# Patient Record
Sex: FEMALE | Race: WHITE | NOT HISPANIC OR LATINO | Employment: OTHER | URBAN - METROPOLITAN AREA
[De-identification: names, ages, dates, MRNs, and addresses within clinical notes are randomized per-mention and may not be internally consistent; named-entity substitution may affect disease eponyms.]

---

## 2017-04-18 ENCOUNTER — CONVERSION ENCOUNTER (OUTPATIENT)
Dept: FAMILY MEDICINE CLINIC | Facility: CLINIC | Age: 67
End: 2017-04-18

## 2017-04-18 LAB
ALBUMIN SERPL-MCNC: 4 G/DL (ref 3.6–5.1)
ALBUMIN/GLOB SERPL: ABNORMAL {RATIO} (ref 1–2.5)
ALP SERPL-CCNC: 75 UNITS/L (ref 33–130)
ALT SERPL-CCNC: 14 UNITS/L (ref 6–29)
AST SERPL-CCNC: 14 UNITS/L (ref 10–35)
BASOPHILS # BLD AUTO: ABNORMAL 10*3/MM3 (ref 0–200)
BASOPHILS NFR BLD AUTO: 0.3 %
BILIRUB SERPL-MCNC: 0.4 MG/DL (ref 0.2–1.2)
BUN SERPL-MCNC: 11 MG/DL (ref 7–25)
BUN/CREAT SERPL: ABNORMAL (ref 6–22)
CALCIUM SERPL-MCNC: 9.3 MG/DL (ref 8.6–10.4)
CHLORIDE SERPL-SCNC: 101 MMOL/L (ref 98–110)
CHOLEST SERPL-MCNC: 301 MG/DL (ref 125–200)
CHOLEST/HDLC SERPL: ABNORMAL {RATIO}
CO2 CONTENT VENOUS: 27 MMOL/L (ref 20–31)
CONV % HGB A1C: ABNORMAL %
CONV NEUTROPHILS/100 LEUKOCYTES IN BODY FLUID BY MANUAL COUNT: 68.4 %
CONV TOTAL PROTEIN: 7.9 G/DL (ref 6.1–8.1)
CREAT UR-MCNC: 0.85 MG/DL (ref 0.5–0.99)
EOSINOPHIL # BLD AUTO: 2.5 %
EOSINOPHIL # BLD AUTO: ABNORMAL 10*3/MM3 (ref 15–500)
ERYTHROCYTE [DISTWIDTH] IN BLOOD BY AUTOMATED COUNT: 13.2 % (ref 11–15)
GLOBULIN UR ELPH-MCNC: ABNORMAL G/DL (ref 1.9–3.7)
GLUCOSE SERPL-MCNC: 222 MG/DL (ref 65–99)
HCT VFR BLD AUTO: 42.3 % (ref 35–45)
HDLC SERPL-MCNC: 44 MG/DL
HGB BLD-MCNC: 14.1 G/DL (ref 11.7–15.5)
LDLC SERPL CALC-MCNC: ABNORMAL MG/DL
LYMPHOCYTES # BLD AUTO: ABNORMAL 10*3/MM3 (ref 850–3900)
LYMPHOCYTES NFR BLD AUTO: 23.6 %
MCH RBC QN AUTO: 28.7 PG (ref 27–33)
MCHC RBC AUTO-ENTMCNC: ABNORMAL % (ref 32–36)
MCV RBC AUTO: 86.2 FL (ref 80–100)
MONOCYTES # BLD AUTO: ABNORMAL 10*3/MICROLITER (ref 200–950)
MONOCYTES NFR BLD AUTO: 5.2 %
NEUTROPHILS # BLD AUTO: ABNORMAL 10*3/MM3 (ref 1500–7800)
PLATELET # BLD AUTO: ABNORMAL 10*3/MM3 (ref 140–400)
PMV BLD AUTO: 10.1 FL (ref 7.5–12.5)
POTASSIUM SERPL-SCNC: 4.1 MMOL/L (ref 3.5–5.3)
RBC # BLD AUTO: ABNORMAL 10*6/MM3 (ref 3.8–5.1)
SODIUM SERPL-SCNC: 139 MMOL/L (ref 135–146)
TRIGL SERPL-MCNC: 456 MG/DL
TSH SERPL-ACNC: 1.12 MICROINTL UNITS/ML (ref 0.4–4.5)
WBC # BLD AUTO: ABNORMAL K/UL (ref 3.8–10.8)

## 2017-05-09 ENCOUNTER — HOSPITAL ENCOUNTER (OUTPATIENT)
Dept: MAMMOGRAPHY | Facility: HOSPITAL | Age: 67
Discharge: HOME | End: 2017-05-09
Attending: NURSE PRACTITIONER | Admitting: NURSE PRACTITIONER

## 2017-05-16 ENCOUNTER — HOSPITAL ENCOUNTER (OUTPATIENT)
Dept: MAMMOGRAPHY | Facility: HOSPITAL | Age: 67
Discharge: HOME | End: 2017-05-16
Attending: NURSE PRACTITIONER | Admitting: NURSE PRACTITIONER

## 2017-06-05 ENCOUNTER — HOSPITAL ENCOUNTER (OUTPATIENT)
Dept: MAMMOGRAPHY | Facility: HOSPITAL | Age: 67
Discharge: HOME | End: 2017-06-05
Attending: NURSE PRACTITIONER | Admitting: NURSE PRACTITIONER

## 2017-08-03 ENCOUNTER — HOSPITAL ENCOUNTER (OUTPATIENT)
Dept: FAMILY MEDICINE CLINIC | Facility: CLINIC | Age: 67
Setting detail: SPECIMEN
Discharge: HOME | End: 2017-08-03
Attending: NURSE PRACTITIONER | Admitting: NURSE PRACTITIONER

## 2017-08-03 LAB
ALBUMIN SERPL-MCNC: 3.4 G/DL (ref 3.5–4.8)
ALBUMIN/GLOB SERPL: 0.9 {RATIO} (ref 1–1.7)
ALP SERPL-CCNC: 57 IU/L (ref 32–91)
ALT SERPL-CCNC: 19 IU/L (ref 14–54)
ANION GAP SERPL CALC-SCNC: 15.6 MMOL/L (ref 10–20)
AST SERPL-CCNC: 20 IU/L (ref 15–41)
BASOPHILS # BLD AUTO: 0 10*3/UL (ref 0–0.2)
BASOPHILS NFR BLD AUTO: 0 % (ref 0–2)
BILIRUB SERPL-MCNC: 0.5 MG/DL (ref 0.3–1.2)
BUN SERPL-MCNC: 12 MG/DL (ref 8–20)
BUN/CREAT SERPL: 15 (ref 5.4–26.2)
CALCIUM SERPL-MCNC: 9.5 MG/DL (ref 8.9–10.3)
CHLORIDE SERPL-SCNC: 104 MMOL/L (ref 101–111)
CHOLEST SERPL-MCNC: 248 MG/DL
CHOLEST/HDLC SERPL: 6.6 {RATIO}
CONV CO2: 23 MMOL/L (ref 22–32)
CONV LDL CHOLESTEROL DIRECT: 153 MG/DL (ref 0–100)
CONV TOTAL PROTEIN: 7.3 G/DL (ref 6.1–7.9)
CREAT UR-MCNC: 0.8 MG/DL (ref 0.4–1)
DIFFERENTIAL METHOD BLD: (no result)
EOSINOPHIL # BLD AUTO: 0.3 10*3/UL (ref 0–0.3)
EOSINOPHIL # BLD AUTO: 4 % (ref 0–3)
ERYTHROCYTE [DISTWIDTH] IN BLOOD BY AUTOMATED COUNT: 12.6 % (ref 11.5–14.5)
GLOBULIN UR ELPH-MCNC: 3.9 G/DL (ref 2.5–3.8)
GLUCOSE SERPL-MCNC: 157 MG/DL (ref 65–99)
HCT VFR BLD AUTO: 39.5 % (ref 35–49)
HDLC SERPL-MCNC: 38 MG/DL
HGB BLD-MCNC: 13 G/DL (ref 12–15)
LDLC/HDLC SERPL: 4.1 {RATIO}
LIPID INTERPRETATION: ABNORMAL
LYMPHOCYTES # BLD AUTO: 2.1 10*3/UL (ref 0.8–4.8)
LYMPHOCYTES NFR BLD AUTO: 30 % (ref 18–42)
MCH RBC QN AUTO: 28.6 PG (ref 26–32)
MCHC RBC AUTO-ENTMCNC: 33 G/DL (ref 32–36)
MCV RBC AUTO: 86.8 FL (ref 80–94)
MONOCYTES # BLD AUTO: 0.5 10*3/UL (ref 0.1–1.3)
MONOCYTES NFR BLD AUTO: 6 % (ref 2–11)
NEUTROPHILS # BLD AUTO: 4.3 10*3/UL (ref 2.3–8.6)
NEUTROPHILS NFR BLD AUTO: 60 % (ref 50–75)
NRBC BLD AUTO-RTO: 0 /100{WBCS}
NRBC/RBC NFR BLD MANUAL: 0 10*3/UL
PLATELET # BLD AUTO: 215 10*3/UL (ref 150–450)
PMV BLD AUTO: 9.1 FL (ref 7.4–10.4)
POTASSIUM SERPL-SCNC: 4.6 MMOL/L (ref 3.6–5.1)
RBC # BLD AUTO: 4.55 10*6/UL (ref 4–5.4)
SODIUM SERPL-SCNC: 138 MMOL/L (ref 136–144)
TRIGL SERPL-MCNC: 345 MG/DL
URATE SERPL-MCNC: 7.6 MG/DL (ref 2.6–8)
VLDLC SERPL CALC-MCNC: 58 MG/DL
WBC # BLD AUTO: 7.2 10*3/UL (ref 4.5–11.5)

## 2017-08-10 ENCOUNTER — HOSPITAL ENCOUNTER (OUTPATIENT)
Dept: MRI IMAGING | Facility: HOSPITAL | Age: 67
Discharge: HOME | End: 2017-08-10
Attending: NURSE PRACTITIONER | Admitting: NURSE PRACTITIONER

## 2017-09-01 ENCOUNTER — HOSPITAL ENCOUNTER (OUTPATIENT)
Dept: PREADMISSION TESTING | Facility: HOSPITAL | Age: 67
Discharge: HOME | End: 2017-09-01
Attending: ORTHOPAEDIC SURGERY | Admitting: ORTHOPAEDIC SURGERY

## 2017-09-01 LAB
ALBUMIN SERPL-MCNC: 3.7 G/DL (ref 3.5–4.8)
ALBUMIN/GLOB SERPL: 0.9 {RATIO} (ref 1–1.7)
ALP SERPL-CCNC: 61 IU/L (ref 32–91)
ALT SERPL-CCNC: 18 IU/L (ref 14–54)
ANION GAP SERPL CALC-SCNC: 17 MMOL/L (ref 10–20)
AST SERPL-CCNC: 19 IU/L (ref 15–41)
BASOPHILS # BLD AUTO: 0.1 10*3/UL (ref 0–0.2)
BASOPHILS NFR BLD AUTO: 1 % (ref 0–2)
BILIRUB SERPL-MCNC: 0.6 MG/DL (ref 0.3–1.2)
BUN SERPL-MCNC: 15 MG/DL (ref 8–20)
BUN/CREAT SERPL: 15 (ref 5.4–26.2)
CALCIUM SERPL-MCNC: 9.3 MG/DL (ref 8.9–10.3)
CHLORIDE SERPL-SCNC: 100 MMOL/L (ref 101–111)
CONV CO2: 26 MMOL/L (ref 22–32)
CONV TOTAL PROTEIN: 7.6 G/DL (ref 6.1–7.9)
CREAT UR-MCNC: 1 MG/DL (ref 0.4–1)
DIFFERENTIAL METHOD BLD: (no result)
EOSINOPHIL # BLD AUTO: 0.3 10*3/UL (ref 0–0.3)
EOSINOPHIL # BLD AUTO: 3 % (ref 0–3)
ERYTHROCYTE [DISTWIDTH] IN BLOOD BY AUTOMATED COUNT: 13.2 % (ref 11.5–14.5)
GLOBULIN UR ELPH-MCNC: 3.9 G/DL (ref 2.5–3.8)
GLUCOSE SERPL-MCNC: 231 MG/DL (ref 65–99)
HCT VFR BLD AUTO: 40.3 % (ref 35–49)
HGB BLD-MCNC: 13.7 G/DL (ref 12–15)
LYMPHOCYTES # BLD AUTO: 2.1 10*3/UL (ref 0.8–4.8)
LYMPHOCYTES NFR BLD AUTO: 27 % (ref 18–42)
Lab: NORMAL
MCH RBC QN AUTO: 29 PG (ref 26–32)
MCHC RBC AUTO-ENTMCNC: 34 G/DL (ref 32–36)
MCV RBC AUTO: 85.4 FL (ref 80–94)
MICRO REPORT STATUS: NORMAL
MONOCYTES # BLD AUTO: 0.4 10*3/UL (ref 0.1–1.3)
MONOCYTES NFR BLD AUTO: 5 % (ref 2–11)
NEUTROPHILS # BLD AUTO: 5.1 10*3/UL (ref 2.3–8.6)
NEUTROPHILS NFR BLD AUTO: 64 % (ref 50–75)
NRBC BLD AUTO-RTO: 0 /100{WBCS}
NRBC/RBC NFR BLD MANUAL: 0 10*3/UL
PLATELET # BLD AUTO: 180 10*3/UL (ref 150–450)
PMV BLD AUTO: 8.8 FL (ref 7.4–10.4)
POTASSIUM SERPL-SCNC: 4 MMOL/L (ref 3.6–5.1)
RBC # BLD AUTO: 4.72 10*6/UL (ref 4–5.4)
SODIUM SERPL-SCNC: 139 MMOL/L (ref 136–144)
SPECIMEN SOURCE: NORMAL
WBC # BLD AUTO: 8 10*3/UL (ref 4.5–11.5)

## 2017-09-05 ENCOUNTER — HOSPITAL ENCOUNTER (OUTPATIENT)
Dept: PREOP | Facility: HOSPITAL | Age: 67
Setting detail: HOSPITAL OUTPATIENT SURGERY
Discharge: HOME | End: 2017-09-05
Attending: ORTHOPAEDIC SURGERY | Admitting: ORTHOPAEDIC SURGERY

## 2017-09-05 LAB
GLUCOSE BLD-MCNC: 172 MG/DL (ref 70–105)
GLUCOSE BLD-MCNC: 257 MG/DL (ref 70–105)
GLUCOSE BLD-MCNC: 265 MG/DL (ref 70–105)
GLUCOSE BLD-MCNC: 282 MG/DL (ref 70–105)

## 2018-09-01 ENCOUNTER — HOSPITAL ENCOUNTER (OUTPATIENT)
Dept: URGENT CARE | Facility: CLINIC | Age: 68
Discharge: HOME | End: 2018-09-01
Attending: EMERGENCY MEDICINE | Admitting: EMERGENCY MEDICINE

## 2018-09-12 ENCOUNTER — HOSPITAL ENCOUNTER (OUTPATIENT)
Dept: FAMILY MEDICINE CLINIC | Facility: CLINIC | Age: 68
Setting detail: SPECIMEN
Discharge: HOME | End: 2018-09-12
Attending: NURSE PRACTITIONER | Admitting: NURSE PRACTITIONER

## 2018-09-12 LAB
ALBUMIN SERPL-MCNC: 3.5 G/DL (ref 3.5–4.8)
ALBUMIN/GLOB SERPL: 0.9 {RATIO} (ref 1–1.7)
ALP SERPL-CCNC: 71 IU/L (ref 32–91)
ALT SERPL-CCNC: 21 IU/L (ref 14–54)
ANION GAP SERPL CALC-SCNC: 14.6 MMOL/L (ref 10–20)
AST SERPL-CCNC: 22 IU/L (ref 15–41)
BASOPHILS # BLD AUTO: 0 10*3/UL (ref 0–0.2)
BASOPHILS NFR BLD AUTO: 1 % (ref 0–2)
BILIRUB SERPL-MCNC: 0.5 MG/DL (ref 0.3–1.2)
BUN SERPL-MCNC: 14 MG/DL (ref 8–20)
BUN/CREAT SERPL: 14 (ref 5.4–26.2)
CALCIUM SERPL-MCNC: 8.9 MG/DL (ref 8.9–10.3)
CHLORIDE SERPL-SCNC: 103 MMOL/L (ref 101–111)
CHOLEST SERPL-MCNC: 256 MG/DL
CHOLEST/HDLC SERPL: 6.5 {RATIO}
CONV CO2: 24 MMOL/L (ref 22–32)
CONV LDL CHOLESTEROL DIRECT: 145 MG/DL (ref 0–100)
CONV TOTAL PROTEIN: 7.2 G/DL (ref 6.1–7.9)
CREAT UR-MCNC: 1 MG/DL (ref 0.4–1)
DIFFERENTIAL METHOD BLD: (no result)
EOSINOPHIL # BLD AUTO: 0.2 10*3/UL (ref 0–0.3)
EOSINOPHIL # BLD AUTO: 3 % (ref 0–3)
ERYTHROCYTE [DISTWIDTH] IN BLOOD BY AUTOMATED COUNT: 13.1 % (ref 11.5–14.5)
GLOBULIN UR ELPH-MCNC: 3.7 G/DL (ref 2.5–3.8)
GLUCOSE SERPL-MCNC: 197 MG/DL (ref 65–99)
HCT VFR BLD AUTO: 40.4 % (ref 35–49)
HDLC SERPL-MCNC: 40 MG/DL
HGB BLD-MCNC: 13.5 G/DL (ref 12–15)
LDLC/HDLC SERPL: 3.7 {RATIO}
LIPID INTERPRETATION: ABNORMAL
LYMPHOCYTES # BLD AUTO: 1.7 10*3/UL (ref 0.8–4.8)
LYMPHOCYTES NFR BLD AUTO: 24 % (ref 18–42)
MCH RBC QN AUTO: 28.5 PG (ref 26–32)
MCHC RBC AUTO-ENTMCNC: 33.3 G/DL (ref 32–36)
MCV RBC AUTO: 85.5 FL (ref 80–94)
MONOCYTES # BLD AUTO: 0.4 10*3/UL (ref 0.1–1.3)
MONOCYTES NFR BLD AUTO: 6 % (ref 2–11)
NEUTROPHILS # BLD AUTO: 4.6 10*3/UL (ref 2.3–8.6)
NEUTROPHILS NFR BLD AUTO: 66 % (ref 50–75)
NRBC BLD AUTO-RTO: 0 /100{WBCS}
NRBC/RBC NFR BLD MANUAL: 0 10*3/UL
PLATELET # BLD AUTO: 235 10*3/UL (ref 150–450)
PMV BLD AUTO: 9.6 FL (ref 7.4–10.4)
POTASSIUM SERPL-SCNC: 4.6 MMOL/L (ref 3.6–5.1)
RBC # BLD AUTO: 4.73 10*6/UL (ref 4–5.4)
SODIUM SERPL-SCNC: 137 MMOL/L (ref 136–144)
TRIGL SERPL-MCNC: 497 MG/DL
VLDLC SERPL CALC-MCNC: 71.7 MG/DL
WBC # BLD AUTO: 7 10*3/UL (ref 4.5–11.5)

## 2018-09-27 ENCOUNTER — HOSPITAL ENCOUNTER (OUTPATIENT)
Dept: GENERAL RADIOLOGY | Facility: HOSPITAL | Age: 68
Discharge: HOME | End: 2018-09-27
Attending: NURSE PRACTITIONER | Admitting: NURSE PRACTITIONER

## 2018-12-12 ENCOUNTER — HOSPITAL ENCOUNTER (OUTPATIENT)
Dept: FAMILY MEDICINE CLINIC | Facility: CLINIC | Age: 68
Setting detail: SPECIMEN
Discharge: HOME | End: 2018-12-12
Attending: NURSE PRACTITIONER | Admitting: NURSE PRACTITIONER

## 2018-12-12 LAB
ALBUMIN SERPL-MCNC: 3.6 G/DL (ref 3.5–4.8)
ALBUMIN/GLOB SERPL: 1 {RATIO} (ref 1–1.7)
ALP SERPL-CCNC: 71 IU/L (ref 32–91)
ALT SERPL-CCNC: 13 IU/L (ref 14–54)
ANION GAP SERPL CALC-SCNC: 14.4 MMOL/L (ref 10–20)
AST SERPL-CCNC: 17 IU/L (ref 15–41)
BILIRUB SERPL-MCNC: 0.6 MG/DL (ref 0.3–1.2)
BUN SERPL-MCNC: 16 MG/DL (ref 8–20)
BUN/CREAT SERPL: 16 (ref 5.4–26.2)
CALCIUM SERPL-MCNC: 8.6 MG/DL (ref 8.9–10.3)
CHLORIDE SERPL-SCNC: 101 MMOL/L (ref 101–111)
CHOLEST SERPL-MCNC: 150 MG/DL
CHOLEST/HDLC SERPL: 3.8 {RATIO}
CONV CO2: 26 MMOL/L (ref 22–32)
CONV LDL CHOLESTEROL DIRECT: 80 MG/DL (ref 0–100)
CONV TOTAL PROTEIN: 7.3 G/DL (ref 6.1–7.9)
CREAT UR-MCNC: 1 MG/DL (ref 0.4–1)
GLOBULIN UR ELPH-MCNC: 3.7 G/DL (ref 2.5–3.8)
GLUCOSE SERPL-MCNC: 260 MG/DL (ref 65–99)
HBA1C MFR BLD: 8.5 % (ref 0–5.6)
HDLC SERPL-MCNC: 40 MG/DL
LDLC/HDLC SERPL: 2 {RATIO}
LIPID INTERPRETATION: ABNORMAL
POTASSIUM SERPL-SCNC: 4.4 MMOL/L (ref 3.6–5.1)
SODIUM SERPL-SCNC: 137 MMOL/L (ref 136–144)
TRIGL SERPL-MCNC: 260 MG/DL
VLDLC SERPL CALC-MCNC: 30.7 MG/DL

## 2019-07-22 RX ORDER — GLIMEPIRIDE 2 MG/1
TABLET ORAL
Qty: 360 TABLET | Refills: 0 | Status: SHIPPED | OUTPATIENT
Start: 2019-07-22 | End: 2020-01-16

## 2019-12-06 RX ORDER — ESCITALOPRAM OXALATE 10 MG/1
10 TABLET ORAL DAILY
Qty: 30 TABLET | Refills: 3 | Status: SHIPPED | OUTPATIENT
Start: 2019-12-06 | End: 2019-12-09 | Stop reason: SDUPTHER

## 2019-12-09 RX ORDER — ESCITALOPRAM OXALATE 10 MG/1
TABLET ORAL
Qty: 30 TABLET | Refills: 4 | Status: SHIPPED | OUTPATIENT
Start: 2019-12-09 | End: 2020-08-27

## 2020-01-16 RX ORDER — GLIMEPIRIDE 2 MG/1
TABLET ORAL
Qty: 360 TABLET | Refills: 0 | Status: SHIPPED | OUTPATIENT
Start: 2020-01-16 | End: 2020-05-22

## 2020-03-04 RX ORDER — ATORVASTATIN CALCIUM 20 MG/1
TABLET, FILM COATED ORAL
Qty: 90 TABLET | Refills: 0 | Status: SHIPPED | OUTPATIENT
Start: 2020-03-04 | End: 2021-06-02 | Stop reason: SDUPTHER

## 2020-04-08 RX ORDER — BISOPROLOL FUMARATE 10 MG/1
TABLET, FILM COATED ORAL
Qty: 90 TABLET | Refills: 0 | Status: SHIPPED | OUTPATIENT
Start: 2020-04-08 | End: 2020-06-25 | Stop reason: SDUPTHER

## 2020-05-22 RX ORDER — GLIMEPIRIDE 2 MG/1
TABLET ORAL
Qty: 360 TABLET | Refills: 0 | Status: SHIPPED | OUTPATIENT
Start: 2020-05-22 | End: 2020-09-01

## 2020-05-22 NOTE — TELEPHONE ENCOUNTER
We have been filling patiens medications without being seen since 2018. She needs to make appt for refills. I did send a 90 day supply today on amaryl but needs appt please

## 2020-05-22 NOTE — TELEPHONE ENCOUNTER
Gave message to patient at 4:25pm and scheduled an appt with [facility] for 06/01/2020 at 9am.

## 2020-05-26 RX ORDER — AMLODIPINE BESYLATE 10 MG/1
TABLET ORAL
Qty: 90 TABLET | Refills: 0 | Status: SHIPPED | OUTPATIENT
Start: 2020-05-26 | End: 2020-08-24

## 2020-05-26 RX ORDER — HYDROCHLOROTHIAZIDE 50 MG/1
TABLET ORAL
Qty: 90 TABLET | Refills: 0 | Status: SHIPPED | OUTPATIENT
Start: 2020-05-26 | End: 2020-08-24

## 2020-06-02 ENCOUNTER — OFFICE VISIT (OUTPATIENT)
Dept: FAMILY MEDICINE CLINIC | Facility: CLINIC | Age: 70
End: 2020-06-02

## 2020-06-02 ENCOUNTER — LAB (OUTPATIENT)
Dept: FAMILY MEDICINE CLINIC | Facility: CLINIC | Age: 70
End: 2020-06-02

## 2020-06-02 VITALS
OXYGEN SATURATION: 97 % | HEIGHT: 64 IN | HEART RATE: 55 BPM | TEMPERATURE: 97.7 F | SYSTOLIC BLOOD PRESSURE: 120 MMHG | BODY MASS INDEX: 37.39 KG/M2 | RESPIRATION RATE: 8 BRPM | WEIGHT: 219 LBS | DIASTOLIC BLOOD PRESSURE: 74 MMHG

## 2020-06-02 DIAGNOSIS — E78.2 MIXED HYPERLIPIDEMIA: ICD-10-CM

## 2020-06-02 DIAGNOSIS — I10 ESSENTIAL HYPERTENSION: ICD-10-CM

## 2020-06-02 DIAGNOSIS — E11.65 TYPE 2 DIABETES MELLITUS WITH HYPERGLYCEMIA, WITHOUT LONG-TERM CURRENT USE OF INSULIN (HCC): ICD-10-CM

## 2020-06-02 DIAGNOSIS — M15.8 OTHER OSTEOARTHRITIS INVOLVING MULTIPLE JOINTS: ICD-10-CM

## 2020-06-02 DIAGNOSIS — E55.9 VITAMIN D DEFICIENCY: ICD-10-CM

## 2020-06-02 DIAGNOSIS — M85.89 OSTEOPENIA OF MULTIPLE SITES: ICD-10-CM

## 2020-06-02 DIAGNOSIS — E11.65 TYPE 2 DIABETES MELLITUS WITH HYPERGLYCEMIA, WITHOUT LONG-TERM CURRENT USE OF INSULIN (HCC): Primary | ICD-10-CM

## 2020-06-02 DIAGNOSIS — Z12.11 SCREENING FOR COLON CANCER: ICD-10-CM

## 2020-06-02 DIAGNOSIS — E11.42 DIABETIC PERIPHERAL NEUROPATHY (HCC): ICD-10-CM

## 2020-06-02 PROBLEM — E78.5 HYPERLIPIDEMIA: Status: ACTIVE | Noted: 2020-06-02

## 2020-06-02 PROBLEM — M85.80 OSTEOPENIA: Status: ACTIVE | Noted: 2017-04-17

## 2020-06-02 PROBLEM — K58.9 IRRITABLE BOWEL SYNDROME WITHOUT DIARRHEA: Status: ACTIVE | Noted: 2017-04-17

## 2020-06-02 PROBLEM — G62.9 PERIPHERAL NEUROPATHY: Status: ACTIVE | Noted: 2017-04-17

## 2020-06-02 PROBLEM — F43.21 ADJUSTMENT DISORDER WITH DEPRESSED MOOD: Status: ACTIVE | Noted: 2018-09-12

## 2020-06-02 PROBLEM — F32.A DEPRESSION: Status: ACTIVE | Noted: 2018-09-12

## 2020-06-02 LAB
25(OH)D3 SERPL-MCNC: 6.7 NG/ML (ref 30–100)
ALBUMIN SERPL-MCNC: 4.4 G/DL (ref 3.5–5.2)
ALBUMIN UR-MCNC: 2.5 MG/DL
ALBUMIN/GLOB SERPL: 1.2 G/DL
ALP SERPL-CCNC: 85 U/L (ref 39–117)
ALT SERPL W P-5'-P-CCNC: 14 U/L (ref 1–33)
ANION GAP SERPL CALCULATED.3IONS-SCNC: 12.7 MMOL/L (ref 5–15)
AST SERPL-CCNC: 11 U/L (ref 1–32)
BASOPHILS # BLD AUTO: 0.03 10*3/MM3 (ref 0–0.2)
BASOPHILS NFR BLD AUTO: 0.4 % (ref 0–1.5)
BILIRUB SERPL-MCNC: 0.2 MG/DL (ref 0.2–1.2)
BUN BLD-MCNC: 30 MG/DL (ref 8–23)
BUN/CREAT SERPL: 28.8 (ref 7–25)
CALCIUM SPEC-SCNC: 9.6 MG/DL (ref 8.6–10.5)
CHLORIDE SERPL-SCNC: 99 MMOL/L (ref 98–107)
CHOLEST SERPL-MCNC: 154 MG/DL (ref 0–200)
CO2 SERPL-SCNC: 23.3 MMOL/L (ref 22–29)
CREAT BLD-MCNC: 1.04 MG/DL (ref 0.57–1)
DEPRECATED RDW RBC AUTO: 40 FL (ref 37–54)
EOSINOPHIL # BLD AUTO: 0.26 10*3/MM3 (ref 0–0.4)
EOSINOPHIL NFR BLD AUTO: 3.5 % (ref 0.3–6.2)
ERYTHROCYTE [DISTWIDTH] IN BLOOD BY AUTOMATED COUNT: 12.8 % (ref 12.3–15.4)
GFR SERPL CREATININE-BSD FRML MDRD: 52 ML/MIN/1.73
GLOBULIN UR ELPH-MCNC: 3.7 GM/DL
GLUCOSE BLD-MCNC: 250 MG/DL (ref 65–99)
HBA1C MFR BLD: 10.3 % (ref 3.5–5.6)
HCT VFR BLD AUTO: 41.5 % (ref 34–46.6)
HDLC SERPL-MCNC: 38 MG/DL (ref 40–60)
HGB BLD-MCNC: 13.6 G/DL (ref 12–15.9)
IMM GRANULOCYTES # BLD AUTO: 0.02 10*3/MM3 (ref 0–0.05)
IMM GRANULOCYTES NFR BLD AUTO: 0.3 % (ref 0–0.5)
LDLC SERPL CALC-MCNC: 47 MG/DL (ref 0–100)
LDLC/HDLC SERPL: 1.25 {RATIO}
LYMPHOCYTES # BLD AUTO: 1.97 10*3/MM3 (ref 0.7–3.1)
LYMPHOCYTES NFR BLD AUTO: 26.9 % (ref 19.6–45.3)
MCH RBC QN AUTO: 28 PG (ref 26.6–33)
MCHC RBC AUTO-ENTMCNC: 32.8 G/DL (ref 31.5–35.7)
MCV RBC AUTO: 85.6 FL (ref 79–97)
MONOCYTES # BLD AUTO: 0.49 10*3/MM3 (ref 0.1–0.9)
MONOCYTES NFR BLD AUTO: 6.7 % (ref 5–12)
NEUTROPHILS # BLD AUTO: 4.56 10*3/MM3 (ref 1.7–7)
NEUTROPHILS NFR BLD AUTO: 62.2 % (ref 42.7–76)
NRBC BLD AUTO-RTO: 0.1 /100 WBC (ref 0–0.2)
PLATELET # BLD AUTO: 196 10*3/MM3 (ref 140–450)
PMV BLD AUTO: 11.5 FL (ref 6–12)
POTASSIUM BLD-SCNC: 5.5 MMOL/L (ref 3.5–5.2)
PROT SERPL-MCNC: 8.1 G/DL (ref 6–8.5)
RBC # BLD AUTO: 4.85 10*6/MM3 (ref 3.77–5.28)
SODIUM BLD-SCNC: 135 MMOL/L (ref 136–145)
TRIGL SERPL-MCNC: 343 MG/DL (ref 0–150)
TSH SERPL DL<=0.05 MIU/L-ACNC: 1.62 UIU/ML (ref 0.27–4.2)
VLDLC SERPL-MCNC: 68.6 MG/DL (ref 5–40)
WBC NRBC COR # BLD: 7.33 10*3/MM3 (ref 3.4–10.8)

## 2020-06-02 PROCEDURE — 80053 COMPREHEN METABOLIC PANEL: CPT | Performed by: NURSE PRACTITIONER

## 2020-06-02 PROCEDURE — 85025 COMPLETE CBC W/AUTO DIFF WBC: CPT | Performed by: NURSE PRACTITIONER

## 2020-06-02 PROCEDURE — 84443 ASSAY THYROID STIM HORMONE: CPT | Performed by: NURSE PRACTITIONER

## 2020-06-02 PROCEDURE — 82306 VITAMIN D 25 HYDROXY: CPT | Performed by: NURSE PRACTITIONER

## 2020-06-02 PROCEDURE — 99215 OFFICE O/P EST HI 40 MIN: CPT | Performed by: NURSE PRACTITIONER

## 2020-06-02 PROCEDURE — 80061 LIPID PANEL: CPT | Performed by: NURSE PRACTITIONER

## 2020-06-02 PROCEDURE — 82043 UR ALBUMIN QUANTITATIVE: CPT | Performed by: NURSE PRACTITIONER

## 2020-06-02 PROCEDURE — 83036 HEMOGLOBIN GLYCOSYLATED A1C: CPT | Performed by: NURSE PRACTITIONER

## 2020-06-02 RX ORDER — GABAPENTIN 600 MG/1
600 TABLET ORAL 3 TIMES DAILY
Qty: 90 TABLET | Refills: 3 | Status: SHIPPED | OUTPATIENT
Start: 2020-06-02 | End: 2020-08-31 | Stop reason: SDUPTHER

## 2020-06-02 RX ORDER — GABAPENTIN 300 MG/1
300 CAPSULE ORAL 3 TIMES DAILY
COMMUNITY
End: 2020-06-02

## 2020-06-02 RX ORDER — MELOXICAM 15 MG/1
15 TABLET ORAL DAILY
Qty: 30 TABLET | Refills: 3 | Status: SHIPPED | OUTPATIENT
Start: 2020-06-02 | End: 2020-08-31 | Stop reason: SDUPTHER

## 2020-06-02 RX ORDER — ASPIRIN 81 MG/1
TABLET ORAL
COMMUNITY
Start: 2013-09-04

## 2020-06-02 NOTE — PROGRESS NOTES
"Subjective   Dora Solorio is a 69 y.o. female.     Chief Complaint   Patient presents with   • Follow-up     medications       /74   Pulse 55   Temp 97.7 °F (36.5 °C) (Temporal)   Resp 8   Ht 162.6 cm (64\")   Wt 99.3 kg (219 lb)   SpO2 97%   BMI 37.59 kg/m²     BP Readings from Last 3 Encounters:   06/02/20 120/74   09/12/18 150/84   08/03/17 126/82       Wt Readings from Last 3 Encounters:   06/02/20 99.3 kg (219 lb)   09/12/18 100 kg (221 lb)   08/03/17 97.3 kg (214 lb 8 oz)       Pt comes in today for follow up on meds. Hasn't been seen in the office in over 1.5 years.  DM: not doing great with diet. BS has been running 110-200 different times throughout day. This morning was 181. Does have increased thirst. She contributes that to her HCTZ.   DPN: getting worse. Feels like \"i'm walking on rocks\". Has pain and burning in noel feet. Takes gabapentin 300mg tid.   Depression: when last seen she was started on lexapro for depression and grief of grandson. Doing well on it and tolerating well.   HTN: BP readings at home running 110-130s/70s.   No recent CP, SOA, palpitations, dizziness, or HA's.     Diabetes   She has type 2 diabetes mellitus. No MedicAlert identification noted. The initial diagnosis of diabetes was made 10 years ago. Pertinent negatives for hypoglycemia include no confusion, dizziness, headaches, hunger, mood changes, nervousness/anxiousness, seizures, sleepiness, speech difficulty, sweats or tremors. Associated symptoms include foot paresthesias. Pertinent negatives for diabetes include no blurred vision, no chest pain, no fatigue, no foot ulcerations, no polydipsia, no polyuria, no weakness and no weight loss. Pertinent negatives for hypoglycemia complications include no blackouts, no hospitalization and no required assistance. Symptoms are stable. Diabetic complications include peripheral neuropathy. Pertinent negatives for diabetic complications include no CVA, heart disease, " nephropathy or retinopathy. Risk factors for coronary artery disease include dyslipidemia, family history and hypertension. Current diabetic treatment includes oral agent (dual therapy). She is compliant with treatment most of the time. Her weight is stable. She is following a generally healthy diet. Meal planning includes avoidance of concentrated sweets. She has not had a previous visit with a dietitian. She participates in exercise intermittently. She monitors blood glucose at home 1-2 x per day. She monitors urine at home <1 x per month. Blood glucose monitoring compliance is fair. Her home blood glucose trend is fluctuating minimally. Her breakfast blood glucose is taken between 7-8 am. Her breakfast blood glucose range is generally 130-140 mg/dl. Her lunch blood glucose is taken between 12-1 pm. Her lunch blood glucose range is generally 140-180 mg/dl. Her dinner blood glucose is taken between 5-6 pm. Her dinner blood glucose range is generally 180-200 mg/dl. Her highest blood glucose is 180-200 mg/dl. Her overall blood glucose range is 140-180 mg/dl. She does not see a podiatrist.Eye exam is current.        The following portions of the patient's history were reviewed and updated as appropriate: allergies, current medications, past family history, past medical history, past social history, past surgical history and problem list.    Review of Systems   Constitutional: Negative for activity change, appetite change, fatigue, unexpected weight gain and unexpected weight loss.   Eyes: Negative for blurred vision.   Cardiovascular: Negative for chest pain and palpitations.   Gastrointestinal: Negative for abdominal pain, diarrhea and nausea.   Endocrine: Negative for polydipsia and polyuria.   Genitourinary: Negative for difficulty urinating.   Musculoskeletal: Positive for arthralgias, back pain and myalgias.   Skin: Positive for dry skin.   Neurological: Negative for dizziness, tremors, seizures, speech  difficulty, weakness and confusion.   Psychiatric/Behavioral: The patient is not nervous/anxious.        Objective   Physical Exam   Constitutional: She is oriented to person, place, and time. She appears well-developed and well-nourished.   HENT:   Head: Normocephalic.   Eyes: Pupils are equal, round, and reactive to light.   Neck: Normal range of motion.   Cardiovascular: Normal rate and regular rhythm.   Pulmonary/Chest: Effort normal and breath sounds normal.   Abdominal: Soft. Bowel sounds are normal. There is no tenderness.   Musculoskeletal:   Lumbar: degenerative changes    Neurological: She is alert and oriented to person, place, and time.   Skin: Skin is warm and dry.   Psychiatric: She has a normal mood and affect. Her behavior is normal.         Diagnoses and all orders for this visit:    1. Type 2 diabetes mellitus with hyperglycemia, without long-term current use of insulin (CMS/[facility]) (Primary)  -     CBC & Differential; Future  -     Comprehensive Metabolic Panel; Future  -     Hemoglobin A1c; Future  -     TSH; Future  -     MicroAlbumin, Urine, Random - Urine, Clean Catch; Future    2. Diabetic peripheral neuropathy (CMS/HCC)  -     gabapentin (NEURONTIN) 600 MG tablet; Take 1 tablet by mouth 3 (Three) Times a Day.  Dispense: 90 tablet; Refill: 3    3. Essential hypertension  -     TSH; Future    4. Mixed hyperlipidemia  -     Lipid Panel; Future    5. Other osteoarthritis involving multiple joints  -     meloxicam (Mobic) 15 MG tablet; Take 1 tablet by mouth Daily.  Dispense: 30 tablet; Refill: 3  -     Vitamin D 25 Hydroxy; Future    6. Vitamin D deficiency  -     Vitamin D 25 Hydroxy; Future    7. Osteopenia of multiple sites  -     DEXA Bone Density Axial    8. Screening for colon cancer  -     Cologuard - Stool, Per Rectum; Future    check labs  dexa scan  cologuard  Will check on PNA vaccine hx  Discussed meds  We are going to trial mobic for OA pain  Will increase her dany to 600mg tid for  SHERWIN, but really need to work on getting her DM under control.   Follow up next week for medicare wellness and review labs  35 min spent with pt discussing her medications, treatment plan, and DM education.   Discussed importance of regular exercise and recommended starting or continuing a regular exercise program for good health. The patient was also encouraged to lose weight for better health.   The importance of monitoring blood sugar regularly was reviewed.  The importance of monitoring the HgBA1C level regularly was reviewed.  The importance of monitoring urine microalbumin regularly to check for kidney damage was reviewed.   The importance of annual eye exams to prevent blindness was reviewed.  The importance of proper foot care and regularly checking feet to prevent sores and possibly loss of limbs was reviewed.   During this office visit, we discussed the pertinent aspects of the visit and treatment recommendations. Pt verbalizes understanding. Follow up was discussed. Patient was given the opportunity to ask questions and discuss other concerns.       Return in about 1 week (around 6/9/2020) for Medicare Wellness.  Answers for HPI/ROS submitted by the patient on 5/26/2020   Diabetes problem  What is the primary reason for your visit?: Diabetes

## 2020-06-12 ENCOUNTER — HOSPITAL ENCOUNTER (OUTPATIENT)
Dept: BONE DENSITY | Facility: HOSPITAL | Age: 70
Discharge: HOME | End: 2020-06-12
Admitting: NURSE PRACTITIONER

## 2020-06-12 PROCEDURE — 77080 DXA BONE DENSITY AXIAL: CPT

## 2020-06-15 ENCOUNTER — TELEPHONE (OUTPATIENT)
Dept: FAMILY MEDICINE CLINIC | Facility: CLINIC | Age: 70
End: 2020-06-15

## 2020-06-15 NOTE — PROGRESS NOTES
Let pt know that her dexa scan showed she is osteopenic. I want to make sure she is taking at least 1200mg calcium/day and 1000 iu Vit D/daily.

## 2020-06-15 NOTE — TELEPHONE ENCOUNTER
----- Message from BRANDON Levy sent at 6/15/2020  8:06 AM EDT -----  Let pt know that her dexa scan showed she is osteopenic. I want to make sure she is taking at least 1200mg calcium/day and 1000 iu Vit D/daily.

## 2020-06-25 ENCOUNTER — OFFICE VISIT (OUTPATIENT)
Dept: FAMILY MEDICINE CLINIC | Facility: CLINIC | Age: 70
End: 2020-06-25

## 2020-06-25 VITALS
RESPIRATION RATE: 16 BRPM | WEIGHT: 225 LBS | TEMPERATURE: 98.2 F | HEIGHT: 64 IN | SYSTOLIC BLOOD PRESSURE: 138 MMHG | OXYGEN SATURATION: 93 % | DIASTOLIC BLOOD PRESSURE: 70 MMHG | BODY MASS INDEX: 38.41 KG/M2 | HEART RATE: 58 BPM

## 2020-06-25 DIAGNOSIS — I10 ESSENTIAL HYPERTENSION: ICD-10-CM

## 2020-06-25 DIAGNOSIS — E55.9 VITAMIN D DEFICIENCY: ICD-10-CM

## 2020-06-25 DIAGNOSIS — Z00.00 MEDICARE ANNUAL WELLNESS VISIT, SUBSEQUENT: Primary | ICD-10-CM

## 2020-06-25 DIAGNOSIS — E11.65 TYPE 2 DIABETES MELLITUS WITH HYPERGLYCEMIA, WITHOUT LONG-TERM CURRENT USE OF INSULIN (HCC): ICD-10-CM

## 2020-06-25 DIAGNOSIS — L08.9 SOFT TISSUE INFECTION: ICD-10-CM

## 2020-06-25 DIAGNOSIS — E78.2 MIXED HYPERLIPIDEMIA: ICD-10-CM

## 2020-06-25 PROCEDURE — G0439 PPPS, SUBSEQ VISIT: HCPCS | Performed by: NURSE PRACTITIONER

## 2020-06-25 PROCEDURE — 99214 OFFICE O/P EST MOD 30 MIN: CPT | Performed by: NURSE PRACTITIONER

## 2020-06-25 RX ORDER — BISOPROLOL FUMARATE 10 MG/1
10 TABLET, FILM COATED ORAL DAILY
Qty: 90 TABLET | Refills: 1 | Status: SHIPPED | OUTPATIENT
Start: 2020-06-25 | End: 2020-12-30

## 2020-06-25 RX ORDER — LISINOPRIL 20 MG/1
20 TABLET ORAL DAILY
COMMUNITY
End: 2020-06-29

## 2020-06-25 RX ORDER — ERGOCALCIFEROL 1.25 MG/1
50000 CAPSULE ORAL WEEKLY
Qty: 12 CAPSULE | Refills: 1 | Status: SHIPPED | OUTPATIENT
Start: 2020-06-25 | End: 2020-12-07

## 2020-06-25 RX ORDER — CEPHALEXIN 500 MG/1
500 CAPSULE ORAL 3 TIMES DAILY
Qty: 30 CAPSULE | Refills: 0 | Status: SHIPPED | OUTPATIENT
Start: 2020-06-25 | End: 2021-10-27

## 2020-06-25 NOTE — PROGRESS NOTES
The ABCs of the Annual Wellness Visit  Subsequent Medicare Wellness Visit    Chief Complaint   Patient presents with   • Medicare Wellness-subsequent       Subjective   History of Present Illness:  Dora Solorio is a 69 y.o. female who presents for a Subsequent Medicare Wellness Visit and also to follow up on recent labs.   Uncontrolled DM: Not monitoring BS regularly at home, but when she does 200s at times. Knows she needs to make diet changes and get rid of soft drinks. She is currently on metformin BID and amaryl BID. We have trid bydureon in the past, but too expensive. We will try trulicity and see how she does (samples will be given for now). She will contact her insurance company to see what we can get covered.   Vit D: Vit d was LOW at 6.7. Will start drisdol. She does c/o fatigue and low energy.   HTN: needs refill on bisoprolol     Cologuard pending.       HEALTH RISK ASSESSMENT    Recent Hospitalizations:  No hospitalization(s) within the last year.    Current Medical Providers:  Patient Care Team:  Bhargavi Rodriguez APRN as PCP - General    Smoking Status:  Social History     Tobacco Use   Smoking Status Never Smoker   Smokeless Tobacco Never Used       Alcohol Consumption:  Social History     Substance and Sexual Activity   Alcohol Use Never   • Frequency: Never       Depression Screen:   PHQ-2/PHQ-9 Depression Screening 6/25/2020   Little interest or pleasure in doing things 0   Feeling down, depressed, or hopeless 0   Total Score 0       Fall Risk Screen:  STEADI Fall Risk Assessment has not been completed.    Health Habits and Functional and Cognitive Screening:  Functional & Cognitive Status 6/25/2020   Do you have difficulty preparing food and eating? No   Do you have difficulty bathing yourself, getting dressed or grooming yourself? No   Do you have difficulty using the toilet? No   Do you have difficulty moving around from place to place? No   Do you have trouble with steps or getting out of a  bed or a chair? Yes   Current Diet Well Balanced Diet   Do you need help using the phone?  No   Are you deaf or do you have serious difficulty hearing?  No   Do you need help with transportation? Yes   Do you need help shopping? No   Do you need help preparing meals?  No   Do you need help with housework?  No   Do you need help with laundry? No   Do you need help taking your medications? No   Do you need help managing money? No   Do you ever drive or ride in a car without wearing a seat belt? No   Have you felt unusual stress, anger or loneliness in the last month? No   Who do you live with? Spouse   If you need help, do you have trouble finding someone available to you? No   Have you been bothered in the last four weeks by sexual problems? No   Do you have difficulty concentrating, remembering or making decisions? No     Does the patient have evidence of cognitive impairment? No    Asprin use counseling:Taking ASA appropriately as indicated    Age-appropriate Screening Schedule:  Refer to the list below for future screening recommendations based on patient's age, sex and/or medical conditions. Orders for these recommended tests are listed in the plan section. The patient has been provided with a written plan.    Health Maintenance   Topic Date Due   • ZOSTER VACCINE (1 of 2) 06/11/2021 (Originally 7/23/1998)   • TDAP/TD VACCINES (1 - Tdap) 07/01/2021 (Originally 7/23/1961)   • INFLUENZA VACCINE  08/01/2020   • MAMMOGRAM  09/27/2020   • HEMOGLOBIN A1C  12/02/2020   • DIABETIC EYE EXAM  01/29/2021   • DIABETIC FOOT EXAM  06/02/2021   • LIPID PANEL  06/02/2021   • URINE MICROALBUMIN  06/02/2021   • DXA SCAN  06/12/2022          The following portions of the patient's history were reviewed and updated as appropriate: allergies, current medications, past family history, past medical history, past social history, past surgical history and problem list.    Outpatient Medications Prior to Visit   Medication Sig Dispense  Refill   • amLODIPine (NORVASC) 10 MG tablet Take 1 tablet by mouth once daily 90 tablet 0   • aspirin (ASPIR) 81 MG EC tablet ASPIRIN 81 81 MG TBEC     • atorvastatin (LIPITOR) 20 MG tablet TAKE 1 TABLET BY MOUTH ONCE DAILY 90 tablet 0   • escitalopram (LEXAPRO) 10 MG tablet TAKE 1 TABLET BY MOUTH ONCE DAILY 30 tablet 4   • gabapentin (NEURONTIN) 600 MG tablet Take 1 tablet by mouth 3 (Three) Times a Day. 90 tablet 3   • glimepiride (AMARYL) 2 MG tablet Take 2 tablets by mouth twice daily 360 tablet 0   • hydroCHLOROthiazide (HYDRODIURIL) 50 MG tablet Take 1 tablet by mouth once daily 90 tablet 0   • lisinopril (PRINIVIL,ZESTRIL) 20 MG tablet Take 20 mg by mouth Daily.     • meloxicam (Mobic) 15 MG tablet Take 1 tablet by mouth Daily. 30 tablet 3   • metFORMIN (GLUCOPHAGE) 1000 MG tablet Take 1,000 mg by mouth 2 (Two) Times a Day With Meals.     • bisoprolol (ZEBeta) 10 MG tablet Take 1 tablet by mouth once daily 90 tablet 0     No facility-administered medications prior to visit.        Patient Active Problem List   Diagnosis   • Adjustment disorder with depressed mood   • Depression   • Diabetes mellitus, type II (CMS/HCC)   • Diabetic peripheral neuropathy (CMS/HCC)   • Essential hypertension   • Gout   • Hyperlipidemia   • Irritable bowel syndrome without diarrhea   • Obesity   • Osteopenia   • Peripheral neuropathy       Advanced Care Planning:  ACP discussion was declined by the patient. Patient does not have an advance directive, declines further assistance.    Review of Systems   Constitutional: Negative for activity change and unexpected weight change.   Eyes: Negative for visual disturbance.   Cardiovascular: Negative for chest pain, palpitations and leg swelling.   Gastrointestinal: Negative for abdominal distention, constipation and diarrhea.   Endocrine: Negative for polydipsia and polyuria.   Neurological: Negative for headaches.   Psychiatric/Behavioral: Negative for confusion and sleep disturbance.  "The patient is not nervous/anxious.        Compared to one year ago, the patient feels her physical health is worse.  Compared to one year ago, the patient feels her mental health is the same.    Reviewed chart for potential of high risk medication in the elderly: yes  Reviewed chart for potential of harmful drug interactions in the elderly:yes    Objective         Vitals:    06/25/20 1031   BP: 138/70   BP Location: Left arm   Patient Position: Sitting   Cuff Size: Large Adult   Pulse: 58   Resp: 16   Temp: 98.2 °F (36.8 °C)   TempSrc: Temporal   SpO2: 93%   Weight: 102 kg (225 lb)   Height: 162.6 cm (64\")       Body mass index is 38.62 kg/m².  Discussed the patient's BMI with her. The BMI is above average; BMI management plan is completed.    Physical Exam   Constitutional: She is oriented to person, place, and time. She appears well-developed.   HENT:   Head: Normocephalic.   Eyes: Pupils are equal, round, and reactive to light. Conjunctivae are normal.   Neck: Neck supple. No thyromegaly present.   Cardiovascular: Normal rate and regular rhythm.   No murmur heard.  Pulmonary/Chest: Effort normal and breath sounds normal.   Abdominal: Soft. Bowel sounds are normal. She exhibits no mass. There is no tenderness.   Neurological: She is alert and oriented to person, place, and time.   Skin: Skin is warm and dry. No lesion noted.   Psychiatric: She has a normal mood and affect. Her behavior is normal.       Lab Results   Component Value Date    TRIG 343 (H) 06/02/2020    HDL 38 (L) 06/02/2020    LDL 47 06/02/2020    VLDL 68.6 (H) 06/02/2020    HGBA1C 10.3 (H) 06/02/2020        Assessment/Plan   Medicare Risks and Personalized Health Plan  CMS Preventative Services Quick Reference  Advance Directive Discussion  Breast Cancer/Mammogram Screening  Colon Cancer Screening  Depression/Dysphoria  Diabetic Lab Screening   Fall Risk  Hearing Problem  Obesity/Overweight   Osteoprorosis Risk    The above risks/problems have been " discussed with the patient.  Pertinent information has been shared with the patient in the After Visit Summary.  Follow up plans and orders are seen below in the Assessment/Plan Section.    Diagnoses and all orders for this visit:    1. Medicare annual wellness visit, subsequent (Primary)    2. Vitamin D deficiency  Comments:  Vit D was LOW at 6.7. Will start drisdol weekly   Orders:  -     vitamin D (ERGOCALCIFEROL) 1.25 MG (92465 UT) capsule capsule; Take 1 capsule by mouth 1 (One) Time Per Week.  Dispense: 12 capsule; Refill: 1    3. Essential hypertension  -     bisoprolol (ZEBeta) 10 MG tablet; Take 1 tablet by mouth Daily.  Dispense: 90 tablet; Refill: 1    4. Type 2 diabetes mellitus with hyperglycemia, without long-term current use of insulin (CMS/[facility])  Comments:  A1C 10.3. We are going to try and add trulicity. Discussed diet and weight management as well.   Orders:  -     Dulaglutide (Trulicity) 0.75 MG/0.5ML solution pen-injector; Inject 0.75 mg under the skin into the appropriate area as directed 1 (One) Time Per Week.  Dispense: 4 pen; Refill: 3    5. Mixed hyperlipidemia  Comments:  TRIGS were high, but should come down once BS comes down. Discussed diet changes.     6. Soft tissue infection  -     cephalexin (Keflex) 500 MG capsule; Take 1 capsule by mouth 3 (Three) Times a Day.  Dispense: 30 capsule; Refill: 0      Follow Up:  Return in about 3 months (around 9/25/2020) for Diabetes follow up.     An After Visit Summary and PPPS were given to the patient.     The importance of monitoring blood sugar regularly was reviewed.  The importance of monitoring the HgBA1C level regularly was reviewed.  The importance of monitoring urine microalbumin regularly to check for kidney damage was reviewed.   The importance of annual eye exams to prevent blindness was reviewed.  The importance of proper foot care and regularly checking feet to prevent sores and possibly loss of limbs was reviewed.   Discussed  importance of regular exercise and recommended starting or continuing a regular exercise program for good health. The patient was also encouraged to lose weight for better health.   During this visit for their annual exam, we reviewed their personal history, social history and family history. We went over their medications and all the recommended health maintenance items for their age group. They were given the opportunity to ask questions and discuss other concerns.

## 2020-06-29 RX ORDER — LISINOPRIL 20 MG/1
TABLET ORAL
Qty: 180 TABLET | Refills: 0 | Status: SHIPPED | OUTPATIENT
Start: 2020-06-29 | End: 2020-09-28

## 2020-08-24 RX ORDER — HYDROCHLOROTHIAZIDE 50 MG/1
TABLET ORAL
Qty: 90 TABLET | Refills: 0 | Status: SHIPPED | OUTPATIENT
Start: 2020-08-24 | End: 2020-11-23

## 2020-08-24 RX ORDER — AMLODIPINE BESYLATE 10 MG/1
TABLET ORAL
Qty: 90 TABLET | Refills: 0 | Status: SHIPPED | OUTPATIENT
Start: 2020-08-24 | End: 2020-11-23

## 2020-08-27 RX ORDER — ESCITALOPRAM OXALATE 10 MG/1
TABLET ORAL
Qty: 30 TABLET | Refills: 0 | Status: SHIPPED | OUTPATIENT
Start: 2020-08-27 | End: 2020-09-28

## 2020-08-31 DIAGNOSIS — M15.8 OTHER OSTEOARTHRITIS INVOLVING MULTIPLE JOINTS: ICD-10-CM

## 2020-08-31 DIAGNOSIS — E11.42 DIABETIC PERIPHERAL NEUROPATHY (HCC): ICD-10-CM

## 2020-08-31 RX ORDER — GABAPENTIN 600 MG/1
600 TABLET ORAL 3 TIMES DAILY
Qty: 90 TABLET | Refills: 3 | Status: SHIPPED | OUTPATIENT
Start: 2020-08-31 | End: 2021-04-05

## 2020-08-31 RX ORDER — MELOXICAM 15 MG/1
15 TABLET ORAL DAILY
Qty: 30 TABLET | Refills: 3 | Status: SHIPPED | OUTPATIENT
Start: 2020-08-31 | End: 2022-06-23

## 2020-09-01 RX ORDER — GLIMEPIRIDE 2 MG/1
TABLET ORAL
Qty: 360 TABLET | Refills: 0 | Status: SHIPPED | OUTPATIENT
Start: 2020-09-01 | End: 2021-04-05

## 2020-09-04 NOTE — TELEPHONE ENCOUNTER
PT IS REQUESTING A REFILL ON metFORMIN (GLUCOPHAGE) 1000 MG tablet    [address] - [facility] - [phone] [facility] [phone] FX    PATIENT IS COMPLETELY OUT OF THE MEDICATION.

## 2020-09-28 RX ORDER — LISINOPRIL 20 MG/1
TABLET ORAL
Qty: 180 TABLET | Refills: 0 | Status: SHIPPED | OUTPATIENT
Start: 2020-09-28 | End: 2020-12-30

## 2020-09-28 RX ORDER — ESCITALOPRAM OXALATE 10 MG/1
TABLET ORAL
Qty: 30 TABLET | Refills: 0 | Status: SHIPPED | OUTPATIENT
Start: 2020-09-28 | End: 2020-11-02

## 2020-11-02 RX ORDER — ESCITALOPRAM OXALATE 10 MG/1
TABLET ORAL
Qty: 30 TABLET | Refills: 0 | Status: SHIPPED | OUTPATIENT
Start: 2020-11-02 | End: 2020-11-30

## 2020-11-23 RX ORDER — HYDROCHLOROTHIAZIDE 50 MG/1
TABLET ORAL
Qty: 90 TABLET | Refills: 0 | Status: SHIPPED | OUTPATIENT
Start: 2020-11-23 | End: 2021-02-22

## 2020-11-23 RX ORDER — AMLODIPINE BESYLATE 10 MG/1
TABLET ORAL
Qty: 90 TABLET | Refills: 0 | Status: SHIPPED | OUTPATIENT
Start: 2020-11-23 | End: 2020-12-07

## 2020-11-30 RX ORDER — ESCITALOPRAM OXALATE 10 MG/1
TABLET ORAL
Qty: 30 TABLET | Refills: 0 | Status: SHIPPED | OUTPATIENT
Start: 2020-11-30 | End: 2020-12-30

## 2020-12-04 DIAGNOSIS — E55.9 VITAMIN D DEFICIENCY: ICD-10-CM

## 2020-12-07 RX ORDER — AMLODIPINE BESYLATE 10 MG/1
TABLET ORAL
Qty: 90 TABLET | Refills: 0 | Status: SHIPPED | OUTPATIENT
Start: 2020-12-07 | End: 2021-05-31

## 2020-12-07 RX ORDER — ERGOCALCIFEROL 1.25 MG/1
CAPSULE ORAL
Qty: 12 CAPSULE | Refills: 0 | Status: SHIPPED | OUTPATIENT
Start: 2020-12-07 | End: 2021-03-01

## 2020-12-30 DIAGNOSIS — I10 ESSENTIAL HYPERTENSION: ICD-10-CM

## 2020-12-30 RX ORDER — ESCITALOPRAM OXALATE 10 MG/1
TABLET ORAL
Qty: 30 TABLET | Refills: 0 | Status: SHIPPED | OUTPATIENT
Start: 2020-12-30 | End: 2021-02-01

## 2020-12-30 RX ORDER — BISOPROLOL FUMARATE 10 MG/1
TABLET, FILM COATED ORAL
Qty: 90 TABLET | Refills: 0 | Status: SHIPPED | OUTPATIENT
Start: 2020-12-30 | End: 2021-04-05

## 2020-12-30 RX ORDER — LISINOPRIL 20 MG/1
TABLET ORAL
Qty: 180 TABLET | Refills: 0 | Status: SHIPPED | OUTPATIENT
Start: 2020-12-30 | End: 2021-10-27 | Stop reason: SDUPTHER

## 2021-02-01 RX ORDER — ESCITALOPRAM OXALATE 10 MG/1
TABLET ORAL
Qty: 30 TABLET | Refills: 3 | Status: SHIPPED | OUTPATIENT
Start: 2021-02-01 | End: 2021-06-02

## 2021-02-22 RX ORDER — HYDROCHLOROTHIAZIDE 50 MG/1
TABLET ORAL
Qty: 90 TABLET | Refills: 0 | Status: SHIPPED | OUTPATIENT
Start: 2021-02-22 | End: 2021-05-23

## 2021-03-01 DIAGNOSIS — E55.9 VITAMIN D DEFICIENCY: ICD-10-CM

## 2021-03-01 RX ORDER — ERGOCALCIFEROL 1.25 MG/1
CAPSULE ORAL
Qty: 12 CAPSULE | Refills: 0 | Status: SHIPPED | OUTPATIENT
Start: 2021-03-01 | End: 2021-06-22

## 2021-04-05 DIAGNOSIS — E11.42 DIABETIC PERIPHERAL NEUROPATHY (HCC): ICD-10-CM

## 2021-04-05 DIAGNOSIS — I10 ESSENTIAL HYPERTENSION: ICD-10-CM

## 2021-04-05 RX ORDER — GLIMEPIRIDE 2 MG/1
TABLET ORAL
Qty: 360 TABLET | Refills: 0 | Status: SHIPPED | OUTPATIENT
Start: 2021-04-05 | End: 2021-11-05

## 2021-04-05 RX ORDER — BISOPROLOL FUMARATE 10 MG/1
TABLET, FILM COATED ORAL
Qty: 90 TABLET | Refills: 0 | Status: SHIPPED | OUTPATIENT
Start: 2021-04-05 | End: 2021-06-22

## 2021-04-05 RX ORDER — GABAPENTIN 600 MG/1
TABLET ORAL
Qty: 270 TABLET | Refills: 0 | Status: SHIPPED | OUTPATIENT
Start: 2021-04-05 | End: 2021-09-27

## 2021-05-23 RX ORDER — HYDROCHLOROTHIAZIDE 50 MG/1
TABLET ORAL
Qty: 90 TABLET | Refills: 0 | Status: SHIPPED | OUTPATIENT
Start: 2021-05-23 | End: 2021-08-23

## 2021-05-31 RX ORDER — AMLODIPINE BESYLATE 10 MG/1
TABLET ORAL
Qty: 90 TABLET | Refills: 0 | Status: SHIPPED | OUTPATIENT
Start: 2021-05-31 | End: 2021-08-23

## 2021-06-02 RX ORDER — ESCITALOPRAM OXALATE 10 MG/1
TABLET ORAL
Qty: 90 TABLET | Refills: 0 | Status: SHIPPED | OUTPATIENT
Start: 2021-06-02 | End: 2021-07-25

## 2021-06-02 RX ORDER — ATORVASTATIN CALCIUM 20 MG/1
20 TABLET, FILM COATED ORAL DAILY
Qty: 90 TABLET | Refills: 0 | Status: SHIPPED | OUTPATIENT
Start: 2021-06-02 | End: 2021-06-03

## 2021-06-03 RX ORDER — ATORVASTATIN CALCIUM 20 MG/1
TABLET, FILM COATED ORAL
Qty: 90 TABLET | Refills: 0 | Status: SHIPPED | OUTPATIENT
Start: 2021-06-03 | End: 2022-03-03

## 2021-06-21 DIAGNOSIS — E55.9 VITAMIN D DEFICIENCY: ICD-10-CM

## 2021-06-21 DIAGNOSIS — I10 ESSENTIAL HYPERTENSION: ICD-10-CM

## 2021-06-22 RX ORDER — BISOPROLOL FUMARATE 10 MG/1
TABLET, FILM COATED ORAL
Qty: 90 TABLET | Refills: 0 | Status: SHIPPED | OUTPATIENT
Start: 2021-06-22 | End: 2021-09-27

## 2021-06-22 RX ORDER — ERGOCALCIFEROL 1.25 MG/1
CAPSULE ORAL
Qty: 12 CAPSULE | Refills: 0 | Status: SHIPPED | OUTPATIENT
Start: 2021-06-22 | End: 2021-09-27

## 2021-07-25 RX ORDER — ESCITALOPRAM OXALATE 10 MG/1
TABLET ORAL
Qty: 90 TABLET | Refills: 0 | Status: SHIPPED | OUTPATIENT
Start: 2021-07-25 | End: 2021-11-23

## 2021-08-23 RX ORDER — AMLODIPINE BESYLATE 10 MG/1
TABLET ORAL
Qty: 90 TABLET | Refills: 0 | Status: SHIPPED | OUTPATIENT
Start: 2021-08-23 | End: 2021-11-22

## 2021-08-23 RX ORDER — HYDROCHLOROTHIAZIDE 50 MG/1
TABLET ORAL
Qty: 90 TABLET | Refills: 0 | Status: SHIPPED | OUTPATIENT
Start: 2021-08-23 | End: 2021-11-22

## 2021-09-26 DIAGNOSIS — E55.9 VITAMIN D DEFICIENCY: ICD-10-CM

## 2021-09-26 DIAGNOSIS — I10 ESSENTIAL HYPERTENSION: ICD-10-CM

## 2021-09-26 DIAGNOSIS — E11.42 DIABETIC PERIPHERAL NEUROPATHY (HCC): ICD-10-CM

## 2021-09-27 RX ORDER — GABAPENTIN 600 MG/1
TABLET ORAL
Qty: 270 TABLET | Refills: 0 | Status: SHIPPED | OUTPATIENT
Start: 2021-09-27 | End: 2022-03-03

## 2021-09-27 RX ORDER — BISOPROLOL FUMARATE 10 MG/1
TABLET, FILM COATED ORAL
Qty: 90 TABLET | Refills: 0 | Status: SHIPPED | OUTPATIENT
Start: 2021-09-27 | End: 2022-01-24

## 2021-09-27 RX ORDER — ERGOCALCIFEROL 1.25 MG/1
CAPSULE ORAL
Qty: 12 CAPSULE | Refills: 0 | Status: SHIPPED | OUTPATIENT
Start: 2021-09-27 | End: 2022-06-23

## 2021-10-07 RX ORDER — LISINOPRIL 20 MG/1
TABLET ORAL
Qty: 180 TABLET | Refills: 0 | OUTPATIENT
Start: 2021-10-07

## 2021-10-07 NOTE — TELEPHONE ENCOUNTER
Left detailed message on patient's voice mail at 3:24pm to call the office to schedule a med check with [facility].

## 2021-10-27 ENCOUNTER — LAB (OUTPATIENT)
Dept: FAMILY MEDICINE CLINIC | Facility: CLINIC | Age: 71
End: 2021-10-27

## 2021-10-27 ENCOUNTER — OFFICE VISIT (OUTPATIENT)
Dept: FAMILY MEDICINE CLINIC | Facility: CLINIC | Age: 71
End: 2021-10-27

## 2021-10-27 VITALS
HEIGHT: 64 IN | BODY MASS INDEX: 35.85 KG/M2 | OXYGEN SATURATION: 99 % | TEMPERATURE: 98 F | WEIGHT: 210 LBS | HEART RATE: 54 BPM | DIASTOLIC BLOOD PRESSURE: 80 MMHG | SYSTOLIC BLOOD PRESSURE: 170 MMHG

## 2021-10-27 DIAGNOSIS — I10 ESSENTIAL HYPERTENSION: ICD-10-CM

## 2021-10-27 DIAGNOSIS — I10 ESSENTIAL HYPERTENSION: Primary | ICD-10-CM

## 2021-10-27 DIAGNOSIS — E78.2 MIXED HYPERLIPIDEMIA: ICD-10-CM

## 2021-10-27 DIAGNOSIS — E11.65 TYPE 2 DIABETES MELLITUS WITH HYPERGLYCEMIA, WITHOUT LONG-TERM CURRENT USE OF INSULIN (HCC): ICD-10-CM

## 2021-10-27 LAB
ALBUMIN SERPL-MCNC: 4.3 G/DL (ref 3.5–5.2)
ALBUMIN UR-MCNC: 15.1 MG/DL
ALBUMIN/GLOB SERPL: 1.2 G/DL
ALP SERPL-CCNC: 69 U/L (ref 39–117)
ALT SERPL W P-5'-P-CCNC: 10 U/L (ref 1–33)
ANION GAP SERPL CALCULATED.3IONS-SCNC: 11.4 MMOL/L (ref 5–15)
AST SERPL-CCNC: 14 U/L (ref 1–32)
BACTERIA UR QL AUTO: ABNORMAL /HPF
BASOPHILS # BLD AUTO: 0.04 10*3/MM3 (ref 0–0.2)
BASOPHILS NFR BLD AUTO: 0.5 % (ref 0–1.5)
BILIRUB SERPL-MCNC: 0.3 MG/DL (ref 0–1.2)
BILIRUB UR QL STRIP: NEGATIVE
BUN SERPL-MCNC: 11 MG/DL (ref 8–23)
BUN/CREAT SERPL: 10.8 (ref 7–25)
CALCIUM SPEC-SCNC: 9.6 MG/DL (ref 8.6–10.5)
CHLORIDE SERPL-SCNC: 100 MMOL/L (ref 98–107)
CHOLEST SERPL-MCNC: 168 MG/DL (ref 0–200)
CLARITY UR: CLEAR
CO2 SERPL-SCNC: 25.6 MMOL/L (ref 22–29)
COLOR UR: YELLOW
CREAT SERPL-MCNC: 1.02 MG/DL (ref 0.57–1)
DEPRECATED RDW RBC AUTO: 41 FL (ref 37–54)
EOSINOPHIL # BLD AUTO: 0.24 10*3/MM3 (ref 0–0.4)
EOSINOPHIL NFR BLD AUTO: 3.1 % (ref 0.3–6.2)
ERYTHROCYTE [DISTWIDTH] IN BLOOD BY AUTOMATED COUNT: 12.5 % (ref 12.3–15.4)
GFR SERPL CREATININE-BSD FRML MDRD: 53 ML/MIN/1.73
GLOBULIN UR ELPH-MCNC: 3.7 GM/DL
GLUCOSE SERPL-MCNC: 263 MG/DL (ref 65–99)
GLUCOSE UR STRIP-MCNC: NEGATIVE MG/DL
HBA1C MFR BLD: 9.8 % (ref 3.5–5.6)
HCT VFR BLD AUTO: 43 % (ref 34–46.6)
HDLC SERPL-MCNC: 42 MG/DL (ref 40–60)
HGB BLD-MCNC: 13.6 G/DL (ref 12–15.9)
HGB UR QL STRIP.AUTO: ABNORMAL
HYALINE CASTS UR QL AUTO: ABNORMAL /LPF
IMM GRANULOCYTES # BLD AUTO: 0.03 10*3/MM3 (ref 0–0.05)
IMM GRANULOCYTES NFR BLD AUTO: 0.4 % (ref 0–0.5)
KETONES UR QL STRIP: NEGATIVE
LDLC SERPL CALC-MCNC: 81 MG/DL (ref 0–100)
LDLC/HDLC SERPL: 1.7 {RATIO}
LEUKOCYTE ESTERASE UR QL STRIP.AUTO: ABNORMAL
LYMPHOCYTES # BLD AUTO: 1.92 10*3/MM3 (ref 0.7–3.1)
LYMPHOCYTES NFR BLD AUTO: 25.1 % (ref 19.6–45.3)
MCH RBC QN AUTO: 28.2 PG (ref 26.6–33)
MCHC RBC AUTO-ENTMCNC: 31.6 G/DL (ref 31.5–35.7)
MCV RBC AUTO: 89.2 FL (ref 79–97)
MONOCYTES # BLD AUTO: 0.59 10*3/MM3 (ref 0.1–0.9)
MONOCYTES NFR BLD AUTO: 7.7 % (ref 5–12)
NEUTROPHILS NFR BLD AUTO: 4.84 10*3/MM3 (ref 1.7–7)
NEUTROPHILS NFR BLD AUTO: 63.2 % (ref 42.7–76)
NITRITE UR QL STRIP: NEGATIVE
NRBC BLD AUTO-RTO: 0 /100 WBC (ref 0–0.2)
PH UR STRIP.AUTO: 6.5 [PH] (ref 5–8)
PLATELET # BLD AUTO: 209 10*3/MM3 (ref 140–450)
PMV BLD AUTO: 11.3 FL (ref 6–12)
POTASSIUM SERPL-SCNC: 5.2 MMOL/L (ref 3.5–5.2)
PROT SERPL-MCNC: 8 G/DL (ref 6–8.5)
PROT UR QL STRIP: ABNORMAL
RBC # BLD AUTO: 4.82 10*6/MM3 (ref 3.77–5.28)
RBC # UR: ABNORMAL /HPF
REF LAB TEST METHOD: ABNORMAL
SODIUM SERPL-SCNC: 137 MMOL/L (ref 136–145)
SP GR UR STRIP: 1.02 (ref 1–1.03)
SQUAMOUS #/AREA URNS HPF: ABNORMAL /HPF
TRIGL SERPL-MCNC: 272 MG/DL (ref 0–150)
TSH SERPL DL<=0.05 MIU/L-ACNC: 2.61 UIU/ML (ref 0.27–4.2)
UROBILINOGEN UR QL STRIP: ABNORMAL
VLDLC SERPL-MCNC: 45 MG/DL (ref 5–40)
WBC # BLD AUTO: 7.66 10*3/MM3 (ref 3.4–10.8)
WBC UR QL AUTO: ABNORMAL /HPF

## 2021-10-27 PROCEDURE — 87086 URINE CULTURE/COLONY COUNT: CPT | Performed by: NURSE PRACTITIONER

## 2021-10-27 PROCEDURE — 80053 COMPREHEN METABOLIC PANEL: CPT | Performed by: NURSE PRACTITIONER

## 2021-10-27 PROCEDURE — 81001 URINALYSIS AUTO W/SCOPE: CPT | Performed by: NURSE PRACTITIONER

## 2021-10-27 PROCEDURE — 84443 ASSAY THYROID STIM HORMONE: CPT | Performed by: NURSE PRACTITIONER

## 2021-10-27 PROCEDURE — 82043 UR ALBUMIN QUANTITATIVE: CPT | Performed by: NURSE PRACTITIONER

## 2021-10-27 PROCEDURE — 36415 COLL VENOUS BLD VENIPUNCTURE: CPT

## 2021-10-27 PROCEDURE — 83036 HEMOGLOBIN GLYCOSYLATED A1C: CPT | Performed by: NURSE PRACTITIONER

## 2021-10-27 PROCEDURE — 85025 COMPLETE CBC W/AUTO DIFF WBC: CPT | Performed by: NURSE PRACTITIONER

## 2021-10-27 PROCEDURE — 99214 OFFICE O/P EST MOD 30 MIN: CPT | Performed by: NURSE PRACTITIONER

## 2021-10-27 PROCEDURE — 80061 LIPID PANEL: CPT | Performed by: NURSE PRACTITIONER

## 2021-10-27 RX ORDER — LISINOPRIL 20 MG/1
20 TABLET ORAL 2 TIMES DAILY
Qty: 180 TABLET | Refills: 0 | Status: SHIPPED | OUTPATIENT
Start: 2021-10-27 | End: 2022-01-27

## 2021-10-27 NOTE — ASSESSMENT & PLAN NOTE
Pt has been non compliant with her DM. Will check labs today, and then f/u in 2 weeks for MWV and discuss treatment options. She may need referral to endo.

## 2021-10-27 NOTE — PROGRESS NOTES
"Chief Complaint  med check  Subjective        Dora Solorio presents to [facility]  Pt comes in today for follow up on HTN and DM  HTN: BP is high today, but states she has been out of lisinopril for 2 weeks. She is also on amlodipine, HCTZ, and bisoprolol. She is also suppose to be taking lisinopril 40mg daily.     DM: last time she was seen was 6/2020. At that time she told me her BS was running in the 200s and I had started her on trulicity. It was working well for her, but it caused some nausea and diarrhea. Took it over 1 month.   Currently, BS running 200s in the mornings fasting. Still taking metformin and amaryl.   Does c/o polyuria.   Due for eye exam   Trying to watch diet, but does admit to drinking regular sodas 1/day or less.   Last A1C in June 2020 was 10.3          Objective     Vital Signs:   /80 (BP Location: Right arm, Patient Position: Sitting, Cuff Size: Adult)   Pulse 54   Temp 98 °F (36.7 °C) (Skin)   Ht 162.6 cm (64\")   Wt 95.3 kg (210 lb)   SpO2 99%   BMI 36.05 kg/m²       BP Readings from Last 3 Encounters:   10/27/21 170/80   06/25/20 138/70   06/02/20 120/74       Wt Readings from Last 3 Encounters:   10/27/21 95.3 kg (210 lb)   06/25/20 102 kg (225 lb)   06/02/20 99.3 kg (219 lb)     Physical Exam  Constitutional:       Appearance: She is well-developed. She is obese.   Eyes:      Pupils: Pupils are equal, round, and reactive to light.   Cardiovascular:      Rate and Rhythm: Normal rate and regular rhythm.   Pulmonary:      Effort: Pulmonary effort is normal.      Breath sounds: Normal breath sounds.   Neurological:      Mental Status: She is alert and oriented to person, place, and time.        Result Review :   The following data was reviewed by: BRANDON Garcias on 10/27/2021:                    Assessment and Plan    Diagnoses and all orders for this visit:    1. Essential hypertension (Primary)  Assessment & Plan:  Hypertension is " worsening.  Continue current treatment regimen.  Dietary sodium restriction.  Weight loss.  Regular aerobic exercise.  Medication changes per orders.  Blood pressure will be reassessed in 4 weeks.  Will restart lisinopril and monitor BP.     Orders:  -     lisinopril (PRINIVIL,ZESTRIL) 20 MG tablet; Take 1 tablet by mouth 2 (Two) Times a Day.  Dispense: 180 tablet; Refill: 0  -     TSH; Future    2. Type 2 diabetes mellitus with hyperglycemia, without long-term current use of insulin (HCC)  Assessment & Plan:  Pt has been non compliant with her DM. Will check labs today, and then f/u in 2 weeks for MWV and discuss treatment options. She may need referral to endo.     Orders:  -     CBC & Differential; Future  -     Comprehensive Metabolic Panel; Future  -     Hemoglobin A1c; Future  -     Urinalysis With Culture If Indicated - Urine, Clean Catch; Future  -     MicroAlbumin, Urine, Random - Urine, Clean Catch; Future    3. Mixed hyperlipidemia  -     Lipid Panel; Future  will check labs today  Refill lisinopril  Will f/u in 2 weeks for MWV, review labs, and discuss treatment options for DM.   During this office visit, we discussed the pertinent aspects of the visit and treatment recommendations. Pt verbalizes understanding. Follow up was discussed. Patient was given the opportunity to ask questions and discuss other concerns.         Follow Up   Return in about 2 weeks (around 11/10/2021) for Medicare Wellness.  Patient was given instructions and counseling regarding her condition or for health maintenance advice. Please see specific information pulled into the AVS if appropriate.

## 2021-10-27 NOTE — ASSESSMENT & PLAN NOTE
Hypertension is worsening.  Continue current treatment regimen.  Dietary sodium restriction.  Weight loss.  Regular aerobic exercise.  Medication changes per orders.  Blood pressure will be reassessed in 4 weeks.  Will restart lisinopril and monitor BP.

## 2021-10-28 LAB — BACTERIA SPEC AEROBE CULT: NO GROWTH

## 2021-11-01 RX ORDER — SEMAGLUTIDE 1.34 MG/ML
0.25 INJECTION, SOLUTION SUBCUTANEOUS WEEKLY
Qty: 1.5 ML | Refills: 1 | Status: SHIPPED | OUTPATIENT
Start: 2021-11-01 | End: 2021-11-11 | Stop reason: SDUPTHER

## 2021-11-05 RX ORDER — GLIMEPIRIDE 2 MG/1
TABLET ORAL
Qty: 360 TABLET | Refills: 0 | Status: SHIPPED | OUTPATIENT
Start: 2021-11-05

## 2021-11-11 ENCOUNTER — OFFICE VISIT (OUTPATIENT)
Dept: FAMILY MEDICINE CLINIC | Facility: CLINIC | Age: 71
End: 2021-11-11

## 2021-11-11 VITALS
SYSTOLIC BLOOD PRESSURE: 150 MMHG | OXYGEN SATURATION: 98 % | WEIGHT: 211 LBS | BODY MASS INDEX: 36.02 KG/M2 | DIASTOLIC BLOOD PRESSURE: 78 MMHG | TEMPERATURE: 97.5 F | HEART RATE: 51 BPM | HEIGHT: 64 IN

## 2021-11-11 DIAGNOSIS — Z00.00 MEDICARE ANNUAL WELLNESS VISIT, SUBSEQUENT: Primary | ICD-10-CM

## 2021-11-11 DIAGNOSIS — E11.65 TYPE 2 DIABETES MELLITUS WITH HYPERGLYCEMIA, WITHOUT LONG-TERM CURRENT USE OF INSULIN (HCC): ICD-10-CM

## 2021-11-11 DIAGNOSIS — Z12.31 ENCOUNTER FOR SCREENING MAMMOGRAM FOR MALIGNANT NEOPLASM OF BREAST: ICD-10-CM

## 2021-11-11 PROCEDURE — 1170F FXNL STATUS ASSESSED: CPT | Performed by: NURSE PRACTITIONER

## 2021-11-11 PROCEDURE — 1160F RVW MEDS BY RX/DR IN RCRD: CPT | Performed by: NURSE PRACTITIONER

## 2021-11-11 PROCEDURE — 99213 OFFICE O/P EST LOW 20 MIN: CPT | Performed by: NURSE PRACTITIONER

## 2021-11-11 PROCEDURE — G0439 PPPS, SUBSEQ VISIT: HCPCS | Performed by: NURSE PRACTITIONER

## 2021-11-11 RX ORDER — SEMAGLUTIDE 1.34 MG/ML
0.25 INJECTION, SOLUTION SUBCUTANEOUS WEEKLY
Qty: 1.5 ML | Refills: 1 | Status: SHIPPED | OUTPATIENT
Start: 2021-11-11 | End: 2022-04-07

## 2021-11-11 NOTE — ASSESSMENT & PLAN NOTE
Diabetes is unchanged.   will try to rx ozempic again. not sure what happened to last rx. pt hasn't tolerated trulicity in the past, but is willing to try ozempic. cont to work on diet and monitor BS and keep diary.   Diabetes will be reassessed in 6 months.

## 2021-11-11 NOTE — PROGRESS NOTES
"The ABCs of the Annual Wellness Visit  Subsequent Medicare Wellness Visit    Chief Complaint   Patient presents with   • Medicare Wellness-subsequent      Subjective    History of Present Illness:  Dora Solorio is a 71 y.o. female who presents for a Subsequent Medicare Wellness Visit.    HTN - Pt currently taking amlodipine, HCTZ, lisinopril, and bisoprolol. Pt monitors her BP every couple days at home. They average 110/50's. Pt reports that her BP \"are always increased here.\" Denies CP, SOB, or HA. Does report some dizziness on occasion.    Diabetes - at last visit, pt was stated on ozempic and referred to endo. Pt was unable to get ozempic and has not heard from endo yet. She is currently taking metformin 1000mg BID and amaryl. Pt reports that she is checking her BS twice a day. She reports that they average in the 100-160.     Covid - completed  Flu - completed  PNA - completed  Colonoscopy - has done cologuard and was positive, but did not follow up with colonoscopy. Does not want to do a colonoscopy. Refuses. Pt was made aware of the possible causes of a positive cologuard.   Mammogram - due. Wants to wait until the first of year.   Dental - UTD  Vision - due.     The following portions of the patient's history were reviewed and   updated as appropriate: allergies, current medications, past family history, past medical history, past social history, past surgical history and problem list.    Compared to one year ago, the patient feels her physical   health is worse.    Compared to one year ago, the patient feels her mental   health is the same.    Recent Hospitalizations:  She was not admitted to the hospital during the last year.       Current Medical Providers:  Patient Care Team:  Bhargavi Rodriguez APRN as PCP - General    Outpatient Medications Prior to Visit   Medication Sig Dispense Refill   • amLODIPine (NORVASC) 10 MG tablet Take 1 tablet by mouth once daily 90 tablet 0   • aspirin (ASPIR) 81 MG EC " tablet ASPIRIN 81 81 MG TBEC     • atorvastatin (LIPITOR) 20 MG tablet Take 1 tablet by mouth once daily 90 tablet 0   • bisoprolol (ZEBeta) 10 MG tablet Take 1 tablet by mouth once daily 90 tablet 0   • escitalopram (LEXAPRO) 10 MG tablet Take 1 tablet by mouth once daily 90 tablet 0   • gabapentin (NEURONTIN) 600 MG tablet TAKE 1 TABLET BY MOUTH THREE TIMES DAILY 270 tablet 0   • glimepiride (AMARYL) 2 MG tablet Take 2 tablets by mouth twice daily 360 tablet 0   • hydroCHLOROthiazide (HYDRODIURIL) 50 MG tablet Take 1 tablet by mouth once daily 90 tablet 0   • lisinopril (PRINIVIL,ZESTRIL) 20 MG tablet Take 1 tablet by mouth 2 (Two) Times a Day. 180 tablet 0   • meloxicam (Mobic) 15 MG tablet Take 1 tablet by mouth Daily. 30 tablet 3   • metFORMIN (GLUCOPHAGE) 1000 MG tablet TAKE 1 TABLET BY MOUTH TWICE DAILY WITH MEALS 180 tablet 0   • vitamin D (ERGOCALCIFEROL) 1.25 MG (15939 UT) capsule capsule Take 1 capsule by mouth once a week 12 capsule 0   • Semaglutide,0.25 or 0.5MG/DOS, (Ozempic, 0.25 or 0.5 MG/DOSE,) 2 MG/1.5ML solution pen-injector Inject 0.25 mg under the skin into the appropriate area as directed 1 (One) Time Per Week. 0.25mg weekly x 1 month, then 0.5mg weekly. 1.5 mL 1     No facility-administered medications prior to visit.       No opioid medication identified on active medication list. I have reviewed chart for other potential  high risk medication/s and harmful drug interactions in the elderly.          Aspirin is on active medication list. Aspirin use is not indicated based on review of current medical condition/s. Risk of harm outweighs potential benefits. Patient instructed to discontinue this medication.  .      Patient Active Problem List   Diagnosis   • Adjustment disorder with depressed mood   • Depression   • Diabetes mellitus, type II (HCC)   • Diabetic peripheral neuropathy (HCC)   • Essential hypertension   • Gout   • Hyperlipidemia   • Irritable bowel syndrome without diarrhea   •  "Obesity   • Osteopenia   • Peripheral neuropathy     Advance Care Planning  Advance Directive is not on file.  ACP discussion was held with the patient during this visit. Patient does not have an advance directive, information provided.          Objective    Vitals:    11/11/21 0926   BP: 150/78   BP Location: Left arm   Patient Position: Sitting   Cuff Size: Adult   Pulse: 51   Temp: 97.5 °F (36.4 °C)   TempSrc: Skin   SpO2: 98%   Weight: 95.7 kg (211 lb)   Height: 162.6 cm (64\")     BMI Readings from Last 1 Encounters:   11/11/21 36.22 kg/m²   BMI is above normal parameters. Recommendations include: nutrition counseling and pharmacological intervention    Does the patient have evidence of cognitive impairment? No    Physical Exam  Constitutional:       Appearance: She is obese. She is not ill-appearing.   HENT:      Head: Normocephalic and atraumatic.      Right Ear: Tympanic membrane normal.      Left Ear: Tympanic membrane normal.   Eyes:      Extraocular Movements: Extraocular movements intact.      Pupils: Pupils are equal, round, and reactive to light.   Cardiovascular:      Rate and Rhythm: Normal rate and regular rhythm.      Pulses: Normal pulses.      Heart sounds: Normal heart sounds.   Pulmonary:      Effort: Pulmonary effort is normal.   Abdominal:      General: Bowel sounds are normal.      Palpations: Abdomen is soft.   Musculoskeletal:         General: Normal range of motion.      Cervical back: Normal range of motion and neck supple.   Neurological:      Mental Status: She is alert and oriented to person, place, and time.   Psychiatric:         Behavior: Behavior normal.       Lab Results   Component Value Date    TRIG 272 (H) 10/27/2021    HDL 42 10/27/2021    LDL 81 10/27/2021    VLDL 45 (H) 10/27/2021    HGBA1C 9.8 (H) 10/27/2021            HEALTH RISK ASSESSMENT    Smoking Status:  Social History     Tobacco Use   Smoking Status Never Smoker   Smokeless Tobacco Never Used     Alcohol " Consumption:  Social History     Substance and Sexual Activity   Alcohol Use Never     Fall Risk Screen:    STEADI Fall Risk Assessment was completed, and patient is at LOW risk for falls.Assessment completed on:11/11/2021    Depression Screening:  PHQ-2/PHQ-9 Depression Screening 11/11/2021   Little interest or pleasure in doing things 0   Feeling down, depressed, or hopeless 0   Total Score 0       Health Habits and Functional and Cognitive Screening:  Functional & Cognitive Status 11/11/2021   Do you have difficulty preparing food and eating? Yes   Do you have difficulty bathing yourself, getting dressed or grooming yourself? No   Do you have difficulty using the toilet? No   Do you have difficulty moving around from place to place? No   Do you have trouble with steps or getting out of a bed or a chair? Yes   Current Diet Diabetic Diet   Dental Exam Up to date   Eye Exam Not up to date   Exercise (times per week) 0 times per week   Current Exercises Include No Regular Exercise;House Cleaning   Do you need help using the phone?  No   Are you deaf or do you have serious difficulty hearing?  No   Do you need help with transportation? No   Do you need help shopping? No   Do you need help preparing meals?  No   Do you need help with housework?  No   Do you need help with laundry? No   Do you need help taking your medications? No   Do you need help managing money? No   Do you ever drive or ride in a car without wearing a seat belt? No   Have you felt unusual stress, anger or loneliness in the last month? No   Who do you live with? Spouse   If you need help, do you have trouble finding someone available to you? No   Have you been bothered in the last four weeks by sexual problems? No   Do you have difficulty concentrating, remembering or making decisions? No       Age-appropriate Screening Schedule:  Refer to the list below for future screening recommendations based on patient's age, sex and/or medical conditions.  Orders for these recommended tests are listed in the plan section. The patient has been provided with a written plan.    Health Maintenance   Topic Date Due   • MAMMOGRAM  09/27/2020   • TDAP/TD VACCINES (1 - Tdap) 11/11/2021 (Originally 7/23/1969)   • DIABETIC FOOT EXAM  11/26/2021 (Originally 6/2/2021)   • DIABETIC EYE EXAM  12/30/2021 (Originally 1/29/2021)   • ZOSTER VACCINE (1 of 2) 11/20/2022 (Originally 7/23/1998)   • HEMOGLOBIN A1C  04/27/2022   • DXA SCAN  06/12/2022   • LIPID PANEL  10/27/2022   • URINE MICROALBUMIN  10/27/2022   • INFLUENZA VACCINE  Completed              Assessment/Plan   CMS Preventative Services Quick Reference  Risk Factors Identified During Encounter  None Identified  The above risks/problems have been discussed with the patient.  Follow up actions/plans if indicated are seen below in the Assessment/Plan Section.  Pertinent information has been shared with the patient in the After Visit Summary.    Diagnoses and all orders for this visit:    1. Medicare annual wellness visit, subsequent (Primary)    2. Type 2 diabetes mellitus with hyperglycemia, without long-term current use of insulin (HCC)  Assessment & Plan:  Diabetes is unchanged.   will try to rx ozempic again. not sure what happened to last rx. pt hasn't tolerated trulicity in the past, but is willing to try ozempic. cont to work on diet and monitor BS and keep diary.   Diabetes will be reassessed in 6 months.    Orders:  -     Semaglutide,0.25 or 0.5MG/DOS, (Ozempic, 0.25 or 0.5 MG/DOSE,) 2 MG/1.5ML solution pen-injector; Inject 0.25 mg under the skin into the appropriate area as directed 1 (One) Time Per Week. 0.25mg weekly x 1 month, then 0.5mg weekly.  Dispense: 1.5 mL; Refill: 1    3. Encounter for screening mammogram for malignant neoplasm of breast  -     Mammo Screening Digital Tomosynthesis Bilateral With CAD; Future      Follow Up:   Return in about 6 months (around 5/11/2022) for Diabetes follow up, HTN follow up.      An After Visit Summary and PPPS were made available to the patient.

## 2021-11-15 ENCOUNTER — TELEPHONE (OUTPATIENT)
Dept: FAMILY MEDICINE CLINIC | Facility: CLINIC | Age: 71
End: 2021-11-15

## 2021-11-15 NOTE — TELEPHONE ENCOUNTER
Caller: Dora Solorio    Relationship: Self    Best call back number: [phone]    Which medication are you concerned about: Semaglutide,0.25 or 0.5MG/DOS, (Ozempic, 0.25 or 0.5 MG/DOSE,) 2 MG/1.5ML solution pen-injector    What are your concerns:  MEDICATION IS $599 A MONTH AND IS TOO EXPENSIVE    PATIENT WOULD LIKE A CALL WITH OPTIONS

## 2021-11-22 RX ORDER — AMLODIPINE BESYLATE 10 MG/1
TABLET ORAL
Qty: 90 TABLET | Refills: 0 | Status: SHIPPED | OUTPATIENT
Start: 2021-11-22 | End: 2022-03-03

## 2021-11-22 RX ORDER — HYDROCHLOROTHIAZIDE 50 MG/1
TABLET ORAL
Qty: 90 TABLET | Refills: 0 | Status: SHIPPED | OUTPATIENT
Start: 2021-11-22 | End: 2022-03-03

## 2021-11-23 RX ORDER — ESCITALOPRAM OXALATE 10 MG/1
TABLET ORAL
Qty: 90 TABLET | Refills: 0 | Status: SHIPPED | OUTPATIENT
Start: 2021-11-23 | End: 2022-03-03

## 2021-11-26 ENCOUNTER — HOSPITAL ENCOUNTER (OUTPATIENT)
Dept: MAMMOGRAPHY | Facility: HOSPITAL | Age: 71
Discharge: HOME | End: 2021-11-26
Admitting: NURSE PRACTITIONER

## 2021-11-26 DIAGNOSIS — Z12.31 ENCOUNTER FOR SCREENING MAMMOGRAM FOR MALIGNANT NEOPLASM OF BREAST: ICD-10-CM

## 2021-11-26 PROCEDURE — 77063 BREAST TOMOSYNTHESIS BI: CPT

## 2021-11-26 PROCEDURE — 77067 SCR MAMMO BI INCL CAD: CPT

## 2022-01-22 DIAGNOSIS — I10 ESSENTIAL HYPERTENSION: ICD-10-CM

## 2022-01-24 RX ORDER — BISOPROLOL FUMARATE 10 MG/1
TABLET, FILM COATED ORAL
Qty: 90 TABLET | Refills: 0 | Status: SHIPPED | OUTPATIENT
Start: 2022-01-24 | End: 2022-05-20

## 2022-01-26 DIAGNOSIS — I10 ESSENTIAL HYPERTENSION: ICD-10-CM

## 2022-01-27 RX ORDER — LISINOPRIL 20 MG/1
TABLET ORAL
Qty: 180 TABLET | Refills: 0 | Status: SHIPPED | OUTPATIENT
Start: 2022-01-27 | End: 2022-10-06

## 2022-03-03 DIAGNOSIS — E11.42 DIABETIC PERIPHERAL NEUROPATHY: ICD-10-CM

## 2022-03-03 RX ORDER — GABAPENTIN 600 MG/1
TABLET ORAL
Qty: 270 TABLET | Refills: 0 | Status: SHIPPED | OUTPATIENT
Start: 2022-03-03 | End: 2022-09-13 | Stop reason: HOSPADM

## 2022-03-03 RX ORDER — ESCITALOPRAM OXALATE 10 MG/1
TABLET ORAL
Qty: 90 TABLET | Refills: 0 | Status: SHIPPED | OUTPATIENT
Start: 2022-03-03 | End: 2022-05-20

## 2022-03-03 RX ORDER — AMLODIPINE BESYLATE 10 MG/1
TABLET ORAL
Qty: 90 TABLET | Refills: 0 | Status: SHIPPED | OUTPATIENT
Start: 2022-03-03 | End: 2022-10-06

## 2022-03-03 RX ORDER — ATORVASTATIN CALCIUM 20 MG/1
TABLET, FILM COATED ORAL
Qty: 90 TABLET | Refills: 0 | Status: SHIPPED | OUTPATIENT
Start: 2022-03-03 | End: 2022-05-20

## 2022-03-03 RX ORDER — HYDROCHLOROTHIAZIDE 50 MG/1
TABLET ORAL
Qty: 90 TABLET | Refills: 0 | Status: SHIPPED | OUTPATIENT
Start: 2022-03-03 | End: 2022-09-13 | Stop reason: HOSPADM

## 2022-04-07 ENCOUNTER — LAB (OUTPATIENT)
Dept: FAMILY MEDICINE CLINIC | Facility: CLINIC | Age: 72
End: 2022-04-07

## 2022-04-07 ENCOUNTER — OFFICE VISIT (OUTPATIENT)
Dept: FAMILY MEDICINE CLINIC | Facility: CLINIC | Age: 72
End: 2022-04-07

## 2022-04-07 VITALS
HEIGHT: 64 IN | RESPIRATION RATE: 17 BRPM | SYSTOLIC BLOOD PRESSURE: 136 MMHG | OXYGEN SATURATION: 99 % | BODY MASS INDEX: 34.31 KG/M2 | HEART RATE: 56 BPM | TEMPERATURE: 97.3 F | DIASTOLIC BLOOD PRESSURE: 84 MMHG | WEIGHT: 201 LBS

## 2022-04-07 DIAGNOSIS — E11.65 TYPE 2 DIABETES MELLITUS WITH HYPERGLYCEMIA, WITHOUT LONG-TERM CURRENT USE OF INSULIN: ICD-10-CM

## 2022-04-07 DIAGNOSIS — E11.65 TYPE 2 DIABETES MELLITUS WITH HYPERGLYCEMIA, WITHOUT LONG-TERM CURRENT USE OF INSULIN: Primary | ICD-10-CM

## 2022-04-07 DIAGNOSIS — L97.511 ULCER OF RIGHT FOOT, LIMITED TO BREAKDOWN OF SKIN: ICD-10-CM

## 2022-04-07 LAB
ALBUMIN SERPL-MCNC: 4 G/DL (ref 3.5–5.2)
ALBUMIN/GLOB SERPL: 1 G/DL
ALP SERPL-CCNC: 68 U/L (ref 39–117)
ALT SERPL W P-5'-P-CCNC: 9 U/L (ref 1–33)
ANION GAP SERPL CALCULATED.3IONS-SCNC: 8.6 MMOL/L (ref 5–15)
AST SERPL-CCNC: 12 U/L (ref 1–32)
BASOPHILS # BLD AUTO: 0.03 10*3/MM3 (ref 0–0.2)
BASOPHILS NFR BLD AUTO: 0.5 % (ref 0–1.5)
BILIRUB SERPL-MCNC: 0.3 MG/DL (ref 0–1.2)
BUN SERPL-MCNC: 17 MG/DL (ref 8–23)
BUN/CREAT SERPL: 20.2 (ref 7–25)
CALCIUM SPEC-SCNC: 9.5 MG/DL (ref 8.6–10.5)
CHLORIDE SERPL-SCNC: 103 MMOL/L (ref 98–107)
CO2 SERPL-SCNC: 26.4 MMOL/L (ref 22–29)
CREAT SERPL-MCNC: 0.84 MG/DL (ref 0.57–1)
DEPRECATED RDW RBC AUTO: 40.5 FL (ref 37–54)
EGFRCR SERPLBLD CKD-EPI 2021: 73.5 ML/MIN/1.73
EOSINOPHIL # BLD AUTO: 0.24 10*3/MM3 (ref 0–0.4)
EOSINOPHIL NFR BLD AUTO: 4 % (ref 0.3–6.2)
ERYTHROCYTE [DISTWIDTH] IN BLOOD BY AUTOMATED COUNT: 12.4 % (ref 12.3–15.4)
GLOBULIN UR ELPH-MCNC: 3.9 GM/DL
GLUCOSE SERPL-MCNC: 172 MG/DL (ref 65–99)
HBA1C MFR BLD: 9.6 % (ref 3.5–5.6)
HCT VFR BLD AUTO: 39.2 % (ref 34–46.6)
HGB BLD-MCNC: 12.5 G/DL (ref 12–15.9)
IMM GRANULOCYTES # BLD AUTO: 0.01 10*3/MM3 (ref 0–0.05)
IMM GRANULOCYTES NFR BLD AUTO: 0.2 % (ref 0–0.5)
LYMPHOCYTES # BLD AUTO: 1.79 10*3/MM3 (ref 0.7–3.1)
LYMPHOCYTES NFR BLD AUTO: 30 % (ref 19.6–45.3)
MCH RBC QN AUTO: 28.3 PG (ref 26.6–33)
MCHC RBC AUTO-ENTMCNC: 31.9 G/DL (ref 31.5–35.7)
MCV RBC AUTO: 88.7 FL (ref 79–97)
MONOCYTES # BLD AUTO: 0.45 10*3/MM3 (ref 0.1–0.9)
MONOCYTES NFR BLD AUTO: 7.5 % (ref 5–12)
NEUTROPHILS NFR BLD AUTO: 3.45 10*3/MM3 (ref 1.7–7)
NEUTROPHILS NFR BLD AUTO: 57.8 % (ref 42.7–76)
NRBC BLD AUTO-RTO: 0 /100 WBC (ref 0–0.2)
PLATELET # BLD AUTO: 187 10*3/MM3 (ref 140–450)
PMV BLD AUTO: 11.5 FL (ref 6–12)
POTASSIUM SERPL-SCNC: 4.9 MMOL/L (ref 3.5–5.2)
PROT SERPL-MCNC: 7.9 G/DL (ref 6–8.5)
RBC # BLD AUTO: 4.42 10*6/MM3 (ref 3.77–5.28)
SODIUM SERPL-SCNC: 138 MMOL/L (ref 136–145)
WBC NRBC COR # BLD: 5.97 10*3/MM3 (ref 3.4–10.8)

## 2022-04-07 PROCEDURE — 85025 COMPLETE CBC W/AUTO DIFF WBC: CPT | Performed by: NURSE PRACTITIONER

## 2022-04-07 PROCEDURE — 80053 COMPREHEN METABOLIC PANEL: CPT | Performed by: NURSE PRACTITIONER

## 2022-04-07 PROCEDURE — 99214 OFFICE O/P EST MOD 30 MIN: CPT | Performed by: NURSE PRACTITIONER

## 2022-04-07 PROCEDURE — 36415 COLL VENOUS BLD VENIPUNCTURE: CPT

## 2022-04-07 PROCEDURE — 83036 HEMOGLOBIN GLYCOSYLATED A1C: CPT | Performed by: NURSE PRACTITIONER

## 2022-04-07 RX ORDER — CEPHALEXIN 500 MG/1
500 CAPSULE ORAL 4 TIMES DAILY
Qty: 40 CAPSULE | Refills: 0 | Status: SHIPPED | OUTPATIENT
Start: 2022-04-07 | End: 2022-06-22 | Stop reason: HOSPADM

## 2022-04-07 NOTE — PROGRESS NOTES
"Chief Complaint  Toe Pain (Swollen and sore on bottom of toe)  Subjective        Dora Solorio presents to [facility]  Pt comes in today w/ c/o sore and infection to right middle toe. Toe itself is red and swollen. Open sore on bottom of toe. Denies pain. Doesn't remember injuring it or stepping on anything. Has neuropathy.  Diabetes has been uncontrolled. Last A1c was 9.8. I had sarted ozempic as she didn't tolerate trulicity in the past, but states she never started it. Insurance wouldn't approve it.   She says BS running 140s.   Taking metformin and glimepiride.         Objective     Vital Signs:   /84   Pulse 56   Temp 97.3 °F (36.3 °C)   Resp 17   Ht 162.6 cm (64\")   Wt 91.2 kg (201 lb)   SpO2 99%   BMI 34.50 kg/m²       BP Readings from Last 3 Encounters:   04/07/22 136/84   11/11/21 150/78   10/27/21 170/80       Wt Readings from Last 3 Encounters:   04/07/22 91.2 kg (201 lb)   11/11/21 95.7 kg (211 lb)   10/27/21 95.3 kg (210 lb)     Physical Exam  Constitutional:       Appearance: She is well-developed.   Eyes:      Pupils: Pupils are equal, round, and reactive to light.   Cardiovascular:      Rate and Rhythm: Normal rate and regular rhythm.   Pulmonary:      Effort: Pulmonary effort is normal.      Breath sounds: Normal breath sounds.   Feet:      Right foot:      Skin integrity: Ulcer and erythema present.      Toenail Condition: Right toenails are abnormally thick.      Left foot:      Toenail Condition: Left toenails are abnormally thick.   Skin:     Comments: Right foot middle toe swollen, red. Posterior toe open round shaped wound. Non tender.    Neurological:      Mental Status: She is alert and oriented to person, place, and time.        Result Review :                 Assessment and Plan    Diagnoses and all orders for this visit:    1. Type 2 diabetes mellitus with hyperglycemia, without long-term current use of insulin (HCC) (Primary)  -     CBC & " Differential; Future  -     Ambulatory Referral to Endocrinology  -     Hemoglobin A1c; Future  -     Comprehensive Metabolic Panel; Future  -     Ambulatory Referral to Podiatry    2. Ulcer of right foot, limited to breakdown of skin (HCC)  -     Ambulatory Referral to Podiatry  -     cephalexin (Keflex) 500 MG capsule; Take 1 capsule by mouth 4 (Four) Times a Day.  Dispense: 40 capsule; Refill: 0    check labs today  Wound cleaned, dressed. Start anbx  Referral to endo  Referral to podiatry  During this office visit, we discussed the pertinent aspects of the visit and treatment recommendations. Pt verbalizes understanding. Follow up was discussed. Patient was given the opportunity to ask questions and discuss other concerns.   The importance of monitoring blood sugar regularly was reviewed.  The importance of monitoring the HgBA1C level regularly was reviewed.  The importance of monitoring urine microalbumin regularly to check for kidney damage was reviewed.   The importance of annual eye exams to prevent blindness was reviewed.  The importance of proper foot care and regularly checking feet to prevent sores and possibly loss of limbs was reviewed.   Discussed importance of regular exercise and recommended starting or continuing a regular exercise program for good health. The patient was also encouraged to lose weight for better health.         Follow Up   Return in about 3 months (around 7/7/2022) for Diabetes follow up.  Patient was given instructions and counseling regarding her condition or for health maintenance advice. Please see specific information pulled into the AVS if appropriate.       Answers for HPI/ROS submitted by the patient on 4/5/2022  Please describe your symptoms.: Red middle toe on right foot. Have a sore on bottom of that toe also.  Have you had these symptoms before?: No  How long have you been having these symptoms?: 5-7 days  Please describe any probable cause for these symptoms. : Don't  know  What is the primary reason for your visit?: Other

## 2022-04-11 ENCOUNTER — TELEPHONE (OUTPATIENT)
Dept: FAMILY MEDICINE CLINIC | Facility: CLINIC | Age: 72
End: 2022-04-11

## 2022-04-11 DIAGNOSIS — E11.65 TYPE 2 DIABETES MELLITUS WITH HYPERGLYCEMIA, WITHOUT LONG-TERM CURRENT USE OF INSULIN: Primary | ICD-10-CM

## 2022-04-11 RX ORDER — PEN NEEDLE, DIABETIC 30 GX3/16"
1 NEEDLE, DISPOSABLE MISCELLANEOUS DAILY
Qty: 100 EACH | Refills: 3 | Status: SHIPPED | OUTPATIENT
Start: 2022-04-11 | End: 2022-06-22 | Stop reason: HOSPADM

## 2022-04-11 NOTE — PROGRESS NOTES
A1c still elevated at 9.6. since we were not able to tolerate trulicity and insurance denied ozempic. I am going to start her on insulin until she is seen by endo. I have placed a referral for her.   Will start lantus 20 units nightly.

## 2022-04-11 NOTE — TELEPHONE ENCOUNTER
Caller: Dora Solorio    Relationship to patient: Self    Best call back number: [phone]    Patient is needing: PATIENT CALLED AND SAID THAT HER PODIATRIST IS PUTTING HER ON A STRONG ANTIBIOTIC DUE TO A FOOT INFECTION     SHE WOULD NEED TO BE OFF HER LEXAPRO TO TAKE IT. THE MEDICATION IS LINEZOLID, ALSO WANTING TO SEE IF THERE IS ANY OTHER INTERACTION WITH OTHER MEDICATION

## 2022-05-20 DIAGNOSIS — I10 ESSENTIAL HYPERTENSION: ICD-10-CM

## 2022-05-20 RX ORDER — ESCITALOPRAM OXALATE 10 MG/1
TABLET ORAL
Qty: 90 TABLET | Refills: 0 | Status: SHIPPED | OUTPATIENT
Start: 2022-05-20 | End: 2022-10-06

## 2022-05-20 RX ORDER — ATORVASTATIN CALCIUM 20 MG/1
TABLET, FILM COATED ORAL
Qty: 90 TABLET | Refills: 0 | Status: SHIPPED | OUTPATIENT
Start: 2022-05-20 | End: 2022-10-06

## 2022-05-20 RX ORDER — BISOPROLOL FUMARATE 10 MG/1
TABLET, FILM COATED ORAL
Qty: 90 TABLET | Refills: 0 | Status: SHIPPED | OUTPATIENT
Start: 2022-05-20 | End: 2022-10-06

## 2022-06-02 ENCOUNTER — HOSPITAL ENCOUNTER (OUTPATIENT)
Dept: CARDIOLOGY | Facility: HOSPITAL | Age: 72
Discharge: HOME | End: 2022-06-02

## 2022-06-02 ENCOUNTER — TRANSCRIBE ORDERS (OUTPATIENT)
Dept: ADMINISTRATIVE | Facility: HOSPITAL | Age: 72
End: 2022-06-02

## 2022-06-02 ENCOUNTER — LAB (OUTPATIENT)
Dept: LAB | Facility: HOSPITAL | Age: 72
End: 2022-06-02

## 2022-06-02 DIAGNOSIS — I10 ESSENTIAL HYPERTENSION, MALIGNANT: ICD-10-CM

## 2022-06-02 DIAGNOSIS — M20.41 ACQUIRED HAMMER TOE OF RIGHT FOOT: ICD-10-CM

## 2022-06-02 DIAGNOSIS — Z01.818 PRE-OP TESTING: ICD-10-CM

## 2022-06-02 DIAGNOSIS — Z01.818 PRE-OP TESTING: Primary | ICD-10-CM

## 2022-06-02 LAB
ANION GAP SERPL CALCULATED.3IONS-SCNC: 12.3 MMOL/L (ref 5–15)
BASOPHILS # BLD AUTO: 0.03 10*3/MM3 (ref 0–0.2)
BASOPHILS NFR BLD AUTO: 0.4 % (ref 0–1.5)
BUN SERPL-MCNC: 23 MG/DL (ref 8–23)
BUN/CREAT SERPL: 21.1 (ref 7–25)
CALCIUM SPEC-SCNC: 9.5 MG/DL (ref 8.6–10.5)
CHLORIDE SERPL-SCNC: 102 MMOL/L (ref 98–107)
CO2 SERPL-SCNC: 22.7 MMOL/L (ref 22–29)
CREAT SERPL-MCNC: 1.09 MG/DL (ref 0.57–1)
DEPRECATED RDW RBC AUTO: 39.5 FL (ref 37–54)
EGFRCR SERPLBLD CKD-EPI 2021: 53.8 ML/MIN/1.73
EOSINOPHIL # BLD AUTO: 0.22 10*3/MM3 (ref 0–0.4)
EOSINOPHIL NFR BLD AUTO: 2.6 % (ref 0.3–6.2)
ERYTHROCYTE [DISTWIDTH] IN BLOOD BY AUTOMATED COUNT: 12.5 % (ref 12.3–15.4)
GLUCOSE SERPL-MCNC: 231 MG/DL (ref 65–99)
HCT VFR BLD AUTO: 42.3 % (ref 34–46.6)
HGB BLD-MCNC: 13.6 G/DL (ref 12–15.9)
IMM GRANULOCYTES # BLD AUTO: 0.04 10*3/MM3 (ref 0–0.05)
IMM GRANULOCYTES NFR BLD AUTO: 0.5 % (ref 0–0.5)
LYMPHOCYTES # BLD AUTO: 2.15 10*3/MM3 (ref 0.7–3.1)
LYMPHOCYTES NFR BLD AUTO: 25.3 % (ref 19.6–45.3)
MCH RBC QN AUTO: 28.2 PG (ref 26.6–33)
MCHC RBC AUTO-ENTMCNC: 32.2 G/DL (ref 31.5–35.7)
MCV RBC AUTO: 87.8 FL (ref 79–97)
MONOCYTES # BLD AUTO: 0.48 10*3/MM3 (ref 0.1–0.9)
MONOCYTES NFR BLD AUTO: 5.6 % (ref 5–12)
NEUTROPHILS NFR BLD AUTO: 5.58 10*3/MM3 (ref 1.7–7)
NEUTROPHILS NFR BLD AUTO: 65.6 % (ref 42.7–76)
NRBC BLD AUTO-RTO: 0 /100 WBC (ref 0–0.2)
PLATELET # BLD AUTO: 186 10*3/MM3 (ref 140–450)
PMV BLD AUTO: 11.5 FL (ref 6–12)
POTASSIUM SERPL-SCNC: 4.8 MMOL/L (ref 3.5–5.2)
RBC # BLD AUTO: 4.82 10*6/MM3 (ref 3.77–5.28)
SODIUM SERPL-SCNC: 137 MMOL/L (ref 136–145)
WBC NRBC COR # BLD: 8.5 10*3/MM3 (ref 3.4–10.8)

## 2022-06-02 PROCEDURE — 93010 ELECTROCARDIOGRAM REPORT: CPT | Performed by: INTERNAL MEDICINE

## 2022-06-02 PROCEDURE — 36415 COLL VENOUS BLD VENIPUNCTURE: CPT

## 2022-06-02 PROCEDURE — 80048 BASIC METABOLIC PNL TOTAL CA: CPT

## 2022-06-02 PROCEDURE — 93005 ELECTROCARDIOGRAM TRACING: CPT | Performed by: PODIATRIST

## 2022-06-02 PROCEDURE — 85025 COMPLETE CBC W/AUTO DIFF WBC: CPT

## 2022-06-03 LAB — QT INTERVAL: 386 MS

## 2022-06-15 ENCOUNTER — APPOINTMENT (OUTPATIENT)
Dept: GENERAL RADIOLOGY | Facility: HOSPITAL | Age: 72
End: 2022-06-15

## 2022-06-15 ENCOUNTER — APPOINTMENT (OUTPATIENT)
Dept: CT IMAGING | Facility: HOSPITAL | Age: 72
End: 2022-06-15

## 2022-06-15 ENCOUNTER — HOSPITAL ENCOUNTER (EMERGENCY)
Facility: HOSPITAL | Age: 72
Discharge: HOME | End: 2022-06-15
Attending: EMERGENCY MEDICINE | Admitting: EMERGENCY MEDICINE

## 2022-06-15 ENCOUNTER — TELEPHONE (OUTPATIENT)
Dept: FAMILY MEDICINE CLINIC | Facility: CLINIC | Age: 72
End: 2022-06-15

## 2022-06-15 VITALS
HEART RATE: 70 BPM | RESPIRATION RATE: 18 BRPM | TEMPERATURE: 97.8 F | OXYGEN SATURATION: 95 % | WEIGHT: 200.62 LBS | BODY MASS INDEX: 34.25 KG/M2 | DIASTOLIC BLOOD PRESSURE: 76 MMHG | SYSTOLIC BLOOD PRESSURE: 169 MMHG | HEIGHT: 64 IN

## 2022-06-15 DIAGNOSIS — M25.572 ACUTE LEFT ANKLE PAIN: ICD-10-CM

## 2022-06-15 DIAGNOSIS — M54.2 NECK PAIN: ICD-10-CM
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
## 2022-06-15 DIAGNOSIS — M79.672 LEFT FOOT PAIN: ICD-10-CM

## 2022-06-15 PROCEDURE — 73630 X-RAY EXAM OF FOOT: CPT

## 2022-06-15 PROCEDURE — 73610 X-RAY EXAM OF ANKLE: CPT

## 2022-06-15 PROCEDURE — 99282 EMERGENCY DEPT VISIT SF MDM: CPT

## 2022-06-15 PROCEDURE — 70450 CT HEAD/BRAIN W/O DYE: CPT

## 2022-06-15 PROCEDURE — 72125 CT NECK SPINE W/O DYE: CPT

## 2022-06-15 RX ORDER — LIDOCAINE 50 MG/G
1 PATCH TOPICAL EVERY 24 HOURS
Qty: 6 PATCH | Refills: 0 | Status: SHIPPED | OUTPATIENT
Start: 2022-06-15 | End: 2022-06-23

## 2022-06-15 RX ORDER — TIZANIDINE 2 MG/1
2 TABLET ORAL EVERY 8 HOURS PRN
Qty: 10 TABLET | Refills: 0 | Status: SHIPPED | OUTPATIENT
Start: 2022-06-15 | End: 2022-06-16 | Stop reason: SDUPTHER

## 2022-06-15 NOTE — DISCHARGE INSTRUCTIONS
Please follow-up with your primary care provider, call for an appointment  Return to the ED for new or worsening symptoms

## 2022-06-15 NOTE — ED PROVIDER NOTES
Subjective    Chief Complaint   Patient presents with   • Fall     Pt reports fell Monday night hitting neck and rt shoulder on 's scooter, c/o continued pain     Bhargavi Rodriguez, BRANDON  No LMP recorded. Patient is postmenopausal.  Allergies   Allergen Reactions   • Sulfa Antibiotics Rash       Patient is a 71-year-old female presents emergency department after a fall early Tuesday morning.  Patient reports she fell hitting her head and her neck no loss of conscious.  No severe headache or visual disturbances, but she does have continued neck pain.  She also complains of pain in her left foot and ankle.      Onset: Early Tuesday morning    Location: Right neck    Duration: Consistent    Character: Throbbing    Aggravating Factors: Movement    Alleviating Factors: None    Radiation: None    Treatments Tried: None            Review of Systems   Constitutional: Negative for chills and fever.   Eyes: Negative for photophobia and visual disturbance.   Respiratory: Negative for shortness of breath.    Cardiovascular: Negative for chest pain.   Musculoskeletal: Positive for neck pain. Negative for back pain.        Left foot and ankle pain   Skin: Negative for color change and rash.   Neurological: Negative for dizziness, syncope, weakness and light-headedness.   Psychiatric/Behavioral: Negative for confusion.       Past Medical History:   Diagnosis Date   • Adjustment disorder with depressed mood 9/12/2018   • Allergic Years    Sulfa drugs   • Arthritis Years    Lotsvof joints. Mostly back, knees, hips, fingers   • Depression 9/12/2018   • Diabetes mellitus, type II (HCC) 7/3/2013   • Essential hypertension 7/3/2013   • Gout 8/12/2014   • Hyperlipidemia 6/2/2020   • Inflammatory bowel disease    • Irritable bowel syndrome without diarrhea 4/17/2017   • Kidney stone ?August 2016?    In hospital overnight   • Osteopenia 4/17/2017   • Peripheral neuropathy 4/17/2017       Allergies   Allergen Reactions   • Sulfa  Antibiotics Rash       Past Surgical History:   Procedure Laterality Date   •  SECTION      x2   • KNEE ARTHROSCOPY Bilateral    • TUBAL ABDOMINAL LIGATION  May 1973       Family History   Adopted: Yes   Problem Relation Age of Onset   • Epilepsy Mother    • Alcohol abuse Father    • Other Father         Alzheimer s   • No Known Problems Sister    • No Known Problems Maternal Grandmother    • No Known Problems Maternal Grandfather    • No Known Problems Paternal Grandmother    • No Known Problems Paternal Grandfather        Social History     Socioeconomic History   • Marital status:    • Number of children: 2   Tobacco Use   • Smoking status: Never Smoker   • Smokeless tobacco: Never Used   Vaping Use   • Vaping Use: Never used   Substance and Sexual Activity   • Alcohol use: Never   • Drug use: Never   • Sexual activity: Not Currently     Partners: Male           Objective   Physical Exam  Vitals and nursing note reviewed.   Constitutional:       General: She is not in acute distress.     Appearance: Normal appearance. She is not ill-appearing, toxic-appearing or diaphoretic.   HENT:      Head: Normocephalic and atraumatic.      Nose: Nose normal.      Mouth/Throat:      Mouth: Mucous membranes are moist.      Pharynx: Oropharynx is clear.   Eyes:      Extraocular Movements: Extraocular movements intact.      Conjunctiva/sclera: Conjunctivae normal.      Pupils: Pupils are equal, round, and reactive to light.   Neck:      Trachea: Trachea and phonation normal.     Cardiovascular:      Rate and Rhythm: Normal rate and regular rhythm.      Heart sounds: Normal heart sounds. No murmur heard.    No friction rub. No gallop.   Pulmonary:      Effort: Pulmonary effort is normal.      Breath sounds: Normal breath sounds.   Abdominal:      General: Bowel sounds are normal.      Palpations: Abdomen is soft.      Tenderness: There is no abdominal tenderness. There is no guarding or rebound.   Musculoskeletal:  "     Right shoulder: No swelling, deformity, effusion, laceration, tenderness, bony tenderness or crepitus. Normal range of motion. Normal strength. Normal pulse.        Arms:       Cervical back: Full passive range of motion without pain, normal range of motion and neck supple. No edema, signs of trauma, rigidity or crepitus. Muscular tenderness present. No spinous process tenderness. Normal range of motion.      Left ankle: Swelling present. No deformity. No tenderness. Normal range of motion. Normal pulse.      Left foot: Swelling present. No tenderness or bony tenderness. Normal pulse.      Comments: Bilateral radial pulses 2+.  Cap refill brisk.   Skin:     General: Skin is warm and dry.      Capillary Refill: Capillary refill takes less than 2 seconds.      Findings: Bruising (Right upper arm) present. No erythema or rash.   Neurological:      Mental Status: She is alert and oriented to person, place, and time.   Psychiatric:         Mood and Affect: Mood normal.         Behavior: Behavior normal.         Procedures           ED Course      /76 (BP Location: Left arm, Patient Position: Sitting)   Pulse 70   Temp 97.8 °F (36.6 °C) (Oral)   Resp 18   Ht 162.6 cm (64\")   Wt 91 kg (200 lb 9.9 oz)   SpO2 95%   BMI 34.44 kg/m²   Labs Reviewed - No data to display  Medications - No data to display  XR Ankle 3+ View Left    Result Date: 6/15/2022  Diffuse left ankle soft tissue swelling. No acute osseous abnormalities. Degenerative changes.  Electronically Signed By-Za Garcia MD On:6/15/2022 4:29 PM This report was finalized on [id number] by  Za Garcia MD.    XR Foot 3+ View Left    Result Date: 6/15/2022   1. Diffuse left foot and ankle soft tissue swelling. 2. No acute osseous abnormality of the left foot. Chronic irregularity of the proximal phalanx of left fifth toe. 3. Degenerative changes. 4. Osteopenia.  Electronically Signed By-Za Garcia MD On:6/15/2022 4:30 PM This report was " finalized on [id number] by  Za Garcia MD.    CT Head Without Contrast    Result Date: 6/15/2022   1. No acute intracranial hemorrhage or mass/mass effect. 2. No acute fracture or subluxation of the cervical spine. 3. Small low-density lesions in the right basal ganglia, which could represent old lacunar infarcts versus prominent perivascular spaces. 4. Mild multilevel cervical spondylosis, as detailed above.  Electronically Signed By-Pritesh Tavera MD On:6/15/2022 4:10 PM This report was finalized on [id number] by  Pritesh Tavera MD.    CT Cervical Spine Without Contrast    Result Date: 6/15/2022   1. No acute intracranial hemorrhage or mass/mass effect. 2. No acute fracture or subluxation of the cervical spine. 3. Small low-density lesions in the right basal ganglia, which could represent old lacunar infarcts versus prominent perivascular spaces. 4. Mild multilevel cervical spondylosis, as detailed above.  Electronically Signed By-Pritesh Tavera MD On:6/15/2022 4:10 PM This report was finalized on [id number] by  Pritesh Tavera MD.                                               MDM  Number of Diagnoses or Management Options  Acute left ankle pain  Fall, initial encounter  Left foot pain  Neck pain  Diagnosis management comments: Appropriate PPE was worn during the duration of the care for this patient while in the emergency department per [facility] Policy  Differentials: Fracture, contusion, strain  This list is not all inclusive and does not constitute the entireity of considered causes.     Patient was brought back to the emergency department room for evaluation and placed on appropriate monitoring.   ED Course /Labs/Imaging/Studies: Patient underwent the above exam and work-up for fall with neck pain.  Patient had CT of the head and neck which revealed no acute findings today.  X-ray of her ankle and foot are negative.  Positive for some soft tissue swelling but patient is able to walk and bear  weight.  She will be discharged home to follow with her primary care provider.  I am giving her a short course of designating, I cautioned her against feeling, lightheaded, dizzy while taking a muscle relaxer, and advised she could take it at night, or if she is resting at home during the day.  She will also be given Lidoderm patches.    Disposition: I spoke with the patient at the bedside regarding their plan of care, discharge instruction, home care, prescriptions, indications to return to the emergency department, and importance follow-up.  We discussed test results at the bedside, including incidental abnormal labs, radiological findings, understands need for follow-up with primary care or specialist if indicated.     Pt is aware that discharge does not mean that nothing is wrong but it indicates no emergency is present and they must continue care with follow-up as given below or physician of their choice        This document is intended for medical expert use only. Reading of this document by patients and/or patient's family without participating medical staff guidance may result in misinterpretation and unintended morbidity.  Any interpretation of such data is the responsibility of the patient and/or family member responsible for the patient in concert with their primary or specialist providers, not to be left for sources of online searches such as [place], [place] or similar queries. Relying on these approaches to knowledge may result in misinterpretation, misguided goals of care and even death should patients or family members try recommendations outside of the realm of professional medical care in a supervised inpatient environment.                                             Amount and/or Complexity of Data Reviewed  Tests in the radiology section of CPT®: reviewed        Final diagnoses:   Fall, initial encounter   Neck pain   Left foot pain   Acute left ankle pain       ED Disposition  ED Disposition     ED  Disposition   Discharge    Condition   Stable    Comment   --             [facility] EMERGENCY DEPARTMENT  [address] [address] [phone]    As needed, If symptoms worsen    Bhargavi Rodriguez, APRN  [address] [place] [address] [phone]    Schedule an appointment as soon as possible for a visit            Medication List      New Prescriptions    lidocaine 5 %  Commonly known as: LIDODERM  Place 1 patch on the skin as directed by provider Daily. Remove & Discard patch within 12 hours or as directed by MD     tiZANidine 2 MG tablet  Commonly known as: ZANAFLEX  Take 1 tablet by mouth Every 8 (Eight) Hours As Needed for Muscle Spasms.           Where to Get Your Medications      These medications were sent to [id number] - [place], [place] - [address] - [phone]  - [phone] FX  [address] [place] [address]    Phone: [phone]   · lidocaine 5 %  · tiZANidine 2 MG tablet          Emmy Dobbins, APRN  06/15/22 2180

## 2022-06-16 ENCOUNTER — HOSPITAL ENCOUNTER (EMERGENCY)
Facility: HOSPITAL | Age: 72
Discharge: HOME | End: 2022-06-16
Admitting: EMERGENCY MEDICINE

## 2022-06-16 VITALS
TEMPERATURE: 98 F | HEIGHT: 64 IN | WEIGHT: 199.74 LBS | OXYGEN SATURATION: 96 % | DIASTOLIC BLOOD PRESSURE: 70 MMHG | SYSTOLIC BLOOD PRESSURE: 172 MMHG | HEART RATE: 60 BPM | BODY MASS INDEX: 34.1 KG/M2 | RESPIRATION RATE: 17 BRPM

## 2022-06-16 DIAGNOSIS — S16.1XXS CERVICAL STRAIN, ACUTE, SEQUELA: Primary | ICD-10-CM

## 2022-06-16 PROCEDURE — 96372 THER/PROPH/DIAG INJ SC/IM: CPT

## 2022-06-16 PROCEDURE — 63710000001 ONDANSETRON ODT 4 MG TABLET DISPERSIBLE: Performed by: NURSE PRACTITIONER

## 2022-06-16 PROCEDURE — 25010000002 ORPHENADRINE CITRATE PER 60 MG: Performed by: NURSE PRACTITIONER

## 2022-06-16 PROCEDURE — 25010000002 HYDROMORPHONE 1 MG/ML SOLUTION: Performed by: NURSE PRACTITIONER

## 2022-06-16 PROCEDURE — 99283 EMERGENCY DEPT VISIT LOW MDM: CPT

## 2022-06-16 PROCEDURE — 25010000002 KETOROLAC TROMETHAMINE PER 15 MG: Performed by: NURSE PRACTITIONER

## 2022-06-16 RX ORDER — ORPHENADRINE CITRATE 30 MG/ML
60 INJECTION INTRAMUSCULAR; INTRAVENOUS EVERY 12 HOURS
Status: DISCONTINUED | OUTPATIENT
Start: 2022-06-16 | End: 2022-06-16 | Stop reason: HOSPADM

## 2022-06-16 RX ORDER — KETOROLAC TROMETHAMINE 30 MG/ML
30 INJECTION, SOLUTION INTRAMUSCULAR; INTRAVENOUS ONCE
Status: COMPLETED | OUTPATIENT
Start: 2022-06-16 | End: 2022-06-16

## 2022-06-16 RX ORDER — TIZANIDINE 2 MG/1
2 TABLET ORAL EVERY 8 HOURS PRN
Qty: 10 TABLET | Refills: 0 | Status: SHIPPED | OUTPATIENT
Start: 2022-06-16 | End: 2022-06-23

## 2022-06-16 RX ORDER — ONDANSETRON 4 MG/1
4 TABLET, ORALLY DISINTEGRATING ORAL ONCE
Status: COMPLETED | OUTPATIENT
Start: 2022-06-16 | End: 2022-06-16

## 2022-06-16 RX ADMIN — KETOROLAC TROMETHAMINE 30 MG: 30 INJECTION, SOLUTION INTRAMUSCULAR at 14:16

## 2022-06-16 RX ADMIN — ORPHENADRINE CITRATE 60 MG: 30 INJECTION INTRAMUSCULAR; INTRAVENOUS at 14:16

## 2022-06-16 RX ADMIN — ONDANSETRON 4 MG: 4 TABLET, ORALLY DISINTEGRATING ORAL at 14:16

## 2022-06-16 RX ADMIN — HYDROMORPHONE HYDROCHLORIDE 1 MG: 1 INJECTION, SOLUTION INTRAMUSCULAR; INTRAVENOUS; SUBCUTANEOUS at 14:16

## 2022-06-16 NOTE — ED PROVIDER NOTES
Subjective   70 y/o  female presents to ER for c/o right neck pain.  Patient reports that she was seen in this ER and had imaging for said complaint, last evening.  Patient states that there is no new injury since last evening.  Patient denies taking any pain medication today or trying any other therapies to help relief pain.  ONSET:  Monday when she tripped over 's scooter and landed on her right side, flank and right side of head and neck.  LOCATION: right neck pain  DURATION: 4 days  CHARACTERISTICS: tight pain in right neck region.  AGGRAVATING/ALLEVIATING FACTORS: movement, unable to rest/ tried head and lidoderm patches, but reports that it doesn't help  RADIATION: none; reports pain in neck and right upper back  THERAPIES/TREATMENTS:  Heat compresses.  Lidoderm patch.  Patient reports that she was prescribed pain medication for recent foot surgery last week, but states she doesn't like to take prescription pain medication.  Patient denies taking tylenol or ibuprofen, today.          Review of Systems   Constitutional: Positive for activity change and fatigue. Negative for appetite change and fever.   HENT: Negative.    Respiratory: Negative for cough, chest tightness and shortness of breath.    Cardiovascular: Negative for chest pain.   Gastrointestinal: Negative for abdominal pain and nausea.   Musculoskeletal: Positive for myalgias and neck pain. Negative for arthralgias, gait problem, joint swelling and neck stiffness.   Skin: Negative for color change, pallor, rash and wound.   All other systems reviewed and are negative.      Past Medical History:   Diagnosis Date   • Adjustment disorder with depressed mood 9/12/2018   • Allergic Years    Sulfa drugs   • Arthritis Years    Lotsvof joints. Mostly back, knees, hips, fingers   • Depression 9/12/2018   • Diabetes mellitus, type II (HCC) 7/3/2013   • Essential hypertension 7/3/2013   • Gout 8/12/2014   • Hyperlipidemia 6/2/2020   •  Inflammatory bowel disease    • Irritable bowel syndrome without diarrhea 2017   • Kidney stone ?2016?    In hospital overnight   • Osteopenia 2017   • Peripheral neuropathy 2017       Allergies   Allergen Reactions   • Sulfa Antibiotics Rash       Past Surgical History:   Procedure Laterality Date   •  SECTION      x2   • KNEE ARTHROSCOPY Bilateral    • TUBAL ABDOMINAL LIGATION  May 1973       Family History   Adopted: Yes   Problem Relation Age of Onset   • Epilepsy Mother    • Alcohol abuse Father    • Other Father         Alzheimer s   • No Known Problems Sister    • No Known Problems Maternal Grandmother    • No Known Problems Maternal Grandfather    • No Known Problems Paternal Grandmother    • No Known Problems Paternal Grandfather        Social History     Socioeconomic History   • Marital status:    • Number of children: 2   Tobacco Use   • Smoking status: Never Smoker   • Smokeless tobacco: Never Used   Vaping Use   • Vaping Use: Never used   Substance and Sexual Activity   • Alcohol use: Never   • Drug use: Never   • Sexual activity: Not Currently     Partners: Male           Objective   Physical Exam  Vitals and nursing note reviewed.   Constitutional:       General: She is in acute distress.      Appearance: She is not ill-appearing, toxic-appearing or diaphoretic.   HENT:      Head: Normocephalic and atraumatic.      Mouth/Throat:      Mouth: Mucous membranes are moist.      Pharynx: Oropharynx is clear.   Eyes:      Extraocular Movements: Extraocular movements intact.      Conjunctiva/sclera: Conjunctivae normal.      Pupils: Pupils are equal, round, and reactive to light.   Musculoskeletal:      Right shoulder: Tenderness present. No bony tenderness or crepitus. Normal range of motion. Normal strength. Normal pulse.      Left shoulder: Normal.        Arms:       Comments: + Full ROM of right shoulder and neck.  5/5 strength in right upper extremity.  Patient  denies numbness or tingling in right upper extremity.   Neurological:      General: No focal deficit present.      Mental Status: She is alert and oriented to person, place, and time.      GCS: GCS eye subscore is 4. GCS verbal subscore is 5. GCS motor subscore is 6.      Sensory: Sensation is intact.      Motor: Motor function is intact. No weakness, tremor, atrophy or abnormal muscle tone.      Gait: Gait is intact.         Procedures           ED Course      Medications   orphenadrine (NORFLEX) injection 60 mg (60 mg Intramuscular Given 6/16/22 1416)   HYDROmorphone (DILAUDID) injection 1 mg (1 mg Subcutaneous Given 6/16/22 1416)   ketorolac (TORADOL) injection 30 mg (30 mg Intramuscular Given 6/16/22 1416)   ondansetron ODT (ZOFRAN-ODT) disintegrating tablet 4 mg (4 mg Oral Given 6/16/22 1416)        Discussed other therapies to deal with stress and pain other than prescribed pain medication.  Patient admits to NOT picking up or taking or trying muscle relaxer prescribed yesterday by previous provider.  Patient had CT head and CT cervical imaging yesterday and was WNL; reiterated results with patient and family.  Discussed and encouraged usage of tylenol/ibuprofen, as directed for pain and inflammation.  Patient and family verbalize understanding.     D/c vitals:   Vitals:    06/16/22 1545   BP: 176/73   Pulse: 62   Resp: 18   Temp:    SpO2: 95%                    XR Ankle 3+ View Left    Result Date: 6/15/2022  Diffuse left ankle soft tissue swelling. No acute osseous abnormalities. Degenerative changes.  Electronically Signed By-Za Garcia MD On:6/15/2022 4:29 PM This report was finalized on [id number] by  Za Garcia MD.    XR Foot 3+ View Left    Result Date: 6/15/2022   1. Diffuse left foot and ankle soft tissue swelling. 2. No acute osseous abnormality of the left foot. Chronic irregularity of the proximal phalanx of left fifth toe. 3. Degenerative changes. 4. Osteopenia.  Electronically Signed  By-Za Garcia MD On:6/15/2022 4:30 PM This report was finalized on [id number] by  Za Garcia MD.    CT Head Without Contrast    Result Date: 6/15/2022   1. No acute intracranial hemorrhage or mass/mass effect. 2. No acute fracture or subluxation of the cervical spine. 3. Small low-density lesions in the right basal ganglia, which could represent old lacunar infarcts versus prominent perivascular spaces. 4. Mild multilevel cervical spondylosis, as detailed above.  Electronically Signed By-Pritesh Tavera MD On:6/15/2022 4:10 PM This report was finalized on [id number] by  Pritesh Tavera MD.    CT Cervical Spine Without Contrast    Result Date: 6/15/2022   1. No acute intracranial hemorrhage or mass/mass effect. 2. No acute fracture or subluxation of the cervical spine. 3. Small low-density lesions in the right basal ganglia, which could represent old lacunar infarcts versus prominent perivascular spaces. 4. Mild multilevel cervical spondylosis, as detailed above.  Electronically Signed By-Pritesh Tavera MD On:6/15/2022 4:10 PM This report was finalized on [id number] by  Pritesh Tavera MD.                  MDM  Encouraged patient to please  prescribed muscle relaxer at pharmacy today and take as directed.  DISCHARGE INSTRUCTIONS:  Rest and use warm heat to neck area for pain.  YOUR muscle relaxer prescribed by ER Provider last evening was a quantity of #10, not one!  Please fill and take as directed.  If your pain persists, please f/u with your PCP for further evaluation and care.  Final diagnoses:   Cervical strain, acute, sequela       ED Disposition  ED Disposition     ED Disposition   Discharge    Condition   Stable    Comment   --             Bhargavi Rodriguez, APRN  [address]  [address]  [place] [address]  [phone]    Schedule an appointment as soon as possible for a visit in 2 days  As needed, If symptoms worsen         Where to Get Your Medications      These medications  were sent to [facility], [place] - [address] - [phone]  - [phone] FX  [address] [place] [address]    Phone: [phone]   · tiZANidine 2 MG tablet        Medication List      No changes were made to your prescriptions during this visit.          Bessie West, APRN  06/17/22 2242

## 2022-06-16 NOTE — DISCHARGE INSTRUCTIONS
Rest and use warm heat to neck area for pain.  YOUR muscle relaxer prescribed by ER Provider last evening was a quantity of #10, not one!  Please fill and take as directed.  If your pain persists, please f/u with your PCP for further evaluation and care.

## 2022-06-22 RX ORDER — CLINDAMYCIN HYDROCHLORIDE 300 MG/1
CAPSULE ORAL
COMMUNITY
Start: 2022-04-07 | End: 2022-06-23

## 2022-06-22 RX ORDER — LINEZOLID 600 MG/1
TABLET, FILM COATED ORAL
COMMUNITY
Start: 2022-04-11 | End: 2022-06-23

## 2022-06-22 RX ORDER — HYDROCODONE BITARTRATE AND ACETAMINOPHEN 5; 325 MG/1; MG/1
1 TABLET ORAL EVERY 6 HOURS PRN
COMMUNITY
Start: 2022-06-09

## 2022-06-23 ENCOUNTER — OFFICE VISIT (OUTPATIENT)
Dept: ENDOCRINOLOGY | Facility: CLINIC | Age: 72
End: 2022-06-23

## 2022-06-23 VITALS
BODY MASS INDEX: 33.97 KG/M2 | OXYGEN SATURATION: 97 % | HEART RATE: 58 BPM | DIASTOLIC BLOOD PRESSURE: 75 MMHG | WEIGHT: 199 LBS | SYSTOLIC BLOOD PRESSURE: 155 MMHG | HEIGHT: 64 IN

## 2022-06-23 DIAGNOSIS — E78.2 MIXED HYPERLIPIDEMIA: ICD-10-CM

## 2022-06-23 DIAGNOSIS — E55.9 VITAMIN D DEFICIENCY: ICD-10-CM

## 2022-06-23 DIAGNOSIS — E11.42 DIABETIC PERIPHERAL NEUROPATHY: ICD-10-CM

## 2022-06-23 DIAGNOSIS — I10 ESSENTIAL HYPERTENSION: ICD-10-CM

## 2022-06-23 DIAGNOSIS — E11.65 TYPE 2 DIABETES MELLITUS WITH HYPERGLYCEMIA, WITHOUT LONG-TERM CURRENT USE OF INSULIN: Primary | ICD-10-CM

## 2022-06-23 LAB — GLUCOSE BLDC GLUCOMTR-MCNC: 449 MG/DL (ref 70–105)

## 2022-06-23 PROCEDURE — 99204 OFFICE O/P NEW MOD 45 MIN: CPT | Performed by: INTERNAL MEDICINE

## 2022-06-23 PROCEDURE — 82962 GLUCOSE BLOOD TEST: CPT | Performed by: INTERNAL MEDICINE

## 2022-06-23 RX ORDER — MAGNESIUM 200 MG
1000 TABLET ORAL DAILY
Qty: 100 EACH | Refills: 4 | Status: SHIPPED | OUTPATIENT
Start: 2022-06-23

## 2022-06-23 NOTE — PATIENT INSTRUCTIONS
Start Jardiance 10 mg p.o. daily  Drink plenty of fluids and if you develop any itching or rash in the genital region then call me  Start vitamin D 5000 units p.o. daily  Start vitamin B12 1000 mcg sublingual daily  Continue metformin and glimepiride  Arrange for refresher course for diabetes  Watch for low blood sugars and always keep glucose source in case of low blood sugars  Annual eye exam and flu vaccine  Labs in 3 months.

## 2022-06-23 NOTE — PROGRESS NOTES
Endocrine Consult Outpatient  Referred by Ms. CoyneBhargavisunni Rodriguez for uncontrolled type 2 diabetes consultation  Patient Care Team:  Bhargavi Rodriguez APRN as PCP - General     Chief Complaint: Uncontrolled type 2 diabetes        HPI: This is a 71-year-old female with history of type 2 diabetes, hypertension, hyperlipidemia, CAD, diabetic neuropathy and obesity is referred for diabetes consultation.    Type 2 diabetes: Initially diagnosis in June 1998, she did have an IV education here at [facility] at that time.  She does check blood sugars on a daily basis.  Blood sugar ranges between 70-2 50 most of the time.  She is currently using metformin 1000 mg twice a day and glimepiride 2 mg twice a day.  In the past she could not tolerate Trulicity, Bydureon and glargine.  All of them cause abdominal pain with nausea and vomiting.  She has not tried anything else other than those mentioned above.    Hypertension: Her blood pressure is high today.    Hyperlipidemia: She is currently on atorvastatin.    Diabetic peripheral neuropathy: She is not sure if she is on vitamin D, she does not think she is taking it at this time.    Past Medical History:   Diagnosis Date   • Adjustment disorder with depressed mood 09/12/2018   • Allergic Years    Sulfa drugs   • Arthritis Years    Lotsvof joints. Mostly back, knees, hips, fingers   • Depression 09/12/2018   • Diabetes mellitus, type II (HCC) 07/03/2013   • Essential hypertension 07/03/2013   • Gout 08/12/2014   • Heart attack (HCC)    • Hyperlipidemia 06/02/2020   • Hypertension    • Inflammatory bowel disease    • Irritable bowel syndrome without diarrhea 04/17/2017   • Kidney stone ?August 2016?    In hospital overnight   • Osteopenia 04/17/2017   • Peripheral neuropathy 04/17/2017       Social History     Socioeconomic History   • Marital status:      Spouse name: Rishabh   • Number of children: 2   • Years of education: 12   Tobacco Use   • Smoking status: Never Smoker   •  Smokeless tobacco: Never Used   Vaping Use   • Vaping Use: Never used   Substance and Sexual Activity   • Alcohol use: Never   • Drug use: Never   • Sexual activity: Not Currently     Partners: Male       Family History   Adopted: Yes   Problem Relation Age of Onset   • Epilepsy Mother    • Alcohol abuse Father    • Other Father         Alzheimer s   • No Known Problems Sister    • Other Maternal Uncle         alzhimers   • No Known Problems Maternal Grandmother    • No Known Problems Maternal Grandfather    • No Known Problems Paternal Grandmother    • No Known Problems Paternal Grandfather        Allergies   Allergen Reactions   • Sulfa Antibiotics Rash       ROS:   Constitutional:  Admit fatigue, tiredness.    Eyes:  Denies change in visual acuity   HENT:  Denies nasal congestion or sore throat   Respiratory: denies cough, shortness of breath.   Cardiovascular:  denies chest pain, edema   GI:  Denies abdominal pain, nausea, vomiting.    :  Denies dysuria   Musculoskeletal:  Denies back pain or joint pain   Integument:  Denies dry skin, rash   Neurologic:  Denies headache, focal weakness or sensory changes   Endocrine:  Denies polyuria or polydipsia   Psychiatric:  Denies depression, admit anxiety      Vitals:    06/23/22 1324   BP: 155/75   Pulse: 58   SpO2: 97%     Body mass index is 34.16 kg/m².      Physical Exam:  GEN: NAD, conversant, obese  EYES: EOMI, PERRL, no conjunctival erythema  NECK: no thyromegaly, full ROM   CV: RRR, no murmurs/rubs/gallops, no peripheral edema  LUNG: CTAB, no wheezes/rales/ronchi  SKIN: no rashes, no acanthosis  MSK: no deformities, full ROM of all extremities  NEURO: no tremors, DTR normal  PSYCH: AOX3, appropriate mood, affect normal      Results Review:     I reviewed the patient's new clinical results.    Lab Results   Component Value Date    GLUCOSE 231 (H) 06/02/2022    BUN 23 06/02/2022    CREATININE 1.09 (H) 06/02/2022    EGFRIFNONA 53 (L) 10/27/2021    EGFRIFAFRI 70  04/18/2017    BCR 21.1 06/02/2022    K 4.8 06/02/2022    CO2 22.7 06/02/2022    CALCIUM 9.5 06/02/2022    ALBUMIN 4.00 04/07/2022    LABIL2 1.0 12/12/2018    AST 12 04/07/2022    ALT 9 04/07/2022    CHOL 168 10/27/2021    TRIG 272 (H) 10/27/2021    HDL 42 10/27/2021    LDL 81 10/27/2021     Lab Results   Component Value Date    HGBA1C 9.6 (H) 04/07/2022    HGBA1C 9.8 (H) 10/27/2021    HGBA1C 10.3 (H) 06/02/2020     Lab Results   Component Value Date    MICROALBUR 15.1 10/27/2021    CREATININE 1.09 (H) 06/02/2022     Lab Results   Component Value Date    TSH 2.610 10/27/2021       Medication Review: Reviewed.       Current Outpatient Medications:   •  amLODIPine (NORVASC) 10 MG tablet, Take 1 tablet by mouth once daily, Disp: 90 tablet, Rfl: 0  •  aspirin (aspirin) 81 MG EC tablet, ASPIRIN 81 81 MG TBEC, Disp: , Rfl:   •  atorvastatin (LIPITOR) 20 MG tablet, Take 1 tablet by mouth once daily, Disp: 90 tablet, Rfl: 0  •  bisoprolol (ZEBeta) 10 MG tablet, Take 1 tablet by mouth once daily, Disp: 90 tablet, Rfl: 0  •  escitalopram (LEXAPRO) 10 MG tablet, Take 1 tablet by mouth once daily, Disp: 90 tablet, Rfl: 0  •  gabapentin (NEURONTIN) 600 MG tablet, TAKE 1 TABLET BY MOUTH THREE TIMES DAILY, Disp: 270 tablet, Rfl: 0  •  glimepiride (AMARYL) 2 MG tablet, Take 2 tablets by mouth twice daily, Disp: 360 tablet, Rfl: 0  •  hydroCHLOROthiazide (HYDRODIURIL) 50 MG tablet, Take 1 tablet by mouth once daily, Disp: 90 tablet, Rfl: 0  •  HYDROcodone-acetaminophen (NORCO) 5-325 MG per tablet, Take 1 tablet by mouth Every 6 (Six) Hours As Needed. for pain, Disp: , Rfl:   •  lisinopril (PRINIVIL,ZESTRIL) 20 MG tablet, Take 1 tablet by mouth twice daily, Disp: 180 tablet, Rfl: 0  •  metFORMIN (GLUCOPHAGE) 1000 MG tablet, TAKE 1 TABLET BY MOUTH TWICE DAILY WITH MEALS, Disp: 180 tablet, Rfl: 0  •  vitamin D (ERGOCALCIFEROL) 1.25 MG (83828 UT) capsule capsule, Take 1 capsule by mouth once a week, Disp: 12 capsule, Rfl:  0        Assessment and plan:  Diabetes mellitus type 2: Uncontrolled with significant hyperglycemia, at this time I will add Jardiance 10 mg p.o. daily.  Side effects of excessive urination and yeast infection discussed with her.  She will continue metformin and glimepiride and I will refer her for comprehensive diabetes education.  She is advised to watch for low blood sugars and if she develops any itching or rash in the genital region she will call me.  Recommend annual eye exam.    Hypertension: Blood pressure is high today, she is on lisinopril.  We will continue that and follow blood pressure.    Hyperlipidemia: Currently on atorvastatin will follow lipid panel    Diabetic peripheral neuropathy: We will add vitamin D and B12 supplementation.    Vitamin D deficiency: We will add vitamin D 5000 units p.o. daily.    Adolph Tillman MD FACE.

## 2022-09-09 ENCOUNTER — APPOINTMENT (OUTPATIENT)
Dept: CT IMAGING | Facility: HOSPITAL | Age: 72
End: 2022-09-09

## 2022-09-09 ENCOUNTER — HOSPITAL ENCOUNTER (INPATIENT)
Facility: HOSPITAL | Age: 72
LOS: 1 days | Discharge: HOME | End: 2022-09-13
Attending: EMERGENCY MEDICINE | Admitting: INTERNAL MEDICINE

## 2022-09-09 ENCOUNTER — APPOINTMENT (OUTPATIENT)
Dept: GENERAL RADIOLOGY | Facility: HOSPITAL | Age: 72
End: 2022-09-09

## 2022-09-09 DIAGNOSIS — R53.1 WEAKNESS: ICD-10-CM

## 2022-09-09 DIAGNOSIS — R42 DIZZY: Primary | ICD-10-CM

## 2022-09-09 LAB
ABO GROUP BLD: NORMAL
ALBUMIN SERPL-MCNC: 3.8 G/DL (ref 3.5–5.2)
ALBUMIN/GLOB SERPL: 1 G/DL
ALP SERPL-CCNC: 69 U/L (ref 39–117)
ALT SERPL W P-5'-P-CCNC: 6 U/L (ref 1–33)
ANION GAP SERPL CALCULATED.3IONS-SCNC: 13 MMOL/L (ref 5–15)
APTT PPP: 28.5 SECONDS (ref 24–31)
AST SERPL-CCNC: 10 U/L (ref 1–32)
BACTERIA UR QL AUTO: ABNORMAL /HPF
BASOPHILS # BLD AUTO: 0 10*3/MM3 (ref 0–0.2)
BASOPHILS NFR BLD AUTO: 0.4 % (ref 0–1.5)
BILIRUB SERPL-MCNC: 0.5 MG/DL (ref 0–1.2)
BILIRUB UR QL STRIP: NEGATIVE
BLD GP AB SCN SERPL QL: NEGATIVE
BUN SERPL-MCNC: 21 MG/DL (ref 8–23)
BUN/CREAT SERPL: 14.3 (ref 7–25)
CALCIUM SPEC-SCNC: 9 MG/DL (ref 8.6–10.5)
CHLORIDE SERPL-SCNC: 101 MMOL/L (ref 98–107)
CK SERPL-CCNC: 33 U/L (ref 20–180)
CLARITY UR: CLEAR
CO2 SERPL-SCNC: 21 MMOL/L (ref 22–29)
COHGB MFR BLD: 2.2 % (ref 0–3)
COLOR UR: YELLOW
CREAT SERPL-MCNC: 1.47 MG/DL (ref 0.57–1)
DEPRECATED RDW RBC AUTO: 42.9 FL (ref 37–54)
EGFRCR SERPLBLD CKD-EPI 2021: 37.3 ML/MIN/1.73
EOSINOPHIL # BLD AUTO: 0.1 10*3/MM3 (ref 0–0.4)
EOSINOPHIL NFR BLD AUTO: 0.8 % (ref 0.3–6.2)
ERYTHROCYTE [DISTWIDTH] IN BLOOD BY AUTOMATED COUNT: 14 % (ref 12.3–15.4)
GLOBULIN UR ELPH-MCNC: 3.8 GM/DL
GLUCOSE BLDC GLUCOMTR-MCNC: 101 MG/DL (ref 70–105)
GLUCOSE BLDC GLUCOMTR-MCNC: 117 MG/DL (ref 70–105)
GLUCOSE SERPL-MCNC: 119 MG/DL (ref 65–99)
GLUCOSE UR STRIP-MCNC: NEGATIVE MG/DL
HCT VFR BLD AUTO: 39 % (ref 34–46.6)
HGB BLD-MCNC: 12.6 G/DL (ref 12–15.9)
HGB UR QL STRIP.AUTO: ABNORMAL
HOLD SPECIMEN: NORMAL
HOLD SPECIMEN: NORMAL
HYALINE CASTS UR QL AUTO: ABNORMAL /LPF
INR PPP: 1.14 (ref 0.93–1.1)
KETONES UR QL STRIP: NEGATIVE
LEUKOCYTE ESTERASE UR QL STRIP.AUTO: NEGATIVE
LYMPHOCYTES # BLD AUTO: 1 10*3/MM3 (ref 0.7–3.1)
LYMPHOCYTES NFR BLD AUTO: 9.1 % (ref 19.6–45.3)
MCH RBC QN AUTO: 27.9 PG (ref 26.6–33)
MCHC RBC AUTO-ENTMCNC: 32.2 G/DL (ref 31.5–35.7)
MCV RBC AUTO: 86.6 FL (ref 79–97)
MONOCYTES # BLD AUTO: 1 10*3/MM3 (ref 0.1–0.9)
MONOCYTES NFR BLD AUTO: 8.9 % (ref 5–12)
NEUTROPHILS NFR BLD AUTO: 8.7 10*3/MM3 (ref 1.7–7)
NEUTROPHILS NFR BLD AUTO: 80.8 % (ref 42.7–76)
NITRITE UR QL STRIP: NEGATIVE
NRBC BLD AUTO-RTO: 0.1 /100 WBC (ref 0–0.2)
PH UR STRIP.AUTO: <=5 [PH] (ref 5–8)
PLATELET # BLD AUTO: 157 10*3/MM3 (ref 140–450)
PMV BLD AUTO: 8.9 FL (ref 6–12)
POTASSIUM SERPL-SCNC: 4.9 MMOL/L (ref 3.5–5.2)
PROCALCITONIN SERPL-MCNC: 0.29 NG/ML (ref 0–0.25)
PROT SERPL-MCNC: 7.6 G/DL (ref 6–8.5)
PROT UR QL STRIP: NEGATIVE
PROTHROMBIN TIME: 11.7 SECONDS (ref 9.6–11.7)
RBC # BLD AUTO: 4.5 10*6/MM3 (ref 3.77–5.28)
RBC # UR STRIP: ABNORMAL /HPF
REF LAB TEST METHOD: ABNORMAL
RH BLD: POSITIVE
SARS-COV-2 RNA PNL SPEC NAA+PROBE: NOT DETECTED
SODIUM SERPL-SCNC: 135 MMOL/L (ref 136–145)
SP GR UR STRIP: 1.05 (ref 1–1.03)
SQUAMOUS #/AREA URNS HPF: ABNORMAL /HPF
T&S EXPIRATION DATE: NORMAL
T4 FREE SERPL-MCNC: 0.93 NG/DL (ref 0.93–1.7)
TROPONIN T SERPL-MCNC: <0.01 NG/ML (ref 0–0.03)
TSH SERPL DL<=0.05 MIU/L-ACNC: 1.38 UIU/ML (ref 0.27–4.2)
UROBILINOGEN UR QL STRIP: ABNORMAL
WBC # UR STRIP: ABNORMAL /HPF
WBC NRBC COR # BLD: 10.8 10*3/MM3 (ref 3.4–10.8)
WHOLE BLOOD HOLD COAG: NORMAL
WHOLE BLOOD HOLD SPECIMEN: NORMAL

## 2022-09-09 PROCEDURE — 36415 COLL VENOUS BLD VENIPUNCTURE: CPT | Performed by: EMERGENCY MEDICINE

## 2022-09-09 PROCEDURE — 99285 EMERGENCY DEPT VISIT HI MDM: CPT

## 2022-09-09 PROCEDURE — 87449 NOS EACH ORGANISM AG IA: CPT | Performed by: INTERNAL MEDICINE

## 2022-09-09 PROCEDURE — 71045 X-RAY EXAM CHEST 1 VIEW: CPT

## 2022-09-09 PROCEDURE — 70498 CT ANGIOGRAPHY NECK: CPT

## 2022-09-09 PROCEDURE — 82962 GLUCOSE BLOOD TEST: CPT

## 2022-09-09 PROCEDURE — P9612 CATHETERIZE FOR URINE SPEC: HCPCS

## 2022-09-09 PROCEDURE — 0 IOPAMIDOL PER 1 ML: Performed by: EMERGENCY MEDICINE

## 2022-09-09 PROCEDURE — 86850 RBC ANTIBODY SCREEN: CPT | Performed by: EMERGENCY MEDICINE

## 2022-09-09 PROCEDURE — 84439 ASSAY OF FREE THYROXINE: CPT | Performed by: INTERNAL MEDICINE

## 2022-09-09 PROCEDURE — 70450 CT HEAD/BRAIN W/O DYE: CPT

## 2022-09-09 PROCEDURE — 85730 THROMBOPLASTIN TIME PARTIAL: CPT | Performed by: EMERGENCY MEDICINE

## 2022-09-09 PROCEDURE — 80053 COMPREHEN METABOLIC PANEL: CPT | Performed by: EMERGENCY MEDICINE

## 2022-09-09 PROCEDURE — 84443 ASSAY THYROID STIM HORMONE: CPT | Performed by: INTERNAL MEDICINE

## 2022-09-09 PROCEDURE — 4A03X5D MEASUREMENT OF ARTERIAL FLOW, INTRACRANIAL, EXTERNAL APPROACH: ICD-10-PCS | Performed by: EMERGENCY MEDICINE

## 2022-09-09 PROCEDURE — 93005 ELECTROCARDIOGRAM TRACING: CPT | Performed by: EMERGENCY MEDICINE

## 2022-09-09 PROCEDURE — 87507 IADNA-DNA/RNA PROBE TQ 12-25: CPT | Performed by: INTERNAL MEDICINE

## 2022-09-09 PROCEDURE — G0378 HOSPITAL OBSERVATION PER HR: HCPCS

## 2022-09-09 PROCEDURE — 85025 COMPLETE CBC W/AUTO DIFF WBC: CPT | Performed by: EMERGENCY MEDICINE

## 2022-09-09 PROCEDURE — 86900 BLOOD TYPING SEROLOGIC ABO: CPT | Performed by: EMERGENCY MEDICINE

## 2022-09-09 PROCEDURE — 99223 1ST HOSP IP/OBS HIGH 75: CPT | Performed by: INTERNAL MEDICINE

## 2022-09-09 PROCEDURE — 81001 URINALYSIS AUTO W/SCOPE: CPT | Performed by: EMERGENCY MEDICINE

## 2022-09-09 PROCEDURE — 84484 ASSAY OF TROPONIN QUANT: CPT | Performed by: EMERGENCY MEDICINE

## 2022-09-09 PROCEDURE — 70496 CT ANGIOGRAPHY HEAD: CPT

## 2022-09-09 PROCEDURE — 84145 PROCALCITONIN (PCT): CPT | Performed by: INTERNAL MEDICINE

## 2022-09-09 PROCEDURE — 87324 CLOSTRIDIUM AG IA: CPT | Performed by: INTERNAL MEDICINE

## 2022-09-09 PROCEDURE — 82375 ASSAY CARBOXYHB QUANT: CPT | Performed by: EMERGENCY MEDICINE

## 2022-09-09 PROCEDURE — 85610 PROTHROMBIN TIME: CPT | Performed by: EMERGENCY MEDICINE

## 2022-09-09 PROCEDURE — 86901 BLOOD TYPING SEROLOGIC RH(D): CPT | Performed by: EMERGENCY MEDICINE

## 2022-09-09 PROCEDURE — 87635 SARS-COV-2 COVID-19 AMP PRB: CPT | Performed by: EMERGENCY MEDICINE

## 2022-09-09 PROCEDURE — 86901 BLOOD TYPING SEROLOGIC RH(D): CPT

## 2022-09-09 PROCEDURE — 82550 ASSAY OF CK (CPK): CPT | Performed by: INTERNAL MEDICINE

## 2022-09-09 PROCEDURE — 86900 BLOOD TYPING SEROLOGIC ABO: CPT

## 2022-09-09 RX ORDER — HYDRALAZINE HYDROCHLORIDE 20 MG/ML
10 INJECTION INTRAMUSCULAR; INTRAVENOUS EVERY 6 HOURS PRN
Status: DISCONTINUED | OUTPATIENT
Start: 2022-09-09 | End: 2022-09-13 | Stop reason: HOSPADM

## 2022-09-09 RX ORDER — NICOTINE POLACRILEX 4 MG
15 LOZENGE BUCCAL
Status: DISCONTINUED | OUTPATIENT
Start: 2022-09-09 | End: 2022-09-13 | Stop reason: HOSPADM

## 2022-09-09 RX ORDER — ASPIRIN 325 MG
325 TABLET ORAL ONCE
Status: COMPLETED | OUTPATIENT
Start: 2022-09-09 | End: 2022-09-09

## 2022-09-09 RX ORDER — SODIUM CHLORIDE 0.9 % (FLUSH) 0.9 %
10 SYRINGE (ML) INJECTION AS NEEDED
Status: DISCONTINUED | OUTPATIENT
Start: 2022-09-09 | End: 2022-09-13 | Stop reason: HOSPADM

## 2022-09-09 RX ORDER — INSULIN LISPRO 100 [IU]/ML
0-7 INJECTION, SOLUTION INTRAVENOUS; SUBCUTANEOUS
Status: DISCONTINUED | OUTPATIENT
Start: 2022-09-10 | End: 2022-09-13 | Stop reason: HOSPADM

## 2022-09-09 RX ORDER — DEXTROSE MONOHYDRATE 25 G/50ML
25 INJECTION, SOLUTION INTRAVENOUS
Status: DISCONTINUED | OUTPATIENT
Start: 2022-09-09 | End: 2022-09-13 | Stop reason: HOSPADM

## 2022-09-09 RX ORDER — ACETAMINOPHEN 500 MG
1000 TABLET ORAL ONCE
Status: COMPLETED | OUTPATIENT
Start: 2022-09-09 | End: 2022-09-09

## 2022-09-09 RX ORDER — OLANZAPINE 10 MG/2ML
1 INJECTION, POWDER, LYOPHILIZED, FOR SOLUTION INTRAMUSCULAR
Status: DISCONTINUED | OUTPATIENT
Start: 2022-09-09 | End: 2022-09-13 | Stop reason: HOSPADM

## 2022-09-09 RX ORDER — SACCHAROMYCES BOULARDII 250 MG
250 CAPSULE ORAL 2 TIMES DAILY
Status: DISCONTINUED | OUTPATIENT
Start: 2022-09-10 | End: 2022-09-13 | Stop reason: HOSPADM

## 2022-09-09 RX ADMIN — IOPAMIDOL 100 ML: 755 INJECTION, SOLUTION INTRAVENOUS at 14:12

## 2022-09-09 RX ADMIN — ASPIRIN 325 MG ORAL TABLET 325 MG: 325 PILL ORAL at 18:58

## 2022-09-09 RX ADMIN — ACETAMINOPHEN 1000 MG: 500 TABLET ORAL at 15:40

## 2022-09-09 RX ADMIN — SODIUM CHLORIDE 1000 ML: 9 INJECTION, SOLUTION INTRAVENOUS at 15:40

## 2022-09-09 NOTE — PLAN OF CARE
Patient states she has been having feelings of dizziness and lightheadedness since approximately 10 AM this morning.  She denied any other symptoms including headache, vision changes, focal weakness or numbness, speech difficulty or difficulty with ambulation.  Code stroke was called on arrival to [facility].  Patient was assessed at CT scanner by Dr. Tom and myself.  Patient was oriented x3, appropriate.  No facial asymmetry, no focal weakness, no aphasia or dysarthria.  CT head completed and reviewed, no acute findings.  This does not look like stroke or primary neurological issue.  No further recommendations at this time, please call with questions, concerns or changes.

## 2022-09-09 NOTE — H&P
[facility] Medicine Services      Patient Name: Dora Solorio  : 1950  MRN: [id number]  Primary Care Physician:  Bhargavi Rodriguez APRN  Date of admission: 2022      Subjective      Chief Complaint: *Dizziness    History of Present Illness: Dora Solorio is a 72 y.o. female who presented to [facility] on 2022 complaining of dizziness  She has chronic medical problems including depression, osteoarthritis,Hypertension, inflammatory bowel disease, irritable syndrome, kidney stones, osteopenia, peripheral neuropathy, DM2, CAD, MI, CAD.    Patient reports feeling of dizziness and lightheadedness since 10:00 in the morning.  Code stroke called in ER.  She denies any history or symptoms of weakness or numbness or speech problems or walking problems but was not able to walk on her own when she came in and needed to present to help her.  ER physician report patient has no focal findings and patient had CT scan of the head CT angiogram of the head and neck showing 60% stenosis origin of the right internal carotid artery, no large intracranial arterial occlusion, posterior circulation supply from anterior circulation with prominent posterior circulating communicating arteries and small basilar arteries  No acute endocrine abnormality was noted on CT head, patient was seen by ER physician as well as neurologist in the ER  Notes from neurology service showed that patient had no focal deficit and no aphasia and did not think patient has stroke like symptoms.  Work-up in the ER showed evidence of possible acute kidney injury with elevated creatinine of 1.4 with a baseline of 1.9 couple months ago  Patient also had mildly low sodium 135 and bicarb of 21  Troponin was negative  PT/INR mostly negative/normal  CBC with essentially normal  Patient had chest x-ray that showed left basilar airspace opacity likely atelectasis versus pneumonia  Patient had also EKG sinus rhythm borderline IN  prolongation inferior infarct, old    Review of Systems   All other systems reviewed and are negative.  Patient also reports diarrhea without abdominal pain.  She has had 4-5 times bowel movements this morning.  She denies any fever or recent antibiotic use.  She denies any bleeding per rectum.  Her grandson reports low-grade temperature 100.9 this morning but she was afebrile in the ER  T-max was 99.2      Personal History     Past Medical History:   Diagnosis Date   • Adjustment disorder with depressed mood 2018   • Allergic Years    Sulfa drugs   • Arthritis Years    Lotsvof joints. Mostly back, knees, hips, fingers   • Depression 2018   • Diabetes mellitus, type II (HCC) 2013   • Essential hypertension 2013   • Gout 2014   • Heart attack (HCC)    • Hyperlipidemia 2020   • Hypertension    • Inflammatory bowel disease    • Irritable bowel syndrome without diarrhea 2017   • Kidney stone ?2016?    In hospital overnight   • Osteopenia 2017   • Peripheral neuropathy 2017       Past Surgical History:   Procedure Laterality Date   •  SECTION      x2   • KNEE ARTHROSCOPY Bilateral    • TUBAL ABDOMINAL LIGATION  May 1973       Family History: family history includes Alcohol abuse in her father; Epilepsy in her mother; No Known Problems in her maternal grandfather, maternal grandmother, paternal grandfather, paternal grandmother, and sister; Other in her father and maternal uncle. She was adopted. Otherwise pertinent FHx was reviewed and not pertinent to current issue.    Social History:  reports that she has never smoked. She has never used smokeless tobacco. She reports that she does not drink alcohol and does not use drugs.    Home Medications:  Prior to Admission Medications     Prescriptions Last Dose Informant Patient Reported? Taking?    amLODIPine (NORVASC) 10 MG tablet   No No    Take 1 tablet by mouth once daily    aspirin (aspirin) 81 MG EC  tablet   Yes No    ASPIRIN 81 81 MG TBEC    atorvastatin (LIPITOR) 20 MG tablet   No No    Take 1 tablet by mouth once daily    bisoprolol (ZEBeta) 10 MG tablet   No No    Take 1 tablet by mouth once daily    Cyanocobalamin (Vitamin B-12) 1000 MCG sublingual tablet   No No    Place 1 tablet under the tongue Daily.    empagliflozin (Jardiance) 10 MG tablet tablet   No No    Take 1 tablet by mouth Daily.    escitalopram (LEXAPRO) 10 MG tablet   No No    Take 1 tablet by mouth once daily    gabapentin (NEURONTIN) 600 MG tablet   No No    TAKE 1 TABLET BY MOUTH THREE TIMES DAILY    glimepiride (AMARYL) 2 MG tablet   No No    Take 2 tablets by mouth twice daily    hydroCHLOROthiazide (HYDRODIURIL) 50 MG tablet   No No    Take 1 tablet by mouth once daily    HYDROcodone-acetaminophen (NORCO) 5-325 MG per tablet   Yes No    Take 1 tablet by mouth Every 6 (Six) Hours As Needed. for pain    lisinopril (PRINIVIL,ZESTRIL) 20 MG tablet   No No    Take 1 tablet by mouth twice daily    metFORMIN (GLUCOPHAGE) 1000 MG tablet   No No    TAKE 1 TABLET BY MOUTH TWICE DAILY WITH MEALS    vitamin D3 125 MCG (5000 UT) capsule capsule   No No    Take 1 tab po daily.            Allergies:  Allergies   Allergen Reactions   • Sulfa Antibiotics Rash       Objective      Vitals:   Temp:  [99.2 °F (37.3 °C)] 99.2 °F (37.3 °C)  Heart Rate:  [62-68] 68  Resp:  [16] 16  BP: (109-142)/(48-68) 129/66    Physical Exam  Vitals and nursing note reviewed.   Constitutional:       General: She is not in acute distress.     Appearance: Normal appearance. She is well-developed. She is not ill-appearing, toxic-appearing or diaphoretic.   HENT:      Head: Normocephalic and atraumatic.      Right Ear: Ear canal and external ear normal.      Left Ear: Ear canal and external ear normal.      Nose: Nose normal. No congestion or rhinorrhea.      Mouth/Throat:      Mouth: Mucous membranes are moist.      Pharynx: No oropharyngeal exudate.   Eyes:      General: No  scleral icterus.        Right eye: No discharge.         Left eye: No discharge.      Extraocular Movements: Extraocular movements intact.      Conjunctiva/sclera: Conjunctivae normal.      Pupils: Pupils are equal, round, and reactive to light.   Neck:      Thyroid: No thyromegaly.      Vascular: No carotid bruit or JVD.      Trachea: No tracheal deviation.   Cardiovascular:      Rate and Rhythm: Normal rate and regular rhythm.      Pulses: Normal pulses.      Heart sounds: Normal heart sounds. No murmur heard.    No friction rub. No gallop.   Pulmonary:      Effort: Pulmonary effort is normal. No respiratory distress.      Breath sounds: Normal breath sounds. No stridor. No wheezing, rhonchi or rales.   Chest:      Chest wall: No tenderness.   Abdominal:      General: Bowel sounds are normal. There is no distension.      Palpations: Abdomen is soft. There is no mass.      Tenderness: There is no abdominal tenderness. There is no guarding or rebound.      Hernia: No hernia is present.   Musculoskeletal:         General: No swelling, tenderness, deformity or signs of injury. Normal range of motion.      Cervical back: Normal range of motion and neck supple. No rigidity. No muscular tenderness.      Right lower leg: No edema.      Left lower leg: No edema.   Lymphadenopathy:      Cervical: No cervical adenopathy.   Skin:     General: Skin is warm and dry.      Coloration: Skin is not jaundiced or pale.      Findings: No bruising, erythema or rash.   Neurological:      General: No focal deficit present.      Mental Status: She is alert and oriented to person, place, and time. Mental status is at baseline.      Cranial Nerves: No cranial nerve deficit.      Sensory: No sensory deficit.      Motor: No weakness or abnormal muscle tone.      Coordination: Coordination normal.   Psychiatric:         Mood and Affect: Mood normal.         Behavior: Behavior normal.         Thought Content: Thought content normal.          Judgment: Judgment normal.            Result Review    Result Review:  I have personally reviewed the results from the time of this admission to 9/9/2022 18:14 EDT and agree with these findings:  [x]  Laboratory  [x]  Microbiology  [x]  Radiology  []  EKG/Telemetry   []  Cardiology/Vascular   []  Pathology  []  Old records  []  Other:  Most notable findings include: As outlined        Assessment & Plan        Active Hospital Problems:  There are no active hospital problems to display for this patient.    Plan:   Generalized weakness  Dizziness  Now hypotensive in the ER  Initial code stroke has been initiated in the ER and seen by neurologist but no concern for stroke according to neurology notes  Continue aspirin high intensity statin blood pressure control  Avoid excessive blood pressure control to avoid cerebral hypoperfusion  Gentle IV hydration  Check MRI brain and echocardiogram per protocol  Neurology has been consulted in the ER and deemed the patient is not having an active stroke or TIA at this time  Neurochecks per protocol  PT OT  Reason for generalized weakness not clear  Check sed rate CRP and CPK, TSH  Urinalysis negative  COVID-negative      DIARRHEA without leukocytosis or fever  No recent antibiotic use.  Gi panel  ivf    Essential hypertension  Now hypotensive  Hold antihypertensive medications  Avoid ACE inhibitor's given ALTON    Acute kidney injury  Possibly hypoperfusion related  IV hydration  Check urinalysis and reviewed without significant proteinuria  Avoid ACE inhibitor's given ALTON      DM2  Diabetic neuropathy  Resume home medication once reconciled  Monitor for hypoglycemia  Hold oral medications  Sliding scale insulin    Gout awaiting home medications    CAD with history of MI on aspirin and statin    Inflammatory bowel disease/irritable bowel syndrome  No specific medications noted    Depression on Lexapro    Peripheral arterial disease with 60% stenosis right internal carotidArtery  origin  Diminutive posterior circulation with large posterior communicating artery  Chronic right basal ganglia lacunar infarct,  Follow-up with vascular as an outpatient  Aspirin statin blood pressure control    B12 deficiency on replacement      DVT prophylaxis:  No DVT prophylaxis order currently exists.    CODE STATUS:       Admission Status:  I believe this patient meets observation status.    I discussed the patient's findings and my recommendations with patient, nursing staff and consulting provider.    This patient has been examined wearing appropriate Personal Protective Equipment and discussed with hospital infection control department. 09/09/22      Signature: *Electronically signed by Kumar Correa MD, 09/09/22, 6:21 PM EDT.  [facility]ist Team

## 2022-09-10 ENCOUNTER — APPOINTMENT (OUTPATIENT)
Dept: CARDIOLOGY | Facility: HOSPITAL | Age: 72
End: 2022-09-10

## 2022-09-10 ENCOUNTER — APPOINTMENT (OUTPATIENT)
Dept: CT IMAGING | Facility: HOSPITAL | Age: 72
End: 2022-09-10

## 2022-09-10 LAB
ADV 40+41 DNA STL QL NAA+NON-PROBE: NOT DETECTED
ASTRO TYP 1-8 RNA STL QL NAA+NON-PROBE: NOT DETECTED
BASOPHILS # BLD AUTO: 0 10*3/MM3 (ref 0–0.2)
BASOPHILS NFR BLD AUTO: 0.2 % (ref 0–1.5)
BH CV ECHO MEAS - AO MAX PG: 27.5 MMHG
BH CV ECHO MEAS - AO MEAN PG: 13.7 MMHG
BH CV ECHO MEAS - AO ROOT DIAM: 3.2 CM
BH CV ECHO MEAS - AO V2 MAX: 261.8 CM/SEC
BH CV ECHO MEAS - AO V2 VTI: 55.1 CM
BH CV ECHO MEAS - AVA(I,D): 1.66 CM2
BH CV ECHO MEAS - EDV(CUBED): 94.7 ML
BH CV ECHO MEAS - EDV(MOD-SP4): 71.6 ML
BH CV ECHO MEAS - EF(MOD-BP): 72 %
BH CV ECHO MEAS - EF(MOD-SP4): 72.1 %
BH CV ECHO MEAS - ESV(CUBED): 25.5 ML
BH CV ECHO MEAS - ESV(MOD-SP4): 20 ML
BH CV ECHO MEAS - FS: 35.4 %
BH CV ECHO MEAS - IVS/LVPW: 1.24 CM
BH CV ECHO MEAS - IVSD: 1.23 CM
BH CV ECHO MEAS - LA DIMENSION: 4.3 CM
BH CV ECHO MEAS - LV DIASTOLIC VOL/BSA (35-75): 37.2 CM2
BH CV ECHO MEAS - LV MASS(C)D: 181 GRAMS
BH CV ECHO MEAS - LV MAX PG: 4.7 MMHG
BH CV ECHO MEAS - LV MEAN PG: 2.8 MMHG
BH CV ECHO MEAS - LV SYSTOLIC VOL/BSA (12-30): 10.4 CM2
BH CV ECHO MEAS - LV V1 MAX: 108.4 CM/SEC
BH CV ECHO MEAS - LV V1 VTI: 25.5 CM
BH CV ECHO MEAS - LVIDD: 4.6 CM
BH CV ECHO MEAS - LVIDS: 2.9 CM
BH CV ECHO MEAS - LVOT AREA: 3.6 CM2
BH CV ECHO MEAS - LVOT DIAM: 2.14 CM
BH CV ECHO MEAS - LVPWD: 0.99 CM
BH CV ECHO MEAS - MV A MAX VEL: 103.9 CM/SEC
BH CV ECHO MEAS - MV DEC SLOPE: 372.2 CM/SEC2
BH CV ECHO MEAS - MV DEC TIME: 0.29 MSEC
BH CV ECHO MEAS - MV E MAX VEL: 106.3 CM/SEC
BH CV ECHO MEAS - MV E/A: 1.02
BH CV ECHO MEAS - MV MAX PG: 6.9 MMHG
BH CV ECHO MEAS - MV MEAN PG: 3.1 MMHG
BH CV ECHO MEAS - MV V2 VTI: 47.1 CM
BH CV ECHO MEAS - MVA(VTI): 1.94 CM2
BH CV ECHO MEAS - PA V2 MAX: 145.8 CM/SEC
BH CV ECHO MEAS - RAP SYSTOLE: 3 MMHG
BH CV ECHO MEAS - RV MAX PG: 1.03 MMHG
BH CV ECHO MEAS - RV V1 MAX: 50.7 CM/SEC
BH CV ECHO MEAS - RV V1 VTI: 11.7 CM
BH CV ECHO MEAS - RVDD: 3 CM
BH CV ECHO MEAS - RVSP: 29.4 MMHG
BH CV ECHO MEAS - SI(MOD-SP4): 26.8 ML/M2
BH CV ECHO MEAS - SV(LVOT): 91.3 ML
BH CV ECHO MEAS - SV(MOD-SP4): 51.6 ML
BH CV ECHO MEAS - TR MAX PG: 26.4 MMHG
BH CV ECHO MEAS - TR MAX VEL: 256.9 CM/SEC
C CAYETANENSIS DNA STL QL NAA+NON-PROBE: NOT DETECTED
C COLI+JEJ+UPSA DNA STL QL NAA+NON-PROBE: NOT DETECTED
C DIFF GDH + TOXINS A+B STL QL IA.RAPID: POSITIVE
C DIFF GDH + TOXINS A+B STL QL IA.RAPID: POSITIVE
CHOLEST SERPL-MCNC: 134 MG/DL (ref 0–200)
CRYPTOSP DNA STL QL NAA+NON-PROBE: NOT DETECTED
DEPRECATED RDW RBC AUTO: 42 FL (ref 37–54)
E HISTOLYT DNA STL QL NAA+NON-PROBE: NOT DETECTED
EAEC PAA PLAS AGGR+AATA ST NAA+NON-PRB: NOT DETECTED
EC STX1+STX2 GENES STL QL NAA+NON-PROBE: NOT DETECTED
EOSINOPHIL # BLD AUTO: 0.1 10*3/MM3 (ref 0–0.4)
EOSINOPHIL NFR BLD AUTO: 0.7 % (ref 0.3–6.2)
EPEC EAE GENE STL QL NAA+NON-PROBE: NOT DETECTED
ERYTHROCYTE [DISTWIDTH] IN BLOOD BY AUTOMATED COUNT: 13.5 % (ref 12.3–15.4)
ETEC LTA+ST1A+ST1B TOX ST NAA+NON-PROBE: NOT DETECTED
G LAMBLIA DNA STL QL NAA+NON-PROBE: NOT DETECTED
GLUCOSE BLDC GLUCOMTR-MCNC: 113 MG/DL (ref 70–105)
GLUCOSE BLDC GLUCOMTR-MCNC: 129 MG/DL (ref 70–105)
GLUCOSE BLDC GLUCOMTR-MCNC: 141 MG/DL (ref 70–105)
GLUCOSE BLDC GLUCOMTR-MCNC: 154 MG/DL (ref 70–105)
HCT VFR BLD AUTO: 36.1 % (ref 34–46.6)
HDLC SERPL-MCNC: 36 MG/DL (ref 40–60)
HGB BLD-MCNC: 11.6 G/DL (ref 12–15.9)
LDLC SERPL CALC-MCNC: 66 MG/DL (ref 0–100)
LDLC/HDLC SERPL: 1.66 {RATIO}
LV EF 2D ECHO EST: 65 %
LYMPHOCYTES # BLD AUTO: 0.3 10*3/MM3 (ref 0.7–3.1)
LYMPHOCYTES NFR BLD AUTO: 4.1 % (ref 19.6–45.3)
MAXIMAL PREDICTED HEART RATE: 146 BPM
MCH RBC QN AUTO: 27.9 PG (ref 26.6–33)
MCHC RBC AUTO-ENTMCNC: 32.2 G/DL (ref 31.5–35.7)
MCV RBC AUTO: 86.8 FL (ref 79–97)
MONOCYTES # BLD AUTO: 0.3 10*3/MM3 (ref 0.1–0.9)
MONOCYTES NFR BLD AUTO: 4.5 % (ref 5–12)
NEUTROPHILS NFR BLD AUTO: 6.7 10*3/MM3 (ref 1.7–7)
NEUTROPHILS NFR BLD AUTO: 90.5 % (ref 42.7–76)
NOROVIRUS GI+II RNA STL QL NAA+NON-PROBE: NOT DETECTED
NRBC BLD AUTO-RTO: 0 /100 WBC (ref 0–0.2)
P SHIGELLOIDES DNA STL QL NAA+NON-PROBE: NOT DETECTED
PLATELET # BLD AUTO: 120 10*3/MM3 (ref 140–450)
PMV BLD AUTO: 8.9 FL (ref 6–12)
RBC # BLD AUTO: 4.16 10*6/MM3 (ref 3.77–5.28)
RVA RNA STL QL NAA+NON-PROBE: NOT DETECTED
S ENT+BONG DNA STL QL NAA+NON-PROBE: NOT DETECTED
SAPO I+II+IV+V RNA STL QL NAA+NON-PROBE: NOT DETECTED
SHIGELLA SP+EIEC IPAH ST NAA+NON-PROBE: NOT DETECTED
STRESS TARGET HR: 124 BPM
TRIGL SERPL-MCNC: 192 MG/DL (ref 0–150)
V CHOL+PARA+VUL DNA STL QL NAA+NON-PROBE: NOT DETECTED
V CHOLERAE DNA STL QL NAA+NON-PROBE: NOT DETECTED
VLDLC SERPL-MCNC: 32 MG/DL (ref 5–40)
WBC NRBC COR # BLD: 7.4 10*3/MM3 (ref 3.4–10.8)
Y ENTEROCOL DNA STL QL NAA+NON-PROBE: NOT DETECTED

## 2022-09-10 PROCEDURE — 85025 COMPLETE CBC W/AUTO DIFF WBC: CPT | Performed by: INTERNAL MEDICINE

## 2022-09-10 PROCEDURE — 82962 GLUCOSE BLOOD TEST: CPT

## 2022-09-10 PROCEDURE — G0378 HOSPITAL OBSERVATION PER HR: HCPCS

## 2022-09-10 PROCEDURE — 93306 TTE W/DOPPLER COMPLETE: CPT

## 2022-09-10 PROCEDURE — 80061 LIPID PANEL: CPT | Performed by: INTERNAL MEDICINE

## 2022-09-10 PROCEDURE — 83036 HEMOGLOBIN GLYCOSYLATED A1C: CPT | Performed by: INTERNAL MEDICINE

## 2022-09-10 PROCEDURE — 93306 TTE W/DOPPLER COMPLETE: CPT | Performed by: INTERNAL MEDICINE

## 2022-09-10 PROCEDURE — 25010000002 ONDANSETRON PER 1 MG: Performed by: INTERNAL MEDICINE

## 2022-09-10 PROCEDURE — 97162 PT EVAL MOD COMPLEX 30 MIN: CPT

## 2022-09-10 PROCEDURE — 63710000001 INSULIN LISPRO (HUMAN) PER 5 UNITS: Performed by: INTERNAL MEDICINE

## 2022-09-10 PROCEDURE — 74176 CT ABD & PELVIS W/O CONTRAST: CPT

## 2022-09-10 PROCEDURE — 99233 SBSQ HOSP IP/OBS HIGH 50: CPT | Performed by: INTERNAL MEDICINE

## 2022-09-10 RX ORDER — ACETAMINOPHEN 325 MG/1
650 TABLET ORAL EVERY 4 HOURS PRN
Status: DISCONTINUED | OUTPATIENT
Start: 2022-09-10 | End: 2022-09-13 | Stop reason: HOSPADM

## 2022-09-10 RX ORDER — VANCOMYCIN HYDROCHLORIDE 250 MG/1
500 CAPSULE ORAL EVERY 6 HOURS SCHEDULED
Status: DISCONTINUED | OUTPATIENT
Start: 2022-09-10 | End: 2022-09-10

## 2022-09-10 RX ORDER — SODIUM CHLORIDE 0.9 % (FLUSH) 0.9 %
10 SYRINGE (ML) INJECTION EVERY 12 HOURS SCHEDULED
Status: DISCONTINUED | OUTPATIENT
Start: 2022-09-10 | End: 2022-09-13 | Stop reason: HOSPADM

## 2022-09-10 RX ORDER — VANCOMYCIN HYDROCHLORIDE 250 MG/1
500 CAPSULE ORAL EVERY 6 HOURS SCHEDULED
Status: DISCONTINUED | OUTPATIENT
Start: 2022-09-10 | End: 2022-09-13 | Stop reason: HOSPADM

## 2022-09-10 RX ORDER — ASPIRIN 81 MG/1
81 TABLET ORAL DAILY
Status: DISCONTINUED | OUTPATIENT
Start: 2022-09-10 | End: 2022-09-10 | Stop reason: SDUPTHER

## 2022-09-10 RX ORDER — ATORVASTATIN CALCIUM 40 MG/1
80 TABLET, FILM COATED ORAL NIGHTLY
Status: DISCONTINUED | OUTPATIENT
Start: 2022-09-10 | End: 2022-09-13 | Stop reason: HOSPADM

## 2022-09-10 RX ORDER — ATORVASTATIN CALCIUM 20 MG/1
20 TABLET, FILM COATED ORAL DAILY
Status: DISCONTINUED | OUTPATIENT
Start: 2022-09-10 | End: 2022-09-10 | Stop reason: SDUPTHER

## 2022-09-10 RX ORDER — ASPIRIN 300 MG/1
300 SUPPOSITORY RECTAL DAILY
Status: DISCONTINUED | OUTPATIENT
Start: 2022-09-10 | End: 2022-09-10

## 2022-09-10 RX ORDER — ASPIRIN 325 MG
325 TABLET ORAL DAILY
Status: DISCONTINUED | OUTPATIENT
Start: 2022-09-10 | End: 2022-09-10

## 2022-09-10 RX ORDER — AMLODIPINE BESYLATE 5 MG/1
10 TABLET ORAL DAILY
Status: DISCONTINUED | OUTPATIENT
Start: 2022-09-10 | End: 2022-09-10

## 2022-09-10 RX ORDER — GABAPENTIN 600 MG/1
300 TABLET ORAL 3 TIMES DAILY
Status: DISCONTINUED | OUTPATIENT
Start: 2022-09-10 | End: 2022-09-13 | Stop reason: HOSPADM

## 2022-09-10 RX ORDER — ASPIRIN 81 MG/1
81 TABLET ORAL DAILY
Status: DISCONTINUED | OUTPATIENT
Start: 2022-09-10 | End: 2022-09-13 | Stop reason: HOSPADM

## 2022-09-10 RX ORDER — BISOPROLOL FUMARATE 5 MG/1
10 TABLET, FILM COATED ORAL DAILY
Status: DISCONTINUED | OUTPATIENT
Start: 2022-09-10 | End: 2022-09-10

## 2022-09-10 RX ORDER — ONDANSETRON 2 MG/ML
4 INJECTION INTRAMUSCULAR; INTRAVENOUS EVERY 6 HOURS PRN
Status: DISCONTINUED | OUTPATIENT
Start: 2022-09-10 | End: 2022-09-13 | Stop reason: HOSPADM

## 2022-09-10 RX ORDER — BISOPROLOL FUMARATE 5 MG/1
2.5 TABLET, FILM COATED ORAL DAILY
Status: DISCONTINUED | OUTPATIENT
Start: 2022-09-11 | End: 2022-09-13 | Stop reason: HOSPADM

## 2022-09-10 RX ORDER — ACETAMINOPHEN 650 MG/1
650 SUPPOSITORY RECTAL EVERY 4 HOURS PRN
Status: DISCONTINUED | OUTPATIENT
Start: 2022-09-10 | End: 2022-09-13 | Stop reason: HOSPADM

## 2022-09-10 RX ORDER — ESCITALOPRAM OXALATE 10 MG/1
10 TABLET ORAL DAILY
Status: DISCONTINUED | OUTPATIENT
Start: 2022-09-10 | End: 2022-09-13 | Stop reason: HOSPADM

## 2022-09-10 RX ORDER — SODIUM CHLORIDE 9 MG/ML
75 INJECTION, SOLUTION INTRAVENOUS CONTINUOUS
Status: DISCONTINUED | OUTPATIENT
Start: 2022-09-10 | End: 2022-09-13 | Stop reason: HOSPADM

## 2022-09-10 RX ORDER — LISINOPRIL 20 MG/1
20 TABLET ORAL 2 TIMES DAILY
Status: DISCONTINUED | OUTPATIENT
Start: 2022-09-10 | End: 2022-09-10

## 2022-09-10 RX ORDER — HYDROCODONE BITARTRATE AND ACETAMINOPHEN 5; 325 MG/1; MG/1
1 TABLET ORAL EVERY 6 HOURS PRN
Status: DISCONTINUED | OUTPATIENT
Start: 2022-09-10 | End: 2022-09-13 | Stop reason: HOSPADM

## 2022-09-10 RX ORDER — VANCOMYCIN HYDROCHLORIDE 125 MG/1
125 CAPSULE ORAL EVERY 6 HOURS SCHEDULED
Status: DISCONTINUED | OUTPATIENT
Start: 2022-09-10 | End: 2022-09-10

## 2022-09-10 RX ORDER — GABAPENTIN 600 MG/1
600 TABLET ORAL 3 TIMES DAILY
Status: DISCONTINUED | OUTPATIENT
Start: 2022-09-10 | End: 2022-09-10

## 2022-09-10 RX ORDER — SODIUM CHLORIDE 0.9 % (FLUSH) 0.9 %
10 SYRINGE (ML) INJECTION AS NEEDED
Status: DISCONTINUED | OUTPATIENT
Start: 2022-09-10 | End: 2022-09-13 | Stop reason: HOSPADM

## 2022-09-10 RX ADMIN — ASPIRIN 81 MG: 81 TABLET, COATED ORAL at 12:30

## 2022-09-10 RX ADMIN — SODIUM CHLORIDE 75 ML/HR: 9 INJECTION, SOLUTION INTRAVENOUS at 02:22

## 2022-09-10 RX ADMIN — GABAPENTIN 300 MG: 600 TABLET, FILM COATED ORAL at 18:33

## 2022-09-10 RX ADMIN — Medication 10 ML: at 21:20

## 2022-09-10 RX ADMIN — ATORVASTATIN CALCIUM 80 MG: 40 TABLET, FILM COATED ORAL at 21:19

## 2022-09-10 RX ADMIN — ESCITALOPRAM OXALATE 10 MG: 10 TABLET ORAL at 08:47

## 2022-09-10 RX ADMIN — ONDANSETRON 4 MG: 2 INJECTION INTRAMUSCULAR; INTRAVENOUS at 21:19

## 2022-09-10 RX ADMIN — Medication 10 ML: at 08:47

## 2022-09-10 RX ADMIN — Medication 10 ML: at 03:10

## 2022-09-10 RX ADMIN — GABAPENTIN 300 MG: 600 TABLET, FILM COATED ORAL at 21:20

## 2022-09-10 RX ADMIN — VANCOMYCIN HYDROCHLORIDE 500 MG: 250 CAPSULE ORAL at 18:04

## 2022-09-10 RX ADMIN — INSULIN LISPRO 2 UNITS: 100 INJECTION, SOLUTION INTRAVENOUS; SUBCUTANEOUS at 18:04

## 2022-09-10 RX ADMIN — VANCOMYCIN HYDROCHLORIDE 500 MG: 250 CAPSULE ORAL at 23:52

## 2022-09-10 RX ADMIN — Medication 250 MG: at 08:47

## 2022-09-10 RX ADMIN — VANCOMYCIN HYDROCHLORIDE 125 MG: 125 CAPSULE ORAL at 12:30

## 2022-09-10 RX ADMIN — AMLODIPINE BESYLATE 10 MG: 5 TABLET ORAL at 08:47

## 2022-09-10 RX ADMIN — LISINOPRIL 20 MG: 20 TABLET ORAL at 08:46

## 2022-09-10 RX ADMIN — ACETAMINOPHEN 650 MG: 325 TABLET, FILM COATED ORAL at 18:37

## 2022-09-10 RX ADMIN — VANCOMYCIN HYDROCHLORIDE 125 MG: 125 CAPSULE ORAL at 08:46

## 2022-09-10 RX ADMIN — Medication 250 MG: at 21:20

## 2022-09-10 RX ADMIN — BISOPROLOL FUMARATE 10 MG: 5 TABLET ORAL at 08:47

## 2022-09-10 RX ADMIN — GABAPENTIN 300 MG: 600 TABLET, FILM COATED ORAL at 08:46

## 2022-09-10 RX ADMIN — CYANOCOBALAMIN TAB 250 MCG 250 MCG: 250 TAB at 08:47

## 2022-09-10 NOTE — PLAN OF CARE
"Goal Outcome Evaluation:     Pt is a 73 y/o female who presents to [facility] with reports of dizziness. PMH significant for OA, HTN, inflammatory bowel disease, osteopenia, DM2, peripheral neuropathy, and MI. Code stroke was called in the ER, but CT head and CT angiogram showing 60% stenosis and no acute focal deficit or abnormalities. Pt is positive for C.dificile. At this time pt is independent w/ bed mobility and requires SBA for STS transfer w/out an AD. Pt becomes lightheaded upon sitting and standing. Unable to get a clear bp reading following 1-2minutes of standing and pt ambulates 3-4ft to bedside chair w/ CGA. BP 90/52 following transfer to chair. Pt became nauseous and was reclined back and BP was 153/62. At this time pt is unsafe to return home, but once medically stable pt would likely be able to return home with HHPT and assist from her sister who lives next door. Sister would be able to provide 24/7 assist if needed.       Low Intensity Therapy recommended post-acute care - This is recommended as therapy feels this patient would require 2-3 visits per week. OP or HH would be the best option depending on patient's home bound status. Consider, if the patient has other  \"skilled\" needs such as wounds, IV antibiotics, etc. Combined with \"low intensity\" could also equate to a SNF. If patient is medically complex, consider LTAC.  "

## 2022-09-10 NOTE — PLAN OF CARE
Problem: Adult Inpatient Plan of Care  Goal: Plan of Care Review  Outcome: Ongoing, Progressing  Flowsheets (Taken 9/10/2022 0443)  Progress: no change  Plan of Care Reviewed With: patient  Goal: Patient-Specific Goal (Individualized)  Outcome: Ongoing, Progressing  Goal: Absence of Hospital-Acquired Illness or Injury  Outcome: Ongoing, Progressing  Intervention: Identify and Manage Fall Risk  Description: Perform standard risk assessment on admission using a validated tool or comprehensive approach appropriate to the patient; reassess fall risk frequently, with change in status or transfer to another level of care.  Communicate fall injury risk to interprofessional healthcare team.  Determine need for increased observation, equipment and environmental modification, such as low bed, signage and supportive, nonskid footwear.  Adjust safety measures to individual developmental age, stage and identified risk factors.  Reinforce the importance of safety and physical activity with patient and family.  Perform regular intentional rounding to assess need for position change, pain assessment and personal needs, including assistance with toileting.  Recent Flowsheet Documentation  Taken 9/10/2022 0409 by Cassy Nichols, RN  Safety Promotion/Fall Prevention:   assistive device/personal items within reach   clutter free environment maintained   fall prevention program maintained   lighting adjusted   nonskid shoes/slippers when out of bed   room organization consistent   safety round/check completed  Taken 9/10/2022 0231 by Cassy Nichols, RN  Safety Promotion/Fall Prevention:   assistive device/personal items within reach   clutter free environment maintained   fall prevention program maintained   lighting adjusted   muscle strengthening facilitated   nonskid shoes/slippers when out of bed   room organization consistent   safety round/check completed  Taken 9/10/2022 0000 by Cassy Nichols, RN  Safety Promotion/Fall  Prevention:   assistive device/personal items within reach   safety round/check completed   room organization consistent   nonskid shoes/slippers when out of bed   lighting adjusted   fall prevention program maintained   clutter free environment maintained  Taken 9/9/2022 2243 by Cassy Nichols RN  Safety Promotion/Fall Prevention:   safety round/check completed   room organization consistent   nonskid shoes/slippers when out of bed   mobility aid in reach   fall prevention program maintained   clutter free environment maintained   assistive device/personal items within reach  Intervention: Prevent Skin Injury  Description: Perform a screening for skin injury risk, such as pressure or moisture associated skin damage on admission and at regular intervals throughout hospital stay.  Keep all areas of skin (especially folds) clean and dry.  Maintain adequate skin hydration.  Relieve and redistribute pressure and protect bony prominences; implement measures based on patient-specific risk factors.  Match turning and repositioning schedule to clinical condition.  Encourage weight shift frequently; assist with reposition if unable to complete independently.  Float heels off bed; avoid pressure on the Achilles tendon.  Keep skin free from extended contact with medical devices.  Encourage functional activity and mobility, as early as tolerated.  Use aids (e.g., slide boards, mechanical lift) during transfer.  Recent Flowsheet Documentation  Taken 9/10/2022 0409 by Cassy Nichols, RN  Body Position:   left   side-lying  Taken 9/10/2022 0000 by Cassy Nichols, RN  Body Position:   right   side-lying  Intervention: Prevent and Manage VTE (Venous Thromboembolism) Risk  Description: Assess for VTE (venous thromboembolism) risk.  Encourage and assist with early ambulation.  Initiate and maintain compression or other therapy, as indicated, based on identified risk in accordance with organizational protocol and provider  order.  Encourage both active and passive leg exercises while in bed, if unable to ambulate.  Recent Flowsheet Documentation  Taken 9/10/2022 0409 by Cassy Nichols RN  Activity Management: activity adjusted per tolerance  VTE Prevention/Management: sequential compression devices on  Range of Motion: active ROM (range of motion) encouraged  Taken 9/10/2022 0000 by Cassy Nichols RN  Activity Management: activity adjusted per tolerance  VTE Prevention/Management: sequential compression devices on  Range of Motion: active ROM (range of motion) encouraged  Intervention: Prevent Infection  Description: Maintain skin and mucous membrane integrity; promote hand, oral and pulmonary hygiene.  Optimize fluid balance, nutrition, sleep and glycemic control to maximize infection resistance.  Identify potential sources of infection early to prevent or mitigate progression of infection (e.g., wound, lines, devices).  Evaluate ongoing need for invasive devices; remove promptly when no longer indicated.  Recent Flowsheet Documentation  Taken 9/10/2022 0409 by Cassy Nichols RN  Infection Prevention:   environmental surveillance performed   single patient room provided   rest/sleep promoted   hand hygiene promoted  Taken 9/10/2022 0231 by Cassy Nichols RN  Infection Prevention:   environmental surveillance performed   hand hygiene promoted   rest/sleep promoted   single patient room provided  Taken 9/10/2022 0000 by Cassy Nichols RN  Infection Prevention:   environmental surveillance performed   single patient room provided   rest/sleep promoted   hand hygiene promoted  Taken 9/9/2022 2243 by Cassy Nichols RN  Infection Prevention:   environmental surveillance performed   hand hygiene promoted   rest/sleep promoted   single patient room provided  Goal: Optimal Comfort and Wellbeing  Outcome: Ongoing, Progressing  Intervention: Monitor Pain and Promote Comfort  Description: Assess pain level, treatment efficacy  and patient response at regular intervals using a consistent pain scale.  Consider the presence and impact of preexisting chronic pain.  Encourage patient and caregiver involvement in pain assessment, interventions and safety measures.  Recent Flowsheet Documentation  Taken 9/10/2022 0409 by Cassy Nichols RN  Pain Management Interventions:   care clustered   see MAR   quiet environment facilitated  Taken 9/10/2022 0000 by Cassy Nichols RN  Pain Management Interventions:   see MAR   quiet environment facilitated   care clustered   relaxation techniques promoted  Intervention: Provide Person-Centered Care  Description: Use a family-focused approach to care.  Develop trust and rapport by proactively providing information, encouraging questions, addressing concerns and offering reassurance.  Acknowledge emotional response to hospitalization.  Recognize and utilize personal coping strategies.  Honor spiritual and cultural preferences.  Recent Flowsheet Documentation  Taken 9/10/2022 0000 by Cassy Nichols RN  Trust Relationship/Rapport:   care explained   choices provided   reassurance provided  Goal: Readiness for Transition of Care  Outcome: Ongoing, Progressing  Intervention: Mutually Develop Transition Plan  Description: Identify available resources for support (e.g., family, friends, community).  Identify and address barriers to ongoing treatment and home management (e.g., environmental, financial).  Provide opportunities to practice self-management skills.  Assess and monitor emotional readiness for transition.  Establish or reconnect linkage with outpatient providers or community-based services.  Recent Flowsheet Documentation  Taken 9/9/2022 2248 by Cassy Nichols RN  Transportation Anticipated: family or friend will provide  Patient/Family Anticipated Services at Transition: none  Patient/Family Anticipates Transition to: home with family  Taken 9/9/2022 2241 by Cassy Nichols RN  Equipment  Currently Used at Home:   bath bench   grab bar     Problem: Fall Injury Risk  Goal: Absence of Fall and Fall-Related Injury  Outcome: Ongoing, Progressing  Intervention: Identify and Manage Contributors  Description: Develop a fall prevention plan with the patient and caregiver/family.  Provide reorientation, appropriate sensory stimulation and routines with changes in mental status to decrease risk of fall.  Promote use of personal vision and auditory aids.  Assess assistance level required for safe and effective self-care; provide support as needed, such as toileting, mobilization. For age 65 and older, implement timed toileting with assistance.  Encourage physical activity, such as performance of mobility and self-care at highest level of patient ability, multicomponent exercise program and provision of appropriate assistive devices.  If fall occurs, assess the severity of injury; implement fall injury protocol. Determine the cause and revise fall injury prevention plan.  Regularly review medication contribution to fall risk; adjust medication administration times to minimize risk of falling.  Consider risk related to polypharmacy and age.  Balance adequate pain management with potential for oversedation.  Recent Flowsheet Documentation  Taken 9/10/2022 0409 by Cassy Nichols RN  Medication Review/Management: medications reviewed  Self-Care Promotion: independence encouraged  Taken 9/10/2022 0231 by Cassy Nichols, RN  Medication Review/Management: medications reviewed  Taken 9/10/2022 0000 by Cassy Nichols, RN  Medication Review/Management: medications reviewed  Self-Care Promotion: independence encouraged  Taken 9/9/2022 2243 by Cassy Nichols, RN  Medication Review/Management: medications reviewed  Intervention: Promote Injury-Free Environment  Description: Provide a safe, barrier-free environment that encourages independent activity.  Keep care area uncluttered and well-lighted.  Determine need for  increased observation or monitoring.  Avoid use of devices that minimize mobility, such as restraints or indwelling urinary catheter.  Recent Flowsheet Documentation  Taken 9/10/2022 0409 by Cassy Nichols RN  Safety Promotion/Fall Prevention:   assistive device/personal items within reach   clutter free environment maintained   fall prevention program maintained   lighting adjusted   nonskid shoes/slippers when out of bed   room organization consistent   safety round/check completed  Taken 9/10/2022 0231 by Cassy Nichols RN  Safety Promotion/Fall Prevention:   assistive device/personal items within reach   clutter free environment maintained   fall prevention program maintained   lighting adjusted   muscle strengthening facilitated   nonskid shoes/slippers when out of bed   room organization consistent   safety round/check completed  Taken 9/10/2022 0000 by Cassy Nichols RN  Safety Promotion/Fall Prevention:   assistive device/personal items within reach   safety round/check completed   room organization consistent   nonskid shoes/slippers when out of bed   lighting adjusted   fall prevention program maintained   clutter free environment maintained  Taken 9/9/2022 2243 by Cassy Nichols, RN  Safety Promotion/Fall Prevention:   safety round/check completed   room organization consistent   nonskid shoes/slippers when out of bed   mobility aid in reach   fall prevention program maintained   clutter free environment maintained   assistive device/personal items within reach     Problem: Behavioral Health Comorbidity  Goal: Maintenance of Behavioral Health Symptom Control  Outcome: Ongoing, Progressing  Intervention: Maintain Behavioral Health Symptom Control  Description: Confirm mental health diagnosis and current treatment.  Evaluate adherence to previously identified self-management plan.  Advocate continuation of home strategies, including medication.  Evaluate effectiveness of self-management  strategies and coping skills.  Communicate with providers to ensure continuity and follow-up at transition.  Recent Flowsheet Documentation  Taken 9/10/2022 0409 by Cassy Nichols RN  Medication Review/Management: medications reviewed  Taken 9/10/2022 0231 by Cassy Nichols RN  Medication Review/Management: medications reviewed  Taken 9/10/2022 0000 by Cassy Nichols RN  Medication Review/Management: medications reviewed  Taken 9/9/2022 2243 by Cassy Nichols RN  Medication Review/Management: medications reviewed  Goal: Maintenance of Behavioral Health Symptom Control  Outcome: Ongoing, Progressing  Intervention: Maintain Behavioral Health Symptom Control  Description: Confirm mental health diagnosis and current treatment.  Evaluate adherence to previously identified self-management plan.  Advocate continuation of home strategies, including medication.  Evaluate effectiveness of self-management strategies and coping skills.  Communicate with providers to ensure continuity and follow-up at transition.  Recent Flowsheet Documentation  Taken 9/10/2022 0409 by Cassy Nichols RN  Medication Review/Management: medications reviewed  Taken 9/10/2022 0231 by Cassy Nichols RN  Medication Review/Management: medications reviewed  Taken 9/10/2022 0000 by Cassy Nichols RN  Medication Review/Management: medications reviewed  Taken 9/9/2022 2243 by Cassy Nichols RN  Medication Review/Management: medications reviewed     Problem: Diabetes Comorbidity  Goal: Blood Glucose Level Within Targeted Range  Outcome: Ongoing, Progressing  Intervention: Monitor and Manage Glycemia  Description: Establish target blood glucose levels based on patient-specific factors, such as age, diabetes-related complications and illness severity.  Document blood glucose levels and monitor trend; advocate for adjustment to keep within targeted range.  Provide pharmacologic therapy to maintain blood glucose levels within targeted  range.  Check blood glucose level if there is a change in mental or cognitive status.  Recognize, treat and document hypoglycemia event and potential cause.  Avoid hypoglycemic episodes by advocating for insulin dose adjustment when there is a change in condition, such as illness severity, decreased oral intake, missed or refused meals and snacks, as well as medication change that may include steroid tape  Recent Flowsheet Documentation  Taken 9/10/2022 0409 by Cassy Nichols RN  Glycemic Management:   blood glucose monitored   oral hydration promoted  Taken 9/10/2022 0000 by Cassy Nichols RN  Glycemic Management:   blood glucose monitored   oral hydration promoted  Goal: Blood Glucose Level Within Targeted Range  Outcome: Ongoing, Progressing  Intervention: Monitor and Manage Glycemia  Description: Establish target blood glucose levels based on patient-specific factors, such as age, diabetes-related complications and illness severity.  Document blood glucose levels and monitor trend; advocate for adjustment to keep within targeted range.  Provide pharmacologic therapy to maintain blood glucose levels within targeted range.  Check blood glucose level if there is a change in mental or cognitive status.  Recognize, treat and document hypoglycemia event and potential cause.  Avoid hypoglycemic episodes by advocating for insulin dose adjustment when there is a change in condition, such as illness severity, decreased oral intake, missed or refused meals and snacks, as well as medication change that may include steroid tape  Recent Flowsheet Documentation  Taken 9/10/2022 0409 by Cassy Nichols RN  Glycemic Management:   blood glucose monitored   oral hydration promoted  Taken 9/10/2022 0000 by Cassy Nichols RN  Glycemic Management:   blood glucose monitored   oral hydration promoted     Problem: Hypertension Comorbidity  Goal: Blood Pressure in Desired Range  Outcome: Ongoing, Progressing  Intervention:  Maintain Blood Pressure Management  Description: Evaluate adherence to home antihypertensive regimen (e.g., exercise and activity, diet modification, medication).  Provide scheduled antihypertensive medication; consider administration time and effects (e.g., avoid giving diuretic prior to bedtime).  Monitor response to antihypertensive medication therapy (e.g., blood pressure, electrolyte levels, medication effects).  Minimize risk of orthostatic hypotension; encourage caution with position changes, particularly if elderly.  Recent Flowsheet Documentation  Taken 9/10/2022 0409 by Cassy Nichols RN  Syncope Management: position changed slowly  Medication Review/Management: medications reviewed  Taken 9/10/2022 0231 by Cassy Nichols RN  Medication Review/Management: medications reviewed  Taken 9/10/2022 0000 by Cassy Nichols RN  Syncope Management: position changed slowly  Medication Review/Management: medications reviewed  Taken 9/9/2022 2243 by Cassy Nichols RN  Medication Review/Management: medications reviewed  Goal: Blood Pressure in Desired Range  Outcome: Ongoing, Progressing  Intervention: Maintain Blood Pressure Management  Description: Evaluate adherence to home antihypertensive regimen (e.g., exercise and activity, diet modification, medication).  Provide scheduled antihypertensive medication; consider administration time and effects (e.g., avoid giving diuretic prior to bedtime).  Monitor response to antihypertensive medication therapy (e.g., blood pressure, electrolyte levels, medication effects).  Minimize risk of orthostatic hypotension; encourage caution with position changes, particularly if elderly.  Recent Flowsheet Documentation  Taken 9/10/2022 0409 by Cassy Nichols RN  Syncope Management: position changed slowly  Medication Review/Management: medications reviewed  Taken 9/10/2022 0231 by Cassy Nichols RN  Medication Review/Management: medications reviewed  Taken 9/10/2022  0000 by Simone, Cassy, RN  Syncope Management: position changed slowly  Medication Review/Management: medications reviewed  Taken 9/9/2022 2243 by Cassy Nichols RN  Medication Review/Management: medications reviewed     Problem: Asthma Comorbidity  Goal: Maintenance of Asthma Control  Outcome: Ongoing, Progressing  Intervention: Maintain Asthma Symptom Control  Description: Evaluate adherence to self-management (asthma action plan), such as medication, symptom-control, trigger-avoidance and self-monitoring.  Advocate for continuation of home regimen, including medication, method of delivery, schedule and symptom monitoring; acknowledge preferred modality and routine.  Minimize exposure to potential triggers, such as perfume, cleaning chemicals and all types of smoke.  Assess for proper use of inhaled medication and delivery technique; assist or reinstruct if needed.  Evaluate effectiveness of coping skills; encourage expression of feelings, expectations and concerns related to disease management and quality of life; reinforce education to enhance management plan and wellbeing.  Recent Flowsheet Documentation  Taken 9/10/2022 0409 by Cassy Nichols RN  Medication Review/Management: medications reviewed  Taken 9/10/2022 0231 by Cassy Nichols RN  Medication Review/Management: medications reviewed  Taken 9/10/2022 0000 by Cassy Nichols RN  Medication Review/Management: medications reviewed  Taken 9/9/2022 2243 by Cassy Nichols RN  Medication Review/Management: medications reviewed     Problem: COPD (Chronic Obstructive Pulmonary Disease) Comorbidity  Goal: Maintenance of COPD Symptom Control  Outcome: Ongoing, Progressing  Intervention: Maintain COPD-Symptom Control  Description: Evaluate adherence to management plan (e.g., medication, trigger avoidance, infection prevention, self-monitoring).  Advocate for continuation of home regimen, including medication, method of delivery, schedule and symptom  monitoring.  Anticipate the need for breathing techniques and activity pacing to minimize fatigue and breathlessness.  Assess for proper use of inhaled medication and delivery technique; assist or reinstruct if needed.  Evaluate effectiveness of coping skills; encourage expression of feelings, expectations and concerns related to disease management and quality of life; reinforce education to enhance management plan and wellbeing.  Recent Flowsheet Documentation  Taken 9/10/2022 0409 by Cassy Nichols RN  Supportive Measures: active listening utilized  Medication Review/Management: medications reviewed  Taken 9/10/2022 0231 by Cassy Nichols RN  Medication Review/Management: medications reviewed  Taken 9/10/2022 0000 by Cassy Nichols RN  Supportive Measures: active listening utilized  Medication Review/Management: medications reviewed  Taken 9/9/2022 2243 by Cassy Nichols, RN  Medication Review/Management: medications reviewed   Goal Outcome Evaluation:  Plan of Care Reviewed With: patient        Progress: no change

## 2022-09-10 NOTE — PHARMACY RECOMMENDATION
"Transitions-of-Care ([facility]) Pharmacy Future COST Assessment:     /Nurse requesting test claim: N/A - this patient is on the high-cost drug report list    Pharmacy ran test claim for the following:     Drug Sig Covered/PA required Patient Copay per month   Vancomycin Oral QID (ran test claim for #40 caps/10 days supply) PA required $ Unknown, PA required -->   [facility] pharmacist started PA process in [facility] on 09/10/22.  key: [id number] --> pending approval     Patient Insurance Type: Medicare - this means they are NOT eligible for a monthly discount card in the future (see \"free month\" note below)    Deductible/Medicare Gap Issue? Unknown    Is patient signed up for M2B service? No    Is above drug(s) eligible for 1 month free through M2B service? No - coupon not available for this drug  If eligible,  or [facility] pharmacy can provide coupon upon request.     For billing questions, reach out to [facility] at [phone]  For M2B questions, reach out to Retail Pharmacy at [phone]    Emily Rivas PharmD  Transitions of Care Clinical Pharmacist   [phone]  9/10/2022 10:03 EDT      "

## 2022-09-10 NOTE — PROGRESS NOTES
[facility] Medicine Services Daily Progress Note    Patient Name: Dora Solorio  : 1950  MRN: [id number]  Primary Care Physician:  Bhargavi Rodriguez APRN  Date of admission: 2022      Subjective      Chief Complaint:     Generalized weakness and diarrhea    Patient Reports     Reports that she still has diarrhea  Reports abdominal pain lower abdomen  CT abdomen pelvis ordered  Significant back positive started on p.o. vancomycin  Small drop in blood pressure intermittently noted and still with low-grade fever            Review of Systems   All other systems reviewed and are negative.          Objective      Vitals:   Temp:  [99.2 °F (37.3 °C)-100.2 °F (37.9 °C)] 100 °F (37.8 °C)  Heart Rate:  [58-72] 65  Resp:  [16-18] 17  BP: ()/(38-68) 117/57    Physical Exam  Vitals and nursing note reviewed.   Constitutional:       General: She is not in acute distress.     Appearance: Normal appearance. She is well-developed. She is not ill-appearing, toxic-appearing or diaphoretic.   HENT:      Head: Normocephalic and atraumatic.      Right Ear: Ear canal and external ear normal.      Left Ear: Ear canal and external ear normal.      Nose: Nose normal. No congestion or rhinorrhea.      Mouth/Throat:      Mouth: Mucous membranes are moist.      Pharynx: No oropharyngeal exudate.   Eyes:      General: No scleral icterus.        Right eye: No discharge.         Left eye: No discharge.      Extraocular Movements: Extraocular movements intact.      Conjunctiva/sclera: Conjunctivae normal.      Pupils: Pupils are equal, round, and reactive to light.   Neck:      Thyroid: No thyromegaly.      Vascular: No carotid bruit or JVD.      Trachea: No tracheal deviation.   Cardiovascular:      Rate and Rhythm: Normal rate and regular rhythm.      Pulses: Normal pulses.      Heart sounds: Normal heart sounds. No murmur heard.    No friction rub. No gallop.   Pulmonary:      Effort: Pulmonary effort is  normal. No respiratory distress.      Breath sounds: Normal breath sounds. No stridor. No wheezing, rhonchi or rales.   Chest:      Chest wall: No tenderness.   Abdominal:      General: Bowel sounds are normal. There is no distension.      Palpations: Abdomen is soft. There is no mass.      Tenderness: There is abdominal tenderness in the suprapubic area. There is no guarding or rebound.      Hernia: No hernia is present.   Musculoskeletal:         General: No swelling, tenderness, deformity or signs of injury. Normal range of motion.      Cervical back: Normal range of motion and neck supple. No rigidity. No muscular tenderness.      Right lower leg: No edema.      Left lower leg: No edema.   Lymphadenopathy:      Cervical: No cervical adenopathy.   Skin:     General: Skin is warm and dry.      Coloration: Skin is not jaundiced or pale.      Findings: No bruising, erythema or rash.   Neurological:      General: No focal deficit present.      Mental Status: She is alert and oriented to person, place, and time. Mental status is at baseline.      Cranial Nerves: No cranial nerve deficit.      Sensory: No sensory deficit.      Motor: No weakness or abnormal muscle tone.      Coordination: Coordination normal.   Psychiatric:         Mood and Affect: Mood normal.         Behavior: Behavior normal.         Thought Content: Thought content normal.         Judgment: Judgment normal.               Result Review    Result Review:  I have personally reviewed the results from the time of this admission to 9/10/2022 11:43 EDT and agree with these findings:  [x]  Laboratory  [x]  Microbiology  [x]  Radiology  [x]  EKG/Telemetry   [x]  Cardiology/Vascular   []  Pathology  []  Old records  []  Other:  Most notable findings include:   As above      Assessment & Plan      Brief Patient Summary:  Dora Solorio is a 72 y.o. female who presents with diarrhea and generalized weakness strokelike symptoms      amLODIPine, 10 mg, Oral,  Daily  atorvastatin, 80 mg, Oral, Nightly  bisoprolol, 10 mg, Oral, Daily  escitalopram, 10 mg, Oral, Daily  gabapentin, 300 mg, Oral, TID  insulin lispro, 0-7 Units, Subcutaneous, TID AC  lisinopril, 20 mg, Oral, BID  saccharomyces boulardii, 250 mg, Oral, BID  sodium chloride, 10 mL, Intravenous, Q12H  vancomycin, 125 mg, Oral, Q6H  vitamin B-12, 250 mcg, Oral, Daily       sodium chloride, 75 mL/hr, Last Rate: 75 mL/hr (09/10/22 0222)         Active Hospital Problems:  Active Hospital Problems    Diagnosis    • Dizzy      Plan:   Dora Solorio is a 72 y.o. female who presented to [facility] on 9/9/2022 complaining of dizziness  She has chronic medical problems including depression, osteoarthritis,Hypertension, inflammatory bowel disease, irritable syndrome, kidney stones, osteopenia, peripheral neuropathy, DM2, CAD, MI, CAD.     Patient reports feeling of dizziness and lightheadedness since 10:00 in the morning.  Code stroke called in ER.  She denies any history or symptoms of weakness or numbness or speech problems or walking problems but was not able to walk on her own when she came in and needed to present to help her.  ER physician report patient has no focal findings and patient had CT scan of the head CT angiogram of the head and neck showing 60% stenosis origin of the right internal carotid artery, no large intracranial arterial occlusion, posterior circulation supply from anterior circulation with prominent posterior circulating communicating arteries and small basilar arteries  No acute endocrine abnormality was noted on CT head, patient was seen by ER physician as well as neurologist in the ER  Notes from neurology service showed that patient had no focal deficit and no aphasia and did not think patient has stroke like symptoms.  Work-up in the ER showed evidence of possible acute kidney injury with elevated creatinine of 1.4 with a baseline of 1.9 couple months ago  Patient also had mildly low  sodium 135 and bicarb of 21  Troponin was negative  PT/INR mostly negative/normal  CBC with essentially normal  Patient had chest x-ray that showed left basilar airspace opacity likely atelectasis versus pneumonia  Patient had also EKG sinus rhythm borderline MT prolongation inferior infarct, old       Assessment/plan      Generalized weakness  Dizziness  Now hypotensive in the ER  Initial code stroke has been initiated in the ER and seen by neurologist but no concern for stroke according to neurology notes  Continue aspirin high intensity statin blood pressure control  Avoid excessive blood pressure control to avoid cerebral hypoperfusion  Gentle IV hydration  Check MRI brain and echocardiogram per protocol  Neurology has been consulted in the ER and deemed the patient is not having an active stroke or TIA at this time  Neurochecks per protocol  PT OT  Reason for generalized weakness not clear  Check sed rate CRP and CPK, TSH  Urinalysis negative  COVID-negative    C. difficile colitis  GI panel negative but C. difficile positive  P.o. Vanco initiated  IV fluids initiated    Sepsis present on admission related to C. difficile colitis  Lactic acid ordered    Essential hypertension  Now hypotensive  Hold antihypertensive medications  Avoid ACE inhibitor's given ALTON  Small dose of bisoprolol.  Hold if blood pressure below 90 systolic    Acute kidney injury  Possibly hypoperfusion related  IV hydration  -Urinalysis and reviewed without significant proteinuria  Avoid ACE inhibitor's given ALTON        DM2  Diabetic neuropathy  Resume home medication once reconciled  Monitor for hypoglycemia  Hold oral medications  Sliding scale insulin     Gout awaiting home medications     CAD with history of MI on aspirin and statin     Inflammatory bowel disease/irritable bowel syndrome  No specific medications noted     Depression on Lexapro     Peripheral arterial disease with 60% stenosis right internal carotidArtery  origin  Diminutive posterior circulation with large posterior communicating artery  Chronic right basal ganglia lacunar infarct,  Follow-up with vascular as an outpatient  Aspirin statin blood pressure control     B12 deficiency on replacement          DVT prophylaxis: Heparin subcutaneous  Mechanical DVT prophylaxis orders are present.    CODE STATUS:    Code Status (Patient has no pulse and is not breathing): CPR (Attempt to Resuscitate)  Medical Interventions (Patient has pulse or is breathing): Full Support  Release to patient: Routine Release      Disposition:  I expect patient to be discharged  Home.    This patient has been examined wearing appropriate Personal Protective Equipment and discussed with hospital infection control department. 09/10/22      Electronically signed by Kumar Correa MD, 09/10/22, 11:43 EDT.  [facility]ist [facility]

## 2022-09-10 NOTE — SIGNIFICANT NOTE
Informed by patient's nurse, C. Diff PCR positive.   - ordered oral vancomycin 125mg q6h  - contact spore isolation precautions  - Florastor BID     Electronically signed by Tatum Valentin MD, 09/10/22, 7:18 AM EDT.

## 2022-09-10 NOTE — THERAPY EVALUATION
Patient Name: Dora Solorio  : 1950    MRN: [id number]                              Today's Date: 9/10/2022       Admit Date: 2022    Visit Dx:     ICD-10-CM ICD-9-CM   1. Dizzy  R42 780.4   2. Weakness  R53.1 780.79     Patient Active Problem List   Diagnosis   • Adjustment disorder with depressed mood   • Depression   • Type 2 diabetes mellitus with hyperglycemia, without long-term current use of insulin (HCC)   • Diabetic peripheral neuropathy (HCC)   • Essential hypertension   • Gout   • Mixed hyperlipidemia   • Irritable bowel syndrome without diarrhea   • Obesity   • Osteopenia   • Peripheral neuropathy   • Vitamin D deficiency   • Dizzy     Past Medical History:   Diagnosis Date   • Adjustment disorder with depressed mood 2018   • Allergic Years    Sulfa drugs   • Arthritis Years    Lotsvof joints. Mostly back, knees, hips, fingers   • Depression 2018   • Diabetes mellitus, type II ([facility]) 2013   • Essential hypertension 2013   • Gout 2014   • Heart attack ([facility])    • Hyperlipidemia 2020   • Hypertension    • Inflammatory bowel disease    • Irritable bowel syndrome without diarrhea 2017   • Kidney stone ?2016?    In hospital overnight   • Osteopenia 2017   • Peripheral neuropathy 2017     Past Surgical History:   Procedure Laterality Date   •  SECTION      x2   • KNEE ARTHROSCOPY Bilateral    • TUBAL ABDOMINAL LIGATION  May 1973      General Information     Row Name 09/10/22 1813          Physical Therapy Time and Intention    Document Type evaluation  -     Mode of Treatment physical therapy  -     Row Name 09/10/22 1813          General Information    Prior Level of Function independent:;all household mobility;gait;transfer;bed mobility  Pt's family provides transportation. Lived w/ spouse, but spouse is currently in rehab facility. Sisters live nearby and able to assist as needed. Reports one fall in the past year; Has RW if  needed  -     Existing Precautions/Restrictions fall  -     Row Name 09/10/22 1813          Living Environment    People in Home alone;other (see comments)  Spouse is currently in rehab; sisters able to assist as needed  -     Row Name 09/10/22 1813          Home Main Entrance    Number of Stairs, Main Entrance one  -ALYSSA     Stair Railings, Main Entrance none  -ALYSSA     Row Name 09/10/22 1813          Stairs Within Home, Primary    Number of Stairs, Within Home, Primary none  -     Row Name 09/10/22 1813          Cognition    Orientation Status (Cognition) oriented x 4  -ALYSSA     Row Name 09/10/22 1813          Safety Issues, Functional Mobility    Impairments Affecting Function (Mobility) balance;endurance/activity tolerance;postural/trunk control;strength  -           User Key  (r) = Recorded By, (t) = Taken By, (c) = Cosigned By    Initials Name Provider Type    Ligia Grajeda PT Physical Therapist               Mobility     Row Name 09/10/22 1815          Bed Mobility    Bed Mobility supine-sit  -     Supine-Sit [place] (Bed Mobility) modified independence  -     Assistive Device (Bed Mobility) bed rails;head of bed elevated  -     Comment, (Bed Mobility) HOB moderately elevated  -     Row Name 09/10/22 1815          Sit-Stand Transfer    Sit-Stand [place] (Transfers) standby assist  -     Comment, (Sit-Stand Transfer) STS from EOB; tolerated standing x1-2minutes d/t lightheadedness  -     Row Name 09/10/22 1815          Gait/Stairs (Locomotion)    [place] Level (Gait) contact guard  -     Distance in Feet (Gait) CGA for safety d/t lightheadedness; 3-4ft to bedside chair  -           User Key  (r) = Recorded By, (t) = Taken By, (c) = Cosigned By    Initials Name Provider Type    Ligia Grajeda PT Physical Therapist               Obj/Interventions     Row Name 09/10/22 1817          Range of Motion Comprehensive    General Range of Motion no range of motion deficits  identified  -[facility] Name 09/10/22 1817          Strength Comprehensive (MMT)    Comment, General Manual Muscle Testing (MMT) Assessment BLE grossly 4-/5  -     Row Name 09/10/22 1817          Balance    Balance Assessment sitting static balance;sitting dynamic balance;standing static balance;standing dynamic balance  -     Static Sitting Balance independent  -     Dynamic Sitting Balance supervision  -     Position, Sitting Balance sitting edge of bed  -     Static Standing Balance contact guard  -     Dynamic Standing Balance minimal assist  -     Comment, Balance Balance would likely improve, significantly, if pt was not lightheaded.  -     Row Name 09/10/22 1817          Sensory Assessment (Somatosensory)    Sensory Assessment (Somatosensory) sensation intact  -           User Key  (r) = Recorded By, (t) = Taken By, (c) = Cosigned By    Initials Name Provider Type    Ligia Grjaeda, PT Physical Therapist               Goals/Plan     [facility] Name 09/10/22 1820          Bed Mobility Goal 1 (PT)    Activity/Assistive Device (Bed Mobility Goal 1, PT) bed mobility activities, all  -ALYSSA     [place] Level/Cues Needed (Bed Mobility Goal 1, PT) independent  -ALYSSA     Time Frame (Bed Mobility Goal 1, PT) long term goal (LTG);2 weeks  -[facility] Name 09/10/22 1820          Transfer Goal 1 (PT)    Activity/Assistive Device (Transfer Goal 1, PT) sit-to-stand/stand-to-sit;bed-to-chair/chair-to-bed  -ALYSSA     [place] Level/Cues Needed (Transfer Goal 1, PT) independent  -ALYSSA     Time Frame (Transfer Goal 1, PT) long term goal (LTG);2 weeks  -[facility] Name 09/10/22 1820          Gait Training Goal 1 (PT)    Activity/Assistive Device (Gait Training Goal 1, PT) gait (walking locomotion)  -ALYSSA     [place] Level (Gait Training Goal 1, PT) independent  -AYLSSA     Distance (Gait Training Goal 1, PT) 100ft  -ALYSSA     Time Frame (Gait Training Goal 1, PT) long term goal (LTG);2 weeks  -ALYSSA     Row Name 09/10/22  1820          Stairs Goal 1 (PT)    Activity/Assistive Device (Stairs Goal 1, PT) stairs, all skills  -ALYSSA     [place] Level/Cues Needed (Stairs Goal 1, PT) independent  -ALYSSA     Number of Stairs (Stairs Goal 1, PT) 1 step  -ALYSSA     Time Frame (Stairs Goal 1, PT) long term goal (LTG);2 weeks  -ALYSSA     Row Name 09/10/22 1820          Therapy Assessment/Plan (PT)    Planned Therapy Interventions (PT) balance training;bed mobility training;gait training;home exercise program;patient/family education;stair training;strengthening;neuromuscular re-education;transfer training  -           User Key  (r) = Recorded By, (t) = Taken By, (c) = Cosigned By    Initials Name Provider Type    Ligia Grajeda, DIANA Physical Therapist               Clinical Impression     Row Name 09/10/22 1818          Pain    Additional Documentation Pain Scale: FACES Pre/Post-Treatment (Group)  -     Row Name 09/10/22 1818          Pain Scale: FACES Pre/Post-Treatment    Pain: FACES Scale, Pretreatment 0-->no hurt  -ALYSSA     Posttreatment Pain Rating 0-->no hurt  -ALYSSA     Row Name 09/10/22 1818          Plan of Care Review    Plan of Care Reviewed With patient  -     Outcome Evaluation Pt is a 71 y/o female who presents to [facility] with reports of dizziness. PMH significant for OA, HTN, inflammatory bowel disease, osteopenia, DM2, peripheral neuropathy, and MI. Code stroke was called in the ER, but CT head and CT angiogram showing 60% stenosis and no acute focal deficit or abnormalities. Pt is positive for C.dificile. At this time pt is independent w/ bed mobility and requires SBA for STS transfer w/out an AD. Pt becomes lightheaded upon sitting and standing. Unable to get a clear bp reading following 1-2minutes of standing and pt ambulates 3-4ft to bedside chair w/ CGA. BP 90/52 following transfer to chair. Pt became nauseous and was reclined back and BP was 153/62. At this time pt is unsafe to return home, but once medically stable pt would  likely be able to return home with HHPT and assist from her sister who lives next door. Sister would be able to provide 24/7 assist if needed.  -ALYSSA     Row Name 09/10/22 1818          Therapy Assessment/Plan (PT)    Therapy Frequency (PT) 5 times/wk  -ALYSSA     Predicted Duration of Therapy Intervention (PT) Until d/c  -ALYSSA     Row Name 09/10/22 1818          Vital Signs    Pre Systolic BP Rehab 117  -ALYSSA     Pre Treatment Diastolic BP 73  supine  -ALYSSA     Intra Systolic BP Rehab 90  Following transfer and standing x1-2min  -ALYSSA     Intra Treatment Diastolic BP 52  -ALYSSA     Post Systolic BP Rehab 153  -ALYSSA     Post Treatment Diastolic BP 62  -ALYSSA     Intratreatment Heart Rate (beats/min) 40  HR dropped to 40s post-transfer  -ALYSSA     Pre SpO2 (%) 96  -ALYSSA     O2 Delivery Pre Treatment room air  -ALYSSA     Intra SpO2 (%) 93  -ALYSSA     O2 Delivery Intra Treatment room air  -ALYSSA     Post SpO2 (%) 95  -ALYSSA     O2 Delivery Post Treatment room air  -     Row Name 09/10/22 1818          Positioning and Restraints    Pre-Treatment Position in bed  -ALYSSA     Post Treatment Position chair  -ALYSSA     In Chair notified nsg;reclined;call light within reach;encouraged to call for assist;exit alarm on  -ALYSSA           User Key  (r) = Recorded By, (t) = Taken By, (c) = Cosigned By    Initials Name Provider Type    Ligia Grajeda, PT Physical Therapist               Outcome Measures     Row Name 09/10/22 1821          How much help from another person do you currently need...    Turning from your back to your side while in flat bed without using bedrails? 3  -ALYSSA     Moving from lying on back to sitting on the side of a flat bed without bedrails? 3  -ALYSSA     Moving to and from a bed to a chair (including a wheelchair)? 3  -ALYSSA     Standing up from a chair using your arms (e.g., wheelchair, bedside chair)? 3  -ALYSSA     Climbing 3-5 steps with a railing? 3  -ALYSSA     To walk in hospital room? 3  -ALYSSA     AM-PAC 6 Clicks Score (PT) 18  -ALYSSA     Highest level of  mobility 6 --> Walked 10 steps or more  -     Row Name 09/10/22 1821          Modified [place] Scale    Pre-Stroke Modified [place] Scale 6 - Unable to determine (UTD) from the medical record documentation  -     Modified [place] Scale 4 - Moderately severe disability.  Unable to walk without assistance, and unable to attend to own bodily needs without assistance.  Pt is unable to ambulate, independently at this time d/t lightheadedness and drop in bp. Once bp is medically stable pt will likely be able to ambulate independently.  -     Row Name 09/10/22 1821          Functional Assessment    Outcome Measure Options AM-PAC 6 Clicks Basic Mobility (PT);Modified [place]  -           User Key  (r) = Recorded By, (t) = Taken By, (c) = Cosigned By    Initials Name Provider Type    Ligia Grajeda, PT Physical Therapist                             Physical Therapy Education                 Title: PT OT SLP Therapies (Done)     Topic: Physical Therapy (Done)     Point: Mobility training (Done)     Learning Progress Summary           Patient Acceptance, E,TB, VU by  at 9/10/2022 1821                   Point: Home exercise program (Done)     Learning Progress Summary           Patient Acceptance, E,TB, VU by  at 9/10/2022 1821                   Point: Body mechanics (Done)     Learning Progress Summary           Patient Acceptance, E,TB, VU by  at 9/10/2022 1821                   Point: Precautions (Done)     Learning Progress Summary           Patient Acceptance, E,TB, VU by  at 9/10/2022 1821                               User Key     Initials Effective Dates Name Provider Type Discipline     08/23/21 -  Ligia Dhaliwal, DIANA Physical Therapist PT              PT Recommendation and Plan  Planned Therapy Interventions (PT): balance training, bed mobility training, gait training, home exercise program, patient/family education, stair training, strengthening, neuromuscular re-education, transfer training  Plan  of Care Reviewed With: patient  Outcome Evaluation: Pt is a 71 y/o female who presents to [facility] with reports of dizziness. PMH significant for OA, HTN, inflammatory bowel disease, osteopenia, DM2, peripheral neuropathy, and MI. Code stroke was called in the ER, but CT head and CT angiogram showing 60% stenosis and no acute focal deficit or abnormalities. Pt is positive for C.dificile. At this time pt is independent w/ bed mobility and requires SBA for STS transfer w/out an AD. Pt becomes lightheaded upon sitting and standing. Unable to get a clear bp reading following 1-2minutes of standing and pt ambulates 3-4ft to bedside chair w/ CGA. BP 90/52 following transfer to chair. Pt became nauseous and was reclined back and BP was 153/62. At this time pt is unsafe to return home, but once medically stable pt would likely be able to return home with HHPT and assist from her sister who lives next door. Sister would be able to provide 24/7 assist if needed.     Time Calculation:    PT Charges     Row Name 09/10/22 1823             Time Calculation    Start Time 1438  -ALYSSA      Stop Time 1500  -ALYSSA      Time Calculation (min) 22 min  -ALYSSA      PT Received On 09/10/22  -ALYSSA      PT - Next Appointment 09/12/22  -ALYSSA      PT Goal Re-Cert Due Date 09/24/22  -            User Key  (r) = Recorded By, (t) = Taken By, (c) = Cosigned By    Initials Name Provider Type    ALYSSA Ligia Dhaliwal, DIANA Physical Therapist              Therapy Charges for Today     Code Description Service Date Service Provider Modifiers Qty    [id number]  PT EVAL MOD COMPLEXITY 4 9/10/2022 Ligia Dhaliwal, PT GP 1          PT G-Codes  Outcome Measure Options: AM-PAC 6 Clicks Basic Mobility (PT), Modified [place]  AM-PAC 6 Clicks Score (PT): 18  Modified [place] Scale: 4 - Moderately severe disability.  Unable to walk without assistance, and unable to attend to own bodily needs without assistance. (Pt is unable to ambulate, independently at this time d/t  lightheadedness and drop in bp. Once bp is medically stable pt will likely be able to ambulate independently.)    Ligia Dhaliwal, PT  9/10/2022

## 2022-09-10 NOTE — PLAN OF CARE
Goal Outcome Evaluation:  Patient is alert and oriented, able to make needs known to staff. Patient has no complaints of pain at this time. Will continue to monitor.

## 2022-09-11 ENCOUNTER — APPOINTMENT (OUTPATIENT)
Dept: GENERAL RADIOLOGY | Facility: HOSPITAL | Age: 72
End: 2022-09-11

## 2022-09-11 LAB
ALBUMIN SERPL-MCNC: 2.7 G/DL (ref 3.5–5.2)
ALBUMIN/GLOB SERPL: 0.8 G/DL
ALP SERPL-CCNC: 60 U/L (ref 39–117)
ALT SERPL W P-5'-P-CCNC: 12 U/L (ref 1–33)
ANION GAP SERPL CALCULATED.3IONS-SCNC: 12 MMOL/L (ref 5–15)
ARTERIAL PATENCY WRIST A: POSITIVE
AST SERPL-CCNC: 25 U/L (ref 1–32)
ATMOSPHERIC PRESS: ABNORMAL MM[HG]
BASE EXCESS BLDA CALC-SCNC: -5.4 MMOL/L (ref 0–3)
BASOPHILS # BLD AUTO: 0 10*3/MM3 (ref 0–0.2)
BASOPHILS NFR BLD AUTO: 0.3 % (ref 0–1.5)
BDY SITE: ABNORMAL
BILIRUB SERPL-MCNC: 0.4 MG/DL (ref 0–1.2)
BUN SERPL-MCNC: 19 MG/DL (ref 8–23)
BUN/CREAT SERPL: 16.1 (ref 7–25)
CALCIUM SPEC-SCNC: 7.8 MG/DL (ref 8.6–10.5)
CHLORIDE SERPL-SCNC: 102 MMOL/L (ref 98–107)
CK SERPL-CCNC: 177 U/L (ref 20–180)
CO2 BLDA-SCNC: 20.9 MMOL/L (ref 22–29)
CO2 SERPL-SCNC: 19 MMOL/L (ref 22–29)
CREAT SERPL-MCNC: 1.18 MG/DL (ref 0.57–1)
CRP SERPL-MCNC: 16.43 MG/DL (ref 0–0.5)
D-LACTATE SERPL-SCNC: 0.5 MMOL/L (ref 0.5–2)
DEPRECATED RDW RBC AUTO: 40.7 FL (ref 37–54)
EGFRCR SERPLBLD CKD-EPI 2021: 48.6 ML/MIN/1.73
EOSINOPHIL # BLD AUTO: 0.1 10*3/MM3 (ref 0–0.4)
EOSINOPHIL NFR BLD AUTO: 2.1 % (ref 0.3–6.2)
ERYTHROCYTE [DISTWIDTH] IN BLOOD BY AUTOMATED COUNT: 13.5 % (ref 12.3–15.4)
GLOBULIN UR ELPH-MCNC: 3.3 GM/DL
GLUCOSE BLDC GLUCOMTR-MCNC: 137 MG/DL (ref 70–105)
GLUCOSE BLDC GLUCOMTR-MCNC: 157 MG/DL (ref 70–105)
GLUCOSE BLDC GLUCOMTR-MCNC: 177 MG/DL (ref 70–105)
GLUCOSE BLDC GLUCOMTR-MCNC: 178 MG/DL (ref 70–105)
GLUCOSE SERPL-MCNC: 116 MG/DL (ref 65–99)
HCO3 BLDA-SCNC: 19.8 MMOL/L (ref 21–28)
HCT VFR BLD AUTO: 32.5 % (ref 34–46.6)
HEMODILUTION: NO
HGB BLD-MCNC: 10.4 G/DL (ref 12–15.9)
HOLD SPECIMEN: NORMAL
INHALED O2 CONCENTRATION: 40 %
LYMPHOCYTES # BLD AUTO: 0.3 10*3/MM3 (ref 0.7–3.1)
LYMPHOCYTES NFR BLD AUTO: 5.3 % (ref 19.6–45.3)
MCH RBC QN AUTO: 27.3 PG (ref 26.6–33)
MCHC RBC AUTO-ENTMCNC: 32 G/DL (ref 31.5–35.7)
MCV RBC AUTO: 85.5 FL (ref 79–97)
MODALITY: ABNORMAL
MONOCYTES # BLD AUTO: 0.4 10*3/MM3 (ref 0.1–0.9)
MONOCYTES NFR BLD AUTO: 6 % (ref 5–12)
NEUTROPHILS NFR BLD AUTO: 5.6 10*3/MM3 (ref 1.7–7)
NEUTROPHILS NFR BLD AUTO: 86.3 % (ref 42.7–76)
NRBC BLD AUTO-RTO: 0 /100 WBC (ref 0–0.2)
NT-PROBNP SERPL-MCNC: 1157 PG/ML (ref 0–900)
PCO2 BLDA: 36.1 MM HG (ref 35–48)
PH BLDA: 7.35 PH UNITS (ref 7.35–7.45)
PLATELET # BLD AUTO: 110 10*3/MM3 (ref 140–450)
PMV BLD AUTO: 8.8 FL (ref 6–12)
PO2 BLDA: 95 MM HG (ref 83–108)
POTASSIUM SERPL-SCNC: 4 MMOL/L (ref 3.5–5.2)
PROCALCITONIN SERPL-MCNC: 0.68 NG/ML (ref 0–0.25)
PROT SERPL-MCNC: 6 G/DL (ref 6–8.5)
RBC # BLD AUTO: 3.8 10*6/MM3 (ref 3.77–5.28)
SAO2 % BLDCOA: 97 % (ref 94–98)
SODIUM SERPL-SCNC: 133 MMOL/L (ref 136–145)
TROPONIN T SERPL-MCNC: <0.01 NG/ML (ref 0–0.03)
WBC NRBC COR # BLD: 6.5 10*3/MM3 (ref 3.4–10.8)

## 2022-09-11 PROCEDURE — 63710000001 INSULIN LISPRO (HUMAN) PER 5 UNITS: Performed by: INTERNAL MEDICINE

## 2022-09-11 PROCEDURE — 83880 ASSAY OF NATRIURETIC PEPTIDE: CPT | Performed by: INTERNAL MEDICINE

## 2022-09-11 PROCEDURE — 82962 GLUCOSE BLOOD TEST: CPT

## 2022-09-11 PROCEDURE — 86140 C-REACTIVE PROTEIN: CPT | Performed by: INTERNAL MEDICINE

## 2022-09-11 PROCEDURE — 87040 BLOOD CULTURE FOR BACTERIA: CPT | Performed by: NURSE PRACTITIONER

## 2022-09-11 PROCEDURE — 85652 RBC SED RATE AUTOMATED: CPT | Performed by: INTERNAL MEDICINE

## 2022-09-11 PROCEDURE — 36600 WITHDRAWAL OF ARTERIAL BLOOD: CPT

## 2022-09-11 PROCEDURE — 71045 X-RAY EXAM CHEST 1 VIEW: CPT

## 2022-09-11 PROCEDURE — 82550 ASSAY OF CK (CPK): CPT | Performed by: INTERNAL MEDICINE

## 2022-09-11 PROCEDURE — 25010000002 ONDANSETRON PER 1 MG: Performed by: INTERNAL MEDICINE

## 2022-09-11 PROCEDURE — 84145 PROCALCITONIN (PCT): CPT | Performed by: INTERNAL MEDICINE

## 2022-09-11 PROCEDURE — 80053 COMPREHEN METABOLIC PANEL: CPT | Performed by: INTERNAL MEDICINE

## 2022-09-11 PROCEDURE — 99233 SBSQ HOSP IP/OBS HIGH 50: CPT | Performed by: INTERNAL MEDICINE

## 2022-09-11 PROCEDURE — G0378 HOSPITAL OBSERVATION PER HR: HCPCS

## 2022-09-11 PROCEDURE — 84484 ASSAY OF TROPONIN QUANT: CPT | Performed by: INTERNAL MEDICINE

## 2022-09-11 PROCEDURE — 83605 ASSAY OF LACTIC ACID: CPT | Performed by: INTERNAL MEDICINE

## 2022-09-11 PROCEDURE — 82803 BLOOD GASES ANY COMBINATION: CPT

## 2022-09-11 PROCEDURE — 85025 COMPLETE CBC W/AUTO DIFF WBC: CPT | Performed by: INTERNAL MEDICINE

## 2022-09-11 RX ORDER — SODIUM BICARBONATE 650 MG/1
1300 TABLET ORAL 4 TIMES DAILY
Status: DISCONTINUED | OUTPATIENT
Start: 2022-09-11 | End: 2022-09-13 | Stop reason: HOSPADM

## 2022-09-11 RX ORDER — CHOLESTYRAMINE LIGHT 4 G/5.7G
1 POWDER, FOR SUSPENSION ORAL EVERY 12 HOURS SCHEDULED
Status: DISCONTINUED | OUTPATIENT
Start: 2022-09-11 | End: 2022-09-13 | Stop reason: HOSPADM

## 2022-09-11 RX ADMIN — CYANOCOBALAMIN TAB 250 MCG 250 MCG: 250 TAB at 08:59

## 2022-09-11 RX ADMIN — Medication 250 MG: at 21:08

## 2022-09-11 RX ADMIN — Medication 5000 UNITS: at 12:02

## 2022-09-11 RX ADMIN — Medication 10 ML: at 21:11

## 2022-09-11 RX ADMIN — ONDANSETRON 4 MG: 2 INJECTION INTRAMUSCULAR; INTRAVENOUS at 21:08

## 2022-09-11 RX ADMIN — INSULIN LISPRO 2 UNITS: 100 INJECTION, SOLUTION INTRAVENOUS; SUBCUTANEOUS at 18:07

## 2022-09-11 RX ADMIN — BISOPROLOL FUMARATE 2.5 MG: 5 TABLET ORAL at 08:59

## 2022-09-11 RX ADMIN — GABAPENTIN 300 MG: 600 TABLET, FILM COATED ORAL at 21:08

## 2022-09-11 RX ADMIN — SODIUM CHLORIDE 75 ML/HR: 9 INJECTION, SOLUTION INTRAVENOUS at 18:07

## 2022-09-11 RX ADMIN — CHOLESTYRAMINE 4 G: 4 POWDER, FOR SUSPENSION ORAL at 21:08

## 2022-09-11 RX ADMIN — SODIUM BICARBONATE 650 MG TABLET 1300 MG: at 12:01

## 2022-09-11 RX ADMIN — GABAPENTIN 300 MG: 600 TABLET, FILM COATED ORAL at 09:00

## 2022-09-11 RX ADMIN — SODIUM CHLORIDE 75 ML/HR: 9 INJECTION, SOLUTION INTRAVENOUS at 05:26

## 2022-09-11 RX ADMIN — ATORVASTATIN CALCIUM 80 MG: 40 TABLET, FILM COATED ORAL at 21:08

## 2022-09-11 RX ADMIN — Medication 10 ML: at 09:00

## 2022-09-11 RX ADMIN — VANCOMYCIN HYDROCHLORIDE 500 MG: 250 CAPSULE ORAL at 05:25

## 2022-09-11 RX ADMIN — HYDROCODONE BITARTRATE AND ACETAMINOPHEN 1 TABLET: 5; 325 TABLET ORAL at 21:09

## 2022-09-11 RX ADMIN — INSULIN LISPRO 2 UNITS: 100 INJECTION, SOLUTION INTRAVENOUS; SUBCUTANEOUS at 12:02

## 2022-09-11 RX ADMIN — GABAPENTIN 300 MG: 600 TABLET, FILM COATED ORAL at 18:07

## 2022-09-11 RX ADMIN — VANCOMYCIN HYDROCHLORIDE 500 MG: 250 CAPSULE ORAL at 12:01

## 2022-09-11 RX ADMIN — Medication 250 MG: at 08:59

## 2022-09-11 RX ADMIN — ESCITALOPRAM OXALATE 10 MG: 10 TABLET ORAL at 08:59

## 2022-09-11 RX ADMIN — SODIUM BICARBONATE 650 MG TABLET 1300 MG: at 21:08

## 2022-09-11 RX ADMIN — SODIUM BICARBONATE 650 MG TABLET 1300 MG: at 18:07

## 2022-09-11 RX ADMIN — ASPIRIN 81 MG: 81 TABLET, COATED ORAL at 09:00

## 2022-09-11 RX ADMIN — VANCOMYCIN HYDROCHLORIDE 500 MG: 250 CAPSULE ORAL at 18:07

## 2022-09-11 NOTE — PLAN OF CARE
Problem: Adult Inpatient Plan of Care  Goal: Absence of Hospital-Acquired Illness or Injury  Intervention: Identify and Manage Fall Risk  Recent Flowsheet Documentation  Taken 9/11/2022 1000 by Shey Gonzales RN  Safety Promotion/Fall Prevention:   activity supervised   assistive device/personal items within reach   clutter free environment maintained   fall prevention program maintained   nonskid shoes/slippers when out of bed   safety round/check completed  Taken 9/11/2022 0901 by Shey Gonzales RN  Safety Promotion/Fall Prevention:   activity supervised   assistive device/personal items within reach   clutter free environment maintained   fall prevention program maintained   nonskid shoes/slippers when out of bed   safety round/check completed  Intervention: Prevent Skin Injury  Recent Flowsheet Documentation  Taken 9/11/2022 0901 by Shey Gonzales RN  Skin Protection: adhesive use limited  Intervention: Prevent and Manage VTE (Venous Thromboembolism) Risk  Recent Flowsheet Documentation  Taken 9/11/2022 0901 by Shey Gonzales RN  Activity Management: activity adjusted per tolerance  Intervention: Prevent Infection  Recent Flowsheet Documentation  Taken 9/11/2022 1000 by Shey Gonzales RN  Infection Prevention: environmental surveillance performed  Taken 9/11/2022 0901 by Shey Gonzales RN  Infection Prevention: environmental surveillance performed  Goal: Optimal Comfort and Wellbeing  Intervention: Provide Person-Centered Care  Recent Flowsheet Documentation  Taken 9/11/2022 0901 by Shey Gonzales RN  Trust Relationship/Rapport:   care explained   thoughts/feelings acknowledged   Goal Outcome Evaluation:

## 2022-09-11 NOTE — PLAN OF CARE
Problem: Adult Inpatient Plan of Care  Goal: Plan of Care Review  Outcome: Ongoing, Progressing  Flowsheets (Taken 9/11/2022 0119)  Plan of Care Reviewed With: patient  Goal: Patient-Specific Goal (Individualized)  Outcome: Ongoing, Progressing  Goal: Absence of Hospital-Acquired Illness or Injury  Outcome: Ongoing, Progressing  Intervention: Identify and Manage Fall Risk  Description: Perform standard risk assessment on admission using a validated tool or comprehensive approach appropriate to the patient; reassess fall risk frequently, with change in status or transfer to another level of care.  Communicate fall injury risk to interprofessional healthcare team.  Determine need for increased observation, equipment and environmental modification, such as low bed, signage and supportive, nonskid footwear.  Adjust safety measures to individual developmental age, stage and identified risk factors.  Reinforce the importance of safety and physical activity with patient and family.  Perform regular intentional rounding to assess need for position change, pain assessment and personal needs, including assistance with toileting.  Recent Flowsheet Documentation  Taken 9/11/2022 0000 by Cassy Nichols, RN  Safety Promotion/Fall Prevention:   safety round/check completed   room organization consistent   nonskid shoes/slippers when out of bed   muscle strengthening facilitated   lighting adjusted   fall prevention program maintained   clutter free environment maintained   assistive device/personal items within reach  Taken 9/10/2022 1958 by Cassy Nichols, RN  Safety Promotion/Fall Prevention: activity supervised  Intervention: Prevent Skin Injury  Description: Perform a screening for skin injury risk, such as pressure or moisture associated skin damage on admission and at regular intervals throughout hospital stay.  Keep all areas of skin (especially folds) clean and dry.  Maintain adequate skin hydration.  Relieve and  redistribute pressure and protect bony prominences; implement measures based on patient-specific risk factors.  Match turning and repositioning schedule to clinical condition.  Encourage weight shift frequently; assist with reposition if unable to complete independently.  Float heels off bed; avoid pressure on the Achilles tendon.  Keep skin free from extended contact with medical devices.  Encourage functional activity and mobility, as early as tolerated.  Use aids (e.g., slide boards, mechanical lift) during transfer.  Recent Flowsheet Documentation  Taken 9/11/2022 0000 by Cassy Nichols RN  Body Position: position changed independently  Skin Protection: adhesive use limited  Taken 9/10/2022 1958 by Cassy Nichols RN  Body Position: position changed independently  Skin Protection: adhesive use limited  Intervention: Prevent and Manage VTE (Venous Thromboembolism) Risk  Description: Assess for VTE (venous thromboembolism) risk.  Encourage and assist with early ambulation.  Initiate and maintain compression or other therapy, as indicated, based on identified risk in accordance with organizational protocol and provider order.  Encourage both active and passive leg exercises while in bed, if unable to ambulate.  Recent Flowsheet Documentation  Taken 9/11/2022 0000 by Cassy Nichols RN  Activity Management:   activity adjusted per tolerance   activity encouraged  VTE Prevention/Management: sequential compression devices on  Taken 9/10/2022 1958 by Cassy Nichols RN  Activity Management: activity adjusted per tolerance  VTE Prevention/Management:   sequential compression devices on   bilateral  Range of Motion: active ROM (range of motion) encouraged  Intervention: Prevent Infection  Description: Maintain skin and mucous membrane integrity; promote hand, oral and pulmonary hygiene.  Optimize fluid balance, nutrition, sleep and glycemic control to maximize infection resistance.  Identify potential sources of  infection early to prevent or mitigate progression of infection (e.g., wound, lines, devices).  Evaluate ongoing need for invasive devices; remove promptly when no longer indicated.  Recent Flowsheet Documentation  Taken 9/11/2022 0000 by Cassy Nichols RN  Infection Prevention: environmental surveillance performed  Taken 9/10/2022 1958 by Cassy Nichols RN  Infection Prevention:   single patient room provided   rest/sleep promoted   hand hygiene promoted   environmental surveillance performed  Goal: Optimal Comfort and Wellbeing  Outcome: Ongoing, Progressing  Intervention: Monitor Pain and Promote Comfort  Description: Assess pain level, treatment efficacy and patient response at regular intervals using a consistent pain scale.  Consider the presence and impact of preexisting chronic pain.  Encourage patient and caregiver involvement in pain assessment, interventions and safety measures.  Recent Flowsheet Documentation  Taken 9/11/2022 0000 by Cassy Nichols RN  Pain Management Interventions:   see MAR   quiet environment facilitated   care clustered  Taken 9/10/2022 1958 by Cassy Nichols RN  Pain Management Interventions:   see MAR   quiet environment facilitated   care clustered  Intervention: Provide Person-Centered Care  Description: Use a family-focused approach to care.  Develop trust and rapport by proactively providing information, encouraging questions, addressing concerns and offering reassurance.  Acknowledge emotional response to hospitalization.  Recognize and utilize personal coping strategies.  Honor spiritual and cultural preferences.  Recent Flowsheet Documentation  Taken 9/11/2022 0000 by Cassy Nichols RN  Trust Relationship/Rapport:   care explained   questions answered   reassurance provided  Taken 9/10/2022 1958 by Cassy Nichols RN  Trust Relationship/Rapport:   care explained   questions answered  Goal: Readiness for Transition of Care  Outcome: Ongoing, Progressing      Problem: Fall Injury Risk  Goal: Absence of Fall and Fall-Related Injury  Outcome: Ongoing, Progressing  Intervention: Identify and Manage Contributors  Description: Develop a fall prevention plan with the patient and caregiver/family.  Provide reorientation, appropriate sensory stimulation and routines with changes in mental status to decrease risk of fall.  Promote use of personal vision and auditory aids.  Assess assistance level required for safe and effective self-care; provide support as needed, such as toileting, mobilization. For age 65 and older, implement timed toileting with assistance.  Encourage physical activity, such as performance of mobility and self-care at highest level of patient ability, multicomponent exercise program and provision of appropriate assistive devices.  If fall occurs, assess the severity of injury; implement fall injury protocol. Determine the cause and revise fall injury prevention plan.  Regularly review medication contribution to fall risk; adjust medication administration times to minimize risk of falling.  Consider risk related to polypharmacy and age.  Balance adequate pain management with potential for oversedation.  Recent Flowsheet Documentation  Taken 9/11/2022 0000 by Cassy Nichols RN  Medication Review/Management: medications reviewed  Self-Care Promotion: independence encouraged  Taken 9/10/2022 1958 by Cassy Nichols, RN  Medication Review/Management: medications reviewed  Self-Care Promotion: independence encouraged  Intervention: Promote Injury-Free Environment  Description: Provide a safe, barrier-free environment that encourages independent activity.  Keep care area uncluttered and well-lighted.  Determine need for increased observation or monitoring.  Avoid use of devices that minimize mobility, such as restraints or indwelling urinary catheter.  Recent Flowsheet Documentation  Taken 9/11/2022 0000 by Cassy Nichols RN  Safety Promotion/Fall  Prevention:   safety round/check completed   room organization consistent   nonskid shoes/slippers when out of bed   muscle strengthening facilitated   lighting adjusted   fall prevention program maintained   clutter free environment maintained   assistive device/personal items within reach  Taken 9/10/2022 1958 by Cassy Nichols RN  Safety Promotion/Fall Prevention: activity supervised     Problem: Behavioral Health Comorbidity  Goal: Maintenance of Behavioral Health Symptom Control  Outcome: Ongoing, Progressing  Intervention: Maintain Behavioral Health Symptom Control  Description: Confirm mental health diagnosis and current treatment.  Evaluate adherence to previously identified self-management plan.  Advocate continuation of home strategies, including medication.  Evaluate effectiveness of self-management strategies and coping skills.  Communicate with providers to ensure continuity and follow-up at transition.  Recent Flowsheet Documentation  Taken 9/11/2022 0000 by Cassy Nichols RN  Medication Review/Management: medications reviewed  Taken 9/10/2022 1958 by Cassy Nichols RN  Medication Review/Management: medications reviewed  Goal: Maintenance of Behavioral Health Symptom Control  Outcome: Ongoing, Progressing  Intervention: Maintain Behavioral Health Symptom Control  Description: Confirm mental health diagnosis and current treatment.  Evaluate adherence to previously identified self-management plan.  Advocate continuation of home strategies, including medication.  Evaluate effectiveness of self-management strategies and coping skills.  Communicate with providers to ensure continuity and follow-up at transition.  Recent Flowsheet Documentation  Taken 9/11/2022 0000 by Cassy Nichols RN  Medication Review/Management: medications reviewed  Taken 9/10/2022 1958 by Cassy Nichols RN  Medication Review/Management: medications reviewed     Problem: Diabetes Comorbidity  Goal: Blood Glucose Level  Within Targeted Range  Outcome: Ongoing, Progressing  Intervention: Monitor and Manage Glycemia  Description: Establish target blood glucose levels based on patient-specific factors, such as age, diabetes-related complications and illness severity.  Document blood glucose levels and monitor trend; advocate for adjustment to keep within targeted range.  Provide pharmacologic therapy to maintain blood glucose levels within targeted range.  Check blood glucose level if there is a change in mental or cognitive status.  Recognize, treat and document hypoglycemia event and potential cause.  Avoid hypoglycemic episodes by advocating for insulin dose adjustment when there is a change in condition, such as illness severity, decreased oral intake, missed or refused meals and snacks, as well as medication change that may include steroid tape  Recent Flowsheet Documentation  Taken 9/10/2022 1958 by Cassy Nichols RN  Glycemic Management: blood glucose monitored  Goal: Blood Glucose Level Within Targeted Range  Outcome: Ongoing, Progressing  Intervention: Monitor and Manage Glycemia  Description: Establish target blood glucose levels based on patient-specific factors, such as age, diabetes-related complications and illness severity.  Document blood glucose levels and monitor trend; advocate for adjustment to keep within targeted range.  Provide pharmacologic therapy to maintain blood glucose levels within targeted range.  Check blood glucose level if there is a change in mental or cognitive status.  Recognize, treat and document hypoglycemia event and potential cause.  Avoid hypoglycemic episodes by advocating for insulin dose adjustment when there is a change in condition, such as illness severity, decreased oral intake, missed or refused meals and snacks, as well as medication change that may include steroid tape  Recent Flowsheet Documentation  Taken 9/10/2022 1958 by Cassy Nichols RN  Glycemic Management: blood  glucose monitored     Problem: Hypertension Comorbidity  Goal: Blood Pressure in Desired Range  Outcome: Ongoing, Progressing  Intervention: Maintain Blood Pressure Management  Description: Evaluate adherence to home antihypertensive regimen (e.g., exercise and activity, diet modification, medication).  Provide scheduled antihypertensive medication; consider administration time and effects (e.g., avoid giving diuretic prior to bedtime).  Monitor response to antihypertensive medication therapy (e.g., blood pressure, electrolyte levels, medication effects).  Minimize risk of orthostatic hypotension; encourage caution with position changes, particularly if elderly.  Recent Flowsheet Documentation  Taken 9/11/2022 0000 by Cassy Nichols RN  Medication Review/Management: medications reviewed  Taken 9/10/2022 1958 by Cassy Nichols RN  Medication Review/Management: medications reviewed  Goal: Blood Pressure in Desired Range  Outcome: Ongoing, Progressing  Intervention: Maintain Blood Pressure Management  Description: Evaluate adherence to home antihypertensive regimen (e.g., exercise and activity, diet modification, medication).  Provide scheduled antihypertensive medication; consider administration time and effects (e.g., avoid giving diuretic prior to bedtime).  Monitor response to antihypertensive medication therapy (e.g., blood pressure, electrolyte levels, medication effects).  Minimize risk of orthostatic hypotension; encourage caution with position changes, particularly if elderly.  Recent Flowsheet Documentation  Taken 9/11/2022 0000 by Cassy Nichols RN  Medication Review/Management: medications reviewed  Taken 9/10/2022 1958 by Cassy Nichols RN  Medication Review/Management: medications reviewed     Problem: Asthma Comorbidity  Goal: Maintenance of Asthma Control  Outcome: Ongoing, Progressing  Intervention: Maintain Asthma Symptom Control  Description: Evaluate adherence to self-management (asthma  action plan), such as medication, symptom-control, trigger-avoidance and self-monitoring.  Advocate for continuation of home regimen, including medication, method of delivery, schedule and symptom monitoring; acknowledge preferred modality and routine.  Minimize exposure to potential triggers, such as perfume, cleaning chemicals and all types of smoke.  Assess for proper use of inhaled medication and delivery technique; assist or reinstruct if needed.  Evaluate effectiveness of coping skills; encourage expression of feelings, expectations and concerns related to disease management and quality of life; reinforce education to enhance management plan and wellbeing.  Recent Flowsheet Documentation  Taken 9/11/2022 0000 by Cassy Nichols RN  Medication Review/Management: medications reviewed  Taken 9/10/2022 1958 by Cassy Nichols RN  Medication Review/Management: medications reviewed     Problem: COPD (Chronic Obstructive Pulmonary Disease) Comorbidity  Goal: Maintenance of COPD Symptom Control  Outcome: Ongoing, Progressing  Intervention: Maintain COPD-Symptom Control  Description: Evaluate adherence to management plan (e.g., medication, trigger avoidance, infection prevention, self-monitoring).  Advocate for continuation of home regimen, including medication, method of delivery, schedule and symptom monitoring.  Anticipate the need for breathing techniques and activity pacing to minimize fatigue and breathlessness.  Assess for proper use of inhaled medication and delivery technique; assist or reinstruct if needed.  Evaluate effectiveness of coping skills; encourage expression of feelings, expectations and concerns related to disease management and quality of life; reinforce education to enhance management plan and wellbeing.  Recent Flowsheet Documentation  Taken 9/11/2022 0000 by Cassy Nichols RN  Supportive Measures:   active listening utilized   self-care encouraged  Medication Review/Management:  medications reviewed  Taken 9/10/2022 1958 by Cassy Nichols, RN  Supportive Measures:   active listening utilized   self-care encouraged  Medication Review/Management: medications reviewed   Goal Outcome Evaluation:  Plan of Care Reviewed With: patient        Progress: no change

## 2022-09-11 NOTE — PROGRESS NOTES
[facility] Medicine Services Daily Progress Note    Patient Name: Dora Solorio  : 1950  MRN: [id number]  Primary Care Physician:  Bhargavi Rodriguez APRN  Date of admission: 2022      Subjective     Chief Complaint:     Generalized weakness and diarrhea    Patient Reports   9/10  Reports that she still has diarrhea  Reports abdominal pain lower abdomen  CT abdomen pelvis ordered  Significant back positive started on p.o. vancomycin  Small drop in blood pressure intermittently noted and still with low-grade fever      Oxygen saturation dropped to 80s while sleeping  Still having low-grade fever and blood pressure still low  Relatively we will consult ID        Review of Systems   All other systems reviewed and are negative.          Objective     Vitals:   Temp:  [99.2 °F (37.3 °C)-100.2 °F (37.9 °C)] 100 °F (37.8 °C)  Heart Rate:  [58-72] 65  Resp:  [16-18] 17  BP: ()/(38-68) 117/57    Physical Exam  Vitals and nursing note reviewed.   Constitutional:       General: She is not in acute distress.     Appearance: Normal appearance. She is well-developed. She is not ill-appearing, toxic-appearing or diaphoretic.   HENT:      Head: Normocephalic and atraumatic.      Right Ear: Ear canal and external ear normal.      Left Ear: Ear canal and external ear normal.      Nose: Nose normal. No congestion or rhinorrhea.      Mouth/Throat:      Mouth: Mucous membranes are moist.      Pharynx: No oropharyngeal exudate.   Eyes:      General: No scleral icterus.        Right eye: No discharge.         Left eye: No discharge.      Extraocular Movements: Extraocular movements intact.      Conjunctiva/sclera: Conjunctivae normal.      Pupils: Pupils are equal, round, and reactive to light.   Neck:      Thyroid: No thyromegaly.      Vascular: No carotid bruit or JVD.      Trachea: No tracheal deviation.   Cardiovascular:      Rate and Rhythm: Normal rate and regular rhythm.      Pulses:  Normal pulses.      Heart sounds: Normal heart sounds. No murmur heard.    No friction rub. No gallop.   Pulmonary:      Effort: Pulmonary effort is normal. No respiratory distress.      Breath sounds: Normal breath sounds. No stridor. No wheezing, rhonchi or rales.   Chest:      Chest wall: No tenderness.   Abdominal:      General: Bowel sounds are normal. There is no distension.      Palpations: Abdomen is soft. There is no mass.      Tenderness: There is abdominal tenderness in the suprapubic area. There is no guarding or rebound.      Hernia: No hernia is present.   Musculoskeletal:         General: No swelling, tenderness, deformity or signs of injury. Normal range of motion.      Cervical back: Normal range of motion and neck supple. No rigidity. No muscular tenderness.      Right lower leg: No edema.      Left lower leg: No edema.   Lymphadenopathy:      Cervical: No cervical adenopathy.   Skin:     General: Skin is warm and dry.      Coloration: Skin is not jaundiced or pale.      Findings: No bruising, erythema or rash.   Neurological:      General: No focal deficit present.      Mental Status: She is alert and oriented to person, place, and time. Mental status is at baseline.      Cranial Nerves: No cranial nerve deficit.      Sensory: No sensory deficit.      Motor: No weakness or abnormal muscle tone.      Coordination: Coordination normal.   Psychiatric:         Mood and Affect: Mood normal.         Behavior: Behavior normal.         Thought Content: Thought content normal.         Judgment: Judgment normal.               Result Review    Result Review:  I have personally reviewed the results from the time of this admission to 9/10/2022 11:43 EDT and agree with these findings:    Laboratory    Microbiology    Radiology    EKG/Telemetry     Cardiology/Vascular     Pathology    Old records    Other:  Most notable findings include:   As above      Assessment & Plan     Brief Patient Summary:  Dora Solorio  is a 72 y.o. female who presents with diarrhea and generalized weakness strokelike symptoms      amLODIPine, 10 mg, Oral, Daily  atorvastatin, 80 mg, Oral, Nightly  bisoprolol, 10 mg, Oral, Daily  escitalopram, 10 mg, Oral, Daily  gabapentin, 300 mg, Oral, TID  insulin lispro, 0-7 Units, Subcutaneous, TID AC  lisinopril, 20 mg, Oral, BID  saccharomyces boulardii, 250 mg, Oral, BID  sodium chloride, 10 mL, Intravenous, Q12H  vancomycin, 125 mg, Oral, Q6H  vitamin B-12, 250 mcg, Oral, Daily       sodium chloride, 75 mL/hr, Last Rate: 75 mL/hr (09/10/22 0222)         Active Hospital Problems:  Active Hospital Problems   Diagnosis   • Dizzy     Plan:   Dora Solorio is a 72 y.o. female who presented to [facility] on 9/9/2022 complaining of dizziness  She has chronic medical problems including depression, osteoarthritis,Hypertension, inflammatory bowel disease, irritable syndrome, kidney stones, osteopenia, peripheral neuropathy, DM2, CAD, MI, CAD.     Patient reports feeling of dizziness and lightheadedness since 10:00 in the morning.  Code stroke called in ER.  She denies any history or symptoms of weakness or numbness or speech problems or walking problems but was not able to walk on her own when she came in and needed to present to help her.  ER physician report patient has no focal findings and patient had CT scan of the head CT angiogram of the head and neck showing 60% stenosis origin of the right internal carotid artery, no large intracranial arterial occlusion, posterior circulation supply from anterior circulation with prominent posterior circulating communicating arteries and small basilar arteries  No acute endocrine abnormality was noted on CT head, patient was seen by ER physician as well as neurologist in the ER  Notes from neurology service showed that patient had no focal deficit and no aphasia and did not think patient has stroke like symptoms.  Work-up in the ER showed evidence of possible  acute kidney injury with elevated creatinine of 1.4 with a baseline of 1.9 couple months ago  Patient also had mildly low sodium 135 and bicarb of 21  Troponin was negative  PT/INR mostly negative/normal  CBC with essentially normal  Patient had chest x-ray that showed left basilar airspace opacity likely atelectasis versus pneumonia  Patient had also EKG sinus rhythm borderline OR prolongation inferior infarct, old       Assessment/plan      Generalized weakness  Dizziness  Now hypotensive in the ER  Initial code stroke has been initiated in the ER and seen by neurologist but no concern for stroke according to neurology notes  Continue aspirin high intensity statin blood pressure control  Avoid excessive blood pressure control to avoid cerebral hypoperfusion  Gentle IV hydration  No MRI brain  Echocardiogram with normal values without PFO  Neurology has been consulted in the ER and deemed the patient is not having an active stroke or TIA at this time  Neurochecks per protocol  PT OT  Reason for generalized weakness positive related to C. difficile diarrhea  -Pending sed rate CRP and CPK,  Normal TSH  Urinalysis negative  COVID-negative    C. difficile diarrhea  GI panel negative but C. difficile positive  P.o. Vanco initiated.  Dose increased to 500 due to persistent symptoms and fever  Consult ID due to persistent fever and  IV fluids initiated  Add p.o. bicarb    Possible pneumonia on chest x-ray  Patient saturations fluctuating  Repeat chest x-ray  Consider initiating antibiotics for pneumonia.  Defer to ID.  Check urine antigens and MRSA screen    Sepsis present on admissionWith fever and, hypotension related to C. difficile colitis  Lactic acid ordered    Essential hypertension  Now hypotensive  Hold antihypertensive medications  Avoid ACE inhibitor's given ALTON  Small dose of bisoprolol.  Hold if blood pressure below 90 systolic    Acute kidney injury  Possibly hypoperfusion related  IV hydration  -Urinalysis  and reviewed without significant proteinuria  Avoid ACE inhibitor's given ALTON  -Metabolic acidosis.  Add p.o. bicarb.     DM2  Diabetic neuropathy  Resume home medication once reconciled  Monitor for hypoglycemia  Hold oral medications  Sliding scale insulin     Gout awaiting home medications     CAD with history of MI on record but patient denies  Currently on aspirin and statin       Inflammatory bowel disease/irritable bowel syndrome  No specific medications noted     Depression on Lexapro     Peripheral arterial disease with 60% stenosis right internal carotidArtery origin  Diminutive posterior circulation with large posterior communicating artery  Chronic right basal ganglia lacunar infarct,  Follow-up with vascular as an outpatient  Aspirin statin blood pressure control     B12 deficiency on replacement        DVT prophylaxis: Heparin subcutaneous  Mechanical DVT prophylaxis orders are present.    CODE STATUS:    Code Status (Patient has no pulse and is not breathing): CPR (Attempt to Resuscitate)  Medical Interventions (Patient has pulse or is breathing): Full Support  Release to patient: Routine Release      Disposition:  I expect patient to be discharged  Home.    This patient has been examined wearing appropriate Personal Protective Equipment and discussed with hospital infection control department. 09/10/22      Electronically signed by Kumar Correa MD, 09/10/22, 11:43 EDT.  [facility]

## 2022-09-11 NOTE — CONSULTS
Infectious Diseases Consult Note    Referring Provider: Kumar Correa *    Reason for Consultation: Fever, C. difficile    Patient Care Team:  Bhargavi Rodriguez APRN as PCP - General    Chief complaint dizziness, diarrhea    Subjective     The patient has had a low-grade fever 100.8 degrees in the last 24 hours the patient is on 3 L of oxygen by nasal cannula, hemodynamically stable, and is tolerating antimicrobial therapy.    History of present illness:      This is a 72-year-old female who presents the hospital 9/9/2022 with complaints of dizziness and diarrhea.  Dizziness and diarrhea started on Friday.  She is still having multiple bouts of diarrhea but denies significant abdominal pain or vomiting.  Does have nausea off and on.  Patient's had some fever and chills and shortness of breath.  Had a productive cough.  She denies any urinary symptoms.    Patient is currently on 3 L of oxygen by nasal cannula and is normally on room air at home.  Patient reports a fever as high as 100.9 degrees at home and has had a temperature as high as 100.8 degrees in the hospital.  She has a creatinine 1.18, white blood cell count 6.5, hemoglobin 10.4 and platelets 110.  C. difficile screen is positive, GI panel is negative and COVID-19 screen is negative.  Chest x-ray shows some vascular congestion and left lower lung opacities.  CT of the abdomen pelvis did not show any acute findings.  She is currently on p.o. vancomycin and has allergies to sulfa antibiotics    Review of Systems   Review of Systems   Constitutional: Negative.    HENT: Negative.    Eyes: Negative.    Respiratory: Positive for shortness of breath.    Cardiovascular: Negative.    Gastrointestinal: Positive for diarrhea and nausea.   Endocrine: Negative.    Genitourinary: Negative.    Musculoskeletal: Negative.    Skin: Negative.    Neurological: Positive for dizziness.   Psychiatric/Behavioral: Negative.    All other systems reviewed and are  negative.      Medications  Medications Prior to Admission   Medication Sig Dispense Refill Last Dose   • amLODIPine (NORVASC) 10 MG tablet Take 1 tablet by mouth once daily 90 tablet 0 9/9/2022 at Unknown time   • aspirin (aspirin) 81 MG EC tablet ASPIRIN 81 81 MG TBEC   9/9/2022 at Unknown time   • atorvastatin (LIPITOR) 20 MG tablet Take 1 tablet by mouth once daily 90 tablet 0 9/8/2022 at Unknown time   • bisoprolol (ZEBeta) 10 MG tablet Take 1 tablet by mouth once daily 90 tablet 0 9/9/2022 at Unknown time   • Cyanocobalamin (Vitamin B-12) 1000 MCG sublingual tablet Place 1 tablet under the tongue Daily. 100 each 4 9/9/2022 at Unknown time   • escitalopram (LEXAPRO) 10 MG tablet Take 1 tablet by mouth once daily 90 tablet 0 9/9/2022 at Unknown time   • gabapentin (NEURONTIN) 600 MG tablet TAKE 1 TABLET BY MOUTH THREE TIMES DAILY 270 tablet 0 9/9/2022 at Unknown time   • glimepiride (AMARYL) 2 MG tablet Take 2 tablets by mouth twice daily 360 tablet 0 9/9/2022 at Unknown time   • hydroCHLOROthiazide (HYDRODIURIL) 50 MG tablet Take 1 tablet by mouth once daily 90 tablet 0 9/9/2022 at Unknown time   • lisinopril (PRINIVIL,ZESTRIL) 20 MG tablet Take 1 tablet by mouth twice daily 180 tablet 0 9/9/2022 at Unknown time   • metFORMIN (GLUCOPHAGE) 1000 MG tablet TAKE 1 TABLET BY MOUTH TWICE DAILY WITH MEALS 180 tablet 0 9/9/2022 at Unknown time   • vitamin D3 125 MCG (5000 UT) capsule capsule Take 1 tab po daily. 100 capsule 4 9/9/2022 at Unknown time   • empagliflozin (Jardiance) 10 MG tablet tablet Take 1 tablet by mouth Daily. (Patient not taking: Reported on 9/9/2022) 30 tablet 6 Not Taking at Unknown time   • HYDROcodone-acetaminophen (NORCO) 5-325 MG per tablet Take 1 tablet by mouth Every 6 (Six) Hours As Needed. for pain   More than a month at Unknown time       History  Past Medical History:   Diagnosis Date   • Adjustment disorder with depressed mood 09/12/2018   • Allergic Years    Sulfa drugs   • Arthritis  Years    [place] joints. Mostly back, knees, hips, fingers   • Depression 2018   • Diabetes mellitus, type II (HCC) 2013   • Essential hypertension 2013   • Gout 2014   • Heart attack (HCC)    • Hyperlipidemia 2020   • Hypertension    • Inflammatory bowel disease    • Irritable bowel syndrome without diarrhea 2017   • Kidney stone ?2016?    In hospital overnight   • Osteopenia 2017   • Peripheral neuropathy 2017     Past Surgical History:   Procedure Laterality Date   •  SECTION      x2   • KNEE ARTHROSCOPY Bilateral    • TUBAL ABDOMINAL LIGATION  May 1973       Family History  Family History   Adopted: Yes   Problem Relation Age of Onset   • Epilepsy Mother    • Alcohol abuse Father    • Other Father         Alzheimer s   • No Known Problems Sister    • Other Maternal Uncle         alzhimers   • No Known Problems Maternal Grandmother    • No Known Problems Maternal Grandfather    • No Known Problems Paternal Grandmother    • No Known Problems Paternal Grandfather        Social History   reports that she has never smoked. She has never used smokeless tobacco. She reports that she does not drink alcohol and does not use drugs.    Allergies  Sulfa antibiotics    Objective     Vital Signs   Vital Signs (last 24 hours)       09/10 0700   0659  0700   1359   Most Recent      Temp (°F) 97 -  100.8      99.6     99.6 (37.6)  09    Heart Rate 56 -  67       67  0600    Resp 13 -  20      17     17  0906    BP 98/41 -  153/62      95/45     95/45  0906    SpO2 (%) 89 -  99       99  0240          Physical Exam:  Physical Exam  Vitals and nursing note reviewed.   Constitutional:       General: She is not in acute distress.     Appearance: Normal appearance. She is well-developed and normal weight. She is not diaphoretic.   HENT:      Head: Normocephalic and atraumatic.   Eyes:      General: No scleral icterus.      Extraocular Movements: Extraocular movements intact.      Conjunctiva/sclera: Conjunctivae normal.      Pupils: Pupils are equal, round, and reactive to light.   Cardiovascular:      Rate and Rhythm: Normal rate and regular rhythm.      Heart sounds: Normal heart sounds, S1 normal and S2 normal. No murmur heard.  Pulmonary:      Effort: Pulmonary effort is normal. No respiratory distress.      Breath sounds: Normal breath sounds. No stridor. No wheezing or rales.   Chest:      Chest wall: No tenderness.   Abdominal:      General: Bowel sounds are normal. There is no distension.      Palpations: Abdomen is soft. There is no mass.      Tenderness: There is no abdominal tenderness. There is no guarding.   Musculoskeletal:         General: No swelling, tenderness or deformity. Normal range of motion.      Cervical back: Neck supple.   Skin:     General: Skin is warm and dry.      Coloration: Skin is not pale.      Findings: No bruising, erythema or rash.   Neurological:      General: No focal deficit present.      Mental Status: She is alert and oriented to person, place, and time. Mental status is at baseline.      Cranial Nerves: No cranial nerve deficit.   Psychiatric:         Mood and Affect: Mood normal.         Microbiology  Microbiology Results (last 10 days)     Procedure Component Value - Date/Time    Gastrointestinal Panel, PCR - Stool, Per Rectum [[id number]]  (Normal) Collected: 09/09/22 2226    Lab Status: Final result Specimen: Stool from Per Rectum Updated: 09/10/22 0052     Campylobacter Not Detected     Plesiomonas shigelloides Not Detected     Salmonella Not Detected     Vibrio Not Detected     Vibrio cholerae Not Detected     Yersinia enterocolitica Not Detected     Enteroaggregative E. coli (EAEC) Not Detected     Enteropathogenic E. coli (EPEC) Not Detected     Enterotoxigenic E. coli (ETEC) lt/st Not Detected     Shiga-like toxin-producing E. coli (STEC) stx1/stx2 Not Detected      Shigella/Enteroinvasive E. coli (EIEC) Not Detected     Cryptosporidium Not Detected     Cyclospora cayetanensis Not Detected     Entamoeba histolytica Not Detected     Giardia lamblia Not Detected     Adenovirus F40/41 Not Detected     Astrovirus Not Detected     Norovirus GI/GII Not Detected     Rotavirus A Not Detected     Sapovirus (I, II, IV or V) Not Detected    Narrative:      If Aeromonas, Staphylococcus aureus or Bacillus cereus are suspected, please order [id number]: Stool Culture, Aeromonas, S aureus, B Cereus.    Clostridioides difficile EIA - Stool, Per Rectum [[id number]]  (Abnormal) Collected: 09/09/22 2226    Lab Status: Final result Specimen: Stool from Per Rectum Updated: 09/10/22 0702     C Diff GDH Ag Positive     C.diff Toxin Ag Positive    Narrative:      DNA from a toxigenic strain of C.difficile was detected, along with the presence of free toxin. These results are suggestive of C.difficile infection, in context of an appropriate clinical scenario.    COVID-19,CEPHEID/[place],COR/[facility]ILIA/PAD/[facility] IN-HOUSE(OR EMERGENT/ADD-ON),NP SWAB IN TRANSPORT MEDIA 3-4 HR TAT, RT-PCR - Swab, Nasopharynx [[id number]]  (Normal) Collected: 09/09/22 1531    Lab Status: Final result Specimen: Swab from Nasopharynx Updated: 09/09/22 1555     COVID19 Not Detected    Narrative:      Fact sheet for providers: https://www.fda.gov/media/730480/download     Fact sheet for patients: https://www.fda.gov/media/545969/download  Fact sheet for providers: https://www.fda.gov/media/271421/download    Fact sheet for patients: https://www.fda.gov/media/599430/download    Test performed by PCR.          Laboratory  Results from last 7 days   Lab Units 09/11/22  0621   WBC 10*3/mm3 6.50   HEMOGLOBIN g/dL 10.4*   HEMATOCRIT % 32.5*   PLATELETS 10*3/mm3 110*     Results from last 7 days   Lab Units 09/11/22  0621   SODIUM mmol/L 133*   POTASSIUM mmol/L 4.0   CHLORIDE mmol/L 102   CO2 mmol/L 19.0*   BUN mg/dL 19   CREATININE mg/dL 1.18*    GLUCOSE mg/dL 116*   CALCIUM mg/dL 7.8*     Results from last 7 days   Lab Units 09/11/22  0621   SODIUM mmol/L 133*   POTASSIUM mmol/L 4.0   CHLORIDE mmol/L 102   CO2 mmol/L 19.0*   BUN mg/dL 19   CREATININE mg/dL 1.18*   GLUCOSE mg/dL 116*   CALCIUM mg/dL 7.8*     Results from last 7 days   Lab Units 09/11/22  0621   CK TOTAL U/L 177               Radiology  Imaging Results (Last 72 Hours)     Procedure Component Value Units Date/Time    XR Chest 1 View [[id number]] Collected: 09/11/22 1143     Updated: 09/11/22 1147    Narrative:         DATE OF EXAM:   9/11/2022 10:20 AM     PROCEDURE:   XR CHEST 1 VW-     INDICATIONS:   hypoxemia; R42-Dizziness and giddiness; R53.1-Weakness     COMPARISON:  9/9/2022     TECHNIQUE:   Portable Chest     FINDINGS:      Study limited by overlying support and monitoring apparatus.     Dependent opacities at the left base again noted similar accounting for  differences in technique from the comparison. Findings are accentuated  by low lung volumes. Heart size is stable. There is mild pulmonary  vascular congestion. Osseous structures are unremarkable        Impression:      IMPRESSION :   Mild pulmonary vascular congestion and dependent opacities at the left  lung base which are similar to the prior study given differences in  technique. Findings are accentuated by low lung volumes[     Electronically Signed By-Wilber Shah On:9/11/2022 11:45 AM  This report was finalized on [id number] by  Wilber Shah, .    CT Abdomen Pelvis Without Contrast [[id number]] Collected: 09/10/22 2141     Updated: 09/10/22 2149    Narrative:         DATE OF EXAM:  9/10/2022 3:37 PM     PROCEDURE:  CT ABDOMEN PELVIS WO CONTRAST-     INDICATIONS:  colitis.antonio, cdiiff; R42-Dizziness and giddiness; R53.1-Weakness history  of kidney stone. Lower abdominal pain today diarrhea today. Generalized  weakness today. Colitis.     COMPARISON:  No Comparisons Available     TECHNIQUE:  Routine transaxial  slices were obtained through the abdomen and pelvis  without the administration of intravenous contrast. Reconstructed  coronal and sagittal images were also obtained. Automated exposure  control and iterative construction methods were used.     FINDINGS:  CT abdomen pelvis without contrast reveals that the heart is enlarged.  No evidence of pericardial effusion. No evidence of hiatal hernia. There  is a small atelectatic consolidating infiltrate in the anterior aspect  of the left lower lobe along. There is a small pleural-parenchymal  thickening in the posterior lung bases bilaterally. The patient is  status post CT angiogram with contrast on September 9, 2022. There is  residual radiographic contrast in the lumen of the gallbladder. No  evidence of liver mass. The gallbladder and bile ducts are normal. The  pancreas is normal. The spleen is normal. Right and left adrenal glands  are normal. No evidence of dilatation of the bile ducts. No evidence of  mass or obstruction or stone in the left kidney. No evidence of mass or  obstruction or stone in the right kidney. There is a residual  radiographic contrast in the right and left renal collecting systems. No  evidence of mass or adenopathy or ascites in the abdomen. No evidence of  bowel obstruction or bowel ileus or free intraperitoneal gas. The  appendix is normal.     CT the pelvis reveals residual contrast in the urinary bladder. No  evidence of mass or adenopathy or fluid in the pelvis. Right left  inguinal regions show no evidence of mass or adenopathy in the distal  right and left ureters are normal        Impression:         1. No acute disease in the abdomen or pelvis.     Electronically Signed By-Isacc Machuca MD On:9/10/2022 9:46 PM  This report was finalized on [id number] by  Isacc Machuca MD.    CT Angiogram Head w AI Analysis of LVO [[id number]] Collected: 09/09/22 1551     Updated: 09/09/22 1617    Narrative:         DATE OF  EXAM:  9/9/2022 2:11 PM     PROCEDURE:  CT ANGIOGRAM HEAD W AI ANALYSIS OF LVO-, CT ANGIOGRAM NECK-     INDICATIONS:   Acute Stroke     COMPARISON:   No Comparisons Available     TECHNIQUE:  CTA of the head and CTA of the neck was performed after the intravenous  administration of Isovue 370. Reconstructed coronal and sagittal images  were also obtained. In addition, a 3 D volume rendered image was  obtained after post processing. Automated exposure control and iterative  reconstruction methods were used. AI analysis of LVO was utilized for  the CTA Head imaging portion of the study.      FINDINGS:  VASCULAR FINDINGS: Normal arch anatomy. Patent bilateral common carotid  arteries without significant stenosis. Patent right internal carotid  artery. Calcific plaque in the distal right common and proximal right  internal carotid artery resulting in approximately 60% luminal diameter  stenosis. No other significant right internal carotid stenosis. Patent  left internal carotid artery, with plaque in the origin of the left  internal carotid artery resulting in less than 50% stenosis. Patent  bilateral vertebral arteries, left dominant. No significant stenosis of  the cervical vertebral arteries. The V4 segments are relatively small,  and the basilar artery is relatively small and appears to terminate in  superior cerebellar branches. The proximal anterior and middle cerebral  arteries are patent. Both posterior cerebral arteries are supplied by  prominent posterior communicating arteries (versus persistent fetal  origin). The proximal posterior cerebral arteries are patent. There is  left P2 branch stenosis. No aneurysms are demonstrated     NONVASCULAR FINDINGS: No contrast enhancing intracranial abnormality. No  acute soft tissue neck abnormality. Lung apices are clear          Impression:         1. Approximately 60% stenosis of the origin of the right internal  carotid artery  2. No significant left carotid stenosis  or occlusion  3. Patent vertebral arteries without significant stenosis  4. No large intracranial arterial occlusion. The posterior circulation  is supplied from the anterior circulation by prominent posterior  communicating arteries (versus persistent fetal origin), with associated  relatively small basilar artery     These results communicated by telephone to the emergency department at  4:15 PM     Electronically Signed By-Gabriel Vicente On:9/9/2022 4:15 PM  This report was finalized on [id number] by  Gabriel Vicente, .    CT Angiogram Neck [[id number]] Collected: 09/09/22 1551     Updated: 09/09/22 1617    Narrative:         DATE OF EXAM:  9/9/2022 2:11 PM     PROCEDURE:  CT ANGIOGRAM HEAD W AI ANALYSIS OF LVO-, CT ANGIOGRAM NECK-     INDICATIONS:   Acute Stroke     COMPARISON:   No Comparisons Available     TECHNIQUE:  CTA of the head and CTA of the neck was performed after the intravenous  administration of Isovue 370. Reconstructed coronal and sagittal images  were also obtained. In addition, a 3 D volume rendered image was  obtained after post processing. Automated exposure control and iterative  reconstruction methods were used. AI analysis of LVO was utilized for  the CTA Head imaging portion of the study.      FINDINGS:  VASCULAR FINDINGS: Normal arch anatomy. Patent bilateral common carotid  arteries without significant stenosis. Patent right internal carotid  artery. Calcific plaque in the distal right common and proximal right  internal carotid artery resulting in approximately 60% luminal diameter  stenosis. No other significant right internal carotid stenosis. Patent  left internal carotid artery, with plaque in the origin of the left  internal carotid artery resulting in less than 50% stenosis. Patent  bilateral vertebral arteries, left dominant. No significant stenosis of  the cervical vertebral arteries. The V4 segments are relatively small,  and the basilar artery is relatively small and  appears to terminate in  superior cerebellar branches. The proximal anterior and middle cerebral  arteries are patent. Both posterior cerebral arteries are supplied by  prominent posterior communicating arteries (versus persistent fetal  origin). The proximal posterior cerebral arteries are patent. There is  left P2 branch stenosis. No aneurysms are demonstrated     NONVASCULAR FINDINGS: No contrast enhancing intracranial abnormality. No  acute soft tissue neck abnormality. Lung apices are clear          Impression:         1. Approximately 60% stenosis of the origin of the right internal  carotid artery  2. No significant left carotid stenosis or occlusion  3. Patent vertebral arteries without significant stenosis  4. No large intracranial arterial occlusion. The posterior circulation  is supplied from the anterior circulation by prominent posterior  communicating arteries (versus persistent fetal origin), with associated  relatively small basilar artery     These results communicated by telephone to the emergency department at  4:15 PM     Electronically Signed ByShruthi Vicente On:9/9/2022 4:15 PM  This report was finalized on [id number] by  Gabriel Vicente, .    XR Chest 1 View [[id number]] Collected: 09/09/22 1501     Updated: 09/09/22 1504    Narrative:      DATE OF EXAM:  9/9/2022 2:52 PM     PROCEDURE:  XR CHEST 1 VW-     INDICATIONS:  Acute Stroke Protocol (onset < 12 hrs)     COMPARISON:  No Comparisons Available     TECHNIQUE:   Single radiographic AP view of the chest was obtained.     FINDINGS:  Heart size and pulmonary vasculature are within normal limits. Small  airspace opacity in the lateral left lung base. Right lung clear.  Costophrenic angles sharp        Impression:      Left basilar airspace opacity likely atelectasis unless the patient has  clinical findings of pneumonia     Electronically Signed ByShruthi Vicente On:9/9/2022 3:02 PM  This report was finalized on [id number] by  Gabriel  Jules .    CT Head Without Contrast Stroke Protocol [[id number]] Collected: 09/09/22 1403     Updated: 09/09/22 1406    Narrative:      CT HEAD WO CONTRAST STROKE PROTOCOL-     Date of Exam: 9/9/2022 1:59 PM     Indication: Stroke, follow up.  Dizziness and weakness for 4 hours.     Comparison: None available.     Technique:  Without contrast, contiguous axial CT images of the head  were obtained from skull base to vertex.  Coronal and sagittal  reconstructions were performed.  Automated exposure control and  iterative reconstruction methods were used.     FINDINGS  No acute intracranial hemorrhage, mass lesion, mass effect or midline  shift or evidence of acute infarct. Chronic lacunar infarct within the  right basal ganglia. No acute calvarial abnormality. Mild intracranial  carotid artery calcifications. Paranasal sinuses and mastoid air cells  are clear.       Impression:         1. No acute intracranial finding. No significant change compared to  6/15/2022. Chronic right basal ganglia lacunar infarct.     Electronically Signed By-Za Garcia MD On:9/9/2022 2:04 PM  This report was finalized on [id number] by  Za Garcia MD.          Cardiology      Results Review:  I have reviewed all clinical data, test, lab, and imaging results.       Schedule Meds  aspirin, 81 mg, Oral, Daily  atorvastatin, 80 mg, Oral, Nightly  bisoprolol, 2.5 mg, Oral, Daily  escitalopram, 10 mg, Oral, Daily  gabapentin, 300 mg, Oral, TID  insulin lispro, 0-7 Units, Subcutaneous, TID AC  saccharomyces boulardii, 250 mg, Oral, BID  sodium bicarbonate, 1,300 mg, Oral, 4x Daily  sodium chloride, 10 mL, Intravenous, Q12H  vancomycin, 500 mg, Oral, Q6H  vitamin B-12, 250 mcg, Oral, Daily  cholecalciferol, 5,000 Units, Oral, Daily        Infusion Meds  sodium chloride, 75 mL/hr, Last Rate: 75 mL/hr (09/11/22 0526)        PRN Meds  •  acetaminophen **OR** acetaminophen  •  dextrose  •  dextrose  •  glucagon (human recombinant)  •   hydrALAZINE  •  HYDROcodone-acetaminophen  •  ondansetron  •  sodium chloride  •  sodium chloride      Assessment & Plan       Assessment    C. difficile colitis.  Patient states that this is her first episode.  Patient denies being on any antibiotics but is currently living with her  who is currently being treated for C. difficile colitis.  This is the likely source. patient still having some diarrhea without significant abdominal pain or tenderness  -Patient's white blood cell count is normal and does not appear toxic  -CT of the abdomen pelvis did not show any acute findings  -GI panel was negative    Low-grade fever-T-max of 100.8 degrees.  COVID-19 screen is negative and urinalysis was unremarkable.  Chest x-ray was questionable for some left lower lung opacities but did not show any obvious infiltrates on the CT  -Could be related to the C. difficile colitis    Type 2 diabetes    History of irritable bowel syndrome    Plan    Continue p.o. vancomycin 500 mg every 6 hours.  It is not unusual for treatment to take several days before significant improvement  Continue p.o. Florastor  We will start p.o. cholestyramine  Blood cultures x2  Continue supportive care  A.m. labs  Enteric precautions          Linda Zelaya, BRANDON  09/11/22  13:59 EDT    Note is dictated utilizing voice recognition software/Dragon

## 2022-09-12 LAB
ANION GAP SERPL CALCULATED.3IONS-SCNC: 12 MMOL/L (ref 5–15)
ANION GAP SERPL CALCULATED.3IONS-SCNC: 8 MMOL/L (ref 5–15)
BASOPHILS # BLD AUTO: 0 10*3/MM3 (ref 0–0.2)
BASOPHILS # BLD AUTO: 0 10*3/MM3 (ref 0–0.2)
BASOPHILS NFR BLD AUTO: 0.5 % (ref 0–1.5)
BASOPHILS NFR BLD AUTO: 0.5 % (ref 0–1.5)
BUN SERPL-MCNC: 14 MG/DL (ref 8–23)
BUN SERPL-MCNC: 23 MG/DL (ref 8–23)
BUN/CREAT SERPL: 15.4 (ref 7–25)
BUN/CREAT SERPL: 15.9 (ref 7–25)
CALCIUM SPEC-SCNC: 7.9 MG/DL (ref 8.6–10.5)
CALCIUM SPEC-SCNC: 7.9 MG/DL (ref 8.6–10.5)
CHLORIDE SERPL-SCNC: 103 MMOL/L (ref 98–107)
CHLORIDE SERPL-SCNC: 106 MMOL/L (ref 98–107)
CO2 SERPL-SCNC: 21 MMOL/L (ref 22–29)
CO2 SERPL-SCNC: 25 MMOL/L (ref 22–29)
CREAT SERPL-MCNC: 0.88 MG/DL (ref 0.57–1)
CREAT SERPL-MCNC: 1.49 MG/DL (ref 0.57–1)
CRP SERPL-MCNC: 13.28 MG/DL (ref 0–0.5)
CRP SERPL-MCNC: 7.74 MG/DL (ref 0–0.5)
DEPRECATED RDW RBC AUTO: 41.1 FL (ref 37–54)
DEPRECATED RDW RBC AUTO: 41.6 FL (ref 37–54)
EGFRCR SERPLBLD CKD-EPI 2021: 36.7 ML/MIN/1.73
EGFRCR SERPLBLD CKD-EPI 2021: 69.1 ML/MIN/1.73
EOSINOPHIL # BLD AUTO: 0.2 10*3/MM3 (ref 0–0.4)
EOSINOPHIL # BLD AUTO: 0.2 10*3/MM3 (ref 0–0.4)
EOSINOPHIL NFR BLD AUTO: 3.1 % (ref 0.3–6.2)
EOSINOPHIL NFR BLD AUTO: 4.5 % (ref 0.3–6.2)
ERYTHROCYTE [DISTWIDTH] IN BLOOD BY AUTOMATED COUNT: 13.5 % (ref 12.3–15.4)
ERYTHROCYTE [DISTWIDTH] IN BLOOD BY AUTOMATED COUNT: 13.8 % (ref 12.3–15.4)
ERYTHROCYTE [SEDIMENTATION RATE] IN BLOOD: 40 MM/HR (ref 0–30)
GLUCOSE BLDC GLUCOMTR-MCNC: 130 MG/DL (ref 70–105)
GLUCOSE BLDC GLUCOMTR-MCNC: 218 MG/DL (ref 70–105)
GLUCOSE BLDC GLUCOMTR-MCNC: 241 MG/DL (ref 70–105)
GLUCOSE BLDC GLUCOMTR-MCNC: 307 MG/DL (ref 70–105)
GLUCOSE SERPL-MCNC: 139 MG/DL (ref 65–99)
GLUCOSE SERPL-MCNC: 228 MG/DL (ref 65–99)
HBA1C MFR BLD: 11 % (ref 3.5–5.6)
HCT VFR BLD AUTO: 32.5 % (ref 34–46.6)
HCT VFR BLD AUTO: 32.7 % (ref 34–46.6)
HGB BLD-MCNC: 10.6 G/DL (ref 12–15.9)
HGB BLD-MCNC: 10.7 G/DL (ref 12–15.9)
L PNEUMO1 AG UR QL IA: NEGATIVE
LYMPHOCYTES # BLD AUTO: 0.9 10*3/MM3 (ref 0.7–3.1)
LYMPHOCYTES # BLD AUTO: 1.2 10*3/MM3 (ref 0.7–3.1)
LYMPHOCYTES NFR BLD AUTO: 17.3 % (ref 19.6–45.3)
LYMPHOCYTES NFR BLD AUTO: 18.4 % (ref 19.6–45.3)
MCH RBC QN AUTO: 27.9 PG (ref 26.6–33)
MCH RBC QN AUTO: 27.9 PG (ref 26.6–33)
MCHC RBC AUTO-ENTMCNC: 32.7 G/DL (ref 31.5–35.7)
MCHC RBC AUTO-ENTMCNC: 32.8 G/DL (ref 31.5–35.7)
MCV RBC AUTO: 85.1 FL (ref 79–97)
MCV RBC AUTO: 85.5 FL (ref 79–97)
MONOCYTES # BLD AUTO: 0.5 10*3/MM3 (ref 0.1–0.9)
MONOCYTES # BLD AUTO: 0.7 10*3/MM3 (ref 0.1–0.9)
MONOCYTES NFR BLD AUTO: 10.2 % (ref 5–12)
MONOCYTES NFR BLD AUTO: 8.6 % (ref 5–12)
NEUTROPHILS NFR BLD AUTO: 3.8 10*3/MM3 (ref 1.7–7)
NEUTROPHILS NFR BLD AUTO: 4.4 10*3/MM3 (ref 1.7–7)
NEUTROPHILS NFR BLD AUTO: 67.8 % (ref 42.7–76)
NEUTROPHILS NFR BLD AUTO: 69.1 % (ref 42.7–76)
NRBC BLD AUTO-RTO: 0 /100 WBC (ref 0–0.2)
NRBC BLD AUTO-RTO: 0.1 /100 WBC (ref 0–0.2)
PLATELET # BLD AUTO: 125 10*3/MM3 (ref 140–450)
PLATELET # BLD AUTO: 145 10*3/MM3 (ref 140–450)
PMV BLD AUTO: 9 FL (ref 6–12)
PMV BLD AUTO: 9.4 FL (ref 6–12)
POTASSIUM SERPL-SCNC: 3.8 MMOL/L (ref 3.5–5.2)
POTASSIUM SERPL-SCNC: 3.8 MMOL/L (ref 3.5–5.2)
RBC # BLD AUTO: 3.8 10*6/MM3 (ref 3.77–5.28)
RBC # BLD AUTO: 3.85 10*6/MM3 (ref 3.77–5.28)
S PNEUM AG SPEC QL LA: NEGATIVE
SODIUM SERPL-SCNC: 136 MMOL/L (ref 136–145)
SODIUM SERPL-SCNC: 139 MMOL/L (ref 136–145)
WBC NRBC COR # BLD: 5.5 10*3/MM3 (ref 3.4–10.8)
WBC NRBC COR # BLD: 6.5 10*3/MM3 (ref 3.4–10.8)

## 2022-09-12 PROCEDURE — 63710000001 INSULIN LISPRO (HUMAN) PER 5 UNITS: Performed by: INTERNAL MEDICINE

## 2022-09-12 PROCEDURE — 97116 GAIT TRAINING THERAPY: CPT

## 2022-09-12 PROCEDURE — 87899 AGENT NOS ASSAY W/OPTIC: CPT | Performed by: INTERNAL MEDICINE

## 2022-09-12 PROCEDURE — 99233 SBSQ HOSP IP/OBS HIGH 50: CPT | Performed by: FAMILY MEDICINE

## 2022-09-12 PROCEDURE — 87449 NOS EACH ORGANISM AG IA: CPT | Performed by: INTERNAL MEDICINE

## 2022-09-12 PROCEDURE — 80048 BASIC METABOLIC PNL TOTAL CA: CPT | Performed by: FAMILY MEDICINE

## 2022-09-12 PROCEDURE — 97166 OT EVAL MOD COMPLEX 45 MIN: CPT

## 2022-09-12 PROCEDURE — 82962 GLUCOSE BLOOD TEST: CPT

## 2022-09-12 PROCEDURE — 85025 COMPLETE CBC W/AUTO DIFF WBC: CPT | Performed by: FAMILY MEDICINE

## 2022-09-12 PROCEDURE — 86140 C-REACTIVE PROTEIN: CPT | Performed by: INTERNAL MEDICINE

## 2022-09-12 RX ADMIN — GABAPENTIN 300 MG: 600 TABLET, FILM COATED ORAL at 22:24

## 2022-09-12 RX ADMIN — SODIUM CHLORIDE 75 ML/HR: 9 INJECTION, SOLUTION INTRAVENOUS at 23:48

## 2022-09-12 RX ADMIN — VANCOMYCIN HYDROCHLORIDE 500 MG: 250 CAPSULE ORAL at 23:48

## 2022-09-12 RX ADMIN — ASPIRIN 81 MG: 81 TABLET, COATED ORAL at 08:24

## 2022-09-12 RX ADMIN — ESCITALOPRAM OXALATE 10 MG: 10 TABLET ORAL at 08:24

## 2022-09-12 RX ADMIN — VANCOMYCIN HYDROCHLORIDE 500 MG: 250 CAPSULE ORAL at 01:14

## 2022-09-12 RX ADMIN — GABAPENTIN 300 MG: 600 TABLET, FILM COATED ORAL at 16:53

## 2022-09-12 RX ADMIN — SODIUM BICARBONATE 650 MG TABLET 1300 MG: at 12:04

## 2022-09-12 RX ADMIN — ATORVASTATIN CALCIUM 80 MG: 40 TABLET, FILM COATED ORAL at 22:24

## 2022-09-12 RX ADMIN — Medication 250 MG: at 08:24

## 2022-09-12 RX ADMIN — GABAPENTIN 300 MG: 600 TABLET, FILM COATED ORAL at 08:24

## 2022-09-12 RX ADMIN — VANCOMYCIN HYDROCHLORIDE 500 MG: 250 CAPSULE ORAL at 12:04

## 2022-09-12 RX ADMIN — SODIUM CHLORIDE 75 ML/HR: 9 INJECTION, SOLUTION INTRAVENOUS at 12:50

## 2022-09-12 RX ADMIN — Medication 250 MG: at 22:24

## 2022-09-12 RX ADMIN — INSULIN LISPRO 3 UNITS: 100 INJECTION, SOLUTION INTRAVENOUS; SUBCUTANEOUS at 16:53

## 2022-09-12 RX ADMIN — VANCOMYCIN HYDROCHLORIDE 500 MG: 250 CAPSULE ORAL at 05:20

## 2022-09-12 RX ADMIN — SODIUM BICARBONATE 650 MG TABLET 1300 MG: at 08:25

## 2022-09-12 RX ADMIN — CHOLESTYRAMINE 4 G: 4 POWDER, FOR SUSPENSION ORAL at 22:24

## 2022-09-12 RX ADMIN — BISOPROLOL FUMARATE 2.5 MG: 5 TABLET ORAL at 08:25

## 2022-09-12 RX ADMIN — SODIUM BICARBONATE 650 MG TABLET 1300 MG: at 22:24

## 2022-09-12 RX ADMIN — CHOLESTYRAMINE 4 G: 4 POWDER, FOR SUSPENSION ORAL at 08:25

## 2022-09-12 RX ADMIN — SODIUM BICARBONATE 650 MG TABLET 1300 MG: at 17:39

## 2022-09-12 RX ADMIN — Medication 5000 UNITS: at 08:24

## 2022-09-12 RX ADMIN — INSULIN LISPRO 3 UNITS: 100 INJECTION, SOLUTION INTRAVENOUS; SUBCUTANEOUS at 12:04

## 2022-09-12 RX ADMIN — VANCOMYCIN HYDROCHLORIDE 500 MG: 250 CAPSULE ORAL at 17:38

## 2022-09-12 RX ADMIN — Medication 10 ML: at 08:24

## 2022-09-12 RX ADMIN — CYANOCOBALAMIN TAB 250 MCG 250 MCG: 250 TAB at 08:24

## 2022-09-12 NOTE — CONSULTS
"Diabetes Education  Assessment/Teaching    Patient Name:  Dora Solorio  YOB: 1950  MRN: [id number]  Admit Date:  9/9/2022      Assessment Date:  9/12/2022  Flowsheet Row Most Recent Value   General Information     Referral From: MD order  [MD consult per stroke protocol.]   Height 162.6 cm (64\")   Height Method Stated   Weight 87.5 kg (193 lb)   Weight Method Bed scale   Pregnancy Assessment    Diabetes History    What type of diabetes do you have? Type 2   Current DM knowledge fair   Do you test your blood sugar at home? yes   Frequency of checks every couple of days   Meter type unsure of name but about 6-7 years old   Who performs the test? patient   Typical readings low 100s - mid 200s   Have you had low blood sugar? (<70mg/dl) no   Have you had high blood sugar? (>140mg/dl) yes   How often do you have high blood sugar? frequently   Education Preferences    What areas of diabetes would you like to learn about? avoiding high blood sugar, diabetes complications, testing my blood sugar at home   Nutrition Information    Assessment Topics    Problem Solving - Assessment Needs education   Reducing Risk - Assessment Needs education   Monitoring - Assessment Needs education   DM Goals    Problem Solving - Goal Today   Reducing Risk - Goal Today   Monitoring - Goal Today          Flowsheet Row Most Recent Value   DM Education Needs    Meter Needs meter   Meter Type Accuchek   Frequency of Testing Daily  [Discussed with patient about trying to check her blood sugar daily varying the times before meals and at bedtime.]   Medication Oral  [At home patient stated that she takes Metformin 1000 mg BID and Glimepiride 2 mg 2 tabs BID. Patient stated she was unable to be on Jardiance due to cost.]   Problem Solving Hyperglycemia, Signs, Symptoms, Treatment   Reducing Risks A1C testing  [On 4/7/2022 A1c was 9.6%. New A1c ordered but not resulted.]   Discharge Plan Home   Motivation Moderate   Teaching Method " Discussion, Handouts   Patient Response Verbalized understanding            Other Comments:  A1c info sheet given with discussion on A1c target and healthy blood sugar range. Patient stated she drinks mostly water and tea lightly sweetened with sugar. Asked patient if she could give up the sugar in her tea because that is keeping her blood sugar elevated and patient stated she might be able to stop the tea. Patient stated she doesn't get any exercise because it hurts to walk and she has neuropathy in her feet and arthritis in her hips. Demonstrated chair exercises to patient and discussed the importance of exercise and getting the blood moving for better blood sugar control.  Prescriptions started in discharge orders for Accu-chek Guide Me glucometer, lancets, test strips, and alcohol wipes. Patient has no further questions or concerns related to diabetes at this time.          Electronically signed by:  Gretchen Caceres RN  09/12/22 11:45 EDT

## 2022-09-12 NOTE — PLAN OF CARE
Goal Outcome Evaluation:  Plan of Care Reviewed With: patient        Progress: no change  Outcome Evaluation: Pt is a 73 y/o female who presented to [facility] 9/9/22 with c/o dizziness. Code stroke initiated in ER, however no concern for stroke according to neurology. PMH includes depression, OA, HTN, inflammatory bowel disease, irritable syndrome, osteopenia, peripheral neuropathy, DM2, CAD, MI, CAD. Head CT (-). CTA head/neck indicates approximately 60% stenosis of the origin of right internal carotid atery. Chest XR left basilar airspace opacity CT Abdomen/Pelvis (-) Chest XR (+) mild pulmonary vascular congestion and dependent opacities at left lung base. Pt agreeable to OT eval this date. Pt was sitting EOB upon arrival. Pt reports living in [facility] with ramp entry. Pt reports  recently had procedure and is in rehab; unsure of d/c plans at this time. Pt has daughter who lives next door and is available to assist if needed. Pt reports being independent with ADLs at baseline with no use of AD for mobility. Pt reports utilizing shower chair. Pt does not drive at baseline. During eval, pt reported no dizziness or lightheadedness. Pt able to complete STS and functional mobility with SBA for safety secondary to potential dizziness. Pt able to complete grooming with SBA standing at sink. Pt likely to complete ADLs independently as dizziness has subsided. Pt likely to return home safely with assistance from daughter as needed. OT to sign off.

## 2022-09-12 NOTE — THERAPY TREATMENT NOTE
Subjective: Pt agreeable to therapeutic plan of care. Hoping to go home today.    Objective:     Bed mobility - N/A or Not attempted. Pt up in chair upon initiation of session.  Transfers - SBA  Ambulation - 30 feet SBA    Vitals: WNL    Pain: 0 VAS  Education: Provided education on importance of mobility and skilled verbal / tactile cueing throughout intervention.     Assessment: Dora Solorio presents with functional mobility impairments which indicate the need for skilled intervention. Tolerating session today without incident. Will continue to follow and progress as tolerated. Good tolerance to PT treatment. Pt reports good family support at home. She is transferring and ambulating with SBA and denies dizziness throughout session. Recommend d/c home with assist when medically appropriate.     Plan/Recommendations:   No ongoing therapy recommended post-acute care. No therapy needs.. Pt requires no DME at discharge.     Pt desires Home with family assist at discharge. Pt cooperative; agreeable to therapeutic recommendations and plan of care.         Basic Mobility 6-click:  Rollin = Total, A lot = 2, A little = 3; 4 = None  Supine>Sit:   1 = Total, A lot = 2, A little = 3; 4 = None   Sit>Stand with arms:  1 = Total, A lot = 2, A little = 3; 4 = None  Bed>Chair:   1 = Total, A lot = 2, A little = 3; 4 = None  Ambulate in room:  1 = Total, A lot = 2, A little = 3; 4 = None  3-5 Steps with railin = Total, A lot = 2, A little = 3; 4 = None  Score: 20    Modified [place]: N/A = No pre-op stroke/TIA    Post-Tx Position: Up in Chair, Alarms activated and Call light and personal items within reach  PPE: mask, gloves, eye protection and gown

## 2022-09-12 NOTE — THERAPY EVALUATION
Patient Name: Dora Solorio  : 1950    MRN: [id number]                              Today's Date: 2022       Admit Date: 2022    Visit Dx:     ICD-10-CM ICD-9-CM   1. Dizzy  R42 780.4   2. Weakness  R53.1 780.79     Patient Active Problem List   Diagnosis   • Adjustment disorder with depressed mood   • Depression   • Type 2 diabetes mellitus with hyperglycemia, without long-term current use of insulin (HCC)   • Diabetic peripheral neuropathy (HCC)   • Essential hypertension   • Gout   • Mixed hyperlipidemia   • Irritable bowel syndrome without diarrhea   • Obesity   • Osteopenia   • Peripheral neuropathy   • Vitamin D deficiency   • Dizzy     Past Medical History:   Diagnosis Date   • Adjustment disorder with depressed mood 2018   • Allergic Years    Sulfa drugs   • Arthritis Years    [place] joints. Mostly back, knees, hips, fingers   • Depression 2018   • Diabetes mellitus, type II ([facility]) 2013   • Essential hypertension 2013   • Gout 2014   • Heart attack ([facility])    • Hyperlipidemia 2020   • Hypertension    • Inflammatory bowel disease    • Irritable bowel syndrome without diarrhea 2017   • Kidney stone ?2016?    In hospital overnight   • Osteopenia 2017   • Peripheral neuropathy 2017     Past Surgical History:   Procedure Laterality Date   •  SECTION      x2   • KNEE ARTHROSCOPY Bilateral    • TUBAL ABDOMINAL LIGATION  May 1973      General Information     Row Name 22 1126          OT Time and Intention    Document Type evaluation  -MS     Mode of Treatment occupational therapy  -MS     Row Name 22 1126          General Information    Patient Profile Reviewed yes  -MS     Prior Level of Function independent:;ADL's  -MS     Existing Precautions/Restrictions fall  -MS     Barriers to Rehab none identified  -MS     Row Name 22 1126          Occupational Profile    Reason for Services/Referral (Occupational Profile)  Pt is a 73 y/o female who presented to [facility] 9/9/22 with c/o dizziness. Code stroke initiated in ER, however no concern for stroke according to neurology. PMH includes depression, OA, HTN, inflammatory bowel disease, irritable syndrome, osteopenia, peripheral neuropathy, DM2, CAD, MI, CAD. Head CT (-). CTA head/neck indicates approximately 60% stenosis of the origin of right internal carotid atery. 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  -MS     Environmental Supports and Barriers (Occupational Profile) supportive family  -MS     Patient Goals (Occupational Profile) return home  -MS     Row Name 09/12/22 1126          Living Environment    People in Home alone  Spouse is currently in rehab; unsure of his d/c plans at this time  -MS     Row Name 09/12/22 1126          Home Main Entrance    Number of Stairs, Main Entrance none  ramp  -MS     Stair Railings, Main Entrance railings safe and in good condition  -MS     Row Name 09/12/22 1126          Stairs Within Home, Primary    Number of Stairs, Within Home, Primary none  -MS     Stair Railings, Within Home, Primary none  -MS     Row Name 09/12/22 1126          Cognition    Orientation Status (Cognition) oriented x 4  -MS           User Key  (r) = Recorded By, (t) = Taken By, (c) = Cosigned By    Initials Name Provider Type    MS Katy Ford OT Occupational Therapist                 Mobility/ADL's     Row Name 09/12/22 1129          Bed Mobility    Bed Mobility bed mobility (all) activities  -MS     All Activities, [place] (Bed Mobility) modified independence  -MS     Assistive Device (Bed Mobility) head of bed elevated  -MS     Row Name 09/12/22 1129          Transfers    Transfers sit-stand transfer  -MS     Sit-Stand [place] (Transfers) standby assist  -MS      Stand-Sit [place] (Transfers) standby assist  -MS     Row Name 09/12/22 1129          Functional Mobility    Functional Mobility- Ind. Level standby assist  -MS     Row Name 09/12/22 1129          Activities of Daily Living    BADL Assessment/Intervention grooming;lower body dressing  -MS     Row Name 09/12/22 1129          Grooming Assessment/Training    [place] Level (Grooming) hair care, combing/brushing;standby assist;wash face, hands  -MS     Position (Grooming) unsupported standing  -MS     Row Name 09/12/22 1129          Lower Body Dressing Assessment/Training    [place] Level (Lower Body Dressing) don;doff;socks;modified independence  -MS     Position (Lower Body Dressing) edge of bed sitting  -MS           User Key  (r) = Recorded By, (t) = Taken By, (c) = Cosigned By    Initials Name Provider Type    Katy Zhang OT Occupational Therapist               Obj/Interventions     Row Name 09/12/22 1132          Sensory Assessment (Somatosensory)    Sensory Assessment (Somatosensory) UE sensation intact  -MS     Row Name 09/12/22 1132          Vision Assessment/Intervention    Visual Impairment/Limitations WNL  -MS     Row Name 09/12/22 1132          Range of Motion Comprehensive    Comment, General Range of Motion BUE ROM WNL  -MS     Row Name 09/12/22 1132          Strength Comprehensive (MMT)    Comment, General Manual Muscle Testing (MMT) Assessment BUE grossly 4/5  -MS     Row Name 09/12/22 1132          Balance    Balance Assessment sitting static balance;sitting dynamic balance;standing static balance;standing dynamic balance  -MS     Static Sitting Balance standby assist  -MS     Dynamic Sitting Balance standby assist  -MS     Static Standing Balance standby assist  -MS     Dynamic Standing Balance standby assist  -MS           User Key  (r) = Recorded By, (t) = Taken By, (c) = Cosigned By    Initials Name Provider Type    Katy Zhang OT Occupational Therapist                Goals/Plan    No documentation.                Clinical Impression     Row Name 09/12/22 1133          Pain Assessment    Pretreatment Pain Rating 0/10 - no pain  -MS     Posttreatment Pain Rating 0/10 - no pain  -MS     Row Name 09/12/22 1133          Plan of Care Review    Plan of Care Reviewed With patient  -MS     Progress no change  -MS     Outcome Evaluation Pt is a 71 y/o female who presented to [facility] 9/9/22 with c/o dizziness. Code stroke initiated in ER, however no concern for stroke according to neurology. PMH includes depression, OA, HTN, inflammatory bowel disease, irritable syndrome, osteopenia, peripheral neuropathy, DM2, CAD, MI, CAD. Head CT (-). CTA head/neck indicates approximately 60% stenosis of the origin of right internal carotid atery. Chest XR left basilar airspace opacity CT Abdomen/Pelvis (-) Chest XR (+) mild pulmonary vascular congestion and dependent opacities at left lung base. Pt agreeable to OT eval this date. Pt was sitting EOB upon arrival. Pt reports living in [facility] with ramp entry. Pt reports  recently had procedure and is in rehab; unsure of d/c plans at this time. Pt has daughter who lives next door and is available to assist if needed. Pt reports being independent with ADLs at baseline with no use of AD for mobility. Pt reports utilizing shower chair. Pt does not drive at baseline. During eval, pt reported no dizziness or lightheadedness. Pt able to complete STS and functional mobility with SBA for safety secondary to potential dizziness. Pt able to complete grooming with SBA standing at sink. Pt likely to complete ADLs independently as dizziness has subsided. Pt likely to return home safely with assistance from daughter as needed. OT to sign off.  -MS     Row Name 09/12/22 1133          Therapy Assessment/Plan (OT)    Patient/Family Therapy Goal Statement (OT) return home  -MS     Criteria for Skilled Therapeutic Interventions Met (OT) no  -MS     Row Name 09/12/22 1133           Vital Signs    Pre Systolic BP Rehab 127  -MS     Pre Treatment Diastolic BP 69  -MS     Pretreatment Heart Rate (beats/min) 63  -MS     Pre SpO2 (%) 98  -MS     O2 Delivery Pre Treatment room air  -MS     Pre Patient Position Sitting  -MS     Intra Patient Position Standing  -MS     Post Patient Position Sitting  -MS     Row Name 09/12/22 1133          Positioning and Restraints    Pre-Treatment Position in bed  -MS     Post Treatment Position chair  -MS     In Chair sitting;call light within reach;encouraged to call for assist;exit alarm on  -MS           User Key  (r) = Recorded By, (t) = Taken By, (c) = Cosigned By    Initials Name Provider Type    MS Katy Ford, OT Occupational Therapist               Outcome Measures     Row Name 09/12/22 1138          How much help from another is currently needed...    Putting on and taking off regular lower body clothing? 4  -MS     Bathing (including washing, rinsing, and drying) 4  -MS     Toileting (which includes using toilet bed pan or urinal) 4  -MS     Putting on and taking off regular upper body clothing 4  -MS     Taking care of personal grooming (such as brushing teeth) 4  -MS     Eating meals 4  -MS     AM-PAC 6 Clicks Score (OT) 24  -MS     Row Name 09/12/22 0400 09/12/22 0000       How much help from another person do you currently need...    Turning from your back to your side while in flat bed without using bedrails? 3  -ED 3  -ED    Moving from lying on back to sitting on the side of a flat bed without bedrails? 3  -ED 3  -ED    Moving to and from a bed to a chair (including a wheelchair)? 3  -ED 3  -ED    Standing up from a chair using your arms (e.g., wheelchair, bedside chair)? 3  -ED 3  -ED    Climbing 3-5 steps with a railing? 3  -ED 3  -ED    To walk in hospital room? 3  -ED 3  -ED    AM-PAC 6 Clicks Score (PT) 18  -ED 18  -ED    Highest level of mobility 6 --> Walked 10 steps or more  -ED 6 --> Walked 10 steps or more  -ED    Row Name  09/12/22 1138          Functional Assessment    Outcome Measure Options AM-PAC 6 Clicks Daily Activity (OT)  -MS           User Key  (r) = Recorded By, (t) = Taken By, (c) = Cosigned By    Initials Name Provider Type    MS Katy Ford, UMAIR Occupational Therapist    Cassy Farias RN Registered Nurse                Occupational Therapy Education                 Title: PT OT SLP Therapies (Done)     Topic: Occupational Therapy (Done)     Point: Precautions (Done)     Description:   Instruct learner(s) on prescribed precautions during self-care and functional transfers.              Learning Progress Summary           Patient Acceptance, E,TB, VU by MS at 9/12/2022 1138                               User Key     Initials Effective Dates Name Provider Type Discipline    MS 07/13/22 -  Katy Ford OT Occupational Therapist OT              OT Recommendation and Plan     Plan of Care Review  Plan of Care Reviewed With: patient  Progress: no change  Outcome Evaluation: Pt is a 71 y/o female who presented to [facility] 9/9/22 with c/o dizziness. Code stroke initiated in ER, however no concern for stroke according to neurology. PMH includes depression, OA, HTN, inflammatory bowel disease, irritable syndrome, osteopenia, peripheral neuropathy, DM2, CAD, MI, CAD. Head CT (-). CTA head/neck indicates approximately 60% stenosis of the origin of right internal carotid atery. Chest XR left basilar airspace opacity CT Abdomen/Pelvis (-) Chest XR (+) mild pulmonary vascular congestion and dependent opacities at left lung base. Pt agreeable to OT eval this date. Pt was sitting EOB upon arrival. Pt reports living in [facility] with ramp entry. Pt reports  recently had procedure and is in rehab; unsure of d/c plans at this time. Pt has daughter who lives next door and is available to assist if needed. Pt reports being independent with ADLs at baseline with no use of AD for mobility. Pt reports utilizing shower chair. Pt does not  drive at baseline. During eval, pt reported no dizziness or lightheadedness. Pt able to complete STS and functional mobility with SBA for safety secondary to potential dizziness. Pt able to complete grooming with SBA standing at sink. Pt likely to complete ADLs independently as dizziness has subsided. Pt likely to return home safely with assistance from daughter as needed. OT to sign off.     Time Calculation:    Time Calculation- OT     Row Name 09/12/22 1139             Time Calculation- OT    OT Start Time 0938  -MS      OT Stop Time 0958  -MS      OT Time Calculation (min) 20 min  -MS      Total Timed Code Minutes- OT 0 minute(s)  -MS      OT Received On 09/12/22  -MS            User Key  (r) = Recorded By, (t) = Taken By, (c) = Cosigned By    Initials Name Provider Type    Katy Zhang OT Occupational Therapist              Therapy Charges for Today     Code Description Service Date Service Provider Modifiers Qty    [id number]  OT EVAL MOD COMPLEXITY 4 9/12/2022 Katy Ford OT GO 1               Katy Ford OT  9/12/2022

## 2022-09-12 NOTE — CONSULTS
Nutrition Services    Patient Name: Dora Solorio  YOB: 1950  MRN: [id number]  Admission date: 9/9/2022    PPE Documentation        PPE Worn By Provider mask, gloves, eye protection and gown   PPE Worn By Patient  none     NUTRITION SCREENING      Encounter Information: Visited patient in room for DELPHINE consult, MST 3. Pt denies any issues at this time, reports a good appetite currently and PTA. Discussed with patient current Hgb A1c value of 11.0, provided education packet with RD contact info.     Pt denies any issues at this time, reports will reach out to RD if questions on education information arise.    No acute nutrition diagnosis at present time.        PO Diet: Diet Diabetic/Consistent Carbs; Diabetic - Consistent Carb   PO Supplements:    PO Intake:  83% last 3 meals       Labs (reviewed below): Creat elev        GI Function:  Stool Output  Stool Unmeasured Occurrence: 1 (09/11/22 0800)          Skin: No breakdown       Weight Hx Review: Wt Readings from Last 10 Encounters:   09/10/22 1032 87.5 kg (193 lb)   09/09/22 2131 87.7 kg (193 lb 5.5 oz)   09/09/22 1342 89.9 kg (198 lb 3.1 oz)   06/23/22 1324 90.3 kg (199 lb)   06/16/22 1128 90.6 kg (199 lb 11.8 oz)   06/15/22 1431 91 kg (200 lb 9.9 oz)   04/07/22 0850 91.2 kg (201 lb)   11/11/21 0926 95.7 kg (211 lb)   10/27/21 0956 95.3 kg (210 lb)   06/25/20 1031 102 kg (225 lb)   06/02/20 0918 99.3 kg (219 lb)   09/12/18 1053 100 kg (221 lb)            Nutrition Intervention: [facility] diet    Provided Diabetes nutrition education       Results from last 7 days   Lab Units 09/11/22  2343 09/11/22  0621 09/09/22  1408   SODIUM mmol/L 136 133* 135*   POTASSIUM mmol/L 3.8 4.0 4.9   CHLORIDE mmol/L 103 102 101   CO2 mmol/L 21.0* 19.0* 21.0*   BUN mg/dL 23 19 21   CREATININE mg/dL 1.49* 1.18* 1.47*   CALCIUM mg/dL 7.9* 7.8* 9.0   BILIRUBIN mg/dL  --  0.4 0.5   ALK PHOS U/L  --  60 69   ALT (SGPT) U/L  --  12 6   AST (SGOT) U/L  --  25 10   GLUCOSE mg/dL 139*  116* 119*     Results from last 7 days   Lab Units 09/11/22  2343 09/10/22  1218 09/10/22  0437   HEMOGLOBIN g/dL 10.7*   < >  --    HEMATOCRIT % 32.7*   < >  --    TRIGLYCERIDES mg/dL  --   --  192*    < > = values in this interval not displayed.     COVID19   Date Value Ref Range Status   09/09/2022 Not Detected Not Detected - Ref. Range Final     Lab Results   Component Value Date    HGBA1C 11.0 (H) 09/10/2022       RD to follow up per protocol.    Electronically signed by:  Basia Chaney RD  09/12/22 15:03 EDT

## 2022-09-12 NOTE — PROGRESS NOTES
Infectious Diseases Progress Note      LOS: 0 days   Patient Care Team:  Bhargavi Rodriguez APRN as PCP - General    Chief Complaint: Weakness    Subjective     The patient had no fever during the last 24 hours.  The patient having any new complaints today.  The patient has only 1 soft bowel movement earlier today.    Review of Systems:   Review of Systems   Constitutional: Negative.    HENT: Negative.    Eyes: Negative.    Respiratory: Negative.    Cardiovascular: Negative.    Gastrointestinal: Negative.    Genitourinary: Negative.    Musculoskeletal: Negative.    Skin: Negative.    Neurological: Negative.    Hematological: Negative.    Psychiatric/Behavioral: Negative.         Objective     Vital Signs  Temp:  [97.9 °F (36.6 °C)-99.5 °F (37.5 °C)] 97.9 °F (36.6 °C)  Heart Rate:  [60-65] 62  Resp:  [11-17] 15  BP: (100-139)/(41-69) 127/69    Physical Exam:  Physical Exam  Vitals and nursing note reviewed.   Constitutional:       Appearance: She is well-developed.   HENT:      Head: Normocephalic and atraumatic.   Eyes:      Pupils: Pupils are equal, round, and reactive to light.   Cardiovascular:      Rate and Rhythm: Normal rate and regular rhythm.      Heart sounds: Normal heart sounds.   Pulmonary:      Effort: Pulmonary effort is normal. No respiratory distress.      Breath sounds: Normal breath sounds. No wheezing or rales.   Abdominal:      General: Bowel sounds are normal. There is no distension.      Palpations: Abdomen is soft. There is no mass.      Tenderness: There is no abdominal tenderness. There is no guarding or rebound.   Musculoskeletal:         General: No deformity. Normal range of motion.      Cervical back: Normal range of motion and neck supple.   Skin:     General: Skin is warm.      Findings: No erythema or rash.   Neurological:      Mental Status: She is alert and oriented to person, place, and time.      Cranial Nerves: No cranial nerve deficit.          Results Review:    I have reviewed  all clinical data, test, lab, and imaging results.     Radiology  No Radiology Exams Resulted Within Past 24 Hours    Cardiology    Laboratory    Results from last 7 days   Lab Units 09/11/22  2343 09/11/22  0621 09/10/22  1218 09/09/22  1408   WBC 10*3/mm3 5.50 6.50 7.40 10.80   HEMOGLOBIN g/dL 10.7* 10.4* 11.6* 12.6   HEMATOCRIT % 32.7* 32.5* 36.1 39.0   PLATELETS 10*3/mm3 125* 110* 120* 157     Results from last 7 days   Lab Units 09/11/22  2343 09/11/22  0621 09/09/22  1408   SODIUM mmol/L 136 133* 135*   POTASSIUM mmol/L 3.8 4.0 4.9   CHLORIDE mmol/L 103 102 101   CO2 mmol/L 21.0* 19.0* 21.0*   BUN mg/dL 23 19 21   CREATININE mg/dL 1.49* 1.18* 1.47*   GLUCOSE mg/dL 139* 116* 119*   ALBUMIN g/dL  --  2.70* 3.80   BILIRUBIN mg/dL  --  0.4 0.5   ALK PHOS U/L  --  60 69   AST (SGOT) U/L  --  25 10   ALT (SGPT) U/L  --  12 6   CALCIUM mg/dL 7.9* 7.8* 9.0     Results from last 7 days   Lab Units 09/11/22  0621   CK TOTAL U/L 177     Results from last 7 days   Lab Units 09/11/22  2343   SED RATE mm/hr 40*         Microbiology   Microbiology Results (last 10 days)     Procedure Component Value - Date/Time    S. Pneumo Ag Urine or CSF - Urine, Urine, Clean Catch [[id number]]  (Normal) Collected: 09/12/22 0928    Lab Status: Final result Specimen: Urine, Clean Catch Updated: 09/12/22 1020     Strep Pneumo Ag Negative    Legionella Antigen, Urine - Urine, Urine, Clean Catch [[id number]]  (Normal) Collected: 09/12/22 0928    Lab Status: Final result Specimen: Urine, Clean Catch Updated: 09/12/22 1020     LEGIONELLA ANTIGEN, URINE Negative    Gastrointestinal Panel, PCR - Stool, Per Rectum [[id number]]  (Normal) Collected: 09/09/22 2226    Lab Status: Final result Specimen: Stool from Per Rectum Updated: 09/10/22 0052     Campylobacter Not Detected     Plesiomonas shigelloides Not Detected     Salmonella Not Detected     Vibrio Not Detected     Vibrio cholerae Not Detected     Yersinia enterocolitica Not Detected      Enteroaggregative E. coli (EAEC) Not Detected     Enteropathogenic E. coli (EPEC) Not Detected     Enterotoxigenic E. coli (ETEC) lt/st Not Detected     Shiga-like toxin-producing E. coli (STEC) stx1/stx2 Not Detected     Shigella/Enteroinvasive E. coli (EIEC) Not Detected     Cryptosporidium Not Detected     Cyclospora cayetanensis Not Detected     Entamoeba histolytica Not Detected     Giardia lamblia Not Detected     Adenovirus F40/41 Not Detected     Astrovirus Not Detected     Norovirus GI/GII Not Detected     Rotavirus A Not Detected     Sapovirus (I, II, IV or V) Not Detected    Narrative:      If Aeromonas, Staphylococcus aureus or Bacillus cereus are suspected, please order [id number]: Stool Culture, Aeromonas, S aureus, B Cereus.    Clostridioides difficile EIA - Stool, Per Rectum [[id number]]  (Abnormal) Collected: 09/09/22 2226    Lab Status: Final result Specimen: Stool from Per Rectum Updated: 09/10/22 0702     C Diff GDH Ag Positive     C.diff Toxin Ag Positive    Narrative:      DNA from a toxigenic strain of C.difficile was detected, along with the presence of free toxin. These results are suggestive of C.difficile infection, in context of an appropriate clinical scenario.    COVID-19,CEPHEID/[place],COR/LILIA/PAD/[facility] IN-HOUSE(OR EMERGENT/ADD-ON),NP SWAB IN TRANSPORT MEDIA 3-4 HR TAT, RT-PCR - Swab, Nasopharynx [[id number]]  (Normal) Collected: 09/09/22 1531    Lab Status: Final result Specimen: Swab from Nasopharynx Updated: 09/09/22 1555     COVID19 Not Detected    Narrative:      Fact sheet for providers: https://www.fda.gov/media/448560/download     Fact sheet for patients: https://www.fda.gov/media/799474/download  Fact sheet for providers: https://www.fda.gov/media/935735/download    Fact sheet for patients: https://www.fda.gov/media/337935/download    Test performed by PCR.          Medication Review:       Schedule Meds  aspirin, 81 mg, Oral, Daily  atorvastatin, 80 mg, Oral, Nightly  bisoprolol, 2.5  mg, Oral, Daily  cholestyramine light, 1 packet, Oral, Q12H  escitalopram, 10 mg, Oral, Daily  gabapentin, 300 mg, Oral, TID  insulin lispro, 0-7 Units, Subcutaneous, TID AC  saccharomyces boulardii, 250 mg, Oral, BID  sodium bicarbonate, 1,300 mg, Oral, 4x Daily  sodium chloride, 10 mL, Intravenous, Q12H  vancomycin, 500 mg, Oral, Q6H  vitamin B-12, 250 mcg, Oral, Daily  cholecalciferol, 5,000 Units, Oral, Daily        Infusion Meds  sodium chloride, 75 mL/hr, Last Rate: 75 mL/hr (09/12/22 1250)        PRN Meds  •  acetaminophen **OR** acetaminophen  •  dextrose  •  dextrose  •  glucagon (human recombinant)  •  hydrALAZINE  •  HYDROcodone-acetaminophen  •  ondansetron  •  sodium chloride  •  sodium chloride        Assessment & Plan       Antimicrobial Therapy   1.  P.o. vancomycin        2.        3.        4.        5.               Assessment     C. difficile colitis.  This is the patient's first episode..  Patient denies being on any antibiotics but is currently living with her  who is currently being treated for C. difficile colitis.  This is the likely source. patient still having some diarrhea without significant abdominal pain or tenderness  -Patient's white blood cell count is normal and does not appear toxic  -CT of the abdomen pelvis did not show any acute findings  The patient diarrhea had improved     Low-grade fever-T-max of 100.8 degrees.  COVID-19 screen is negative and urinalysis was unremarkable.  Chest x-ray was questionable for some left lower lung opacities but did not show any obvious infiltrates on the CT  -Could be related to the C. difficile colitis.  Resolved     Type 2 diabetes          Plan     Continue p.o. vancomycin 500 mg every 6 hours for 2 weeks  Continue p.o. Florastor for 10 to 14 days  Continue p.o. cholestyramine for 7 days  Continue supportive care  A.m. labs  Enteric precautions  Can discharge home soon probably tomorrow      Tony Lawrence MD  09/12/22  13:52  EDT    Note is dictated utilizing voice recognition software/Dragon

## 2022-09-12 NOTE — PLAN OF CARE
Goal Outcome Evaluation:            Assessment: Dora Solorio presents with functional mobility impairments which indicate the need for skilled intervention. Tolerating session today without incident. Will continue to follow and progress as tolerated. Good tolerance to PT treatment. Pt reports good family support at home. She is transferring and ambulating with SBA and denies dizziness throughout session. Recommend d/c home with assist when medically appropriate.

## 2022-09-12 NOTE — CASE MANAGEMENT/SOCIAL WORK
Discharge Planning Assessment   Vick     Patient Name: Dora Solorio  MRN: [id number]  Today's Date: 9/12/2022    Admit Date: 9/9/2022     Discharge Needs Assessment     Row Name 09/12/22 1455       Living Environment    People in Home alone  Two dghs live next door    Current Living Arrangements home    Primary Care Provided by self    Provides Primary Care For no one    Family Caregiver if Needed child(rupinder), adult    Quality of Family Relationships helpful;involved;supportive    Able to Return to Prior Arrangements yes       Resource/Environmental Concerns    Resource/Environmental Concerns none    Transportation Concerns none       Transition Planning    Patient/Family Anticipates Transition to home with family    Patient/Family Anticipated Services at Transition none    Transportation Anticipated family or friend will provide       Discharge Needs Assessment    Readmission Within the Last 30 Days no previous admission in last 30 days    Equipment Currently Used at Home walker, rolling;cane, straight;shower chair;grab bar    Concerns to be Addressed discharge planning    Anticipated Changes Related to Illness none    Equipment Needed After Discharge none               Discharge Plan     Row Name 09/12/22 1455       Plan    Plan Declined HH. Home at discharge. Watch O2 needs.    Patient/Family in Agreement with Plan yes    Plan Comments Patient lives at home alone, patient's daughters live next door. Patient does not drive, patient's daughter will provide transportation at discharge. Patient performs ADL. PCP and pharmacy confirmed. Denies financial assistance for medication and/or food. Denies HH and/or PT needs post discharge. D/C barriers: cdiff positive, pending urine/blood cultures, 2L NC, ID following.               Demographic Summary     Row Name 09/12/22 1454       General Information    Admission Type observation    Arrived From emergency department    Required Notices Provided Observation Status  Notice    Referral Source admission list    Reason for Consult discharge planning    Preferred Language English               Functional Status     Row Name 09/12/22 5940       Functional Status    Usual Activity Tolerance good    Current Activity Tolerance good       Functional Status, IADL    Medications independent    Meal Preparation independent    Housekeeping independent    Laundry independent    Shopping independent                Met with patient in room wearing PPE: mask.    Maintained distance greater than six feet and spent less than 15 minutes in the room.          Elise Carlson RN

## 2022-09-12 NOTE — PROGRESS NOTES
[facility] Medicine Services Daily Progress Note    Patient Name: Dora Solorio  : 1950  MRN: [id number]  Primary Care Physician:  Bhargavi Rodriguez APRN  Date of admission: 2022      Subjective      Chief Complaint: Dizziness      Patient reports she has had less stool output and is not having any abdominal pain.  No nausea or vomiting.  No episodes of dizziness at this time PT evaluated patient and felt to be appropriate to go home.  ID recommending possible discharge tomorrow.    Review of Systems   Gastrointestinal: Positive for diarrhea.   All other systems reviewed and are negative.        Objective      Vitals:   Temp:  [97 °F (36.1 °C)-99.5 °F (37.5 °C)] 97 °F (36.1 °C)  Heart Rate:  [56-65] 56  Resp:  [11-17] 13  BP: (100-139)/(41-69) 120/53  Flow (L/min):  [2] 2    Physical Exam        Result Review    Result Review:  I have personally reviewed the results from the time of this admission to 2022 14:18 EDT and agree with these findings:  [x]  Laboratory  [x]  Microbiology  [x]  Radiology  [x]  EKG/Telemetry   [x]  Cardiology/Vascular   []  Pathology  []  Old records  []  Other:  Most notable findings include: Anemia, renal insufficiency          Assessment & Plan      Brief Patient Summary:    Dora Solorio is a 72 y.o. female who presented to [facility] on 2022 complaining of dizziness  She has chronic medical problems including depression, osteoarthritis,Hypertension, inflammatory bowel disease, irritable syndrome, kidney stones, osteopenia, peripheral neuropathy, DM2, CAD, MI, CAD.     Patient reports feeling of dizziness and lightheadedness since 10:00 in the morning.  Code stroke called in ER.  She denies any history or symptoms of weakness or numbness or speech problems or walking problems but was not able to walk on her own when she came in and needed to present to help her.  ER physician report patient has no focal findings and patient had CT  scan of the head CT angiogram of the head and neck showing 60% stenosis origin of the right internal carotid artery, no large intracranial arterial occlusion, posterior circulation supply from anterior circulation with prominent posterior circulating communicating arteries and small basilar arteries  No acute endocrine abnormality was noted on CT head, patient was seen by ER physician as well as neurologist in the ER  Notes from neurology service showed that patient had no focal deficit and no aphasia and did not think patient has stroke like symptoms.  Work-up in the ER showed evidence of possible acute kidney injury with elevated creatinine of 1.4 with a baseline of 1.9 couple months ago  Patient also had mildly low sodium 135 and bicarb of 21  Troponin was negative  PT/INR mostly negative/normal  CBC with essentially normal  Patient had chest x-ray that showed left basilar airspace opacity likely atelectasis versus pneumonia  Patient had also EKG sinus rhythm borderline OR prolongation inferior infarct, old    9/10  Reports that she still has diarrhea  Reports abdominal pain lower abdomen  CT abdomen pelvis ordered  Significant back positive started on p.o. vancomycin  Small drop in blood pressure intermittently noted and still with low-grade fever     9/11  Oxygen saturation dropped to 80s while sleeping  Still having low-grade fever and blood pressure still low  Relatively we will consult ID       aspirin, 81 mg, Oral, Daily  atorvastatin, 80 mg, Oral, Nightly  bisoprolol, 2.5 mg, Oral, Daily  cholestyramine light, 1 packet, Oral, Q12H  escitalopram, 10 mg, Oral, Daily  gabapentin, 300 mg, Oral, TID  insulin lispro, 0-7 Units, Subcutaneous, TID AC  saccharomyces boulardii, 250 mg, Oral, BID  sodium bicarbonate, 1,300 mg, Oral, 4x Daily  sodium chloride, 10 mL, Intravenous, Q12H  vancomycin, 500 mg, Oral, Q6H  vitamin B-12, 250 mcg, Oral, Daily  cholecalciferol, 5,000 Units, Oral, Daily       sodium chloride, 75  mL/hr, Last Rate: 75 mL/hr (09/12/22 1250)         Active Hospital Problems:  Active Hospital Problems    Diagnosis    • Dizzy      Plan:     Dizziness-likely composite of volume loss associated with C. difficile infection, patient reports she does have some difficulty with balance when she stands up quickly which compounded this, patient was hypotensive in the emergency department  -Monitor I's and O's  -IV hydration  -Diet  -PT OT evaluated cleared for home  -Treat C. difficile    C. difficile infection-patient reports decreased stool output since starting treatment  -Oral vancomycin  -ID consulted  -Cholestyramine  -Florastor    Sepsis-with leukocytosis and tachycardia  -Now resolved  -Follow-up cultures  -Antibiotics    Pulmonary infiltrate-patient denying any respiratory symptoms  -ID consulted  -Urine antigens ordered  -No antibiotics currently    Renal insufficiency-likely volume related with some prerenal injury  -IV hydration  -Daily lab    Type 2 diabetes-aim to keep blood sugars less than 200  -Sliding scale  -Diabetic diet    CAD-patient denies any chest pain  -EKG  -Cardiac monitoring  -Resume home medications clinically appropriate    Depression/IBS/gout/PAD/hypertension-chronic in nature  -Resume home medications clinically appropriate    DVT prophylaxis:  Mechanical DVT prophylaxis orders are present.    CODE STATUS:    Code Status (Patient has no pulse and is not breathing): CPR (Attempt to Resuscitate)  Medical Interventions (Patient has pulse or is breathing): Full Support  Release to patient: Routine Release      Disposition:  I expect patient to be discharged in 24 hours    This patient has been examined wearing appropriate Personal Protective Equipment and discussed with hospital infection control department. 09/12/22      Electronically signed by Deniz Geiger MD, 09/12/22, 14:18 EDT.  [facility] Hospitalist Team

## 2022-09-12 NOTE — PLAN OF CARE
Problem: Adult Inpatient Plan of Care  Goal: Plan of Care Review  Outcome: Ongoing, Progressing  Flowsheets (Taken 9/12/2022 0236)  Plan of Care Reviewed With: patient  Goal: Patient-Specific Goal (Individualized)  Outcome: Ongoing, Progressing  Goal: Absence of Hospital-Acquired Illness or Injury  Outcome: Ongoing, Progressing  Intervention: Identify and Manage Fall Risk  Description: Perform standard risk assessment on admission using a validated tool or comprehensive approach appropriate to the patient; reassess fall risk frequently, with change in status or transfer to another level of care.  Communicate fall injury risk to interprofessional healthcare team.  Determine need for increased observation, equipment and environmental modification, such as low bed, signage and supportive, nonskid footwear.  Adjust safety measures to individual developmental age, stage and identified risk factors.  Reinforce the importance of safety and physical activity with patient and family.  Perform regular intentional rounding to assess need for position change, pain assessment and personal needs, including assistance with toileting.  Recent Flowsheet Documentation  Taken 9/12/2022 0000 by Cassy Nichols, RN  Safety Promotion/Fall Prevention: assistive device/personal items within reach  Taken 9/11/2022 2228 by Cassy Nichols, RN  Safety Promotion/Fall Prevention:   safety round/check completed   room organization consistent   nonskid shoes/slippers when out of bed   mobility aid in reach   fall prevention program maintained   clutter free environment maintained   assistive device/personal items within reach  Taken 9/11/2022 2026 by Cassy Nichols, RN  Safety Promotion/Fall Prevention:   assistive device/personal items within reach   clutter free environment maintained   fall prevention program maintained   lighting adjusted   nonskid shoes/slippers when out of bed   room organization consistent   safety round/check  completed  Intervention: Prevent Skin Injury  Description: Perform a screening for skin injury risk, such as pressure or moisture associated skin damage on admission and at regular intervals throughout hospital stay.  Keep all areas of skin (especially folds) clean and dry.  Maintain adequate skin hydration.  Relieve and redistribute pressure and protect bony prominences; implement measures based on patient-specific risk factors.  Match turning and repositioning schedule to clinical condition.  Encourage weight shift frequently; assist with reposition if unable to complete independently.  Float heels off bed; avoid pressure on the Achilles tendon.  Keep skin free from extended contact with medical devices.  Encourage functional activity and mobility, as early as tolerated.  Use aids (e.g., slide boards, mechanical lift) during transfer.  Recent Flowsheet Documentation  Taken 9/12/2022 0000 by Cassy Nichols RN  Body Position: position changed independently  Skin Protection: adhesive use limited  Taken 9/11/2022 2026 by Cassy Nichols RN  Body Position: position changed independently  Skin Protection: adhesive use limited  Intervention: Prevent and Manage VTE (Venous Thromboembolism) Risk  Description: Assess for VTE (venous thromboembolism) risk.  Encourage and assist with early ambulation.  Initiate and maintain compression or other therapy, as indicated, based on identified risk in accordance with organizational protocol and provider order.  Encourage both active and passive leg exercises while in bed, if unable to ambulate.  Recent Flowsheet Documentation  Taken 9/12/2022 0000 by Cassy Nichols RN  Activity Management: activity adjusted per tolerance  VTE Prevention/Management:   bilateral   sequential compression devices on  Range of Motion: active ROM (range of motion) encouraged  Taken 9/11/2022 2026 by Cassy Nichols RN  Activity Management: activity adjusted per tolerance  VTE  Prevention/Management:   bilateral   sequential compression devices on  Range of Motion: active ROM (range of motion) encouraged  Intervention: Prevent Infection  Description: Maintain skin and mucous membrane integrity; promote hand, oral and pulmonary hygiene.  Optimize fluid balance, nutrition, sleep and glycemic control to maximize infection resistance.  Identify potential sources of infection early to prevent or mitigate progression of infection (e.g., wound, lines, devices).  Evaluate ongoing need for invasive devices; remove promptly when no longer indicated.  Recent Flowsheet Documentation  Taken 9/12/2022 0000 by Cassy Nichols RN  Infection Prevention: environmental surveillance performed  Taken 9/11/2022 2228 by Cassy Nichols RN  Infection Prevention:   single patient room provided   rest/sleep promoted   hand hygiene promoted   environmental surveillance performed  Taken 9/11/2022 2026 by Cassy Nichols RN  Infection Prevention:   single patient room provided   rest/sleep promoted   hand hygiene promoted   environmental surveillance performed  Goal: Optimal Comfort and Wellbeing  Outcome: Ongoing, Progressing  Intervention: Monitor Pain and Promote Comfort  Description: Assess pain level, treatment efficacy and patient response at regular intervals using a consistent pain scale.  Consider the presence and impact of preexisting chronic pain.  Encourage patient and caregiver involvement in pain assessment, interventions and safety measures.  Recent Flowsheet Documentation  Taken 9/12/2022 0000 by Cassy Nichols RN  Pain Management Interventions:   see MAR   quiet environment facilitated   care clustered  Taken 9/11/2022 2026 by Cassy Nichols RN  Pain Management Interventions:   care clustered   see MAR   quiet environment facilitated  Intervention: Provide Person-Centered Care  Description: Use a family-focused approach to care.  Develop trust and rapport by proactively providing  information, encouraging questions, addressing concerns and offering reassurance.  Acknowledge emotional response to hospitalization.  Recognize and utilize personal coping strategies.  Honor spiritual and cultural preferences.  Recent Flowsheet Documentation  Taken 9/12/2022 0000 by Cassy Nichols RN  Trust Relationship/Rapport:   care explained   choices provided   thoughts/feelings acknowledged  Taken 9/11/2022 2026 by Cassy Nichols RN  Trust Relationship/Rapport:   care explained   choices provided   thoughts/feelings acknowledged  Goal: Readiness for Transition of Care  Outcome: Ongoing, Progressing     Problem: Fall Injury Risk  Goal: Absence of Fall and Fall-Related Injury  Outcome: Ongoing, Progressing  Intervention: Identify and Manage Contributors  Description: Develop a fall prevention plan with the patient and caregiver/family.  Provide reorientation, appropriate sensory stimulation and routines with changes in mental status to decrease risk of fall.  Promote use of personal vision and auditory aids.  Assess assistance level required for safe and effective self-care; provide support as needed, such as toileting, mobilization. For age 65 and older, implement timed toileting with assistance.  Encourage physical activity, such as performance of mobility and self-care at highest level of patient ability, multicomponent exercise program and provision of appropriate assistive devices.  If fall occurs, assess the severity of injury; implement fall injury protocol. Determine the cause and revise fall injury prevention plan.  Regularly review medication contribution to fall risk; adjust medication administration times to minimize risk of falling.  Consider risk related to polypharmacy and age.  Balance adequate pain management with potential for oversedation.  Recent Flowsheet Documentation  Taken 9/12/2022 0000 by Cassy Nichols RN  Medication Review/Management: medications reviewed  Self-Care  Promotion: independence encouraged  Taken 9/11/2022 2228 by Cassy Nichols RN  Medication Review/Management: medications reviewed  Taken 9/11/2022 2026 by Cassy Nichols RN  Medication Review/Management: medications reviewed  Self-Care Promotion: independence encouraged  Intervention: Promote Injury-Free Environment  Description: Provide a safe, barrier-free environment that encourages independent activity.  Keep care area uncluttered and well-lighted.  Determine need for increased observation or monitoring.  Avoid use of devices that minimize mobility, such as restraints or indwelling urinary catheter.  Recent Flowsheet Documentation  Taken 9/12/2022 0000 by Cassy Nichols RN  Safety Promotion/Fall Prevention: assistive device/personal items within reach  Taken 9/11/2022 2228 by Cassy Nichols RN  Safety Promotion/Fall Prevention:   safety round/check completed   room organization consistent   nonskid shoes/slippers when out of bed   mobility aid in reach   fall prevention program maintained   clutter free environment maintained   assistive device/personal items within reach  Taken 9/11/2022 2026 by Cassy Nichols RN  Safety Promotion/Fall Prevention:   assistive device/personal items within reach   clutter free environment maintained   fall prevention program maintained   lighting adjusted   nonskid shoes/slippers when out of bed   room organization consistent   safety round/check completed     Problem: Behavioral Health Comorbidity  Goal: Maintenance of Behavioral Health Symptom Control  Outcome: Ongoing, Progressing  Intervention: Maintain Behavioral Health Symptom Control  Description: Confirm mental health diagnosis and current treatment.  Evaluate adherence to previously identified self-management plan.  Advocate continuation of home strategies, including medication.  Evaluate effectiveness of self-management strategies and coping skills.  Communicate with providers to ensure continuity and  follow-up at transition.  Recent Flowsheet Documentation  Taken 9/12/2022 0000 by Cassy Nichols RN  Medication Review/Management: medications reviewed  Taken 9/11/2022 2228 by Cassy Nichols RN  Medication Review/Management: medications reviewed  Taken 9/11/2022 2026 by Cassy Nichols RN  Medication Review/Management: medications reviewed  Goal: Maintenance of Behavioral Health Symptom Control  Outcome: Ongoing, Progressing  Intervention: Maintain Behavioral Health Symptom Control  Description: Confirm mental health diagnosis and current treatment.  Evaluate adherence to previously identified self-management plan.  Advocate continuation of home strategies, including medication.  Evaluate effectiveness of self-management strategies and coping skills.  Communicate with providers to ensure continuity and follow-up at transition.  Recent Flowsheet Documentation  Taken 9/12/2022 0000 by Cassy Nichols RN  Medication Review/Management: medications reviewed  Taken 9/11/2022 2228 by Cassy Nichols RN  Medication Review/Management: medications reviewed  Taken 9/11/2022 2026 by Cassy Nichols RN  Medication Review/Management: medications reviewed     Problem: Diabetes Comorbidity  Goal: Blood Glucose Level Within Targeted Range  Outcome: Ongoing, Progressing  Intervention: Monitor and Manage Glycemia  Description: Establish target blood glucose levels based on patient-specific factors, such as age, diabetes-related complications and illness severity.  Document blood glucose levels and monitor trend; advocate for adjustment to keep within targeted range.  Provide pharmacologic therapy to maintain blood glucose levels within targeted range.  Check blood glucose level if there is a change in mental or cognitive status.  Recognize, treat and document hypoglycemia event and potential cause.  Avoid hypoglycemic episodes by advocating for insulin dose adjustment when there is a change in condition, such as illness  severity, decreased oral intake, missed or refused meals and snacks, as well as medication change that may include steroid tape  Recent Flowsheet Documentation  Taken 9/12/2022 0000 by Cassy Nichols RN  Glycemic Management: blood glucose monitored  Taken 9/11/2022 2026 by Cassy Nichols RN  Glycemic Management: blood glucose monitored  Goal: Blood Glucose Level Within Targeted Range  Outcome: Ongoing, Progressing  Intervention: Monitor and Manage Glycemia  Description: Establish target blood glucose levels based on patient-specific factors, such as age, diabetes-related complications and illness severity.  Document blood glucose levels and monitor trend; advocate for adjustment to keep within targeted range.  Provide pharmacologic therapy to maintain blood glucose levels within targeted range.  Check blood glucose level if there is a change in mental or cognitive status.  Recognize, treat and document hypoglycemia event and potential cause.  Avoid hypoglycemic episodes by advocating for insulin dose adjustment when there is a change in condition, such as illness severity, decreased oral intake, missed or refused meals and snacks, as well as medication change that may include steroid tape  Recent Flowsheet Documentation  Taken 9/12/2022 0000 by Cassy Nichols RN  Glycemic Management: blood glucose monitored  Taken 9/11/2022 2026 by Cassy Nichols RN  Glycemic Management: blood glucose monitored     Problem: Hypertension Comorbidity  Goal: Blood Pressure in Desired Range  Outcome: Ongoing, Progressing  Intervention: Maintain Blood Pressure Management  Description: Evaluate adherence to home antihypertensive regimen (e.g., exercise and activity, diet modification, medication).  Provide scheduled antihypertensive medication; consider administration time and effects (e.g., avoid giving diuretic prior to bedtime).  Monitor response to antihypertensive medication therapy (e.g., blood pressure, electrolyte  levels, medication effects).  Minimize risk of orthostatic hypotension; encourage caution with position changes, particularly if elderly.  Recent Flowsheet Documentation  Taken 9/12/2022 0000 by Cassy Nichols RN  Medication Review/Management: medications reviewed  Taken 9/11/2022 2228 by Cassy Nichols RN  Medication Review/Management: medications reviewed  Taken 9/11/2022 2026 by Cassy Nichols RN  Syncope Management: position changed slowly  Medication Review/Management: medications reviewed  Goal: Blood Pressure in Desired Range  Outcome: Ongoing, Progressing  Intervention: Maintain Blood Pressure Management  Description: Evaluate adherence to home antihypertensive regimen (e.g., exercise and activity, diet modification, medication).  Provide scheduled antihypertensive medication; consider administration time and effects (e.g., avoid giving diuretic prior to bedtime).  Monitor response to antihypertensive medication therapy (e.g., blood pressure, electrolyte levels, medication effects).  Minimize risk of orthostatic hypotension; encourage caution with position changes, particularly if elderly.  Recent Flowsheet Documentation  Taken 9/12/2022 0000 by Cassy Nichols RN  Medication Review/Management: medications reviewed  Taken 9/11/2022 2228 by Cassy Nichols RN  Medication Review/Management: medications reviewed  Taken 9/11/2022 2026 by Cassy Nichols RN  Syncope Management: position changed slowly  Medication Review/Management: medications reviewed     Problem: Asthma Comorbidity  Goal: Maintenance of Asthma Control  Outcome: Ongoing, Progressing  Intervention: Maintain Asthma Symptom Control  Description: Evaluate adherence to self-management (asthma action plan), such as medication, symptom-control, trigger-avoidance and self-monitoring.  Advocate for continuation of home regimen, including medication, method of delivery, schedule and symptom monitoring; acknowledge preferred modality and  routine.  Minimize exposure to potential triggers, such as perfume, cleaning chemicals and all types of smoke.  Assess for proper use of inhaled medication and delivery technique; assist or reinstruct if needed.  Evaluate effectiveness of coping skills; encourage expression of feelings, expectations and concerns related to disease management and quality of life; reinforce education to enhance management plan and wellbeing.  Recent Flowsheet Documentation  Taken 9/12/2022 0000 by Cassy Nichols RN  Medication Review/Management: medications reviewed  Taken 9/11/2022 2228 by Cassy Nichols RN  Medication Review/Management: medications reviewed  Taken 9/11/2022 2026 by Cassy Nichols RN  Medication Review/Management: medications reviewed     Problem: COPD (Chronic Obstructive Pulmonary Disease) Comorbidity  Goal: Maintenance of COPD Symptom Control  Outcome: Ongoing, Progressing  Intervention: Maintain COPD-Symptom Control  Description: Evaluate adherence to management plan (e.g., medication, trigger avoidance, infection prevention, self-monitoring).  Advocate for continuation of home regimen, including medication, method of delivery, schedule and symptom monitoring.  Anticipate the need for breathing techniques and activity pacing to minimize fatigue and breathlessness.  Assess for proper use of inhaled medication and delivery technique; assist or reinstruct if needed.  Evaluate effectiveness of coping skills; encourage expression of feelings, expectations and concerns related to disease management and quality of life; reinforce education to enhance management plan and wellbeing.  Recent Flowsheet Documentation  Taken 9/12/2022 0000 by Cassy Nichols RN  Supportive Measures:   active listening utilized   self-care encouraged  Medication Review/Management: medications reviewed  Taken 9/11/2022 2228 by Cassy Nichols RN  Medication Review/Management: medications reviewed  Taken 9/11/2022 2026 by Simone  AHMET [place]  Supportive Measures: active listening utilized  Medication Review/Management: medications reviewed   Goal Outcome Evaluation:  Plan of Care Reviewed With: patient        Progress: no change

## 2022-09-13 ENCOUNTER — READMISSION MANAGEMENT (OUTPATIENT)
Dept: CALL CENTER | Facility: HOSPITAL | Age: 72
End: 2022-09-13

## 2022-09-13 VITALS
OXYGEN SATURATION: 97 % | RESPIRATION RATE: 15 BRPM | DIASTOLIC BLOOD PRESSURE: 54 MMHG | HEIGHT: 64 IN | SYSTOLIC BLOOD PRESSURE: 134 MMHG | WEIGHT: 193 LBS | HEART RATE: 69 BPM | TEMPERATURE: 98.2 F | BODY MASS INDEX: 32.95 KG/M2

## 2022-09-13 LAB
GLUCOSE BLDC GLUCOMTR-MCNC: 145 MG/DL (ref 70–105)
GLUCOSE BLDC GLUCOMTR-MCNC: 242 MG/DL (ref 70–105)

## 2022-09-13 PROCEDURE — 63710000001 INSULIN LISPRO (HUMAN) PER 5 UNITS: Performed by: INTERNAL MEDICINE

## 2022-09-13 PROCEDURE — 82962 GLUCOSE BLOOD TEST: CPT

## 2022-09-13 PROCEDURE — 99239 HOSP IP/OBS DSCHRG MGMT >30: CPT | Performed by: FAMILY MEDICINE

## 2022-09-13 RX ORDER — ISOPROPYL ALCOHOL 0.7 ML/1
1 SWAB TOPICAL DAILY
Qty: 100 EACH | Refills: 2 | Status: SHIPPED | OUTPATIENT
Start: 2022-09-13

## 2022-09-13 RX ORDER — LANCETS
1 EACH MISCELLANEOUS DAILY
Qty: 100 EACH | Refills: 2 | Status: SHIPPED | OUTPATIENT
Start: 2022-09-13

## 2022-09-13 RX ORDER — BLOOD-GLUCOSE METER
1 EACH MISCELLANEOUS DAILY
Qty: 1 KIT | Refills: 0 | Status: SHIPPED | OUTPATIENT
Start: 2022-09-13

## 2022-09-13 RX ORDER — SACCHAROMYCES BOULARDII 250 MG
250 CAPSULE ORAL 2 TIMES DAILY
Qty: 28 CAPSULE | Refills: 0 | Status: SHIPPED | OUTPATIENT
Start: 2022-09-13 | End: 2022-09-27

## 2022-09-13 RX ORDER — CHOLESTYRAMINE LIGHT 4 G/5.7G
1 POWDER, FOR SUSPENSION ORAL EVERY 12 HOURS SCHEDULED
Qty: 14 PACKET | Refills: 0 | Status: SHIPPED | OUTPATIENT
Start: 2022-09-13 | End: 2022-09-20

## 2022-09-13 RX ORDER — LANCETS
1 EACH MISCELLANEOUS DAILY
Qty: 100 EACH | Refills: 2 | Status: SHIPPED | OUTPATIENT
Start: 2022-09-13 | End: 2022-09-13 | Stop reason: SDUPTHER

## 2022-09-13 RX ORDER — VANCOMYCIN HYDROCHLORIDE 250 MG/1
500 CAPSULE ORAL EVERY 6 HOURS SCHEDULED
Qty: 52 CAPSULE | Refills: 0 | Status: SHIPPED | OUTPATIENT
Start: 2022-09-13 | End: 2022-09-20

## 2022-09-13 RX ADMIN — SODIUM BICARBONATE 650 MG TABLET 1300 MG: at 09:03

## 2022-09-13 RX ADMIN — VANCOMYCIN HYDROCHLORIDE 500 MG: 250 CAPSULE ORAL at 06:42

## 2022-09-13 RX ADMIN — ASPIRIN 81 MG: 81 TABLET, COATED ORAL at 09:04

## 2022-09-13 RX ADMIN — VANCOMYCIN HYDROCHLORIDE 500 MG: 250 CAPSULE ORAL at 12:06

## 2022-09-13 RX ADMIN — Medication 250 MG: at 09:03

## 2022-09-13 RX ADMIN — Medication 10 ML: at 09:04

## 2022-09-13 RX ADMIN — CHOLESTYRAMINE 4 G: 4 POWDER, FOR SUSPENSION ORAL at 09:03

## 2022-09-13 RX ADMIN — CYANOCOBALAMIN TAB 250 MCG 250 MCG: 250 TAB at 09:03

## 2022-09-13 RX ADMIN — SODIUM BICARBONATE 650 MG TABLET 1300 MG: at 12:06

## 2022-09-13 RX ADMIN — INSULIN LISPRO 3 UNITS: 100 INJECTION, SOLUTION INTRAVENOUS; SUBCUTANEOUS at 12:44

## 2022-09-13 RX ADMIN — ESCITALOPRAM OXALATE 10 MG: 10 TABLET ORAL at 09:04

## 2022-09-13 RX ADMIN — GABAPENTIN 300 MG: 600 TABLET, FILM COATED ORAL at 09:03

## 2022-09-13 RX ADMIN — BISOPROLOL FUMARATE 2.5 MG: 5 TABLET ORAL at 09:03

## 2022-09-13 RX ADMIN — Medication 5000 UNITS: at 09:07

## 2022-09-13 NOTE — CASE MANAGEMENT/SOCIAL WORK
Case Management Discharge Note      Final Note: Home      Transportation Services  Private: Car    Final Discharge Disposition Code: 01 - home or self-care

## 2022-09-13 NOTE — PROGRESS NOTES
Infectious Diseases Progress Note      LOS: 1 day   Patient Care Team:  Bhargavi Rodriguez APRN as PCP - General    Chief Complaint: Weakness    Subjective     The patient had no fever during the last 24 hours.  The patient having any new complaints today.  Patient still having some minor diarrhea but says that it is much improved and denies any abdominal pain, nausea or vomiting    Review of Systems:   Review of Systems   Constitutional: Negative.    HENT: Negative.    Eyes: Negative.    Respiratory: Negative.    Cardiovascular: Negative.    Gastrointestinal: Negative.    Genitourinary: Negative.    Musculoskeletal: Negative.    Skin: Negative.    Neurological: Negative.    Hematological: Negative.    Psychiatric/Behavioral: Negative.         Objective     Vital Signs  Temp:  [97 °F (36.1 °C)-98.8 °F (37.1 °C)] 98.3 °F (36.8 °C)  Heart Rate:  [56-69] 64  Resp:  [13-17] 15  BP: (120-157)/(53-90) 131/58    Physical Exam:  Physical Exam  Vitals and nursing note reviewed.   Constitutional:       Appearance: She is well-developed.   HENT:      Head: Normocephalic and atraumatic.   Eyes:      Pupils: Pupils are equal, round, and reactive to light.   Cardiovascular:      Rate and Rhythm: Normal rate and regular rhythm.      Heart sounds: Normal heart sounds.   Pulmonary:      Effort: Pulmonary effort is normal. No respiratory distress.      Breath sounds: Normal breath sounds. No wheezing or rales.   Abdominal:      General: Bowel sounds are normal. There is no distension.      Palpations: Abdomen is soft. There is no mass.      Tenderness: There is no abdominal tenderness. There is no guarding or rebound.   Musculoskeletal:         General: No deformity. Normal range of motion.      Cervical back: Normal range of motion and neck supple.   Skin:     General: Skin is warm.      Findings: No erythema or rash.   Neurological:      Mental Status: She is alert and oriented to person, place, and time.      Cranial Nerves: No  cranial nerve deficit.          Results Review:    I have reviewed all clinical data, test, lab, and imaging results.     Radiology  No Radiology Exams Resulted Within Past 24 Hours    Cardiology    Laboratory    Results from last 7 days   Lab Units 09/12/22 2255 09/11/22 2343 09/11/22 0621 09/10/22  1218 09/09/22  1408   WBC 10*3/mm3 6.50 5.50 6.50 7.40 10.80   HEMOGLOBIN g/dL 10.6* 10.7* 10.4* 11.6* 12.6   HEMATOCRIT % 32.5* 32.7* 32.5* 36.1 39.0   PLATELETS 10*3/mm3 145 125* 110* 120* 157     Results from last 7 days   Lab Units 09/12/22 2255 09/11/22 2343 09/11/22 0621 09/09/22  1408   SODIUM mmol/L 139 136 133* 135*   POTASSIUM mmol/L 3.8 3.8 4.0 4.9   CHLORIDE mmol/L 106 103 102 101   CO2 mmol/L 25.0 21.0* 19.0* 21.0*   BUN mg/dL 14 23 19 21   CREATININE mg/dL 0.88 1.49* 1.18* 1.47*   GLUCOSE mg/dL 228* 139* 116* 119*   ALBUMIN g/dL  --   --  2.70* 3.80   BILIRUBIN mg/dL  --   --  0.4 0.5   ALK PHOS U/L  --   --  60 69   AST (SGOT) U/L  --   --  25 10   ALT (SGPT) U/L  --   --  12 6   CALCIUM mg/dL 7.9* 7.9* 7.8* 9.0     Results from last 7 days   Lab Units 09/11/22 0621   CK TOTAL U/L 177     Results from last 7 days   Lab Units 09/11/22  2343   SED RATE mm/hr 40*         Microbiology   Microbiology Results (last 10 days)     Procedure Component Value - Date/Time    S. Pneumo Ag Urine or CSF - Urine, Urine, Clean Catch [[id number]]  (Normal) Collected: 09/12/22 0928    Lab Status: Final result Specimen: Urine, Clean Catch Updated: 09/12/22 1020     Strep Pneumo Ag Negative    Legionella Antigen, Urine - Urine, Urine, Clean Catch [[id number]]  (Normal) Collected: 09/12/22 0928    Lab Status: Final result Specimen: Urine, Clean Catch Updated: 09/12/22 1020     LEGIONELLA ANTIGEN, URINE Negative    Blood Culture - Blood, Hand, Left [[id number]]  (Normal) Collected: 09/11/22 1459    Lab Status: Preliminary result Specimen: Blood from Hand, Left Updated: 09/12/22 1603     Blood Culture No growth at 24 hours     Blood Culture - Blood, Arm, Left [[id number]]  (Normal) Collected: 09/11/22 1458    Lab Status: Preliminary result Specimen: Blood from Arm, Left Updated: 09/12/22 1603     Blood Culture No growth at 24 hours    Gastrointestinal Panel, PCR - Stool, Per Rectum [[id number]]  (Normal) Collected: 09/09/22 2226    Lab Status: Final result Specimen: Stool from Per Rectum Updated: 09/10/22 0052     Campylobacter Not Detected     Plesiomonas shigelloides Not Detected     Salmonella Not Detected     Vibrio Not Detected     Vibrio cholerae Not Detected     Yersinia enterocolitica Not Detected     Enteroaggregative E. coli (EAEC) Not Detected     Enteropathogenic E. coli (EPEC) Not Detected     Enterotoxigenic E. coli (ETEC) lt/st Not Detected     Shiga-like toxin-producing E. coli (STEC) stx1/stx2 Not Detected     Shigella/Enteroinvasive E. coli (EIEC) Not Detected     Cryptosporidium Not Detected     Cyclospora cayetanensis Not Detected     Entamoeba histolytica Not Detected     Giardia lamblia Not Detected     Adenovirus F40/41 Not Detected     Astrovirus Not Detected     Norovirus GI/GII Not Detected     Rotavirus A Not Detected     Sapovirus (I, II, IV or V) Not Detected    Narrative:      If Aeromonas, Staphylococcus aureus or Bacillus cereus are suspected, please order [id number]: Stool Culture, Aeromonas, S aureus, B Cereus.    Clostridioides difficile EIA - Stool, Per Rectum [[id number]]  (Abnormal) Collected: 09/09/22 2226    Lab Status: Final result Specimen: Stool from Per Rectum Updated: 09/10/22 0702     C Diff GDH Ag Positive     C.diff Toxin Ag Positive    Narrative:      DNA from a toxigenic strain of C.difficile was detected, along with the presence of free toxin. These results are suggestive of C.difficile infection, in context of an appropriate clinical scenario.    COVID-19,CEPHEID/[place],COR/[facility] IN-HOUSE(OR EMERGENT/ADD-ON),NP SWAB IN TRANSPORT MEDIA 3-4 HR TAT, RT-PCR - Swab, Nasopharynx [[id number]]   (Normal) Collected: 09/09/22 1531    Lab Status: Final result Specimen: Swab from Nasopharynx Updated: 09/09/22 1555     COVID19 Not Detected    Narrative:      Fact sheet for providers: https://www.fda.gov/media/831595/download     Fact sheet for patients: https://www.fda.gov/media/169426/download  Fact sheet for providers: https://www.fda.gov/media/658858/download    Fact sheet for patients: https://www.fda.gov/media/197915/download    Test performed by PCR.          Medication Review:       Schedule Meds  aspirin, 81 mg, Oral, Daily  atorvastatin, 80 mg, Oral, Nightly  bisoprolol, 2.5 mg, Oral, Daily  cholestyramine light, 1 packet, Oral, Q12H  escitalopram, 10 mg, Oral, Daily  gabapentin, 300 mg, Oral, TID  insulin lispro, 0-7 Units, Subcutaneous, TID AC  saccharomyces boulardii, 250 mg, Oral, BID  sodium bicarbonate, 1,300 mg, Oral, 4x Daily  sodium chloride, 10 mL, Intravenous, Q12H  vancomycin, 500 mg, Oral, Q6H  vitamin B-12, 250 mcg, Oral, Daily  cholecalciferol, 5,000 Units, Oral, Daily        Infusion Meds  sodium chloride, 75 mL/hr, Last Rate: 75 mL/hr (09/12/22 8983)        PRN Meds  •  acetaminophen **OR** acetaminophen  •  dextrose  •  dextrose  •  glucagon (human recombinant)  •  hydrALAZINE  •  HYDROcodone-acetaminophen  •  ondansetron  •  sodium chloride  •  sodium chloride        Assessment & Plan       Antimicrobial Therapy   1.  P.o. vancomycin        2.        3.        4.        5.               Assessment     C. difficile colitis.  This is the patient's first episode..  Patient denies being on any antibiotics but is currently living with her  who is currently being treated for C. difficile colitis.  This is the likely source. patient still having some diarrhea without significant abdominal pain or tenderness  -Patient's white blood cell count is normal and does not appear toxic  -CT of the abdomen pelvis did not show any acute findings  The patient diarrhea had improved     Low-grade  fever-T-max of 100.8 degrees.  COVID-19 screen is negative and urinalysis was unremarkable.  Chest x-ray was questionable for some left lower lung opacities but did not show any obvious infiltrates on the CT  -Could be related to the C. difficile colitis.  Resolved     Type 2 diabetes          Plan     Continue p.o. vancomycin 500 mg every 6 hours for 2 weeks  Continue p.o. Florastor for 10 to 14 days  Continue p.o. cholestyramine for 7 days  Continue supportive care  Enteric precautions  Okay to discharge from Infectious Disease standpoint      BRANDON Echevarria  09/13/22  12:51 EDT    Note is dictated utilizing voice recognition software/Dragon

## 2022-09-13 NOTE — OUTREACH NOTE
Prep Survey    Flowsheet Row Responses   [facility] patient discharged from? [facility]   Is LACE score < 7 ? No   Emergency Room discharge w/ pulse ox? No   Eligibility [facility]   Date of Admission 09/09/22   Date of Discharge 09/13/22   Discharge Disposition Home or Self Care   Discharge diagnosis C. difficile colitis,   Dizziness,    Sepsis   Does the patient have one of the following disease processes/diagnoses(primary or secondary)? Sepsis   Does the patient have Home health ordered? No   Is there a DME ordered? No   Prep survey completed? Yes          LOLA VALENCIA - Registered Nurse

## 2022-09-13 NOTE — DISCHARGE SUMMARY
[facility] Hospital Medicine Services  DISCHARGE SUMMARY    Patient Name: Dora Solorio  : 1950  MRN: [id number]    Date of Admission: 2022  Discharge Diagnosis: C. difficile infection  Date of Discharge: 2022  Primary Care Physician: Bhargavi Rodriguez APRN      Presenting Problem:   Weakness [R53.1]  Dizzy [R42]    Active and Resolved Hospital Problems:  Active Hospital Problems    Diagnosis POA   • Dizzy [R42] Yes      Resolved Hospital Problems   No resolved problems to display.     Dizziness-likely composite of volume loss associated with C. difficile infection, patient reports she does have some difficulty with balance when she stands up quickly which compounded this, patient was hypotensive in the emergency department now overall improved  -Stop hydrochlorothiazide  -Monitor I's and O's  -IV hydration  -Diet   -PT OT evaluated cleared for home  -Treat C. difficile     C. difficile infection-patient reports decreased stool output since starting treatment  -Oral vancomycin  -ID consulted, cleared patient for discharge  -Cholestyramine  -Florastor     Sepsis-with leukocytosis and tachycardia  -Now resolved  -Follow-up cultures  -Antibiotics     Pulmonary infiltrate-patient denying any respiratory symptoms  -ID consulted  -Urine antigens ordered  -No antibiotics currently     Renal insufficiency-likely volume related with some prerenal injury, normalized  -IV hydration  -Daily lab     Type 2 diabetes-aim to keep blood sugars less than 200  -Sliding scale  -Diabetic diet     CAD-patient denies any chest pain  -EKG  -Cardiac monitoring  -Resume home medications clinically appropriate     Depression/IBS/gout/PAD/hypertension-chronic in nature  -Resume home medications clinically appropriate    Hospital Course     Hospital Course:    Dora Solorio is a 72 y.o. female who presented to [facility] on 2022 complaining of dizziness  She has chronic medical problems  including depression, osteoarthritis,Hypertension, inflammatory bowel disease, irritable syndrome, kidney stones, osteopenia, peripheral neuropathy, DM2, CAD, MI, CAD.     Patient reports feeling of dizziness and lightheadedness since 10:00 in the morning.  Code stroke called in ER.  She denies any history or symptoms of weakness or numbness or speech problems or walking problems but was not able to walk on her own when she came in and needed to present to help her.  ER physician report patient has no focal findings and patient had CT scan of the head CT angiogram of the head and neck showing 60% stenosis origin of the right internal carotid artery, no large intracranial arterial occlusion, posterior circulation supply from anterior circulation with prominent posterior circulating communicating arteries and small basilar arteries  No acute endocrine abnormality was noted on CT head, patient was seen by ER physician as well as neurologist in the ER  Notes from neurology service showed that patient had no focal deficit and no aphasia and did not think patient has stroke like symptoms.  Work-up in the ER showed evidence of possible acute kidney injury with elevated creatinine of 1.4 with a baseline of 1.9 couple months ago  Patient also had mildly low sodium 135 and bicarb of 21  Troponin was negative  PT/INR mostly negative/normal  CBC with essentially normal  Patient had chest x-ray that showed left basilar airspace opacity likely atelectasis versus pneumonia  Patient had also EKG sinus rhythm borderline CT prolongation inferior infarct, old     9/10  Reports that she still has diarrhea  Reports abdominal pain lower abdomen  CT abdomen pelvis ordered  Significant back positive started on p.o. vancomycin  Small drop in blood pressure intermittently noted and still with low-grade fever     9/11  Oxygen saturation dropped to 80s while sleeping  Still having low-grade fever and blood pressure still low  Relatively we  will consult ID    9/12/2022:Patient reports she has had less stool output and is not having any abdominal pain.  No nausea or vomiting.  No episodes of dizziness at this time PT evaluated patient and felt to be appropriate to go home.  ID recommending possible discharge tomorrow.    9/13/2022: Patient very stressed as her  is in the ICU but physically feeling much better.  Cleared by ID for discharge.  Follow-up organized with primary care.  Patient discharged home in good condition with strict return precautions given.    DISCHARGE Follow Up Recommendations for labs and diagnostics:       Reasons For Change In Medications and Indications for New Medications:    Florastor 250 mg twice daily for 14 days  Cholestyramine 1 packet twice daily for 7 days  Continue oral vancomycin 500 mg every 6 hours for another 26 doses for C. difficile infection      Day of Discharge     Vital Signs:  Temp:  [97.1 °F (36.2 °C)-98.8 °F (37.1 °C)] 98.2 °F (36.8 °C)  Heart Rate:  [64-69] 69  Resp:  [13-17] 15  BP: (131-157)/(54-90) 134/54  Flow (L/min):  [2] 2    Physical Exam:  Physical Exam     General: Obese elderly female sitting up in a chair breathing comfortably on room air no acute distress  HEENT: NC/AT, EOMI, mucosa moist  Heart: Regular, rate controlled  Chest: Normal work of breathing moving air well no wheezing  Abdominal: Soft.  Nontender nondistended  Musculoskeletal: Normal ROM.  No cyanosis. No calf tenderness.  Neurological: AAOx3, no focal deficits  Skin: Skin is warm and dry. No rash  Psychiatric: Normal mood and affect.      Pertinent  and/or Most Recent Results     LAB RESULTS:      Lab 09/12/22  2255 09/11/22  2343 09/11/22  1059 09/11/22  0621 09/10/22  1218 09/09/22  1408   WBC 6.50 5.50  --  6.50 7.40 10.80   HEMOGLOBIN 10.6* 10.7*  --  10.4* 11.6* 12.6   HEMATOCRIT 32.5* 32.7*  --  32.5* 36.1 39.0   PLATELETS 145 125*  --  110* 120* 157   NEUTROS ABS 4.40 3.80  --  5.60 6.70 8.70*   LYMPHS ABS 1.20 0.90   --  0.30* 0.30* 1.00   MONOS ABS 0.70 0.50  --  0.40 0.30 1.00*   EOS ABS 0.20 0.20  --  0.10 0.10 0.10   MCV 85.5 85.1  --  85.5 86.8 86.6   SED RATE  --  40*  --   --   --   --    CRP 7.74* 13.28* 16.43*  --   --   --    PROCALCITONIN  --   --  0.68*  --   --  0.29*   LACTATE  --   --   --  0.5  --   --    PROTIME  --   --   --   --   --  11.7   APTT  --   --   --   --   --  28.5         Lab 09/12/22  2255 09/11/22  2343 09/11/22  0621 09/10/22  0437 09/09/22  1408   SODIUM 139 136 133*  --  135*   POTASSIUM 3.8 3.8 4.0  --  4.9   CHLORIDE 106 103 102  --  101   CO2 25.0 21.0* 19.0*  --  21.0*   ANION GAP 8.0 12.0 12.0  --  13.0   BUN 14 23 19  --  21   CREATININE 0.88 1.49* 1.18*  --  1.47*   EGFR 69.1 36.7* 48.6*  --  37.3*   GLUCOSE 228* 139* 116*  --  119*   CALCIUM 7.9* 7.9* 7.8*  --  9.0   HEMOGLOBIN A1C  --   --   --  11.0*  --    TSH  --   --   --   --  1.380         Lab 09/11/22  0621 09/09/22  1408   TOTAL PROTEIN 6.0 7.6   ALBUMIN 2.70* 3.80   GLOBULIN 3.3 3.8   ALT (SGPT) 12 6   AST (SGOT) 25 10   BILIRUBIN 0.4 0.5   ALK PHOS 60 69         Lab 09/11/22  1059 09/09/22  1408   PROBNP 1,157.0*  --    TROPONIN T <0.010 <0.010   PROTIME  --  11.7   INR  --  1.14*         Lab 09/10/22  0437   CHOLESTEROL 134   LDL CHOL 66   HDL CHOL 36*   TRIGLYCERIDES 192*         Lab 09/09/22  1408   ABO TYPING B   RH TYPING Positive   ANTIBODY SCREEN Negative         Lab 09/11/22  1106   PH, ARTERIAL 7.346*   PCO2, ARTERIAL 36.1   PO2 ART 95.0   O2 SATURATION ART 97.0   FIO2 40   HCO3 ART 19.8*   BASE EXCESS ART -5.4*     Brief Urine Lab Results  (Last result in the past 365 days)      Color   Clarity   Blood   Leuk Est   Nitrite   Protein   CREAT   Urine HCG        09/09/22 1647 Yellow   Clear   Trace   Negative   Negative   Negative               Microbiology Results (last 10 days)     Procedure Component Value - Date/Time    S. Pneumo Ag Urine or CSF - Urine, Urine, Clean Catch [[id number]]  (Normal) Collected: 09/12/22  0928    Lab Status: Final result Specimen: Urine, Clean Catch Updated: 09/12/22 1020     Strep Pneumo Ag Negative    Legionella Antigen, Urine - Urine, Urine, Clean Catch [[id number]]  (Normal) Collected: 09/12/22 0928    Lab Status: Final result Specimen: Urine, Clean Catch Updated: 09/12/22 1020     LEGIONELLA ANTIGEN, URINE Negative    Blood Culture - Blood, Hand, Left [[id number]]  (Normal) Collected: 09/11/22 1459    Lab Status: Preliminary result Specimen: Blood from Hand, Left Updated: 09/12/22 1603     Blood Culture No growth at 24 hours    Blood Culture - Blood, Arm, Left [[id number]]  (Normal) Collected: 09/11/22 1458    Lab Status: Preliminary result Specimen: Blood from Arm, Left Updated: 09/12/22 1603     Blood Culture No growth at 24 hours    Gastrointestinal Panel, PCR - Stool, Per Rectum [[id number]]  (Normal) Collected: 09/09/22 2226    Lab Status: Final result Specimen: Stool from Per Rectum Updated: 09/10/22 0052     Campylobacter Not Detected     Plesiomonas shigelloides Not Detected     Salmonella Not Detected     Vibrio Not Detected     Vibrio cholerae Not Detected     Yersinia enterocolitica Not Detected     Enteroaggregative E. coli (EAEC) Not Detected     Enteropathogenic E. coli (EPEC) Not Detected     Enterotoxigenic E. coli (ETEC) lt/st Not Detected     Shiga-like toxin-producing E. coli (STEC) stx1/stx2 Not Detected     Shigella/Enteroinvasive E. coli (EIEC) Not Detected     Cryptosporidium Not Detected     Cyclospora cayetanensis Not Detected     Entamoeba histolytica Not Detected     Giardia lamblia Not Detected     Adenovirus F40/41 Not Detected     Astrovirus Not Detected     Norovirus GI/GII Not Detected     Rotavirus A Not Detected     Sapovirus (I, II, IV or V) Not Detected    Narrative:      If Aeromonas, Staphylococcus aureus or Bacillus cereus are suspected, please order [id number]: Stool Culture, Aeromonas, S aureus, B Cereus.    Clostridioides difficile EIA - Stool, Per Rectum  [[id number]]  (Abnormal) Collected: 09/09/22 2226    Lab Status: Final result Specimen: Stool from Per Rectum Updated: 09/10/22 0702     C Diff GDH Ag Positive     C.diff Toxin Ag Positive    Narrative:      DNA from a toxigenic strain of C.difficile was detected, along with the presence of free toxin. These results are suggestive of C.difficile infection, in context of an appropriate clinical scenario.    COVID-19,CEPHEID/[place],COR/LILIA/PAD/[place] IN-HOUSE(OR EMERGENT/ADD-ON),NP SWAB IN TRANSPORT MEDIA 3-4 HR TAT, RT-PCR - Swab, Nasopharynx [[id number]]  (Normal) Collected: 09/09/22 1531    Lab Status: Final result Specimen: Swab from Nasopharynx Updated: 09/09/22 1555     COVID19 Not Detected    Narrative:      Fact sheet for providers: https://www.fda.gov/media/227914/download     Fact sheet for patients: https://www.fda.gov/media/642986/download  Fact sheet for providers: https://www.fda.gov/media/744225/download    Fact sheet for patients: https://www.fda.gov/media/827247/download    Test performed by PCR.          CT Abdomen Pelvis Without Contrast    Result Date: 9/10/2022  Impression:  1. No acute disease in the abdomen or pelvis.  Electronically Signed By-Isacc Machuca MD On:9/10/2022 9:46 PM This report was finalized on [id number] by  Isacc Machuca MD.    CT Angiogram Neck    Result Date: 9/9/2022  Impression:  1. Approximately 60% stenosis of the origin of the right internal carotid artery 2. No significant left carotid stenosis or occlusion 3. Patent vertebral arteries without significant stenosis 4. No large intracranial arterial occlusion. The posterior circulation is supplied from the anterior circulation by prominent posterior communicating arteries (versus persistent fetal origin), with associated relatively small basilar artery  These results communicated by telephone to the emergency department at 4:15 PM  Electronically Signed By-Gabriel Vicente On:9/9/2022 4:15 PM This report was  finalized on [id number] by  Gabriel Vicente, .    XR Chest 1 View    Result Date: 9/11/2022  Impression: IMPRESSION : Mild pulmonary vascular congestion and dependent opacities at the left lung base which are similar to the prior study given differences in technique. Findings are accentuated by low lung volumes[  Electronically Signed By-Wilber Shah On:9/11/2022 11:45 AM This report was finalized on [id number] by  Wilber Shah, .    XR Chest 1 View    Result Date: 9/9/2022  Impression: Left basilar airspace opacity likely atelectasis unless the patient has clinical findings of pneumonia  Electronically Signed By-Gabriel Vicente On:9/9/2022 3:02 PM This report was finalized on [id number] by  Gabriel Vicente, .    CT Head Without Contrast Stroke Protocol    Result Date: 9/9/2022  Impression:  1. No acute intracranial finding. No significant change compared to 6/15/2022. Chronic right basal ganglia lacunar infarct.  Electronically Signed By-Za Garcia MD On:9/9/2022 2:04 PM This report was finalized on [id number] by  Za Garcia MD.    CT Angiogram Head w AI Analysis of LVO    Result Date: 9/9/2022  Impression:  1. Approximately 60% stenosis of the origin of the right internal carotid artery 2. No significant left carotid stenosis or occlusion 3. Patent vertebral arteries without significant stenosis 4. No large intracranial arterial occlusion. The posterior circulation is supplied from the anterior circulation by prominent posterior communicating arteries (versus persistent fetal origin), with associated relatively small basilar artery  These results communicated by telephone to the emergency department at 4:15 PM  Electronically Signed By-Gabriel Vicente On:9/9/2022 4:15 PM This report was finalized on [id number] by  Gabriel Vicente, .              Results for orders placed during the hospital encounter of 09/09/22    Adult Transthoracic Echo Complete W/ Cont if Necessary Per Protocol  (With Agitated Saline)    Interpretation Summary  · Left ventricular wall thickness is consistent with mild concentric hypertrophy.  · Estimated left ventricular EF = 65% Estimated left ventricular EF was in agreement with the calculated left ventricular EF. Left ventricular systolic function is normal.  · Left ventricular diastolic function is consistent with (grade I) impaired relaxation.  · Saline test results are negative for right to left atrial level shunt.  · Estimated right ventricular systolic pressure from tricuspid regurgitation is normal (<35 mmHg).      Labs Pending at Discharge:  Pending Labs     Order Current Status    Blood Culture - Blood, Arm, Left Preliminary result    Blood Culture - Blood, Hand, Left Preliminary result          Procedures Performed           Consults:   Consults     Date and Time Order Name Status Description    9/11/2022 10:02 AM Inpatient Infectious Diseases Consult Completed     9/9/2022  5:35 PM Hospitalist (on-call MD unless specified)              Discharge Details        Discharge Medications      New Medications      Instructions Start Date   Accu-Chek Guide Me w/Device kit   1 kit, Does not apply, Daily, Dx code: E11.65  NDC [address]-72  Bin#: [id number] Group #: [id number]  ID #: [id number]  Issuer #: ([id number])      Accu-Chek Softclix Lancets lancets   1 each, Other, Daily, Use as instructed Dx code: E11.65      Alcohol Wipes 70 % misc   1 each, Apply externally, Daily, Dx code: E11.65      cholestyramine light 4 g packet   4 g, Oral, Every 12 Hours Scheduled      glucose blood test strip  Commonly known as: Accu-Chek Guide   1 each, Other, Daily, Use as instructed Dx code: E11.65      saccharomyces boulardii 250 MG capsule  Commonly known as: FLORASTOR   250 mg, Oral, 2 Times Daily      vancomycin 250 MG capsule  Commonly known as: VANCOCIN   500 mg, Oral, Every 6 Hours Scheduled         Changes to Medications      Instructions Start Date   Gabapentin 50 MG tablet  What  changed:   · medication strength  · how much to take   300 mg, Oral, 3 Times Daily         Continue These Medications      Instructions Start Date   amLODIPine 10 MG tablet  Commonly known as: NORVASC   Take 1 tablet by mouth once daily      aspirin 81 MG EC tablet   ASPIRIN 81 81 MG TBEC      atorvastatin 20 MG tablet  Commonly known as: LIPITOR   Take 1 tablet by mouth once daily      bisoprolol 10 MG tablet  Commonly known as: ZEBeta   Take 1 tablet by mouth once daily      escitalopram 10 MG tablet  Commonly known as: LEXAPRO   Take 1 tablet by mouth once daily      glimepiride 2 MG tablet  Commonly known as: AMARYL   Take 2 tablets by mouth twice daily      HYDROcodone-acetaminophen 5-325 MG per tablet  Commonly known as: NORCO   1 tablet, Oral, Every 6 Hours PRN, for pain      lisinopril 20 MG tablet  Commonly known as: PRINIVIL,ZESTRIL   Take 1 tablet by mouth twice daily      metFORMIN 1000 MG tablet  Commonly known as: GLUCOPHAGE   TAKE 1 TABLET BY MOUTH TWICE DAILY WITH MEALS      Vitamin B-12 1000 MCG sublingual tablet   1,000 mcg, Sublingual, Daily      vitamin D3 125 MCG (5000 UT) capsule capsule   Take 1 tab po daily.         Stop These Medications    hydroCHLOROthiazide 50 MG tablet  Commonly known as: HYDRODIURIL            Allergies   Allergen Reactions   • Sulfa Antibiotics Rash         Discharge Disposition: Good  Home or Self Care    Diet:  Hospital:  Diet Order   Procedures   • Diet Diabetic/Consistent Carbs; Diabetic - Consistent Carb         Discharge Activity:         CODE STATUS:  Code Status and Medical Interventions:   Ordered at: 09/10/22 1143     Code Status (Patient has no pulse and is not breathing):    CPR (Attempt to Resuscitate)     Medical Interventions (Patient has pulse or is breathing):    Full Support     Release to patient:    Routine Release         No future appointments.    Additional Instructions for the Follow-ups that You Need to Schedule     Discharge Follow-up with PCP    As directed       Currently Documented PCP:    Bhargavi Rodriguez APRN    PCP Phone Number:    [phone]     Follow Up Details: Please follow-up with primary care within 1 to 2 weeks               Time spent on Discharge including face to face service:  35 minutes    This patient has been examined wearing appropriate Personal Protective Equipment and discussed with hospital infection control department. 09/13/22      Signature: Electronically signed by Deniz Geiger MD, 09/13/22, 2:35 PM EDT.

## 2022-09-13 NOTE — PLAN OF CARE
Problem: Adult Inpatient Plan of Care  Goal: Plan of Care Review  Outcome: Ongoing, Progressing  Flowsheets (Taken 9/13/2022 0514)  Progress: improving  Plan of Care Reviewed With: patient  Goal: Patient-Specific Goal (Individualized)  Outcome: Ongoing, Progressing  Goal: Absence of Hospital-Acquired Illness or Injury  Outcome: Ongoing, Progressing  Intervention: Identify and Manage Fall Risk  Description: Perform standard risk assessment on admission using a validated tool or comprehensive approach appropriate to the patient; reassess fall risk frequently, with change in status or transfer to another level of care.  Communicate fall injury risk to interprofessional healthcare team.  Determine need for increased observation, equipment and environmental modification, such as low bed, signage and supportive, nonskid footwear.  Adjust safety measures to individual developmental age, stage and identified risk factors.  Reinforce the importance of safety and physical activity with patient and family.  Perform regular intentional rounding to assess need for position change, pain assessment and personal needs, including assistance with toileting.  Recent Flowsheet Documentation  Taken 9/13/2022 0500 by Cassy Nichols RN  Safety Promotion/Fall Prevention: assistive device/personal items within reach  Taken 9/13/2022 0200 by Cassy Nichols, RN  Safety Promotion/Fall Prevention:   safety round/check completed   room organization consistent   nonskid shoes/slippers when out of bed   mobility aid in [facility]   fall prevention program maintained   clutter free environment maintained   lighting adjusted   assistive device/personal items within reach  Taken 9/12/2022 2213 by Cassy Nichols, RN  Safety Promotion/Fall Prevention:   safety round/check completed   room organization consistent   patient off unit   nonskid shoes/slippers when out of bed   muscle strengthening facilitated   mobility aid in [facility]  Intervention:  Prevent Skin Injury  Description: Perform a screening for skin injury risk, such as pressure or moisture associated skin damage on admission and at regular intervals throughout hospital stay.  Keep all areas of skin (especially folds) clean and dry.  Maintain adequate skin hydration.  Relieve and redistribute pressure and protect bony prominences; implement measures based on patient-specific risk factors.  Match turning and repositioning schedule to clinical condition.  Encourage weight shift frequently; assist with reposition if unable to complete independently.  Float heels off bed; avoid pressure on the Achilles tendon.  Keep skin free from extended contact with medical devices.  Encourage functional activity and mobility, as early as tolerated.  Use aids (e.g., slide boards, mechanical lift) during transfer.  Recent Flowsheet Documentation  Taken 9/13/2022 0500 by Cassy Nichols RN  Body Position: position changed independently  Skin Protection: adhesive use limited  Taken 9/13/2022 0000 by Cassy Nichols RN  Body Position: position changed independently  Skin Protection: adhesive use limited  Taken 9/12/2022 2213 by Cassy Nichols RN  Body Position: position changed independently  Skin Protection: adhesive use limited  Intervention: Prevent and Manage VTE (Venous Thromboembolism) Risk  Description: Assess for VTE (venous thromboembolism) risk.  Encourage and assist with early ambulation.  Initiate and maintain compression or other therapy, as indicated, based on identified risk in accordance with organizational protocol and provider order.  Encourage both active and passive leg exercises while in bed, if unable to ambulate.  Recent Flowsheet Documentation  Taken 9/13/2022 0500 by Cassy Nichols RN  Activity Management: activity adjusted per tolerance  VTE Prevention/Management:   bilateral   sequential compression devices on  Range of Motion: active ROM (range of motion) encouraged  Taken 9/13/2022  0000 by Cassy Nichols RN  Activity Management: activity adjusted per tolerance  VTE Prevention/Management:   bilateral   sequential compression devices on  Taken 9/12/2022 2213 by Cassy Nichols RN  Activity Management: activity adjusted per tolerance  VTE Prevention/Management:   bilateral   sequential compression devices on  Range of Motion: active ROM (range of motion) encouraged  Intervention: Prevent Infection  Description: Maintain skin and mucous membrane integrity; promote hand, oral and pulmonary hygiene.  Optimize fluid balance, nutrition, sleep and glycemic control to maximize infection resistance.  Identify potential sources of infection early to prevent or mitigate progression of infection (e.g., wound, lines, devices).  Evaluate ongoing need for invasive devices; remove promptly when no longer indicated.  Recent Flowsheet Documentation  Taken 9/13/2022 0500 by Cassy Nichols RN  Infection Prevention:   single patient room provided   rest/sleep promoted   hand hygiene promoted   environmental surveillance performed  Taken 9/13/2022 0200 by Cassy Nichols RN  Infection Prevention:   single patient room provided   rest/sleep promoted   hand hygiene promoted   environmental surveillance performed  Taken 9/12/2022 2213 by Cassy Nichols RN  Infection Prevention:   environmental surveillance performed   single patient room provided   rest/sleep promoted   hand hygiene promoted  Goal: Optimal Comfort and Wellbeing  Outcome: Ongoing, Progressing  Intervention: Monitor Pain and Promote Comfort  Description: Assess pain level, treatment efficacy and patient response at regular intervals using a consistent pain scale.  Consider the presence and impact of preexisting chronic pain.  Encourage patient and caregiver involvement in pain assessment, interventions and safety measures.  Recent Flowsheet Documentation  Taken 9/13/2022 0500 by Cassy Nichols RN  Pain Management Interventions:   care  clustered   see MAR   quiet environment facilitated  Taken 9/13/2022 0000 by Cassy Nichols RN  Pain Management Interventions:   see MAR   quiet environment facilitated   care clustered  Taken 9/12/2022 2213 by Cassy Nichols RN  Pain Management Interventions:   care clustered   see MAR   quiet environment facilitated   position adjusted  Intervention: Provide Person-Centered Care  Description: Use a family-focused approach to care.  Develop trust and rapport by proactively providing information, encouraging questions, addressing concerns and offering reassurance.  Acknowledge emotional response to hospitalization.  Recognize and utilize personal coping strategies.  Honor spiritual and cultural preferences.  Recent Flowsheet Documentation  Taken 9/13/2022 0500 by Cassy Nichols RN  Trust Relationship/Rapport:   care explained   choices provided   reassurance provided  Taken 9/13/2022 0000 by Cassy Nichols RN  Trust Relationship/Rapport:   care explained   choices provided   questions encouraged  Taken 9/12/2022 2213 by Cassy Nichols RN  Trust Relationship/Rapport:   care explained   choices provided   reassurance provided  Goal: Readiness for Transition of Care  Outcome: Ongoing, Progressing     Problem: Fall Injury Risk  Goal: Absence of Fall and Fall-Related Injury  Outcome: Ongoing, Progressing  Intervention: Identify and Manage Contributors  Description: Develop a fall prevention plan with the patient and caregiver/family.  Provide reorientation, appropriate sensory stimulation and routines with changes in mental status to decrease risk of fall.  Promote use of personal vision and auditory aids.  Assess assistance level required for safe and effective self-care; provide support as needed, such as toileting, mobilization. For age 65 and older, implement timed toileting with assistance.  Encourage physical activity, such as performance of mobility and self-care at highest level of patient ability,  multicomponent exercise program and provision of appropriate assistive devices.  If fall occurs, assess the severity of injury; implement fall injury protocol. Determine the cause and revise fall injury prevention plan.  Regularly review medication contribution to fall risk; adjust medication administration times to minimize risk of falling.  Consider risk related to polypharmacy and age.  Balance adequate pain management with potential for oversedation.  Recent Flowsheet Documentation  Taken 9/13/2022 0500 by Cassy Nichols RN  Medication Review/Management: medications reviewed  Self-Care Promotion: independence encouraged  Taken 9/13/2022 0200 by Cassy Nichols RN  Medication Review/Management: medications reviewed  Taken 9/13/2022 0000 by Cassy Nichols, RN  Medication Review/Management: medications reviewed  Self-Care Promotion: independence encouraged  Taken 9/12/2022 2213 by Cassy Nichols, RN  Medication Review/Management: medications reviewed  Intervention: Promote Injury-Free Environment  Description: Provide a safe, barrier-free environment that encourages independent activity.  Keep care area uncluttered and well-lighted.  Determine need for increased observation or monitoring.  Avoid use of devices that minimize mobility, such as restraints or indwelling urinary catheter.  Recent Flowsheet Documentation  Taken 9/13/2022 0500 by Cassy Nichols RN  Safety Promotion/Fall Prevention: assistive device/personal items within reach  Taken 9/13/2022 0200 by Cassy Nichols, RN  Safety Promotion/Fall Prevention:   safety round/check completed   room organization consistent   nonskid shoes/slippers when out of bed   mobility aid in reach   fall prevention program maintained   clutter free environment maintained   lighting adjusted   assistive device/personal items within reach  Taken 9/12/2022 2213 by Cassy Nichols, RN  Safety Promotion/Fall Prevention:   safety round/check completed   room  organization consistent   patient off unit   nonskid shoes/slippers when out of bed   muscle strengthening facilitated   mobility aid in reach     Problem: Behavioral Health Comorbidity  Goal: Maintenance of Behavioral Health Symptom Control  Outcome: Ongoing, Progressing  Intervention: Maintain Behavioral Health Symptom Control  Description: Confirm mental health diagnosis and current treatment.  Evaluate adherence to previously identified self-management plan.  Advocate continuation of home strategies, including medication.  Evaluate effectiveness of self-management strategies and coping skills.  Communicate with providers to ensure continuity and follow-up at transition.  Recent Flowsheet Documentation  Taken 9/13/2022 0500 by Cassy Nichols RN  Medication Review/Management: medications reviewed  Taken 9/13/2022 0200 by Cassy Nichols RN  Medication Review/Management: medications reviewed  Taken 9/13/2022 0000 by Cassy Nichols RN  Medication Review/Management: medications reviewed  Taken 9/12/2022 2213 by Cassy Nichols RN  Medication Review/Management: medications reviewed  Goal: Maintenance of Behavioral Health Symptom Control  Outcome: Ongoing, Progressing  Intervention: Maintain Behavioral Health Symptom Control  Description: Confirm mental health diagnosis and current treatment.  Evaluate adherence to previously identified self-management plan.  Advocate continuation of home strategies, including medication.  Evaluate effectiveness of self-management strategies and coping skills.  Communicate with providers to ensure continuity and follow-up at transition.  Recent Flowsheet Documentation  Taken 9/13/2022 0500 by Cassy Nichols RN  Medication Review/Management: medications reviewed  Taken 9/13/2022 0200 by Cassy Nichols RN  Medication Review/Management: medications reviewed  Taken 9/13/2022 0000 by Cassy Nichols RN  Medication Review/Management: medications reviewed  Taken 9/12/2022  2213 by Cassy Nichols RN  Medication Review/Management: medications reviewed     Problem: Diabetes Comorbidity  Goal: Blood Glucose Level Within Targeted Range  Outcome: Ongoing, Progressing  Intervention: Monitor and Manage Glycemia  Description: Establish target blood glucose levels based on patient-specific factors, such as age, diabetes-related complications and illness severity.  Document blood glucose levels and monitor trend; advocate for adjustment to keep within targeted range.  Provide pharmacologic therapy to maintain blood glucose levels within targeted range.  Check blood glucose level if there is a change in mental or cognitive status.  Recognize, treat and document hypoglycemia event and potential cause.  Avoid hypoglycemic episodes by advocating for insulin dose adjustment when there is a change in condition, such as illness severity, decreased oral intake, missed or refused meals and snacks, as well as medication change that may include steroid tape  Recent Flowsheet Documentation  Taken 9/13/2022 0500 by Cassy Nichols RN  Glycemic Management: blood glucose monitored  Taken 9/12/2022 2213 by Cassy Nichols RN  Glycemic Management: blood glucose monitored  Goal: Blood Glucose Level Within Targeted Range  Outcome: Ongoing, Progressing  Intervention: Monitor and Manage Glycemia  Description: Establish target blood glucose levels based on patient-specific factors, such as age, diabetes-related complications and illness severity.  Document blood glucose levels and monitor trend; advocate for adjustment to keep within targeted range.  Provide pharmacologic therapy to maintain blood glucose levels within targeted range.  Check blood glucose level if there is a change in mental or cognitive status.  Recognize, treat and document hypoglycemia event and potential cause.  Avoid hypoglycemic episodes by advocating for insulin dose adjustment when there is a change in condition, such as illness  severity, decreased oral intake, missed or refused meals and snacks, as well as medication change that may include steroid tape  Recent Flowsheet Documentation  Taken 9/13/2022 0500 by Cassy Nichols RN  Glycemic Management: blood glucose monitored  Taken 9/12/2022 2213 by Cassy Nichols RN  Glycemic Management: blood glucose monitored     Problem: Hypertension Comorbidity  Goal: Blood Pressure in Desired Range  Outcome: Ongoing, Progressing  Intervention: Maintain Blood Pressure Management  Description: Evaluate adherence to home antihypertensive regimen (e.g., exercise and activity, diet modification, medication).  Provide scheduled antihypertensive medication; consider administration time and effects (e.g., avoid giving diuretic prior to bedtime).  Monitor response to antihypertensive medication therapy (e.g., blood pressure, electrolyte levels, medication effects).  Minimize risk of orthostatic hypotension; encourage caution with position changes, particularly if elderly.  Recent Flowsheet Documentation  Taken 9/13/2022 0500 by Cassy Nichols RN  Syncope Management: position changed slowly  Medication Review/Management: medications reviewed  Taken 9/13/2022 0200 by Cassy Nichols RN  Medication Review/Management: medications reviewed  Taken 9/13/2022 0000 by Cassy Nichols RN  Syncope Management: position changed slowly  Medication Review/Management: medications reviewed  Taken 9/12/2022 2213 by Cassy Nichols RN  Syncope Management: position changed slowly  Medication Review/Management: medications reviewed  Goal: Blood Pressure in Desired Range  Outcome: Ongoing, Progressing  Intervention: Maintain Blood Pressure Management  Description: Evaluate adherence to home antihypertensive regimen (e.g., exercise and activity, diet modification, medication).  Provide scheduled antihypertensive medication; consider administration time and effects (e.g., avoid giving diuretic prior to bedtime).  Monitor  response to antihypertensive medication therapy (e.g., blood pressure, electrolyte levels, medication effects).  Minimize risk of orthostatic hypotension; encourage caution with position changes, particularly if elderly.  Recent Flowsheet Documentation  Taken 9/13/2022 0500 by Cassy Ncihols RN  Syncope Management: position changed slowly  Medication Review/Management: medications reviewed  Taken 9/13/2022 0200 by Cassy Nichols RN  Medication Review/Management: medications reviewed  Taken 9/13/2022 0000 by Cassy Nichols RN  Syncope Management: position changed slowly  Medication Review/Management: medications reviewed  Taken 9/12/2022 2213 by Cassy Nichols RN  Syncope Management: position changed slowly  Medication Review/Management: medications reviewed     Problem: Asthma Comorbidity  Goal: Maintenance of Asthma Control  Outcome: Ongoing, Progressing  Intervention: Maintain Asthma Symptom Control  Description: Evaluate adherence to self-management (asthma action plan), such as medication, symptom-control, trigger-avoidance and self-monitoring.  Advocate for continuation of home regimen, including medication, method of delivery, schedule and symptom monitoring; acknowledge preferred modality and routine.  Minimize exposure to potential triggers, such as perfume, cleaning chemicals and all types of smoke.  Assess for proper use of inhaled medication and delivery technique; assist or reinstruct if needed.  Evaluate effectiveness of coping skills; encourage expression of feelings, expectations and concerns related to disease management and quality of life; reinforce education to enhance management plan and wellbeing.  Recent Flowsheet Documentation  Taken 9/13/2022 0500 by Cassy Nichols RN  Medication Review/Management: medications reviewed  Taken 9/13/2022 0200 by Cassy Nichols RN  Medication Review/Management: medications reviewed  Taken 9/13/2022 0000 by Cassy Nichols RN  Medication  Review/Management: medications reviewed  Taken 9/12/2022 2213 by Cassy Nichols RN  Medication Review/Management: medications reviewed     Problem: COPD (Chronic Obstructive Pulmonary Disease) Comorbidity  Goal: Maintenance of COPD Symptom Control  Outcome: Ongoing, Progressing  Intervention: Maintain COPD-Symptom Control  Description: Evaluate adherence to management plan (e.g., medication, trigger avoidance, infection prevention, self-monitoring).  Advocate for continuation of home regimen, including medication, method of delivery, schedule and symptom monitoring.  Anticipate the need for breathing techniques and activity pacing to minimize fatigue and breathlessness.  Assess for proper use of inhaled medication and delivery technique; assist or reinstruct if needed.  Evaluate effectiveness of coping skills; encourage expression of feelings, expectations and concerns related to disease management and quality of life; reinforce education to enhance management plan and wellbeing.  Recent Flowsheet Documentation  Taken 9/13/2022 0500 by Cassy Nichols RN  Supportive Measures:   active listening utilized   self-care encouraged  Medication Review/Management: medications reviewed  Taken 9/13/2022 0200 by Cassy Nichols RN  Medication Review/Management: medications reviewed  Taken 9/13/2022 0000 by Cassy Nichols RN  Supportive Measures: active listening utilized  Medication Review/Management: medications reviewed  Taken 9/12/2022 2213 by Cassy Nichols RN  Supportive Measures:   active listening utilized   self-care encouraged  Medication Review/Management: medications reviewed   Goal Outcome Evaluation:  Plan of Care Reviewed With: patient        Progress: improving

## 2022-09-13 NOTE — CASE MANAGEMENT/SOCIAL WORK
Continued Stay Note  JUSTINA Hickman     Patient Name: Dora Solorio  MRN: [id number]  Today's Date: 9/13/2022    Admit Date: 9/9/2022     Discharge Plan     Row Name 09/13/22 1328       Plan    Plan Declined HH. Home at discharge.    Plan Comments Declined HH. Home at discharge. No other discharge needs at this time. Possible dc today, 9/13.              Met with patient in room wearing PPE: mask.  Maintained distance greater than six feet and spent less than 15 minutes in the room.          Elise Carlson RN

## 2022-09-14 ENCOUNTER — TRANSITIONAL CARE MANAGEMENT TELEPHONE ENCOUNTER (OUTPATIENT)
Dept: CALL CENTER | Facility: HOSPITAL | Age: 72
End: 2022-09-14

## 2022-09-14 NOTE — OUTREACH NOTE
Call Center TCM Note    Flowsheet Row Responses   [facility] facility patient discharged from? [facility]   Does the patient have one of the following disease processes/diagnoses(primary or secondary)? Sepsis   TCM attempt successful? No   Unsuccessful attempts Attempt 1          Liliana Fierro LPN    9/14/2022, 14:18 EDT

## 2022-09-14 NOTE — OUTREACH NOTE
Call Center TCM Note    Flowsheet Row Responses   [facility] facility patient discharged from? [facility]   Does the patient have one of the following disease processes/diagnoses(primary or secondary)? Sepsis   TCM attempt successful? No   Unsuccessful attempts Attempt 2          Genoveva Petit RN    9/14/2022, 16:22 EDT

## 2022-09-15 ENCOUNTER — TRANSITIONAL CARE MANAGEMENT TELEPHONE ENCOUNTER (OUTPATIENT)
Dept: CALL CENTER | Facility: HOSPITAL | Age: 72
End: 2022-09-15

## 2022-09-15 LAB — QT INTERVAL: 400 MS

## 2022-09-15 NOTE — OUTREACH NOTE
Call Center TCM Note    Flowsheet Row Responses   [facility] patient discharged from? [facility]   Does the patient have one of the following disease processes/diagnoses(primary or secondary)? Sepsis   TCM attempt successful? Yes   Call start time 1541   Call end time 1542   Discharge diagnosis C. difficile colitis,   Dizziness,    Sepsis   Does the patient have all medications related to this admission filled (includes all antibiotics, inhalers, nebulizers,steroids,etc.) Yes   Is the patient taking all medications as directed (includes completed medication regime)? Yes   Has home health visited the patient within 72 hours of discharge? N/A   Psychosocial issues? No   Did the patient receive a copy of their discharge instructions? Yes   What is the patient's perception of their health status since discharge? Improving   Nursing interventions Nurse provided patient education   Is the patient/caregiver able to teach back TIME? T emperature - higher or lower than normal, I nfection - may have signs and symptoms of an infection, M ental Decline - confused, sleepy, difficult to arouse, E xtremely Ill - severe pain, discomfort, shortness of breath   Nursing interventions Nurse provided patient education   Is the patient/caregiver able to teach back signs and symptoms of worsening condition: Fever   Is the patient/caregiver able to teach back the hierarchy of who to call/visit for symptoms/problems? PCP, Specialist, Home health nurse, Urgent Care, ED, 911 Yes   TCM call completed? Yes   Wrap up additional comments Doing well, no questions, declined to make appt with PCP today because her daughter takes her to appts and she has to see her schedule.          Genoveva Petit RN    9/15/2022, 15:42 EDT

## 2022-09-16 LAB
BACTERIA SPEC AEROBE CULT: NORMAL
BACTERIA SPEC AEROBE CULT: NORMAL

## 2022-10-06 DIAGNOSIS — I10 ESSENTIAL HYPERTENSION: ICD-10-CM

## 2022-10-06 RX ORDER — BISOPROLOL FUMARATE 10 MG/1
TABLET, FILM COATED ORAL
Qty: 90 TABLET | Refills: 0 | Status: SHIPPED | OUTPATIENT
Start: 2022-10-06 | End: 2023-02-16

## 2022-10-06 RX ORDER — ESCITALOPRAM OXALATE 10 MG/1
TABLET ORAL
Qty: 90 TABLET | Refills: 0 | Status: SHIPPED | OUTPATIENT
Start: 2022-10-06 | End: 2023-01-02

## 2022-10-06 RX ORDER — LISINOPRIL 20 MG/1
TABLET ORAL
Qty: 180 TABLET | Refills: 0 | Status: SHIPPED | OUTPATIENT
Start: 2022-10-06 | End: 2022-12-15 | Stop reason: SDUPTHER

## 2022-10-06 RX ORDER — ATORVASTATIN CALCIUM 20 MG/1
TABLET, FILM COATED ORAL
Qty: 90 TABLET | Refills: 0 | Status: SHIPPED | OUTPATIENT
Start: 2022-10-06 | End: 2023-01-02

## 2022-10-06 RX ORDER — AMLODIPINE BESYLATE 10 MG/1
TABLET ORAL
Qty: 90 TABLET | Refills: 0 | Status: SHIPPED | OUTPATIENT
Start: 2022-10-06 | End: 2023-02-16

## 2022-10-06 RX ORDER — HYDROCHLOROTHIAZIDE 50 MG/1
TABLET ORAL
Qty: 90 TABLET | Refills: 0 | Status: SHIPPED | OUTPATIENT
Start: 2022-10-06 | End: 2023-02-16

## 2022-12-05 NOTE — ED PROVIDER NOTES
Pt recently evaluated  for nausea, bloating and GERD He has GERD for several years. Taking dexilant for > 5 years now. GERD symptoms seem mostly well controlled.  Pt had EGD and COLONOSCOPY by Dr Peraza from surgery in 1/2018 which reportedly showed esophagitis, gastritis without H Pylori, diverticulosis and hemorrhoids. Denies family history of colorectal neoplasm. EGD 11/8/22 showed gastric erythema. Esophageal and gastric biopsy results are pending at this time.  Pt states that he has developed diarrhea, bloating and fatigue over last 3-4 weeks Multiple loose stools after eating, sometimes dark, however no red blood or black stools Bloating always an issues, mostly with food  Pt c/o fatigue and lack of energy Denies weight loss GERD symptoms well controlled with dexilant. Subjective   PIT    Chief complaint weakness and dizziness    History of present illness 72-year-old female who states about 9:00 this morning she developed sudden onset of dizziness and trouble walking.  She denies any headache or vision changes or speech difficulty.  She describes it as as moderate degree worse in her she tries to get up and walk and move.  Is been pretty much continuous since 9:00 PM.  She denies slurred speech or paralysis.  No loss of bladder bowel control no fever chills no chest pain neck arm or jaw pain.  Patient states she was unable to walk on her own and had to have 2 people helping her.  No recent long car ride plane or immobilization foreign travels.  Denies any pain at this time.  Reported by triage nurse to had trouble walking in and appear to be ataxic.          Review of Systems   Constitutional: Negative for chills and fever.   HENT: Positive for ear pain. Negative for congestion and sinus pressure.    Eyes: Negative for photophobia and visual disturbance.   Respiratory: Negative for chest tightness and shortness of breath.    Cardiovascular: Negative for chest pain and palpitations.   Gastrointestinal: Negative for abdominal pain and vomiting.   Endocrine: Negative for cold intolerance and heat intolerance.   Genitourinary: Negative for difficulty urinating and dysuria.   Musculoskeletal: Negative for back pain and neck pain.   Neurological: Positive for dizziness, weakness and light-headedness. Negative for facial asymmetry and speech difficulty.   Psychiatric/Behavioral: Negative for confusion.       Past Medical History:   Diagnosis Date   • Adjustment disorder with depressed mood 09/12/2018   • Allergic Years    Sulfa drugs   • Arthritis Years    [place] joints. Mostly back, knees, hips, fingers   • Depression 09/12/2018   • Diabetes mellitus, type II (HCC) 07/03/2013   • Essential hypertension 07/03/2013   • Gout 08/12/2014   • Heart attack (HCC)    • Hyperlipidemia  2020   • Hypertension    • Inflammatory bowel disease    • Irritable bowel syndrome without diarrhea 2017   • Kidney stone ?2016?    In hospital overnight   • Osteopenia 2017   • Peripheral neuropathy 2017       Allergies   Allergen Reactions   • Sulfa Antibiotics Rash       Past Surgical History:   Procedure Laterality Date   •  SECTION      x2   • KNEE ARTHROSCOPY Bilateral    • TUBAL ABDOMINAL LIGATION  May 1973       Family History   Adopted: Yes   Problem Relation Age of Onset   • Epilepsy Mother    • Alcohol abuse Father    • Other Father         Alzheimer s   • No Known Problems Sister    • Other Maternal Uncle         alzhimers   • No Known Problems Maternal Grandmother    • No Known Problems Maternal Grandfather    • No Known Problems Paternal Grandmother    • No Known Problems Paternal Grandfather        Social History     Socioeconomic History   • Marital status:      Spouse name: Rishabh   • Number of children: 2   • Years of education: 12   Tobacco Use   • Smoking status: Never Smoker   • Smokeless tobacco: Never Used   Vaping Use   • Vaping Use: Never used   Substance and Sexual Activity   • Alcohol use: Never   • Drug use: Never   • Sexual activity: Not Currently     Partners: Male           Objective   Physical Exam  Constitutional 72-year-old female awake alert no distress.  Temperature was 100.  Patient initial blood pressure was running in the 155/75.  Heart rate was noted to be in the 60 range sats are good at 97% on room air with respiratory rate 18.  HEENT extraocular muscles are intact pupils equal and reactive there is no nystagmus.  No photophobia.  The TMs are clear the mouth is clear.  Neck supple no adenopathy no JVD no bruits no meningeal signs.  Lungs clear no retractions.  Heart regular without murmur.  Abdomen soft without tenderness good bowel sounds no pulsatile masses.  Extremities pulses are equal throughout upper and lower extremities  no edema cords or Homans' sign or evidence of DVT.  Skin warm and dry without rashes or cellulitic changes.  Neurologic awake alert orientated x4 no facial asymmetry normal speech tongue soft palate normal no appreciated nystagmus.  No drift the arms or legs normal finger-to-nose.  Patient sits up and she has no truncal ataxia.  But has had trouble walking at home.  Procedures           ED Course      Results for orders placed or performed during the hospital encounter of 09/09/22   COVID-19,CEPHEID/[place],COR/LILIA/PAD/[facility] IN-HOUSE(OR EMERGENT/ADD-ON),NP SWAB IN TRANSPORT MEDIA 3-4 HR TAT, RT-PCR - Swab, Nasopharynx    Specimen: Nasopharynx; Swab   Result Value Ref Range    COVID19 Not Detected Not Detected - Ref. Range   Comprehensive Metabolic Panel    Specimen: Arm, Right; Blood   Result Value Ref Range    Glucose 119 (H) 65 - 99 mg/dL    BUN 21 8 - 23 mg/dL    Creatinine 1.47 (H) 0.57 - 1.00 mg/dL    Sodium 135 (L) 136 - 145 mmol/L    Potassium 4.9 3.5 - 5.2 mmol/L    Chloride 101 98 - 107 mmol/L    CO2 21.0 (L) 22.0 - 29.0 mmol/L    Calcium 9.0 8.6 - 10.5 mg/dL    Total Protein 7.6 6.0 - 8.5 g/dL    Albumin 3.80 3.50 - 5.20 g/dL    ALT (SGPT) 6 1 - 33 U/L    AST (SGOT) 10 1 - 32 U/L    Alkaline Phosphatase 69 39 - 117 U/L    Total Bilirubin 0.5 0.0 - 1.2 mg/dL    Globulin 3.8 gm/dL    A/G Ratio 1.0 g/dL    BUN/Creatinine Ratio 14.3 7.0 - 25.0    Anion Gap 13.0 5.0 - 15.0 mmol/L    eGFR 37.3 (L) >60.0 mL/min/1.73   Protime-INR    Specimen: Arm, Right; Blood   Result Value Ref Range    Protime 11.7 9.6 - 11.7 Seconds    INR 1.14 (H) 0.93 - 1.10   aPTT    Specimen: Arm, Right; Blood   Result Value Ref Range    PTT 28.5 24.0 - 31.0 seconds   Troponin    Specimen: Arm, Right; Blood   Result Value Ref Range    Troponin T <0.010 0.000 - 0.030 ng/mL   CBC Auto Differential    Specimen: Arm, Right; Blood   Result Value Ref Range    WBC 10.80 3.40 - 10.80 10*3/mm3    RBC 4.50 3.77 - 5.28 10*6/mm3    Hemoglobin 12.6 12.0 -  15.9 g/dL    Hematocrit 39.0 34.0 - 46.6 %    MCV 86.6 79.0 - 97.0 fL    MCH 27.9 26.6 - 33.0 pg    MCHC 32.2 31.5 - 35.7 g/dL    RDW 14.0 12.3 - 15.4 %    RDW-SD 42.9 37.0 - 54.0 fl    MPV 8.9 6.0 - 12.0 fL    Platelets 157 140 - 450 10*3/mm3    Neutrophil % 80.8 (H) 42.7 - 76.0 %    Lymphocyte % 9.1 (L) 19.6 - 45.3 %    Monocyte % 8.9 5.0 - 12.0 %    Eosinophil % 0.8 0.3 - 6.2 %    Basophil % 0.4 0.0 - 1.5 %    Neutrophils, Absolute 8.70 (H) 1.70 - 7.00 10*3/mm3    Lymphocytes, Absolute 1.00 0.70 - 3.10 10*3/mm3    Monocytes, Absolute 1.00 (H) 0.10 - 0.90 10*3/mm3    Eosinophils, Absolute 0.10 0.00 - 0.40 10*3/mm3    Basophils, Absolute 0.00 0.00 - 0.20 10*3/mm3    nRBC 0.1 0.0 - 0.2 /100 WBC   Urinalysis With Microscopic If Indicated (No Culture) - Urine, Catheter    Specimen: Urine, Catheter   Result Value Ref Range    Color, UA Yellow Yellow, Straw    Appearance, UA Clear Clear    pH, UA <=5.0 5.0 - 8.0    Specific Gravity, UA 1.047 (H) 1.005 - 1.030    Glucose, UA Negative Negative    Ketones, UA Negative Negative    Bilirubin, UA Negative Negative    Blood, UA Trace (A) Negative    Protein, UA Negative Negative    Leuk Esterase, UA Negative Negative    Nitrite, UA Negative Negative    Urobilinogen, UA 0.2 E.U./dL 0.2 - 1.0 E.U./dL   Carbon Monoxide, Blood    Specimen: Blood   Result Value Ref Range    Carbon Monoxide, Blood 2.2 0 - 3 %   Urinalysis, Microscopic Only - Urine, Catheter    Specimen: Urine, Catheter   Result Value Ref Range    RBC, UA 0-2 (A) None Seen /HPF    WBC, UA None Seen None Seen /HPF    Bacteria, UA None Seen None Seen /HPF    Squamous Epithelial Cells, UA 0-2 None Seen, 0-2 /HPF    Hyaline Casts, UA None Seen None Seen /LPF    Methodology Manual Light Microscopy    POC Glucose Once    Specimen: Blood   Result Value Ref Range    Glucose 117 (H) 70 - 105 mg/dL   ECG 12 Lead   Result Value Ref Range    QT Interval 400 ms   Type & Screen    Specimen: Arm, Right; Blood   Result Value Ref  Range    ABO Type B     RH type Positive     Antibody Screen Negative     T&S Expiration Date 9/12/2022 11:59:59 PM    Green Top (Gel)   Result Value Ref Range    Extra Tube Hold for add-ons.    Lavender Top   Result Value Ref Range    Extra Tube hold for add-on    Gold Top - SST   Result Value Ref Range    Extra Tube Hold for add-ons.    Light Blue Top   Result Value Ref Range    Extra Tube Hold for add-ons.      CT Angiogram Neck    Result Date: 9/9/2022   1. Approximately 60% stenosis of the origin of the right internal carotid artery 2. No significant left carotid stenosis or occlusion 3. Patent vertebral arteries without significant stenosis 4. No large intracranial arterial occlusion. The posterior circulation is supplied from the anterior circulation by prominent posterior communicating arteries (versus persistent fetal origin), with associated relatively small basilar artery  These results communicated by telephone to the emergency department at 4:15 PM  Electronically Signed By-Gabriel Vicente On:9/9/2022 4:15 PM This report was finalized on [id number] by  Gabriel Vicente, .    XR Chest 1 View    Result Date: 9/9/2022  Left basilar airspace opacity likely atelectasis unless the patient has clinical findings of pneumonia  Electronically Signed By-Gabriel Vicente On:9/9/2022 3:02 PM This report was finalized on [id number] by  Gabriel Vicente, .    CT Head Without Contrast Stroke Protocol    Result Date: 9/9/2022   1. No acute intracranial finding. No significant change compared to 6/15/2022. Chronic right basal ganglia lacunar infarct.  Electronically Signed By-Za Garcia MD On:9/9/2022 2:04 PM This report was finalized on [id number] by  Za Garcia MD.    CT Angiogram Head w AI Analysis of LVO    Result Date: 9/9/2022   1. Approximately 60% stenosis of the origin of the right internal carotid artery 2. No significant left carotid stenosis or occlusion 3. Patent vertebral arteries without  significant stenosis 4. No large intracranial arterial occlusion. The posterior circulation is supplied from the anterior circulation by prominent posterior communicating arteries (versus persistent fetal origin), with associated relatively small basilar artery  These results communicated by telephone to the emergency department at 4:15 PM  Electronically Signed By-Gabriel Vicente On:9/9/2022 4:15 PM This report was finalized on [id number] by  Gabriel Vicente, .    Medications   sodium chloride 0.9 % flush 10 mL (has no administration in time range)   aspirin tablet 325 mg (has no administration in time range)   iopamidol (ISOVUE-370) 76 % injection 100 mL (100 mL Intravenous Given 9/9/22 1412)   sodium chloride 0.9 % bolus 1,000 mL (1,000 mL Intravenous New Bag 9/9/22 1540)   acetaminophen (TYLENOL) tablet 1,000 mg (1,000 mg Oral Given 9/9/22 1540)          EKG my interpretation normal sinus rhythm rate of 70 borderline first AV block QTC of 425 patient does have some Q waves noted inferiorly but this is unchanged from previous EKG abnormal                                  MDM  Number of Diagnoses or Management Options  Dizzy: new and requires workup  Weakness: new and requires workup  Diagnosis management comments: Medical decision making.  Patient IV established placed on monitor and EKG obtained reviewed by me showed a sinus rhythm without acute findings.  Underwent a code stroke protocol because of trouble walking and the dizziness although its been going on since 9:00 AM she is not really a tPA candidate based on her timing and her symptoms at this time NIH is 0 currently without signs of acute large vessel occlusion.  Patient been seen by neurology and CT scan and felt this was not related to a stroke and was more just generalized weakness and metabolic.  Labs obtained reviewed by me chemistries unremarkable creatinine 1.47.  CO2 was 21 INR was 1.94 CBC normal troponin was negative urine was negative carbon  oxide level 3.2 urine was negative chest x-ray obtained reviewed by me as well as radiology without acute findings some atelectasis noted CT head without no acute findings no significant change chronic basal ganglia lacunar infarct.  Patient has CTA with approximately 6.  60% stenosis of the origin of the right internal carotid artery.  No significant left carotid artery stenosis.  No large vessel occlusion.  Posterior circulation is supplied from the anterior circulation by prominent posterior communicating arteries.  Patient repeat exam was still feeling little bit dizzy although she did feel improved.  She is able to sit up without truncal ataxia there is no focal weakness in extremities NIH of 0.  The patient had sudden onset of this lightheadedness and dizziness and trouble walking which need assistance to walk-in.  CT angiogram showed no large vessel occlusions.  Although 60% lesion was noted in the right ICA.  Patient given aspirin p.o. he was made aware of the findings.  In light of her difficulty walking and needing assistance with this which is not normal for her according to her and family with her.  I will put her in the hospital I talked to the hospitalist.  Patient made aware of the findings I will see evidence of an acute infection meningitis or encephalitis consider stroke or TIA.  Just generalized metabolic process.  Not a complete list of all possibilities of weakness.  Currently stable and improved..  All labs obtained reviewed by me CT obtained reviewed by me as well as chest x-ray.  CT angiogram reports reviewed by me.       Amount and/or Complexity of Data Reviewed  Clinical lab tests: reviewed  Tests in the radiology section of CPT®: reviewed  Discuss the patient with other providers: yes    Risk of Complications, Morbidity, and/or Mortality  Presenting problems: high  Diagnostic procedures: high  Management options: high    Patient Progress  Patient progress: stable      Final diagnoses:    Dizzy   Weakness       ED Disposition  ED Disposition     ED Disposition   Decision to Admit    Condition   --    Comment   Level of Care: Telemetry [5]   Admitting Physician: DAYTON SPANN [[id number]]   Attending Physician: DAYTON SPANN [[id number]]   Bed Request Comments: agusto               No follow-up provider specified.       Medication List      No changes were made to your prescriptions during this visit.          Jules Grayson MD  09/09/22 [id number]

## 2022-12-15 DIAGNOSIS — I10 ESSENTIAL HYPERTENSION: ICD-10-CM

## 2022-12-15 RX ORDER — LISINOPRIL 20 MG/1
20 TABLET ORAL 2 TIMES DAILY
Qty: 180 TABLET | Refills: 0 | Status: SHIPPED | OUTPATIENT
Start: 2022-12-15 | End: 2022-12-19 | Stop reason: SDUPTHER

## 2022-12-19 DIAGNOSIS — I10 ESSENTIAL HYPERTENSION: ICD-10-CM

## 2022-12-19 RX ORDER — LISINOPRIL 20 MG/1
20 TABLET ORAL 2 TIMES DAILY
Qty: 180 TABLET | Refills: 0 | Status: SHIPPED | OUTPATIENT
Start: 2022-12-19

## 2023-01-02 RX ORDER — ATORVASTATIN CALCIUM 20 MG/1
TABLET, FILM COATED ORAL
Qty: 90 TABLET | Refills: 0 | Status: SHIPPED | OUTPATIENT
Start: 2023-01-02 | End: 2023-03-28

## 2023-01-02 RX ORDER — ESCITALOPRAM OXALATE 10 MG/1
TABLET ORAL
Qty: 90 TABLET | Refills: 0 | Status: SHIPPED | OUTPATIENT
Start: 2023-01-02 | End: 2023-03-28

## 2023-02-16 DIAGNOSIS — I10 ESSENTIAL HYPERTENSION: ICD-10-CM

## 2023-02-16 RX ORDER — BISOPROLOL FUMARATE 10 MG/1
TABLET, FILM COATED ORAL
Qty: 90 TABLET | Refills: 0 | Status: SHIPPED | OUTPATIENT
Start: 2023-02-16

## 2023-02-16 RX ORDER — HYDROCHLOROTHIAZIDE 50 MG/1
TABLET ORAL
Qty: 90 TABLET | Refills: 0 | Status: SHIPPED | OUTPATIENT
Start: 2023-02-16

## 2023-02-16 RX ORDER — AMLODIPINE BESYLATE 10 MG/1
TABLET ORAL
Qty: 90 TABLET | Refills: 0 | Status: SHIPPED | OUTPATIENT
Start: 2023-02-16

## 2023-02-18 NOTE — TELEPHONE ENCOUNTER
Caller: Dora Solorio    Relationship to patient: Self    Best call back number: [phone]    Patient is needing: PATIENT CALLED AND REPORTED THAT SHE HAD TRIPPED AND FELL OVER HER 'S POWERCHAIR, BUT WAS ABLE TO GET UP AND BACK ON HER FEET     SHE HAD AN APPOINTMENT WITH BILLY HIGUERA IN July, THAT NEEDED TO BE RESCHEDULED SO HUB RESCHEDULED, AND OFFERED TO MOVE IT UP SOONER, SHE WAS OK WITH THAT BUT SAID HER DAUGHTER COULDN'T BRING HER IN UNTIL NEXT Wednesday SO HUB SCHEDULED FOR 06/22     SHE SAID IF SHE FELT BETTER BEFORE THEN SHE MAY CANCEL, PLEASE CONTACT PATIENT IF SHE NEEDS TO DO SOMETHING DIFFERENT    
DIFFUSE

## 2023-03-28 RX ORDER — ATORVASTATIN CALCIUM 20 MG/1
TABLET, FILM COATED ORAL
Qty: 90 TABLET | Refills: 0 | Status: SHIPPED | OUTPATIENT
Start: 2023-03-28

## 2023-03-28 RX ORDER — ESCITALOPRAM OXALATE 10 MG/1
TABLET ORAL
Qty: 90 TABLET | Refills: 0 | Status: SHIPPED | OUTPATIENT
Start: 2023-03-28

## 2023-05-16 DIAGNOSIS — E11.42 DIABETIC PERIPHERAL NEUROPATHY: ICD-10-CM

## 2023-05-17 RX ORDER — GABAPENTIN 600 MG/1
TABLET ORAL
Qty: 270 TABLET | Refills: 0 | OUTPATIENT
Start: 2023-05-17

## 2023-06-06 ENCOUNTER — LAB (OUTPATIENT)
Dept: FAMILY MEDICINE CLINIC | Facility: CLINIC | Age: 73
End: 2023-06-06
Payer: MEDICARE

## 2023-06-06 ENCOUNTER — OFFICE VISIT (OUTPATIENT)
Dept: FAMILY MEDICINE CLINIC | Facility: CLINIC | Age: 73
End: 2023-06-06
Payer: MEDICARE

## 2023-06-06 VITALS
RESPIRATION RATE: 15 BRPM | SYSTOLIC BLOOD PRESSURE: 145 MMHG | BODY MASS INDEX: 33.46 KG/M2 | HEART RATE: 52 BPM | WEIGHT: 196 LBS | OXYGEN SATURATION: 97 % | HEIGHT: 64 IN | DIASTOLIC BLOOD PRESSURE: 78 MMHG | TEMPERATURE: 98 F

## 2023-06-06 DIAGNOSIS — E11.42 DIABETIC PERIPHERAL NEUROPATHY: ICD-10-CM

## 2023-06-06 DIAGNOSIS — E11.65 TYPE 2 DIABETES MELLITUS WITH HYPERGLYCEMIA, WITHOUT LONG-TERM CURRENT USE OF INSULIN: Primary | ICD-10-CM

## 2023-06-06 DIAGNOSIS — E78.2 MIXED HYPERLIPIDEMIA: ICD-10-CM

## 2023-06-06 DIAGNOSIS — E11.621 TYPE 2 DIABETES MELLITUS WITH FOOT ULCER (CODE): ICD-10-CM

## 2023-06-06 DIAGNOSIS — E11.65 TYPE 2 DIABETES MELLITUS WITH HYPERGLYCEMIA, WITHOUT LONG-TERM CURRENT USE OF INSULIN: ICD-10-CM

## 2023-06-06 DIAGNOSIS — F32.9 REACTIVE DEPRESSION: ICD-10-CM

## 2023-06-06 LAB
ALBUMIN SERPL-MCNC: 3.9 G/DL (ref 3.5–5.2)
ALBUMIN/GLOB SERPL: 1.1 G/DL
ALP SERPL-CCNC: 80 U/L (ref 39–117)
ALT SERPL W P-5'-P-CCNC: 8 U/L (ref 1–33)
ANION GAP SERPL CALCULATED.3IONS-SCNC: 11.9 MMOL/L (ref 5–15)
AST SERPL-CCNC: 8 U/L (ref 1–32)
BILIRUB SERPL-MCNC: 0.3 MG/DL (ref 0–1.2)
BUN SERPL-MCNC: 13 MG/DL (ref 8–23)
BUN/CREAT SERPL: 13.5 (ref 7–25)
CALCIUM SPEC-SCNC: 9.2 MG/DL (ref 8.6–10.5)
CHLORIDE SERPL-SCNC: 106 MMOL/L (ref 98–107)
CHOLEST SERPL-MCNC: 178 MG/DL (ref 0–200)
CO2 SERPL-SCNC: 19.1 MMOL/L (ref 22–29)
CREAT SERPL-MCNC: 0.96 MG/DL (ref 0.57–1)
EGFRCR SERPLBLD CKD-EPI 2021: 62.2 ML/MIN/1.73
GLOBULIN UR ELPH-MCNC: 3.7 GM/DL
GLUCOSE SERPL-MCNC: 202 MG/DL (ref 65–99)
HBA1C MFR BLD: 13.7 % (ref 4.8–5.6)
HDLC SERPL-MCNC: 44 MG/DL (ref 40–60)
LDLC SERPL CALC-MCNC: 93 MG/DL (ref 0–100)
LDLC/HDLC SERPL: 1.94 {RATIO}
POTASSIUM SERPL-SCNC: 5.4 MMOL/L (ref 3.5–5.2)
PROT SERPL-MCNC: 7.6 G/DL (ref 6–8.5)
SODIUM SERPL-SCNC: 137 MMOL/L (ref 136–145)
TRIGL SERPL-MCNC: 243 MG/DL (ref 0–150)
VLDLC SERPL-MCNC: 41 MG/DL (ref 5–40)

## 2023-06-06 PROCEDURE — 1159F MED LIST DOCD IN RCRD: CPT | Performed by: NURSE PRACTITIONER

## 2023-06-06 PROCEDURE — 3077F SYST BP >= 140 MM HG: CPT | Performed by: NURSE PRACTITIONER

## 2023-06-06 PROCEDURE — 83036 HEMOGLOBIN GLYCOSYLATED A1C: CPT | Performed by: NURSE PRACTITIONER

## 2023-06-06 PROCEDURE — 99214 OFFICE O/P EST MOD 30 MIN: CPT | Performed by: NURSE PRACTITIONER

## 2023-06-06 PROCEDURE — 36415 COLL VENOUS BLD VENIPUNCTURE: CPT

## 2023-06-06 PROCEDURE — 80061 LIPID PANEL: CPT | Performed by: NURSE PRACTITIONER

## 2023-06-06 PROCEDURE — 80053 COMPREHEN METABOLIC PANEL: CPT | Performed by: NURSE PRACTITIONER

## 2023-06-06 PROCEDURE — 1160F RVW MEDS BY RX/DR IN RCRD: CPT | Performed by: NURSE PRACTITIONER

## 2023-06-06 PROCEDURE — 3078F DIAST BP <80 MM HG: CPT | Performed by: NURSE PRACTITIONER

## 2023-06-06 RX ORDER — GABAPENTIN 600 MG/1
600 TABLET ORAL 3 TIMES DAILY
COMMUNITY
End: 2023-06-06 | Stop reason: SDUPTHER

## 2023-06-06 RX ORDER — GLIMEPIRIDE 2 MG/1
4 TABLET ORAL 2 TIMES DAILY
Qty: 360 TABLET | Refills: 0 | Status: SHIPPED | OUTPATIENT
Start: 2023-06-06

## 2023-06-06 RX ORDER — BUPROPION HYDROCHLORIDE 150 MG/1
150 TABLET ORAL DAILY
Qty: 30 TABLET | Refills: 5 | Status: SHIPPED | OUTPATIENT
Start: 2023-06-06

## 2023-06-06 RX ORDER — GABAPENTIN 600 MG/1
600 TABLET ORAL 3 TIMES DAILY
Qty: 90 TABLET | Refills: 2 | Status: SHIPPED | OUTPATIENT
Start: 2023-06-06

## 2023-06-06 NOTE — PROGRESS NOTES
Chief Complaint  Hypertension and Diabetes  Subjective        Dora Solorio presents to [facility]  History of Present Illness  Pt comes in today for follow up on HTN and DM  Has not seen endo since last year. In Sept when in the hospital her A1C was >11.   She has been without amaryl.   States Dr. Tillman was trying her on jardiance, but too expensive. Not doing well with diet.  recently passed away and struggling with that. Feels her A1c may be higher now.   Having some increased depression and lack of motivation since husbands death.     Her main purpose for today's visit was that she needed a refill on gabapentin.   Diabetes  She has type 2 diabetes mellitus. No MedicAlert identification noted. The initial diagnosis of diabetes was made 22 Years ago. Hypoglycemia symptoms include nervousness/anxiousness. Pertinent negatives for hypoglycemia include no confusion, dizziness, headaches, hunger, mood changes, pallor, seizures, sleepiness, speech difficulty, sweats or tremors. Associated symptoms include fatigue, foot paresthesias, foot ulcerations, polydipsia and weakness. Pertinent negatives for diabetes include no blurred vision, no chest pain, no polyphagia, no polyuria, no visual change and no weight loss. Hypoglycemia complications include hospitalization. Pertinent negatives for hypoglycemia complications include no blackouts, no nocturnal hypoglycemia, no required assistance and no required glucagon injection. Diabetic complications include peripheral neuropathy. Pertinent negatives for diabetic complications include no CVA, heart disease, impotence, nephropathy, PVD or retinopathy. Risk factors for coronary artery disease include dyslipidemia, hypertension, sedentary lifestyle and stress. Current diabetic treatment includes oral agent (dual therapy). She is compliant with treatment some of the time. Her weight is fluctuating minimally. She is following a generally  "unhealthy diet. When asked about meal planning, she reported none. She has not had a previous visit with a dietitian. She rarely participates in exercise. She monitors blood glucose at home 3-4 x per week. She monitors urine at home <1 x per month. Blood glucose monitoring compliance is adequate. Her home blood glucose trend is fluctuating minimally. Her breakfast blood glucose is taken between 7-8 am. Her breakfast blood glucose range is generally >200 mg/dl. Her overall blood glucose range is >200 mg/dl. She does not see a podiatrist.Eye exam is current.   Review of Systems   Constitutional:  Positive for fatigue. Negative for unexpected weight loss.   Eyes:  Negative for blurred vision.   Cardiovascular:  Negative for chest pain.   Endocrine: Positive for polydipsia. Negative for polyphagia and polyuria.   Genitourinary:  Negative for impotence.   Skin:  Negative for pallor.   Neurological:  Positive for weakness. Negative for dizziness, tremors, seizures, speech difficulty and confusion.   Psychiatric/Behavioral:  The patient is nervous/anxious.    Objective     Vital Signs:   /78   Pulse 52   Temp 98 °F (36.7 °C)   Resp 15   Ht 162.6 cm (64\")   Wt 88.9 kg (196 lb)   SpO2 97%   BMI 33.64 kg/m²       BP Readings from Last 3 Encounters:   06/06/23 145/78   09/13/22 134/54   06/23/22 155/75       Wt Readings from Last 3 Encounters:   06/06/23 88.9 kg (196 lb)   09/10/22 87.5 kg (193 lb)   06/23/22 90.3 kg (199 lb)     Physical Exam  Constitutional:       Appearance: She is well-developed.   Eyes:      Pupils: Pupils are equal, round, and reactive to light.   Cardiovascular:      Rate and Rhythm: Normal rate and regular rhythm.   Pulmonary:      Effort: Pulmonary effort is normal.      Breath sounds: Normal breath sounds.   Neurological:      Mental Status: She is alert and oriented to person, place, and time.      Result Review :                 Assessment and Plan    Diagnoses and all orders for this " visit:    1. Type 2 diabetes mellitus with hyperglycemia, without long-term current use of insulin (Primary)  -     glimepiride (AMARYL) 2 MG tablet; Take 2 tablets by mouth 2 (Two) Times a Day.  Dispense: 360 tablet; Refill: 0  -     Comprehensive Metabolic Panel; Future  -     Hemoglobin A1c; Future    2. Diabetic peripheral neuropathy  -     gabapentin (NEURONTIN) 600 MG tablet; Take 1 tablet by mouth 3 (Three) Times a Day.  Dispense: 90 tablet; Refill: 2    3. Mixed hyperlipidemia  -     Lipid Panel; Future    4. Reactive depression  -     buPROPion XL (Wellbutrin XL) 150 MG 24 hr tablet; Take 1 tablet by mouth Daily.  Dispense: 30 tablet; Refill: 5    5. Type 2 diabetes mellitus with foot ulcer (CODE)    Other orders  -     glucose blood (Accu-Chek Guide) test strip; test once daily  Dispense: 100 each; Refill: 2    Check labs  Add wellbutrin  Refill meds  Discussed importance of regular exercise and recommended starting or continuing a regular exercise program for good health. The patient was also encouraged to lose weight for better health.   The importance of monitoring blood sugar regularly was reviewed.  The importance of monitoring the HgBA1C level regularly was reviewed.  The importance of monitoring urine microalbumin regularly to check for kidney damage was reviewed.   The importance of annual eye exams to prevent blindness was reviewed.  The importance of proper foot care and regularly checking feet to prevent sores and possibly loss of limbs was reviewed.   During this office visit, we discussed the pertinent aspects of the visit and treatment recommendations. Pt verbalizes understanding. Follow up was discussed. Patient was given the opportunity to ask questions and discuss other concerns.         Follow Up   Return in about 3 weeks (around 6/27/2023) for Medicare Wellness.  Patient was given instructions and counseling regarding her condition or for health maintenance advice. Please see specific  information pulled into the AVS if appropriate.

## 2023-06-07 RX ORDER — TIRZEPATIDE 2.5 MG/.5ML
2.5 INJECTION, SOLUTION SUBCUTANEOUS WEEKLY
Qty: 0.5 ML | Refills: 3 | Status: SHIPPED | OUTPATIENT
Start: 2023-06-07

## 2023-06-07 NOTE — PROGRESS NOTES
A1c was high at 13.7.   Start mounjaro and needs to follow up with endo ASAP.   I will call in rx for mounjaro.

## 2023-06-12 ENCOUNTER — TELEPHONE (OUTPATIENT)
Dept: FAMILY MEDICINE CLINIC | Facility: CLINIC | Age: 73
End: 2023-06-12
Payer: MEDICARE

## 2023-06-12 NOTE — TELEPHONE ENCOUNTER
----- Message from BRANDON Levy sent at 6/12/2023  7:16 AM EDT -----  Regarding: FW: Diabetic supplies  Contact: [phone]  Can you look into this.   ----- Message -----  From: Dora Solorio  Sent: 6/11/2023   2:51 PM EDT  To: BRANDON Levy  Subject: Diabetic supplies                                I still have not gotten my diabetic supplies. Pharmacy says something about prescription needs to be written differently.  They were supposed to contact your office.  Thank you. Dora Solorio 1950.

## 2023-06-14 ENCOUNTER — TELEPHONE (OUTPATIENT)
Dept: FAMILY MEDICINE CLINIC | Facility: CLINIC | Age: 73
End: 2023-06-14

## 2023-06-14 RX ORDER — BLOOD SUGAR DIAGNOSTIC
STRIP MISCELLANEOUS
Qty: 200 EACH | Refills: 12 | Status: SHIPPED | OUTPATIENT
Start: 2023-06-14

## 2023-07-22 ENCOUNTER — APPOINTMENT (OUTPATIENT)
Dept: GENERAL RADIOLOGY | Facility: HOSPITAL | Age: 73
End: 2023-07-22
Payer: MEDICARE

## 2023-07-22 ENCOUNTER — APPOINTMENT (OUTPATIENT)
Dept: CT IMAGING | Facility: HOSPITAL | Age: 73
End: 2023-07-22
Payer: MEDICARE

## 2023-07-22 ENCOUNTER — HOSPITAL ENCOUNTER (OUTPATIENT)
Facility: HOSPITAL | Age: 73
Setting detail: OBSERVATION
Discharge: HOME | End: 2023-07-24
Attending: EMERGENCY MEDICINE | Admitting: STUDENT IN AN ORGANIZED HEALTH CARE EDUCATION/TRAINING PROGRAM
Payer: MEDICARE

## 2023-07-22 DIAGNOSIS — R55 NEAR SYNCOPE: Primary | ICD-10-CM

## 2023-07-22 DIAGNOSIS — I95.1 ORTHOSTATIC HYPOTENSION: ICD-10-CM

## 2023-07-22 DIAGNOSIS — R53.1 WEAKNESS: ICD-10-CM

## 2023-07-22 DIAGNOSIS — N39.0 URINARY TRACT INFECTION WITHOUT HEMATURIA, SITE UNSPECIFIED: ICD-10-CM

## 2023-07-22 LAB
ALBUMIN SERPL-MCNC: 4.1 G/DL (ref 3.5–5.2)
ALBUMIN/GLOB SERPL: 1.1 G/DL
ALP SERPL-CCNC: 89 U/L (ref 39–117)
ALT SERPL W P-5'-P-CCNC: 9 U/L (ref 1–33)
ANION GAP SERPL CALCULATED.3IONS-SCNC: 12 MMOL/L (ref 5–15)
AST SERPL-CCNC: 11 U/L (ref 1–32)
B PARAPERT DNA SPEC QL NAA+PROBE: NOT DETECTED
B PERT DNA SPEC QL NAA+PROBE: NOT DETECTED
BACTERIA UR QL AUTO: ABNORMAL /HPF
BASOPHILS # BLD AUTO: 0 10*3/MM3 (ref 0–0.2)
BASOPHILS NFR BLD AUTO: 0.3 % (ref 0–1.5)
BILIRUB SERPL-MCNC: 0.3 MG/DL (ref 0–1.2)
BILIRUB UR QL STRIP: ABNORMAL
BUN SERPL-MCNC: 20 MG/DL (ref 8–23)
BUN/CREAT SERPL: 12 (ref 7–25)
C PNEUM DNA NPH QL NAA+NON-PROBE: NOT DETECTED
CALCIUM SPEC-SCNC: 9.5 MG/DL (ref 8.6–10.5)
CHLORIDE SERPL-SCNC: 102 MMOL/L (ref 98–107)
CLARITY UR: ABNORMAL
CO2 SERPL-SCNC: 23 MMOL/L (ref 22–29)
COLOR UR: ABNORMAL
CREAT SERPL-MCNC: 1.67 MG/DL (ref 0.57–1)
DEPRECATED RDW RBC AUTO: 41.1 FL (ref 37–54)
EGFRCR SERPLBLD CKD-EPI 2021: 32 ML/MIN/1.73
EOSINOPHIL # BLD AUTO: 0.2 10*3/MM3 (ref 0–0.4)
EOSINOPHIL NFR BLD AUTO: 2.7 % (ref 0.3–6.2)
ERYTHROCYTE [DISTWIDTH] IN BLOOD BY AUTOMATED COUNT: 13.5 % (ref 12.3–15.4)
FLUAV SUBTYP SPEC NAA+PROBE: NOT DETECTED
FLUBV RNA ISLT QL NAA+PROBE: NOT DETECTED
GEN 5 2HR TROPONIN T REFLEX: 12 NG/L
GLOBULIN UR ELPH-MCNC: 3.9 GM/DL
GLUCOSE SERPL-MCNC: 230 MG/DL (ref 65–99)
GLUCOSE UR STRIP-MCNC: NEGATIVE MG/DL
HADV DNA SPEC NAA+PROBE: NOT DETECTED
HCOV 229E RNA SPEC QL NAA+PROBE: NOT DETECTED
HCOV HKU1 RNA SPEC QL NAA+PROBE: NOT DETECTED
HCOV NL63 RNA SPEC QL NAA+PROBE: NOT DETECTED
HCOV OC43 RNA SPEC QL NAA+PROBE: NOT DETECTED
HCT VFR BLD AUTO: 40 % (ref 34–46.6)
HGB BLD-MCNC: 13.3 G/DL (ref 12–15.9)
HGB UR QL STRIP.AUTO: NEGATIVE
HMPV RNA NPH QL NAA+NON-PROBE: NOT DETECTED
HOLD SPECIMEN: NORMAL
HOLD SPECIMEN: NORMAL
HPIV1 RNA ISLT QL NAA+PROBE: NOT DETECTED
HPIV2 RNA SPEC QL NAA+PROBE: NOT DETECTED
HPIV3 RNA NPH QL NAA+PROBE: NOT DETECTED
HPIV4 P GENE NPH QL NAA+PROBE: NOT DETECTED
HYALINE CASTS UR QL AUTO: ABNORMAL /LPF
KETONES UR QL STRIP: ABNORMAL
LEUKOCYTE ESTERASE UR QL STRIP.AUTO: ABNORMAL
LYMPHOCYTES # BLD AUTO: 1.8 10*3/MM3 (ref 0.7–3.1)
LYMPHOCYTES NFR BLD AUTO: 21.8 % (ref 19.6–45.3)
M PNEUMO IGG SER IA-ACNC: NOT DETECTED
MAGNESIUM SERPL-MCNC: 1.8 MG/DL (ref 1.6–2.4)
MCH RBC QN AUTO: 29 PG (ref 26.6–33)
MCHC RBC AUTO-ENTMCNC: 33.2 G/DL (ref 31.5–35.7)
MCV RBC AUTO: 87.2 FL (ref 79–97)
MONOCYTES # BLD AUTO: 0.6 10*3/MM3 (ref 0.1–0.9)
MONOCYTES NFR BLD AUTO: 7.7 % (ref 5–12)
NEUTROPHILS NFR BLD AUTO: 5.5 10*3/MM3 (ref 1.7–7)
NEUTROPHILS NFR BLD AUTO: 67.5 % (ref 42.7–76)
NITRITE UR QL STRIP: POSITIVE
NRBC BLD AUTO-RTO: 0.1 /100 WBC (ref 0–0.2)
NT-PROBNP SERPL-MCNC: 156.1 PG/ML (ref 0–900)
PH UR STRIP.AUTO: <=5 [PH] (ref 5–8)
PLATELET # BLD AUTO: 219 10*3/MM3 (ref 140–450)
PMV BLD AUTO: 8.2 FL (ref 6–12)
POTASSIUM SERPL-SCNC: 4.7 MMOL/L (ref 3.5–5.2)
PROT SERPL-MCNC: 8 G/DL (ref 6–8.5)
PROT UR QL STRIP: ABNORMAL
RBC # BLD AUTO: 4.59 10*6/MM3 (ref 3.77–5.28)
RBC # UR STRIP: ABNORMAL /HPF
REF LAB TEST METHOD: ABNORMAL
RHINOVIRUS RNA SPEC NAA+PROBE: NOT DETECTED
RSV RNA NPH QL NAA+NON-PROBE: NOT DETECTED
SARS-COV-2 RNA NPH QL NAA+NON-PROBE: NOT DETECTED
SODIUM SERPL-SCNC: 137 MMOL/L (ref 136–145)
SP GR UR STRIP: 1.03 (ref 1–1.03)
SQUAMOUS #/AREA URNS HPF: ABNORMAL /HPF
TRANS CELLS #/AREA URNS HPF: ABNORMAL /HPF
TROPONIN T DELTA: 0 NG/L
TROPONIN T SERPL HS-MCNC: 12 NG/L
TSH SERPL DL<=0.05 MIU/L-ACNC: 3.84 UIU/ML (ref 0.27–4.2)
UROBILINOGEN UR QL STRIP: ABNORMAL
WBC # UR STRIP: ABNORMAL /HPF
WBC NRBC COR # BLD: 8.2 10*3/MM3 (ref 3.4–10.8)
WHOLE BLOOD HOLD COAG: NORMAL
WHOLE BLOOD HOLD SPECIMEN: NORMAL

## 2023-07-22 PROCEDURE — 25010000002 ENOXAPARIN PER 10 MG: Performed by: STUDENT IN AN ORGANIZED HEALTH CARE EDUCATION/TRAINING PROGRAM

## 2023-07-22 PROCEDURE — 96365 THER/PROPH/DIAG IV INF INIT: CPT

## 2023-07-22 PROCEDURE — 87086 URINE CULTURE/COLONY COUNT: CPT | Performed by: PHYSICIAN ASSISTANT

## 2023-07-22 PROCEDURE — G0378 HOSPITAL OBSERVATION PER HR: HCPCS

## 2023-07-22 PROCEDURE — 80053 COMPREHEN METABOLIC PANEL: CPT | Performed by: PHYSICIAN ASSISTANT

## 2023-07-22 PROCEDURE — 87186 SC STD MICRODIL/AGAR DIL: CPT | Performed by: PHYSICIAN ASSISTANT

## 2023-07-22 PROCEDURE — 25010000002 CEFTRIAXONE PER 250 MG: Performed by: PHYSICIAN ASSISTANT

## 2023-07-22 PROCEDURE — 71045 X-RAY EXAM CHEST 1 VIEW: CPT

## 2023-07-22 PROCEDURE — 81001 URINALYSIS AUTO W/SCOPE: CPT | Performed by: PHYSICIAN ASSISTANT

## 2023-07-22 PROCEDURE — 85025 COMPLETE CBC W/AUTO DIFF WBC: CPT | Performed by: PHYSICIAN ASSISTANT

## 2023-07-22 PROCEDURE — 84484 ASSAY OF TROPONIN QUANT: CPT | Performed by: PHYSICIAN ASSISTANT

## 2023-07-22 PROCEDURE — 99285 EMERGENCY DEPT VISIT HI MDM: CPT

## 2023-07-22 PROCEDURE — 87077 CULTURE AEROBIC IDENTIFY: CPT | Performed by: PHYSICIAN ASSISTANT

## 2023-07-22 PROCEDURE — 83735 ASSAY OF MAGNESIUM: CPT | Performed by: PHYSICIAN ASSISTANT

## 2023-07-22 PROCEDURE — 70450 CT HEAD/BRAIN W/O DYE: CPT

## 2023-07-22 PROCEDURE — 96372 THER/PROPH/DIAG INJ SC/IM: CPT

## 2023-07-22 PROCEDURE — 93005 ELECTROCARDIOGRAM TRACING: CPT

## 2023-07-22 PROCEDURE — 83880 ASSAY OF NATRIURETIC PEPTIDE: CPT | Performed by: PHYSICIAN ASSISTANT

## 2023-07-22 PROCEDURE — 0202U NFCT DS 22 TRGT SARS-COV-2: CPT | Performed by: PHYSICIAN ASSISTANT

## 2023-07-22 PROCEDURE — 84443 ASSAY THYROID STIM HORMONE: CPT | Performed by: PHYSICIAN ASSISTANT

## 2023-07-22 RX ORDER — AMOXICILLIN 250 MG
2 CAPSULE ORAL 2 TIMES DAILY
Status: DISCONTINUED | OUTPATIENT
Start: 2023-07-22 | End: 2023-07-24 | Stop reason: HOSPADM

## 2023-07-22 RX ORDER — POLYETHYLENE GLYCOL 3350 17 G/17G
17 POWDER, FOR SOLUTION ORAL DAILY PRN
Status: DISCONTINUED | OUTPATIENT
Start: 2023-07-22 | End: 2023-07-24 | Stop reason: HOSPADM

## 2023-07-22 RX ORDER — BISACODYL 10 MG
10 SUPPOSITORY, RECTAL RECTAL DAILY PRN
Status: DISCONTINUED | OUTPATIENT
Start: 2023-07-22 | End: 2023-07-24 | Stop reason: HOSPADM

## 2023-07-22 RX ORDER — ATORVASTATIN CALCIUM 20 MG/1
20 TABLET, FILM COATED ORAL DAILY
Status: DISCONTINUED | OUTPATIENT
Start: 2023-07-23 | End: 2023-07-24 | Stop reason: HOSPADM

## 2023-07-22 RX ORDER — NITROGLYCERIN 0.4 MG/1
0.4 TABLET SUBLINGUAL
Status: DISCONTINUED | OUTPATIENT
Start: 2023-07-22 | End: 2023-07-24 | Stop reason: HOSPADM

## 2023-07-22 RX ORDER — SODIUM CHLORIDE 0.9 % (FLUSH) 0.9 %
10 SYRINGE (ML) INJECTION EVERY 12 HOURS SCHEDULED
Status: DISCONTINUED | OUTPATIENT
Start: 2023-07-22 | End: 2023-07-24 | Stop reason: HOSPADM

## 2023-07-22 RX ORDER — ONDANSETRON 2 MG/ML
4 INJECTION INTRAMUSCULAR; INTRAVENOUS EVERY 6 HOURS PRN
Status: DISCONTINUED | OUTPATIENT
Start: 2023-07-22 | End: 2023-07-24 | Stop reason: HOSPADM

## 2023-07-22 RX ORDER — SODIUM CHLORIDE 0.9 % (FLUSH) 0.9 %
10 SYRINGE (ML) INJECTION AS NEEDED
Status: DISCONTINUED | OUTPATIENT
Start: 2023-07-22 | End: 2023-07-24 | Stop reason: HOSPADM

## 2023-07-22 RX ORDER — BISACODYL 5 MG/1
5 TABLET, DELAYED RELEASE ORAL DAILY PRN
Status: DISCONTINUED | OUTPATIENT
Start: 2023-07-22 | End: 2023-07-24 | Stop reason: HOSPADM

## 2023-07-22 RX ORDER — SODIUM CHLORIDE 9 MG/ML
40 INJECTION, SOLUTION INTRAVENOUS AS NEEDED
Status: DISCONTINUED | OUTPATIENT
Start: 2023-07-22 | End: 2023-07-24 | Stop reason: HOSPADM

## 2023-07-22 RX ORDER — ENOXAPARIN SODIUM 100 MG/ML
40 INJECTION SUBCUTANEOUS DAILY
Status: DISCONTINUED | OUTPATIENT
Start: 2023-07-22 | End: 2023-07-24 | Stop reason: HOSPADM

## 2023-07-22 RX ORDER — SODIUM CHLORIDE 9 MG/ML
75 INJECTION, SOLUTION INTRAVENOUS CONTINUOUS
Status: DISCONTINUED | OUTPATIENT
Start: 2023-07-22 | End: 2023-07-24 | Stop reason: HOSPADM

## 2023-07-22 RX ADMIN — SENNOSIDES AND DOCUSATE SODIUM 2 TABLET: 50; 8.6 TABLET ORAL at 21:55

## 2023-07-22 RX ADMIN — SODIUM CHLORIDE 1000 ML: 9 INJECTION, SOLUTION INTRAVENOUS at 21:18

## 2023-07-22 RX ADMIN — ENOXAPARIN SODIUM 40 MG: 100 INJECTION SUBCUTANEOUS at 21:55

## 2023-07-22 RX ADMIN — CEFTRIAXONE 2000 MG: 2 INJECTION, POWDER, FOR SOLUTION INTRAMUSCULAR; INTRAVENOUS at 21:18

## 2023-07-22 NOTE — ED PROVIDER NOTES
Subjective   History of Present Illness  Patient is a 72-year-old female accompanied by family with reports of a near syncopal episode around 5:30 PM this evening.  Patient states she was getting out of the car when she felt lightheaded.  She states she had an episode of diarrhea prior to this episode happening.  She does report some associated nausea and shortness of breath now.  At that time patient's daughter states she seemed more confused she was sitting in the car with her granddaughter and she said that there was a truck that was going to back into them and there was no truck in the area patient's daughter states this lasted for about 30 minutes and reports she is at baseline currently but feels that she is just now generally weak.  Pertinent negatives include recent fever, illness, chest pain, headache, visual disturbances, one-sided numbness/weakness, vomiting, abdominal pain, urinary complaints, cough, congestion.  Upon arrival to the ED patient's heart rate was in the upper 40s patient's daughter states that her heart rate is usually in the 40s in the mornings    History provided by:  Patient    Review of Systems   Constitutional:  Negative for chills, diaphoresis, fatigue and fever.   HENT: Negative.     Eyes:  Negative for photophobia and visual disturbance.   Respiratory:  Positive for shortness of breath. Negative for apnea, cough, choking, chest tightness, wheezing and stridor.    Cardiovascular: Negative.    Gastrointestinal:  Positive for nausea. Negative for abdominal distention, abdominal pain, constipation, diarrhea and vomiting.   Genitourinary:  Negative for difficulty urinating, dysuria, flank pain, frequency and hematuria.   Musculoskeletal:  Negative for back pain, neck pain and neck stiffness.   Skin: Negative.    Neurological:  Positive for dizziness, weakness and light-headedness. Negative for tremors, seizures, syncope, facial asymmetry, speech difficulty, numbness and headaches.    Hematological: Negative.      Past Medical History:   Diagnosis Date    Adjustment disorder with depressed mood 2018    Allergic Years    Sulfa drugs    Anxiety     Arthritis Years    [place] joints. Mostly back, knees, hips, fingers    Depression 2018    Diabetes mellitus, type II 2013    Essential hypertension 2013    Gout 2014    Heart attack     Hyperlipidemia 2020    Hypertension     Inflammatory bowel disease     Irritable bowel syndrome without diarrhea 2017    Kidney stone ?2016?    In hospital overnight    Osteopenia 2017    Peripheral neuropathy 2017       Allergies   Allergen Reactions    Sulfa Antibiotics Rash       Past Surgical History:   Procedure Laterality Date     SECTION      x2    COLON SURGERY      ENDOMETRIAL ABLATION      KNEE ARTHROSCOPY Bilateral     TUBAL ABDOMINAL LIGATION  1973       Family History   Adopted: Yes   Problem Relation Age of Onset    Epilepsy Mother     Colon cancer Mother     Diabetes Mother     Hypertension Mother     No Known Problems Sister     No Known Problems Maternal Grandmother     No Known Problems Maternal Grandfather     No Known Problems Paternal Grandmother     No Known Problems Paternal Grandfather     Other Maternal Uncle         [email or web address]    Diabetes Daughter        Social History     Socioeconomic History    Marital status:      Spouse name: Rishabh    Number of children: 2    Years of education: 12   Tobacco Use    Smoking status: Never    Smokeless tobacco: Never   Vaping Use    Vaping Use: Never used   Substance and Sexual Activity    Alcohol use: Never    Drug use: Never    Sexual activity: Not Currently     Partners: Male     Birth control/protection: Other           Objective   Physical Exam  Vitals and nursing note reviewed.   Constitutional:       General: She is not in acute distress.     Appearance: Normal appearance. She is well-developed. She is obese. She is not  "ill-appearing, toxic-appearing or diaphoretic.   HENT:      Head: Normocephalic and atraumatic.      Mouth/Throat:      Mouth: Mucous membranes are moist.      Pharynx: Oropharynx is clear.   Eyes:      General: No scleral icterus.     Extraocular Movements: Extraocular movements intact.      Pupils: Pupils are equal, round, and reactive to light.   Cardiovascular:      Rate and Rhythm: Normal rate and regular rhythm.      Pulses: Normal pulses.      Heart sounds: No murmur heard.    No friction rub. No gallop.   Pulmonary:      Effort: Pulmonary effort is normal. No respiratory distress.      Breath sounds: Normal breath sounds. No stridor. No wheezing, rhonchi or rales.   Chest:      Chest wall: No tenderness.   Abdominal:      General: Bowel sounds are normal. There is no distension. There are no signs of injury.      Palpations: Abdomen is soft.      Tenderness: There is no abdominal tenderness. There is no guarding or rebound.      Hernia: No hernia is present.   Skin:     General: Skin is warm.      Capillary Refill: Capillary refill takes less than 2 seconds.      Coloration: Skin is not cyanotic, jaundiced or pale.      Findings: No rash.   Neurological:      General: No focal deficit present.      Mental Status: She is alert and oriented to person, place, and time.      GCS: GCS eye subscore is 4. GCS verbal subscore is 5. GCS motor subscore is 6.      Motor: No tremor.      Comments: Normal and equal sensation strength throughout moving all extremities freely.   Psychiatric:         Mood and Affect: Mood normal.         Behavior: Behavior normal.       Procedures           ED Course    /64   Pulse 50   Temp 98 °F (36.7 °C) (Oral)   Resp 16   Ht 160 cm (63\")   Wt 87.5 kg (193 lb)   SpO2 94%   BMI 34.19 kg/m²   Medications   sodium chloride 0.9 % flush 10 mL (has no administration in time range)   sodium chloride 0.9 % flush 10 mL (has no administration in time range)   cefTRIAXone (ROCEPHIN) " 2,000 mg in sodium chloride 0.9 % 100 mL IVPB (2,000 mg Intravenous New Bag 7/22/23 2118)   sodium chloride 0.9 % bolus 1,000 mL (1,000 mL Intravenous New Bag 7/22/23 2118)     Labs Reviewed   COMPREHENSIVE METABOLIC PANEL - Abnormal; Notable for the following components:       Result Value    Glucose 230 (*)     Creatinine 1.67 (*)     eGFR 32.0 (*)     All other components within normal limits    Narrative:     GFR Normal >60  Chronic Kidney Disease <60  Kidney Failure <15    The GFR formula is only valid for adults with stable renal function between ages 18 and 70.   URINALYSIS W/ CULTURE IF INDICATED - Abnormal; Notable for the following components:    Color, UA Dark Yellow (*)     Appearance, UA Cloudy (*)     Ketones, UA 15 mg/dL (1+) (*)     Bilirubin, UA Small (1+) (*)     Protein,  mg/dL (2+) (*)     Leuk Esterase, UA Moderate (2+) (*)     Nitrite, UA Positive (*)     All other components within normal limits    Narrative:     In absence of clinical symptoms, the presence of pyuria, bacteria, and/or nitrites on the urinalysis result does not correlate with infection.   TROPONIN - Abnormal; Notable for the following components:    HS Troponin T 12 (*)     All other components within normal limits    Narrative:     High Sensitive Troponin T Reference Range:  <10.0 ng/L- Negative Female for AMI  <15.0 ng/L- Negative Male for AMI  >=10 - Abnormal Female indicating possible myocardial injury.  >=15 - Abnormal Male indicating possible myocardial injury.   Clinicians would have to utilize clinical acumen, EKG, Troponin, and serial changes to determine if it is an Acute Myocardial Infarction or myocardial injury due to an underlying chronic condition.        URINALYSIS, MICROSCOPIC ONLY - Abnormal; Notable for the following components:    WBC, UA 21-30 (*)     Bacteria, UA Trace (*)     Squamous Epithelial Cells, UA 7-12 (*)     Transitional Epithelial Cells, UA 3-6 (*)     All other components within normal  limits   RESPIRATORY PANEL PCR W/ COVID-19 (SARS-COV-2) KEITH/LISANDRO/LILIA/PAD/COR/MAD/LIZBET IN-HOUSE, NP SWAB IN UTM/VTP, 3-4 HR TAT - Normal    Narrative:     In the setting of a positive respiratory panel with a viral infection PLUS a negative procalcitonin without other underlying concern for bacterial infection, consider observing off antibiotics or discontinuation of antibiotics and continue supportive care. If the respiratory panel is positive for atypical bacterial infection (Bordetella pertussis, Chlamydophila pneumoniae, or Mycoplasma pneumoniae), consider antibiotic de-escalation to target atypical bacterial infection.   TSH - Normal   MAGNESIUM - Normal   BNP (IN-HOUSE) - Normal    Narrative:     Among patients with dyspnea, NT-proBNP is highly sensitive for the detection of acute congestive heart failure. In addition NT-proBNP of <300 pg/ml effectively rules out acute congestive heart failure with 99% negative predictive value.    Results may be falsely decreased if patient taking Biotin.     CBC WITH AUTO DIFFERENTIAL - Normal   URINE CULTURE   RAINBOW DRAW    Narrative:     The following orders were created for panel order [place] Draw.  Procedure                               Abnormality         Status                     ---------                               -----------         ------                     Green Top (Gel)[[id number]]                                  Final result               Lavender Top[[id number]]                                     Final result               Gold Top - SST[[id number]]                                   Final result               Light Blue Top[[id number]]                                   Final result                 Please view results for these tests on the individual orders.   HIGH SENSITIVITIY TROPONIN T 2HR   GREEN TOP   LAVENDER TOP   GOLD TOP - SST   LIGHT BLUE TOP   CBC AND DIFFERENTIAL    Narrative:     The following orders were created for panel order CBC &  Differential.  Procedure                               Abnormality         Status                     ---------                               -----------         ------                     CBC Auto Differential[[id number]]        Normal              Final result                 Please view results for these tests on the individual orders.     CT Head Without Contrast   Final Result   Impression:   No acute intracranial process.         Electronically Signed: Keiko Preston     7/22/2023 8:34 PM EDT     Workstation ID: [id number]      XR Chest 1 View   Final Result   No evidence of acute disease in the chest.      Electronically Signed: Bacilio Mallory     7/22/2023 7:59 PM EDT     Workstation ID: [id number]                                               Medical Decision Making  Chart Review: Discharge summary reviewed from 9/13/2022 was admitted for weakness and dizziness.  The dizziness was composite of volume loss associated to C. difficile infection    Echocardiogram from 9/10/2022 showed EF 65% left ventricular wall thickness is consistent with mild concentric hypertrophy.     Comorbidity: As per past medical history  Differentials: Arrhythmia, vertigo, stroke, electrolyte abnormality, dehydration     ;this list is not all inclusive and does not constitute the entirety of considered causes  EKG: Interpreted by myself and Dr Fall shows sinus bradycardia rate 47 previous reviewed from 9/9/22 showed sinus rhythm.  Anterior infarct old.  Labs: As above  Radiology: My interpretation chest x-ray shows no acute cardiopulmonary normality, CTh shows no obvious brain bleed correlated with radiologist interpretation as below  CT Head Without Contrast   Final Result    Impression:    No acute intracranial process.            Electronically Signed: Keiko Preston      7/22/2023 8:34 PM EDT      Workstation ID: [id number]     XR Chest 1 View   Final Result    No evidence of acute disease in the chest.        Electronically Signed: Bacilio  Mallory      7/22/2023 7:59 PM EDT      Workstation ID: [id number]     Disposition/Treatment:  Appropriate PPE was worn during exam and throughout all encounters with the patient.  While in the ED IV was placed and labs were obtained patient was placed on proper monitors she was afebrile and appeared nontoxic showed no acute cranial focal neural deficits patient presented to the ED with complaints of near syncopal episode, dizziness upon standing, confusion with generalized weakness that all started today    EKG showed no acute STEMI initial troponin 12 pending repeat troponin she denies any significant chest pain. CBC shows no leukocytosis or anemia.  Metabolic panel showed glucose 230 but no signs of DKA within normal anion gap and bicarb.  Creatinine 1.67 which was normal 1 month ago.  BNP normal at 156.1.  TSH and magnesium unremarkable.  Respiratory panel unremarkable.  Urinalysis significant for UTI urine culture pending.  She was given Rocephin while in the ED patient's abdomen is soft and nontender on exam.  In regards to her dizziness and near syncope CT head was ordered which showed no acute intracranial abnormality.      Upon reassessment patient is resting quietly findings were discussed with the patient and family at bedside patient will be admitted for IV antibiotics for her UTI and further work-up of her weakness and near syncopal episode.  Orthostatic hypotension could be contributing factor to her dizziness versus some vertigo.  If dizziness continues could potentially get PT eval for vertigo.  Spoke to Dr. Schmitt who agreed for admission with hospitalist    Amount and/or Complexity of Data Reviewed  External Data Reviewed: notes.  Labs: ordered. Decision-making details documented in ED Course.  Radiology: ordered. Decision-making details documented in ED Course.  Discussion of management or test interpretation with external provider(s): As above     Risk  Prescription drug management.        Final  diagnoses:   Near syncope   Weakness   Urinary tract infection without hematuria, site unspecified   Orthostatic hypotension       ED Disposition  ED Disposition       ED Disposition   Decision to Admit    Condition   --    Comment   Level of Care: Med/Surg [1]   Admitting Physician: ENEDINA CORONA [[id number]]   Attending Physician: ENEDINA CORONA [[id number]]                 No follow-up provider specified.       Medication List      No changes were made to your prescriptions during this visit.            King Modi PA  07/22/23 0106

## 2023-07-22 NOTE — Clinical Note
Level of Care: Med/Surg [1]   Admitting Physician: ENEDINA CORONA [[id number]]   Attending Physician: ENEDINA CORONA [[id number]]

## 2023-07-23 LAB
ANION GAP SERPL CALCULATED.3IONS-SCNC: 10 MMOL/L (ref 5–15)
BASOPHILS # BLD AUTO: 0 10*3/MM3 (ref 0–0.2)
BASOPHILS NFR BLD AUTO: 0.6 % (ref 0–1.5)
BUN SERPL-MCNC: 22 MG/DL (ref 8–23)
BUN/CREAT SERPL: 15.4 (ref 7–25)
CALCIUM SPEC-SCNC: 8.9 MG/DL (ref 8.6–10.5)
CHLORIDE SERPL-SCNC: 105 MMOL/L (ref 98–107)
CO2 SERPL-SCNC: 25 MMOL/L (ref 22–29)
CREAT SERPL-MCNC: 1.43 MG/DL (ref 0.57–1)
DEPRECATED RDW RBC AUTO: 40.7 FL (ref 37–54)
EGFRCR SERPLBLD CKD-EPI 2021: 38.3 ML/MIN/1.73
EOSINOPHIL # BLD AUTO: 0.2 10*3/MM3 (ref 0–0.4)
EOSINOPHIL NFR BLD AUTO: 2 % (ref 0.3–6.2)
ERYTHROCYTE [DISTWIDTH] IN BLOOD BY AUTOMATED COUNT: 13.3 % (ref 12.3–15.4)
GLUCOSE BLDC GLUCOMTR-MCNC: 150 MG/DL (ref 70–105)
GLUCOSE BLDC GLUCOMTR-MCNC: 193 MG/DL (ref 70–105)
GLUCOSE BLDC GLUCOMTR-MCNC: 241 MG/DL (ref 70–105)
GLUCOSE BLDC GLUCOMTR-MCNC: 249 MG/DL (ref 70–105)
GLUCOSE SERPL-MCNC: 191 MG/DL (ref 65–99)
HCT VFR BLD AUTO: 37.6 % (ref 34–46.6)
HGB BLD-MCNC: 12.4 G/DL (ref 12–15.9)
LYMPHOCYTES # BLD AUTO: 2.3 10*3/MM3 (ref 0.7–3.1)
LYMPHOCYTES NFR BLD AUTO: 26.3 % (ref 19.6–45.3)
MAGNESIUM SERPL-MCNC: 1.7 MG/DL (ref 1.6–2.4)
MCH RBC QN AUTO: 28.6 PG (ref 26.6–33)
MCHC RBC AUTO-ENTMCNC: 32.9 G/DL (ref 31.5–35.7)
MCV RBC AUTO: 86.9 FL (ref 79–97)
MONOCYTES # BLD AUTO: 0.6 10*3/MM3 (ref 0.1–0.9)
MONOCYTES NFR BLD AUTO: 6.5 % (ref 5–12)
NEUTROPHILS NFR BLD AUTO: 5.6 10*3/MM3 (ref 1.7–7)
NEUTROPHILS NFR BLD AUTO: 64.6 % (ref 42.7–76)
NRBC BLD AUTO-RTO: 0 /100 WBC (ref 0–0.2)
PLATELET # BLD AUTO: 190 10*3/MM3 (ref 140–450)
PMV BLD AUTO: 8.1 FL (ref 6–12)
POTASSIUM SERPL-SCNC: 4.3 MMOL/L (ref 3.5–5.2)
QT INTERVAL: 519 MS
RBC # BLD AUTO: 4.32 10*6/MM3 (ref 3.77–5.28)
SODIUM SERPL-SCNC: 140 MMOL/L (ref 136–145)
WBC NRBC COR # BLD: 8.6 10*3/MM3 (ref 3.4–10.8)

## 2023-07-23 PROCEDURE — 85025 COMPLETE CBC W/AUTO DIFF WBC: CPT | Performed by: STUDENT IN AN ORGANIZED HEALTH CARE EDUCATION/TRAINING PROGRAM

## 2023-07-23 PROCEDURE — G0378 HOSPITAL OBSERVATION PER HR: HCPCS

## 2023-07-23 PROCEDURE — 97166 OT EVAL MOD COMPLEX 45 MIN: CPT | Performed by: OCCUPATIONAL THERAPIST

## 2023-07-23 PROCEDURE — 96372 THER/PROPH/DIAG INJ SC/IM: CPT

## 2023-07-23 PROCEDURE — 82948 REAGENT STRIP/BLOOD GLUCOSE: CPT

## 2023-07-23 PROCEDURE — 25010000002 ENOXAPARIN PER 10 MG: Performed by: STUDENT IN AN ORGANIZED HEALTH CARE EDUCATION/TRAINING PROGRAM

## 2023-07-23 PROCEDURE — 97161 PT EVAL LOW COMPLEX 20 MIN: CPT

## 2023-07-23 PROCEDURE — 83735 ASSAY OF MAGNESIUM: CPT | Performed by: STUDENT IN AN ORGANIZED HEALTH CARE EDUCATION/TRAINING PROGRAM

## 2023-07-23 PROCEDURE — 80048 BASIC METABOLIC PNL TOTAL CA: CPT | Performed by: STUDENT IN AN ORGANIZED HEALTH CARE EDUCATION/TRAINING PROGRAM

## 2023-07-23 PROCEDURE — 25010000002 CEFTRIAXONE PER 250 MG: Performed by: STUDENT IN AN ORGANIZED HEALTH CARE EDUCATION/TRAINING PROGRAM

## 2023-07-23 PROCEDURE — 36415 COLL VENOUS BLD VENIPUNCTURE: CPT | Performed by: STUDENT IN AN ORGANIZED HEALTH CARE EDUCATION/TRAINING PROGRAM

## 2023-07-23 PROCEDURE — 63710000001 INSULIN LISPRO (HUMAN) PER 5 UNITS: Performed by: STUDENT IN AN ORGANIZED HEALTH CARE EDUCATION/TRAINING PROGRAM

## 2023-07-23 RX ORDER — DEXTROSE MONOHYDRATE 25 G/50ML
25 INJECTION, SOLUTION INTRAVENOUS
Status: DISCONTINUED | OUTPATIENT
Start: 2023-07-23 | End: 2023-07-24 | Stop reason: HOSPADM

## 2023-07-23 RX ORDER — DULOXETIN HYDROCHLORIDE 20 MG/1
20 CAPSULE, DELAYED RELEASE ORAL DAILY
COMMUNITY

## 2023-07-23 RX ORDER — NICOTINE POLACRILEX 4 MG
15 LOZENGE BUCCAL
Status: DISCONTINUED | OUTPATIENT
Start: 2023-07-23 | End: 2023-07-24 | Stop reason: HOSPADM

## 2023-07-23 RX ORDER — ACETAMINOPHEN 325 MG/1
650 TABLET ORAL EVERY 6 HOURS PRN
Status: DISCONTINUED | OUTPATIENT
Start: 2023-07-23 | End: 2023-07-24 | Stop reason: HOSPADM

## 2023-07-23 RX ORDER — INSULIN LISPRO 100 [IU]/ML
2-7 INJECTION, SOLUTION INTRAVENOUS; SUBCUTANEOUS
Status: DISCONTINUED | OUTPATIENT
Start: 2023-07-23 | End: 2023-07-24 | Stop reason: HOSPADM

## 2023-07-23 RX ORDER — CHOLECALCIFEROL (VITAMIN D3) 125 MCG
5 CAPSULE ORAL NIGHTLY PRN
Status: DISCONTINUED | OUTPATIENT
Start: 2023-07-23 | End: 2023-07-24 | Stop reason: HOSPADM

## 2023-07-23 RX ORDER — ESCITALOPRAM OXALATE 10 MG/1
20 TABLET ORAL DAILY
Status: DISCONTINUED | OUTPATIENT
Start: 2023-07-23 | End: 2023-07-24 | Stop reason: HOSPADM

## 2023-07-23 RX ORDER — IBUPROFEN 600 MG/1
1 TABLET ORAL
Status: DISCONTINUED | OUTPATIENT
Start: 2023-07-23 | End: 2023-07-24 | Stop reason: HOSPADM

## 2023-07-23 RX ORDER — BUPROPION HYDROCHLORIDE 150 MG/1
150 TABLET ORAL DAILY
Status: DISCONTINUED | OUTPATIENT
Start: 2023-07-23 | End: 2023-07-24 | Stop reason: HOSPADM

## 2023-07-23 RX ADMIN — Medication 10 ML: at 09:40

## 2023-07-23 RX ADMIN — Medication 10 ML: at 21:16

## 2023-07-23 RX ADMIN — SENNOSIDES AND DOCUSATE SODIUM 2 TABLET: 50; 8.6 TABLET ORAL at 08:27

## 2023-07-23 RX ADMIN — SODIUM CHLORIDE 75 ML/HR: 9 INJECTION, SOLUTION INTRAVENOUS at 00:06

## 2023-07-23 RX ADMIN — INSULIN LISPRO 2 UNITS: 100 INJECTION, SOLUTION INTRAVENOUS; SUBCUTANEOUS at 17:09

## 2023-07-23 RX ADMIN — Medication 5 MG: at 23:33

## 2023-07-23 RX ADMIN — INSULIN LISPRO 3 UNITS: 100 INJECTION, SOLUTION INTRAVENOUS; SUBCUTANEOUS at 21:16

## 2023-07-23 RX ADMIN — CEFTRIAXONE 2000 MG: 2 INJECTION, POWDER, FOR SOLUTION INTRAMUSCULAR; INTRAVENOUS at 21:16

## 2023-07-23 RX ADMIN — ENOXAPARIN SODIUM 40 MG: 100 INJECTION SUBCUTANEOUS at 16:40

## 2023-07-23 RX ADMIN — Medication 10 ML: at 00:08

## 2023-07-23 RX ADMIN — ESCITALOPRAM OXALATE 20 MG: 10 TABLET ORAL at 17:09

## 2023-07-23 RX ADMIN — BUPROPION HYDROCHLORIDE 150 MG: 150 TABLET, EXTENDED RELEASE ORAL at 17:09

## 2023-07-23 RX ADMIN — ATORVASTATIN CALCIUM 20 MG: 20 TABLET, FILM COATED ORAL at 08:27

## 2023-07-23 NOTE — THERAPY EVALUATION
Patient Name: Dora Solorio  : 1950    MRN: [id number]                              Today's Date: 2023       Admit Date: 2023    Visit Dx:     ICD-10-CM ICD-9-CM   1. Near syncope  R55 780.2   2. Weakness  R53.1 780.79   3. Urinary tract infection without hematuria, site unspecified  N39.0 599.0   4. Orthostatic hypotension  I95.1 458.0     Patient Active Problem List   Diagnosis    Adjustment disorder with depressed mood    Depression    Type 2 diabetes mellitus with hyperglycemia, without long-term current use of insulin    Diabetic peripheral neuropathy    Essential hypertension    Gout    Mixed hyperlipidemia    Irritable bowel syndrome without diarrhea    Obesity    Osteopenia    Peripheral neuropathy    Vitamin D deficiency    Dizzy    Acute UTI     Past Medical History:   Diagnosis Date    Adjustment disorder with depressed mood 2018    Allergic Years    Sulfa drugs    Anxiety     Arthritis Years    [place] joints. Mostly back, knees, hips, fingers    Depression 2018    Diabetes mellitus, type II 2013    Essential hypertension 2013    Gout 2014    Heart attack     Hyperlipidemia 2020    Hypertension     Inflammatory bowel disease     Irritable bowel syndrome without diarrhea 2017    Kidney stone ?2016?    In hospital overnight    Osteopenia 2017    Peripheral neuropathy 2017     Past Surgical History:   Procedure Laterality Date     SECTION      x2    COLON SURGERY      ENDOMETRIAL ABLATION      KNEE ARTHROSCOPY Bilateral     TUBAL ABDOMINAL LIGATION  1973      General Information       Row Name 23 1835          Physical Therapy Time and Intention    Document Type evaluation  -EL     Mode of Treatment physical therapy  -EL       Row Name 23 1835          General Information    Patient Profile Reviewed yes  -EL     Prior Level of Function independent:;all household mobility;ADL's  -EL     Existing  Precautions/Restrictions no known precautions/restrictions  -     Barriers to Rehab none identified  -       Row Name 07/23/23 1835          Living Environment    People in Home child(rupinder), adult  -       Row Name 07/23/23 1835          Home Main Entrance    Number of Stairs, Main Entrance none  -       Row Name 07/23/23 1835          Stairs Within Home, Primary    Number of Stairs, Within Home, Primary none  -       Row Name 07/23/23 1835          Cognition    Orientation Status (Cognition) oriented x 4  -       Row Name 07/23/23 1835          Safety Issues, Functional Mobility    Impairments Affecting Function (Mobility) other (see comments)  [place]  -EL               User Key  (r) = Recorded By, (t) = Taken By, (c) = Cosigned By      Initials Name Provider Type    Amador Ramsay PT Physical Therapist                   Mobility       Row Name 07/23/23 1835          Bed Mobility    Bed Mobility supine-sit  -EL     Supine-Sit [place] (Bed Mobility) independent  -       Row Name 07/23/23 1835          Bed-Chair Transfer    Bed-Chair [place] (Transfers) standby assist  -       Row Name 07/23/23 1835          Sit-Stand Transfer    Sit-Stand [place] (Transfers) standby assist  -       Row Name 07/23/23 1835          Gait/Stairs (Locomotion)    [place] Level (Gait) standby assist  -EL     Distance in Feet (Gait) 50  -EL     Comment, (Gait/Stairs) without significant gait deviations  -EL               User Key  (r) = Recorded By, (t) = Taken By, (c) = Cosigned By      Initials Name Provider Type    Amador Ramsay PT Physical Therapist                   Obj/Interventions       Row Name 07/23/23 1835          Range of Motion Comprehensive    General Range of Motion no range of motion deficits identified  -       Row Name 07/23/23 1835          Strength Comprehensive (MMT)    General Manual Muscle Testing (MMT) Assessment no strength deficits identified  -       Row Name 07/23/23 1835           Balance    Balance Assessment sitting static balance;standing static balance;standing dynamic balance  -EL     Static Sitting Balance independent  -EL     Static Standing Balance standby assist  -EL     Dynamic Standing Balance standby assist  -       Row Name 07/23/23 1835          Sensory Assessment (Somatosensory)    Sensory Assessment (Somatosensory) sensation intact  -               User Key  (r) = Recorded By, (t) = Taken By, (c) = Cosigned By      Initials Name Provider Type    Amador Ramsay, PT Physical Therapist                   Goals/Plan    No documentation.                  Clinical Impression       Row Name 07/23/23 1836          Pain    Pretreatment Pain Rating 0/10 - no pain  -EL     Posttreatment Pain Rating 0/10 - no pain  -EL       Row Name 07/23/23 1836          Plan of Care Review    Plan of Care Reviewed With patient  -EL     Outcome Evaluation Pt is a 72 y.o. F adm to [facility] with c/o dizziness, confusion & near syncopal episode. Pt found to have UTI, acute kidney injury, orthostatic hypotension, deyhdration. Pt lives with adult daughter and grandchild, typoically is independent with all ADLs, ambulating without AD and had no recent falls. Pt reports she had a near fall at home prior to admission. BP assessed in supine, sitting and standing this date and recorded below. Pt without significant sypmtoms. Pt this date requires no physical assistance for mobility and appears safe to return home wiht family. Pt does not require PT services at this time and PT to sign off.  -       Row Name 07/23/23 1836          Therapy Assessment/Plan (PT)    Criteria for Skilled Interventions Met (PT) no problems identified which require skilled intervention  -EL     Therapy Frequency (PT) evaluation only  -       Row Name 07/23/23 1836          Vital Signs    Pre Systolic BP Rehab 142  supine  -EL     Pre Treatment Diastolic BP 74  -EL     Intra Systolic BP Rehab 133  sitting  -EL     Intra Treatment  Diastolic BP 68  -EL     Post Systolic BP Rehab 120  standing  -EL     Post Treatment Diastolic BP 66  -EL     O2 Delivery Pre Treatment room air  -EL     O2 Delivery Intra Treatment room air  -EL     O2 Delivery Post Treatment room air  -EL     Pre Patient Position Supine  -EL     Intra Patient Position Standing  -EL     Post Patient Position Sitting  -EL       Row Name 07/23/23 1836          Positioning and Restraints    Pre-Treatment Position in bed  -EL     Post Treatment Position chair  -EL     In Chair notified nsg;reclined;call light within reach;encouraged to call for assist;exit alarm on  -EL               User Key  (r) = Recorded By, (t) = Taken By, (c) = Cosigned By      Initials Name Provider Type    EL Amador Zamudio, DIANA Physical Therapist                   Outcome Measures       Row Name 07/23/23 1838          Modified [place] Scale    Pre-Stroke Modified [place] Scale 6 - Unable to determine (UTD) from the medical record documentation  -EL     Modified Radha Scale 0 - No Symptoms at all.  -EL       Row Name 07/23/23 1838          Functional Assessment    Outcome Measure Options Modified [place]  -EL               User Key  (r) = Recorded By, (t) = Taken By, (c) = Cosigned By      Initials Name Provider Type    EL Amador Zamudio, DIANA Physical Therapist                                 Physical Therapy Education       Title: PT OT SLP Therapies (Done)       Topic: Physical Therapy (Done)       Point: Mobility training (Done)       Learning Progress Summary             Patient Acceptance, E,TB, VU by  at 7/23/2023 1838                         Point: Precautions (Done)       Learning Progress Summary             Patient Acceptance, E,TB, VU by  at 7/23/2023 1838                                         User Key       Initials Effective Dates Name Provider Type Discipline     06/23/20 -  Amador Zamudio, DIANA Physical Therapist PT                  PT Recommendation and Plan     Plan of Care Reviewed With:  patient  Outcome Evaluation: Pt is a 72 y.o. F adm to [facility] with c/o dizziness, confusion & near syncopal episode. Pt found to have UTI, acute kidney injury, orthostatic hypotension, deyhdration. Pt lives with adult daughter and grandchild, typoically is independent with all ADLs, ambulating without AD and had no recent falls. Pt reports she had a near fall at home prior to admission. BP assessed in supine, sitting and standing this date and recorded below. Pt without significant sypmtoms. Pt this date requires no physical assistance for mobility and appears safe to return home wiht family. Pt does not require PT services at this time and PT to sign off.     Time Calculation:   PT Evaluation Complexity  History, PT Evaluation Complexity: no personal factors and/or comorbidities  Examination of Body Systems (PT Eval Complexity): 1-2 elements  Clinical Presentation (PT Evaluation Complexity): stable  Clinical Decision Making (PT Evaluation Complexity): low complexity  Overall Complexity (PT Evaluation Complexity): low complexity     PT Charges       Row Name 07/23/23 1839             Time Calculation    Start Time 1302  -EL      Stop Time 1318  -EL      Time Calculation (min) 16 min  -EL      PT Received On 07/23/23  -EL                User Key  (r) = Recorded By, (t) = Taken By, (c) = Cosigned By      Initials Name Provider Type    Amador Ramsay PT Physical Therapist                  Therapy Charges for Today       Code Description Service Date Service Provider Modifiers Qty    [id number]  PT EVAL LOW COMPLEXITY 3 7/23/2023 Amador Zamudio PT GP 1            PT G-Codes  Outcome Measure Options: Modified [place]  Modified Radha Scale: 0 - No Symptoms at all.  PT Discharge Summary  Anticipated Discharge Disposition (PT): home with assist    Amador Zamudio PT  7/23/2023

## 2023-07-23 NOTE — PLAN OF CARE
Goal Outcome Evaluation:  Plan of Care Reviewed With: patient           Outcome Evaluation: Pt is a 72 y.o. F adm to [facility] with c/o dizziness, confusion & near syncopal episode. Pt found to have UTI, acute kidney injury, orthostatic hypotension, deyhdration. PMH: HTN, DM type 2, CAD, peripheral neuropathy, depression. At baseline, pt lives with daughter & grandchildren in a SSH with 0 TONY. She has a RW, but does not use it. She reports she is ind with all BADLs & IADLs. She does not drive & family provides transportation. Upon eval, pt is A&O X4. She states she is feeling better & has no c/o dizziness. She did have orthostatic hypotension during tx, but pt was asymptomatic. She was ind with bed mobility and completed ADL transfers with SBA as a precaution due to orthostatic hypotension. She demo good strength, balance & endurance & is ind with BADLs. Pt seems to be functioning at her baseline status; therefore, no OT tx is recommended at this time. OT will sign off & pt should be safe to return home with family once medically cleared.      Anticipated Discharge Disposition (OT): home

## 2023-07-23 NOTE — CASE MANAGEMENT/SOCIAL WORK
Continued Stay Note  JUSTINA Hickman     Patient Name: Dora Solorio  MRN: [id number]  Today's Date: 7/23/2023    Admit Date: 7/22/2023        Discharge Plan       Row Name 07/23/23 1811       Plan    Plan Comments DC barrier: IV fluids, IV abx                 Expected Discharge Date and Time       Expected Discharge Date Expected Discharge Time    Jul 24, 2023               Danielle Walden RN

## 2023-07-23 NOTE — H&P
[facility] Medicine Services  History & Physical        Patient Name: Dora Solorio  : 1950  MRN: [id number]  Primary Care Physician:  Bhargavi Rodriguez APRN  Referring Physician: BRANDON Levy  Date of admission: 2023    Subjective      Chief Complaint: Acute encephalopathy, acute UTI     History of Present Illness: Dora Solorio is a 72 y.o. female presents reports of a near syncopal episode. Patient states she was getting out of the car when she felt lightheaded.  Reports having an episode of diarrhea prior to this episode. She she also states associated nausea and shortness of breath.  Family noticed that the patient was more confused sitting in the car and sat that there was a truck that was going to back into them and there was no truck in the area patient's daughter.  Family now reports patient is back to baseline but feels that she appears more weak.  Denies fever, illness, chest pain, headache, visual disturbances, one-sided numbness/weakness, vomiting, abdominal pain, urinary complaints, cough, congestion.     Constitutional: No unintentional weight loss or appetite changes   Head/Ears/Nose/Throat: No epistaxis  Eye: No visual disturbance  Cardiovascular: No chest pain  Pulmonary: Mild dyspnea,   Gastroenterology: Positive for nausea and diarrhea, no abdominal pain, no vomiting   Genitourinary: No hematuria or dysuria  Integument: No rash  Hematologic: No easy bruising or bleeding  Endocrine: No polyuria or polydipsia  Musculoskeletal: No new joint pains  Neurological:  Positive for dizziness, weakness and light-headedness. Negative for tremors, seizures, syncope, facial asymmetry, speech difficulty, numbness and headaches.   Psychiatric: No confusion or anxiety  14 point review of systems was performed and was negative except as noted in the HPI        Personal History       Past Medical History:   Diagnosis Date    Adjustment disorder with depressed mood 2018     Allergic Years    Sulfa drugs    Anxiety     Arthritis Years    Lotsvof joints. Mostly back, knees, hips, fingers    Depression 2018    Diabetes mellitus, type II 2013    Essential hypertension 2013    Gout 2014    Heart attack     Hyperlipidemia 2020    Hypertension     Inflammatory bowel disease     Irritable bowel syndrome without diarrhea 2017    Kidney stone ?2016?    In hospital overnight    Osteopenia 2017    Peripheral neuropathy 2017       Past Surgical History:   Procedure Laterality Date     SECTION      x2    COLON SURGERY      ENDOMETRIAL ABLATION      KNEE ARTHROSCOPY Bilateral     TUBAL ABDOMINAL LIGATION  1973       Family History: family history includes Colon cancer in her mother; Diabetes in her daughter and mother; Epilepsy in her mother; Hypertension in her mother; No Known Problems in her maternal grandfather, maternal grandmother, paternal grandfather, paternal grandmother, and sister; Other in her maternal uncle. She was adopted. Otherwise pertinent FHx was reviewed and not pertinent to current issue.    Social History:  reports that she has never smoked. She has never used smokeless tobacco. She reports that she does not drink alcohol and does not use drugs.    Home Medications:   Accu-Chek Guide Me, Accu-Chek Softclix Lancets, Alcohol Prep, Insulin Degludec, Vitamin B-12, amLODIPine, aspirin, atorvastatin, bisoprolol, buPROPion XL, escitalopram, gabapentin, glimepiride, glucose blood, hydroCHLOROthiazide, lisinopril, and metFORMIN    Allergies:  Allergies   Allergen Reactions    Sulfa Antibiotics Rash         Objective        Vitals:  Temp:  [98 °F (36.7 °C)] 98 °F (36.7 °C)  Heart Rate:  [47-52] 50  Resp:  [16] 16  BP: ()/(55-68) 131/68        Physical Exam  Vitals and nursing note reviewed.   Constitutional:       General: She is not in acute distress.     Appearance: Normal appearance. She is well-developed. She is  obese. She is not ill-appearing, toxic-appearing or diaphoretic.   HENT:      Head: Normocephalic and atraumatic.      Mouth/Throat:      Mouth: Mucous membranes are moist.      Pharynx: Oropharynx is clear.   Eyes:      General: No scleral icterus.     Extraocular Movements: Extraocular movements intact.      Pupils: Pupils are equal, round, and reactive to light.   Cardiovascular:      Rate and Rhythm: Normal rate and regular rhythm.      Pulses: Normal pulses.      Heart sounds: No murmur heard.    No friction rub. No gallop.   Pulmonary:      Effort: Pulmonary effort is normal. No respiratory distress.      Breath sounds: Normal breath sounds. No stridor. No wheezing, rhonchi or rales.   Chest:      Chest wall: No tenderness.   Abdominal:      General: Bowel sounds are normal. There is no distension. There are no signs of injury.      Palpations: Abdomen is soft.      Tenderness: There is no abdominal tenderness. There is no guarding or rebound.      Hernia: No hernia is present.   Skin:     General: Skin is warm.      Capillary Refill: Capillary refill takes less than 2 seconds.      Coloration: Skin is not cyanotic, jaundiced or pale.      Findings: No rash.   Neurological:      General: No focal deficit present.      Mental Status: She is alert and oriented to person, place, and time.      Motor: No tremor.      Comments: Normal and equal sensation strength throughout moving all extremities freely.   Psychiatric:         Mood and Affect: Mood normal.         Behavior: Behavior normal.       Result Review    Result Review:  I have personally reviewed the results from the time of this admission to 7/22/2023 21:17 EDT and agree with these findings:  [x]  Laboratory  [x]  Microbiology  [x]  Radiology  [x]  EKG/Telemetry   [x]  Cardiology/Vascular   [x]  Pathology  [x]  Old records      Assessment & Plan        Active Hospital Problems:    Acute toxic encephalopathy  Acute UTI  Acute kidney injury   Near  syncope  Dizziness  Orthostatic hypotension  Generalized weakness  Hypertension  Type 2 diabetes mellitus  Hyperlipidemia  History of CAD  Peripheral neuropathy  Depression       Plan:   Telemetry   Labs, vitals and imaging reviewed  CT head negative  Start on IV fluids  Continue on IV Rocephin  Follow-up and urine culture  We will repeat orthostatic vital signs after fluid hydration  Pain control and antiemetics as ordered  We will hold amlodipine, lisinopril, hydrochlorothiazide and gabapentin  Medications reviewed, resume home medication regimen once reviewed by pharmacy  Monitor labs and vitals daily   Replace electrolytes as needed   PT/OT  Fall precautions   Cardiac diet   DVT and GI prophylaxis   Consult case management for discharge planning         CODE STATUS: Full code     Admission Status:  I believe this patient meets observation status.    I discussed the patient's findings and my recommendations with patient and nursing staff        Signature: Electronically signed by Franklin Schmitt MD, 07/22/23, 21:17 EDT.  [facility] Hospitalist Team

## 2023-07-23 NOTE — PLAN OF CARE
Goal Outcome Evaluation:              Problem: Adult Inpatient Plan of Care  Goal: Plan of Care Review  Outcome: Met  Goal: Patient-Specific Goal (Individualized)  Outcome: Met  Goal: Absence of Hospital-Acquired Illness or Injury  Outcome: Met  Intervention: Identify and Manage Fall Risk  Recent Flowsheet Documentation  Taken 7/23/2023 1000 by Jose Yoder RN  Safety Promotion/Fall Prevention:   safety round/check completed   room organization consistent   fall prevention program maintained   clutter free environment maintained   activity supervised   assistive device/personal items within reach  Taken 7/23/2023 0800 by Jose Yoder RN  Safety Promotion/Fall Prevention:   toileting scheduled   safety round/check completed   room organization consistent   nonskid shoes/slippers when out of bed   fall prevention program maintained   clutter free environment maintained   assistive device/personal items within reach   activity supervised  Intervention: Prevent Skin Injury  Recent Flowsheet Documentation  Taken 7/23/2023 1200 by Jose Yoder RN  Body Position: sitting up in bed  Taken 7/23/2023 0800 by Jose Yoder RN  Body Position: side-lying  Intervention: Prevent and Manage VTE (Venous Thromboembolism) Risk  Recent Flowsheet Documentation  Taken 7/23/2023 0800 by Jose Yoder RN  Activity Management: bedrest  Intervention: Prevent Infection  Recent Flowsheet Documentation  Taken 7/23/2023 1200 by Jose Yoder RN  Infection Prevention:   environmental surveillance performed   hand hygiene promoted   rest/sleep promoted  Taken 7/23/2023 1000 by Jose Yoder RN  Infection Prevention:   environmental surveillance performed   hand hygiene promoted   rest/sleep promoted  Taken 7/23/2023 0800 by Jose Yoder RN  Infection Prevention:   environmental surveillance performed   rest/sleep promoted  Goal: Optimal Comfort and Wellbeing  Outcome: Met  Intervention: Provide Person-Centered  Care  Recent Flowsheet Documentation  Taken 7/23/2023 1200 by Jose Yoder RN  Trust Relationship/Rapport:   care explained   choices provided   questions encouraged   questions answered   empathic listening provided   emotional support provided   reassurance provided   thoughts/feelings acknowledged  Taken 7/23/2023 0800 by Jose Yoder RN  Trust Relationship/Rapport:   care explained   choices provided   emotional support provided   empathic listening provided   questions answered   questions encouraged   reassurance provided   thoughts/feelings acknowledged  Goal: Readiness for Transition of Care  Outcome: Met     Problem: Fall Injury Risk  Goal: Absence of Fall and Fall-Related Injury  Outcome: Met  Intervention: Identify and Manage Contributors  Recent Flowsheet Documentation  Taken 7/23/2023 1000 by Jose Yoder RN  Medication Review/Management: medications reviewed  Taken 7/23/2023 0800 by Jose Yoder RN  Medication Review/Management: medications reviewed  Intervention: Promote Injury-Free Environment  Recent Flowsheet Documentation  Taken 7/23/2023 1000 by Jose Yoder RN  Safety Promotion/Fall Prevention:   safety round/check completed   room organization consistent   fall prevention program maintained   clutter free environment maintained   activity supervised   assistive device/personal items within reach  Taken 7/23/2023 0800 by Jose Yoder RN  Safety Promotion/Fall Prevention:   toileting scheduled   safety round/check completed   room organization consistent   nonskid shoes/slippers when out of bed   fall prevention program maintained   clutter free environment maintained   assistive device/personal items within reach   activity supervised     Problem: Fall Injury Risk  Goal: Absence of Fall and Fall-Related Injury  Outcome: Ongoing, Progressing  Intervention: Identify and Manage Contributors  Recent Flowsheet Documentation  Taken 7/23/2023 1000 by Jose Yoder  RN  Medication Review/Management: medications reviewed  Taken 7/23/2023 0800 by Jose Yoder RN  Medication Review/Management: medications reviewed  Intervention: Promote Injury-Free Environment  Recent Flowsheet Documentation  Taken 7/23/2023 1000 by Jose Yoder RN  Safety Promotion/Fall Prevention:   safety round/check completed   room organization consistent   fall prevention program maintained   clutter free environment maintained   activity supervised   assistive device/personal items within reach  Taken 7/23/2023 0800 by Jose Yoder RN  Safety Promotion/Fall Prevention:   toileting scheduled   safety round/check completed   room organization consistent   nonskid shoes/slippers when out of bed   fall prevention program maintained   clutter free environment maintained   assistive device/personal items within reach   activity supervised     Problem: Pain Acute  Goal: Acceptable Pain Control and Functional Ability  Outcome: Met  Intervention: Prevent or Manage Pain  Recent Flowsheet Documentation  Taken 7/23/2023 1200 by Jose Yoder RN  Sleep/Rest Enhancement:   room darkened   regular sleep/rest pattern promoted  Taken 7/23/2023 1000 by Jose Yoder RN  Medication Review/Management: medications reviewed  Taken 7/23/2023 0800 by Jose Yoder RN  Sleep/Rest Enhancement:   room darkened   awakenings minimized   relaxation techniques promoted  Medication Review/Management: medications reviewed     Problem: Skin Injury Risk Increased  Goal: Skin Health and Integrity  Outcome: Ongoing, Progressing  Intervention: Optimize Skin Protection  Recent Flowsheet Documentation  Taken 7/23/2023 1200 by Jose Yoder RN  Pressure Reduction Techniques:   positioned off wounds   frequent weight shift encouraged  Head of Bed (HOB) Positioning: HOB at 60 degrees  Pressure Reduction Devices: pressure-redistributing mattress utilized  Taken 7/23/2023 0800 by Jose Yoder RN  Pressure Reduction  Techniques:   positioned off wounds   frequent weight shift encouraged  Head of Bed (HOB) Positioning: HOB at 30 degrees  Pressure Reduction Devices: pressure-redistributing mattress utilized

## 2023-07-23 NOTE — THERAPY EVALUATION
Patient Name: Dora Solorio  : 1950    MRN: [id number]                              Today's Date: 2023       Admit Date: 2023    Visit Dx:     ICD-10-CM ICD-9-CM   1. Near syncope  R55 780.2   2. Weakness  R53.1 780.79   3. Urinary tract infection without hematuria, site unspecified  N39.0 599.0   4. Orthostatic hypotension  I95.1 458.0     Patient Active Problem List   Diagnosis    Adjustment disorder with depressed mood    Depression    Type 2 diabetes mellitus with hyperglycemia, without long-term current use of insulin    Diabetic peripheral neuropathy    Essential hypertension    Gout    Mixed hyperlipidemia    Irritable bowel syndrome without diarrhea    Obesity    Osteopenia    Peripheral neuropathy    Vitamin D deficiency    Dizzy    Acute UTI     Past Medical History:   Diagnosis Date    Adjustment disorder with depressed mood 2018    Allergic Years    Sulfa drugs    Anxiety     Arthritis Years    Lotsvof joints. Mostly back, knees, hips, fingers    Depression 2018    Diabetes mellitus, type II 2013    Essential hypertension 2013    Gout 2014    Heart attack     Hyperlipidemia 2020    Hypertension     Inflammatory bowel disease     Irritable bowel syndrome without diarrhea 2017    Kidney stone ?2016?    In hospital overnight    Osteopenia 2017    Peripheral neuropathy 2017     Past Surgical History:   Procedure Laterality Date     SECTION      x2    COLON SURGERY      ENDOMETRIAL ABLATION      KNEE ARTHROSCOPY Bilateral     TUBAL ABDOMINAL LIGATION  1973      General Information       Row Name 23 1553          OT Time and Intention    Document Type evaluation  -DT     Mode of Treatment occupational therapy  -DT       Row Name 23 1553          General Information    Patient Profile Reviewed yes  -DT     Prior Level of Function independent:;all household mobility;community mobility;ADL's;home management  -DT      Existing Precautions/Restrictions no known precautions/restrictions  -DT     Barriers to Rehab none identified  -DT       Row Name 07/23/23 1553          Living Environment    People in Home child(rupinder), adult;grandchild(rupinder)  -DT       Row Name 07/23/23 1553          Home Main Entrance    Number of Stairs, Main Entrance none  -DT       Row Name 07/23/23 1553          Stairs Within Home, Primary    Number of Stairs, Within Home, Primary none  -DT       Row Name 07/23/23 1553          Cognition    Orientation Status (Cognition) oriented x 4  -DT               User Key  (r) = Recorded By, (t) = Taken By, (c) = Cosigned By      Initials Name Provider Type    DT Roberta Bird, OT Occupational Therapist                     Mobility/ADL's       Row Name 07/23/23 1559          Bed Mobility    Bed Mobility supine-sit  -DT     Supine-Sit Schriever (Bed Mobility) independent  -DT       Row Name 07/23/23 1559          Transfers    Transfers bed-chair transfer  -DT       Row Name 07/23/23 1602 07/23/23 1559       Bed-Chair Transfer    Bed-Chair Schriever (Transfers) standby assist  as a precaution due to orthostatic hypotension.  -DT independent  -DT      Row Name 07/23/23 1600          Activities of Daily Living    BADL Assessment/Intervention lower body dressing  -DT       Row Name 07/23/23 1600          Lower Body Dressing Assessment/Training    Schriever Level (Lower Body Dressing) lower body dressing skills;independent  -DT     Position (Lower Body Dressing) supported sitting;unsupported standing  -DT               User Key  (r) = Recorded By, (t) = Taken By, (c) = Cosigned By      Initials Name Provider Type    DT Roberta Bird, OT Occupational Therapist                   Obj/Interventions       Row Name 07/23/23 1600          Range of Motion Comprehensive    General Range of Motion no range of motion deficits identified  -DT       Row Name 07/23/23 1600          Strength Comprehensive (MMT)     General Manual Muscle Testing (MMT) Assessment no strength deficits identified  -DT       Row Name 07/23/23 1602 07/23/23 1600       Balance    Balance Assessment -- sitting static balance;sitting dynamic balance;standing static balance;standing dynamic balance  -DT    Static Sitting Balance -- independent  -DT    Dynamic Sitting Balance -- independent  -DT    Static Standing Balance -- standby assist  as a precaution due to orthostatic hypotension.  -DT    Dynamic Standing Balance -- standby assist  as a precaution due to orthostatic hypotension.  -DT    Position/Device Used, Standing Balance unsupported  -DT --              User Key  (r) = Recorded By, (t) = Taken By, (c) = Cosigned By      Initials Name Provider Type    DT Roberta Bird, OT Occupational Therapist                   Goals/Plan    No documentation.                  Clinical Impression       Row Name 07/23/23 1602          Pain Assessment    Pretreatment Pain Rating 0/10 - no pain  -DT     Posttreatment Pain Rating 0/10 - no pain  -DT       Row Name 07/23/23 1602          Plan of Care Review    Plan of Care Reviewed With patient  -DT     Outcome Evaluation Pt is a 72 y.o. F adm to [facility] with c/o dizziness, confusion & near syncopal episode. Pt found to have UTI, acute kidney injury, orthostatic hypotension, deyhdration. PMH: HTN, DM type 2, CAD, peripheral neuropathy, depression. At baseline, pt lives with daughter & grandchildren in a SSH with 0 TONY. She has a RW, but does not use it. She reports she is ind with all BADLs & IADLs. She does not drive & family provides transportation. Upon eval, pt is A&O X4. She states she is feeling better & has no c/o dizziness. She did have orthostatic hypotension during tx, but pt was asymptomatic. She was ind with bed mobility and completed ADL transfers with SBA as a precaution due to orthostatic hypotension. She demo good strength, balance & endurance & is ind with BADLs. Pt seems to be functioning  at her baseline status; therefore, no OT tx is recommended at this time. OT will sign off & pt should be safe to return home with family once medically cleared.  -DT       Row Name 07/23/23 1602          Therapy Assessment/Plan (OT)    Criteria for Skilled Therapeutic Interventions Met (OT) no;no problems identified which require skilled intervention  -DT       Row Name 07/23/23 1602          Therapy Plan Review/Discharge Plan (OT)    Anticipated Discharge Disposition (OT) home  -DT       Row Name 07/23/23 1602          Vital Signs    Pre Systolic BP Rehab 142  supine  -DT     Pre Treatment Diastolic BP 74  -DT     Intra Systolic BP Rehab 133  sit  -DT     Intra Treatment Diastolic BP 68  -DT     Post Systolic BP Rehab 120  standing  -DT     Post Treatment Diastolic BP 66  -DT       Row Name 07/23/23 1602          Positioning and Restraints    Pre-Treatment Position in bed  -DT     Post Treatment Position chair  -DT     In Chair notified nsg;sitting;call light within reach;encouraged to call for assist  -DT               User Key  (r) = Recorded By, (t) = Taken By, (c) = Cosigned By      Initials Name Provider Type    DT Roberta Bird, OT Occupational Therapist                   Outcome Measures    No documentation.                   Occupational Therapy Education       Title: PT OT SLP Therapies (Done)       Topic: Occupational Therapy (Done)       Point: ADL training (Done)       Description:   Instruct learner(s) on proper safety adaptation and remediation techniques during self care or transfers.   Instruct in proper use of assistive devices.                  Learning Progress Summary             Patient Acceptance, E,TB, VU by DT at 7/23/2023 1617    Comment: Role of OT, safety prec in hospital setting                         Point: Precautions (Done)       Description:   Instruct learner(s) on prescribed precautions during self-care and functional transfers.                  Learning Progress Summary              Patient Acceptance, E,TB, VU by DT at 7/23/2023 4497    Comment: Role of OT, safety prec in hospital setting                                         User Key       Initials Effective Dates Name Provider Type Discipline    DT 07/11/23 -  Roberta Bird, OT Occupational Therapist OT                  OT Recommendation and Plan     Plan of Care Review  Plan of Care Reviewed With: patient  Outcome Evaluation: Pt is a 72 y.o. F adm to [facility] with c/o dizziness, confusion & near syncopal episode. Pt found to have UTI, acute kidney injury, orthostatic hypotension, deyhdration. PMH: HTN, DM type 2, CAD, peripheral neuropathy, depression. At baseline, pt lives with daughter & grandchildren in a SSH with 0 TONY. She has a RW, but does not use it. She reports she is ind with all BADLs & IADLs. She does not drive & family provides transportation. Upon eval, pt is A&O X4. She states she is feeling better & has no c/o dizziness. She did have orthostatic hypotension during tx, but pt was asymptomatic. She was ind with bed mobility and completed ADL transfers with SBA as a precaution due to orthostatic hypotension. She demo good strength, balance & endurance & is ind with BADLs. Pt seems to be functioning at her baseline status; therefore, no OT tx is recommended at this time. OT will sign off & pt should be safe to return home with family once medically cleared.     Time Calculation:         Time Calculation- OT       Row Name 07/23/23 1618             Time Calculation- OT    OT Start Time 1302  -DT      OT Stop Time 1320  -DT      OT Time Calculation (min) 18 min  -DT      OT Received On 07/23/23  -DT                User Key  (r) = Recorded By, (t) = Taken By, (c) = Cosigned By      Initials Name Provider Type    DT Roberta Bird, UMAIR Occupational Therapist                           Roberta Bird OT  7/23/2023

## 2023-07-23 NOTE — ED NOTES
"Nursing report ED to floor  Dora Solorio  73 y.o.  female    HPI:   Chief Complaint   Patient presents with    Dizziness     Pt reports she became dizzy with bilateral weakness in her knees, some confusion and became \"really hot\" after eating at 1730 today.         Admitting doctor:   Franklin Schmitt MD    Admitting diagnosis:   The primary encounter diagnosis was Near syncope. Diagnoses of Weakness, Urinary tract infection without hematuria, site unspecified, and Orthostatic hypotension were also pertinent to this visit.    Code status:   Current Code Status       Date Active Code Status Order ID Comments User Context       7/22/2023 2127 CPR (Attempt to Resuscitate) [id number]  Franklin Schmitt MD ED        Question Answer    Code Status (Patient has no pulse and is not breathing) CPR (Attempt to Resuscitate)    Medical Interventions (Patient has pulse or is breathing) Full Support    Level Of Support Discussed With Patient    Release to patient Routine Release                    Allergies:   Sulfa antibiotics    Isolation:  No active isolations     Fall Risk:  Fall Risk Assessment was completed, and patient is at moderate risk for falls.   Predictive Model Details         8 (Low) Factor Value    Calculated 7/23/2023 02:07 Age 73    Risk of Fall Model Musculoskeletal Assessment WDL     Active Peripheral IV Present     Imaging order in this encounter Present     Skin Assessment WDL     Magnesium 1.8 mg/dL     Number of Distinct Medication Classes administered 4     Financial Class Medicare     Diastolic BP 66     Drug Use No     Creatinine 1.67 mg/dL     Constantino Scale not on file     Peripheral Vascular Assessment WDL     Cardiac Assessment X     Gastrointestinal Assessment WDL     Chloride 102 mmol/L     Respiratory Rate 16     Albumin 4.1 g/dL     Days after Admission 0.304     Number of administrations of Anti-Coagulants 1     Potassium 4.7 mmol/L     Calcium 9.5 mg/dL     ALT 9 U/L     Total Bilirubin 0.3 mg/dL  "        Weight:       07/22/23  1842   Weight: 87.5 kg (193 lb)       Intake and Output    Intake/Output Summary (Last 24 hours) at 7/23/2023 0530  Last data filed at 7/22/2023 2339  Gross per 24 hour   Intake 1100 ml   Output --   Net 1100 ml       Diet:   Dietary Orders (From admission, onward)       Start     Ordered    07/22/23 2127  Diet: Cardiac Diets; Healthy Heart (2-3 Na+); Texture: Regular Texture (IDDSI 7); Fluid Consistency: Thin (IDDSI 0)  Diet Effective Now        References:    Diet Order [place]   Question Answer Comment   Diets: Cardiac Diets    Cardiac Diet: Healthy Heart (2-3 Na+)    Texture: Regular Texture (IDDSI 7)    Fluid Consistency: Thin (IDDSI 0)        07/22/23 2127                     Most recent vitals:   Vitals:    07/23/23 0059 07/23/23 0200 07/23/23 0303 07/23/23 0403   BP: 140/66 133/69 122/66 150/71   Pulse: 60 62 59 60   Resp:       Temp:       TempSrc:       SpO2: 95% 95% 95% 96%   Weight:       Height:           Active LDAs/IV Access:   Lines, Drains & Airways       Active LDAs       Name Placement date Placement time Site Days    Peripheral IV 07/22/23 1857 Left Antecubital 07/22/23 1857  Antecubital  less than 1                    Skin Condition:   Skin Assessments (last day)       None             Labs (abnormal labs have a star):   Labs Reviewed   COMPREHENSIVE METABOLIC PANEL - Abnormal; Notable for the following components:       Result Value    Glucose 230 (*)     Creatinine 1.67 (*)     eGFR 32.0 (*)     All other components within normal limits    Narrative:     GFR Normal >60  Chronic Kidney Disease <60  Kidney Failure <15    The GFR formula is only valid for adults with stable renal function between ages 18 and 70.   URINALYSIS W/ CULTURE IF INDICATED - Abnormal; Notable for the following components:    Color, UA Dark Yellow (*)     Appearance, UA Cloudy (*)     Ketones, UA 15 mg/dL (1+) (*)     Bilirubin, UA Small (1+) (*)     Protein,  mg/dL (2+) (*)      Leuk Esterase, UA Moderate (2+) (*)     Nitrite, UA Positive (*)     All other components within normal limits    Narrative:     In absence of clinical symptoms, the presence of pyuria, bacteria, and/or nitrites on the urinalysis result does not correlate with infection.   TROPONIN - Abnormal; Notable for the following components:    HS Troponin T 12 (*)     All other components within normal limits    Narrative:     High Sensitive Troponin T Reference Range:  <10.0 ng/L- Negative Female for AMI  <15.0 ng/L- Negative Male for AMI  >=10 - Abnormal Female indicating possible myocardial injury.  >=15 - Abnormal Male indicating possible myocardial injury.   Clinicians would have to utilize clinical acumen, EKG, Troponin, and serial changes to determine if it is an Acute Myocardial Infarction or myocardial injury due to an underlying chronic condition.        HIGH SENSITIVITIY TROPONIN T 2HR - Abnormal; Notable for the following components:    HS Troponin T 12 (*)     All other components within normal limits    Narrative:     High Sensitive Troponin T Reference Range:  <10.0 ng/L- Negative Female for AMI  <15.0 ng/L- Negative Male for AMI  >=10 - Abnormal Female indicating possible myocardial injury.  >=15 - Abnormal Male indicating possible myocardial injury.   Clinicians would have to utilize clinical acumen, EKG, Troponin, and serial changes to determine if it is an Acute Myocardial Infarction or myocardial injury due to an underlying chronic condition.        URINALYSIS, MICROSCOPIC ONLY - Abnormal; Notable for the following components:    WBC, UA 21-30 (*)     Bacteria, UA Trace (*)     Squamous Epithelial Cells, UA 7-12 (*)     Transitional Epithelial Cells, UA 3-6 (*)     All other components within normal limits   RESPIRATORY PANEL PCR W/ COVID-19 (SARS-COV-2) KEITH/LISANDRO/LILIA/PAD/COR/MAD/LIZBET IN-HOUSE, NP SWAB IN [facility], 3-4 HR TAT - Normal    Narrative:     In the setting of a positive respiratory panel with a  viral infection PLUS a negative procalcitonin without other underlying concern for bacterial infection, consider observing off antibiotics or discontinuation of antibiotics and continue supportive care. If the respiratory panel is positive for atypical bacterial infection (Bordetella pertussis, Chlamydophila pneumoniae, or Mycoplasma pneumoniae), consider antibiotic de-escalation to target atypical bacterial infection.   TSH - Normal   MAGNESIUM - Normal   BNP (IN-HOUSE) - Normal    Narrative:     Among patients with dyspnea, NT-proBNP is highly sensitive for the detection of acute congestive heart failure. In addition NT-proBNP of <300 pg/ml effectively rules out acute congestive heart failure with 99% negative predictive value.    Results may be falsely decreased if patient taking Biotin.     CBC WITH AUTO DIFFERENTIAL - Normal   CBC WITH AUTO DIFFERENTIAL - Normal   URINE CULTURE   RAINBOW DRAW    Narrative:     The following orders were created for panel order [place] Draw.  Procedure                               Abnormality         Status                     ---------                               -----------         ------                     Green Top (Gel)[[id number]]                                  Final result               Lavender Top[[id number]]                                     Final result               Gold Top - SST[[id number]]                                   Final result               Light Blue Top[[id number]]                                   Final result                 Please view results for these tests on the individual orders.   BASIC METABOLIC PANEL   MAGNESIUM   GREEN TOP   LAVENDER TOP   GOLD TOP - SST   LIGHT BLUE TOP   CBC AND DIFFERENTIAL    Narrative:     The following orders were created for panel order CBC & Differential.  Procedure                               Abnormality         Status                     ---------                               -----------         ------                      CBC Auto Differential[[id number]]        Normal              Final result                 Please view results for these tests on the individual orders.   CBC AND DIFFERENTIAL    Narrative:     The following orders were created for panel order CBC & Differential.  Procedure                               Abnormality         Status                     ---------                               -----------         ------                     CBC Auto Differential[[id number]]        Normal              Final result                 Please view results for these tests on the individual orders.       LOC: Person, Place, Time, and Situation    Telemetry:  Telemetry    Cardiac Monitoring Ordered: yes    EKG:   ECG 12 Lead Other; Dizziness   Preliminary Result   HEART RATE= 47  bpm   RR Interval= 1272  ms   CT Interval= 207  ms   P Horizontal Axis=   deg   P Front Axis= 26  deg   QRSD Interval= 91  ms   QT Interval= 519  ms   QRS Axis= -6  deg   T Wave Axis= 28  deg   - ABNORMAL ECG -   Sinus bradycardia   Anterior infarct, old   Electronically Signed By:    Date and Time of Study: 2023-07-22 19:09:35          Medications Given in the ED:   Medications   sodium chloride 0.9 % flush 10 mL (has no administration in time range)   sodium chloride 0.9 % flush 10 mL (has no administration in time range)   sodium chloride 0.9 % flush 10 mL (10 mL Intravenous Given 7/23/23 0008)   sodium chloride 0.9 % flush 10 mL (has no administration in time range)   sodium chloride 0.9 % infusion 40 mL (has no administration in time range)   sennosides-docusate (PERICOLACE) 8.6-50 MG per tablet 2 tablet (2 tablets Oral Given 7/22/23 9335)     And   polyethylene glycol (MIRALAX) packet 17 g (has no administration in time range)     And   bisacodyl (DULCOLAX) EC tablet 5 mg (has no administration in time range)     And   bisacodyl (DULCOLAX) suppository 10 mg (has no administration in time range)   Enoxaparin Sodium (LOVENOX) syringe 40 mg  (40 mg Subcutaneous Given 7/22/23 2155)   nitroglycerin (NITROSTAT) SL tablet 0.4 mg (has no administration in time range)   Potassium Replacement - Follow Nurse / BPA Driven Protocol (has no administration in time range)   Magnesium Standard Dose Replacement - Follow Nurse / BPA Driven Protocol (has no administration in time range)   Phosphorus Replacement - Follow Nurse / BPA Driven Protocol (has no administration in time range)   Calcium Replacement - Follow Nurse / BPA Driven Protocol (has no administration in time range)   ondansetron (ZOFRAN) injection 4 mg (has no administration in time range)   sodium chloride 0.9 % infusion (75 mL/hr Intravenous New Bag 7/23/23 0006)   atorvastatin (LIPITOR) tablet 20 mg (has no administration in time range)   cefTRIAXone (ROCEPHIN) 2,000 mg in sodium chloride 0.9 % 100 mL IVPB (has no administration in time range)   acetaminophen (TYLENOL) tablet 650 mg (has no administration in time range)   cefTRIAXone (ROCEPHIN) 2,000 mg in sodium chloride 0.9 % 100 mL IVPB (0 mg Intravenous Stopped 7/22/23 2339)   sodium chloride 0.9 % bolus 1,000 mL (0 mL Intravenous Stopped 7/22/23 2339)       Imaging results:  CT Head Without Contrast    Result Date: 7/22/2023  Impression: No acute intracranial process. Electronically Signed: Keiko Preston  7/22/2023 8:34 PM EDT  Workstation ID: [id number]    XR Chest 1 View    Result Date: 7/22/2023  No evidence of acute disease in the chest. Electronically Signed: Bacilio Mallory  7/22/2023 7:59 PM EDT  Workstation ID: [id number]     Social issues:   Social History     Socioeconomic History    Marital status:      Spouse name: Rishabh    Number of children: 2    Years of education: 12   Tobacco Use    Smoking status: Never    Smokeless tobacco: Never   Vaping Use    Vaping Use: Never used   Substance and Sexual Activity    Alcohol use: Never    Drug use: Never    Sexual activity: Not Currently     Partners: Male     Birth control/protection: Other        NIH Stroke Scale:  Interval: (not recorded)  1a. Level of Consciousness: (not recorded)  1b. LOC Questions: (not recorded)  1c. LOC Commands: (not recorded)  2. Best Gaze: (not recorded)  3. Visual: (not recorded)  4. Facial Palsy: (not recorded)  5a. Motor Arm, Left: (not recorded)  5b. Motor Arm, Right: (not recorded)  6a. Motor Leg, Left: (not recorded)  6b. Motor Leg, Right: (not recorded)  7. Limb Ataxia: (not recorded)  8. Sensory: (not recorded)  9. Best Language: (not recorded)  10. Dysarthria: (not recorded)  11. Extinction and Inattention (formerly Neglect): (not recorded)    Total (NIH Stroke Scale): (not recorded)     Additional notable assessment information:     Nursing report ED to floor:  248    Jory Mera RN   07/23/23 05:30 EDT

## 2023-07-23 NOTE — PROGRESS NOTES
[facility]     Progress Note    Patient Name: Dora Solorio  : 1950  MRN: [id number]  Primary Care Physician:  Bhargavi Rodriguez APRN  Date of admission: 2023    Subjective   Subjective     Chief Complaint:  Acute encephalopathy, acute UTI      History of Present Illness  Patient Reports feeling better. Seen and examined at bedside alongside RN. Denies any chest pain, SOA, or dizziness    Review of Systems  12 points ROS reviewed and all neg except for what mentioned above  Objective   Objective     Vitals:   Temp:  [97.7 °F (36.5 °C)-98 °F (36.7 °C)] 97.7 °F (36.5 °C)  Heart Rate:  [47-98] 98  Resp:  [15-16] 15  BP: ()/(54-84) 154/65    Physical Exam   Vitals and nursing note reviewed.   Constitutional:       General: She is not in acute distress.     Appearance: Normal appearance. She is well-developed. She is obese. She is not ill-appearing, toxic-appearing or diaphoretic.   HENT:      Head: Normocephalic and atraumatic.      Mouth/Throat:      Mouth: Mucous membranes are moist.      Pharynx: Oropharynx is clear.   Eyes:      General: No scleral icterus.     Extraocular Movements: Extraocular movements intact.      Pupils: Pupils are equal, round, and reactive to light.   Cardiovascular:      Rate and Rhythm: Normal rate and regular rhythm.      Pulses: Normal pulses.      Heart sounds: No murmur heard.    No friction rub. No gallop.   Pulmonary:      Effort: Pulmonary effort is normal. No respiratory distress.      Breath sounds: Normal breath sounds. No stridor. No wheezing, rhonchi or rales.   Chest:      Chest wall: No tenderness.   Abdominal:      General: Bowel sounds are normal. There is no distension. There are no signs of injury.      Palpations: Abdomen is soft.      Tenderness: There is no abdominal tenderness. There is no guarding or rebound.      Hernia: No hernia is present.   Skin:     General: Skin is warm.      Capillary Refill: Capillary refill takes less than 2 seconds.       Coloration: Skin is not cyanotic, jaundiced or pale.      Findings: No rash.   Neurological:      General: No focal deficit present.      Mental Status: She is alert and oriented to person, place, and time.      Motor: No tremor.      Comments: Normal and equal sensation strength throughout moving all extremities freely.   Psychiatric:         Mood and Affect: Mood normal.         Behavior: Behavior normal.   Result Review    Result Review:  I have personally reviewed the results from the time of this admission to 7/23/2023 14:23 EDT and agree with these findings:  [x]  Laboratory list / accordion  []  Microbiology  [x]  Radiology  []  EKG/Telemetry   []  Cardiology/Vascular   []  Pathology  []  Old records  []  Other:  Most notable findings include      Assessment & Plan   Assessment / Plan     Brief Patient Summary:  Dora Solorio is a 73 y.o. female     Active Hospital Problems:  Active Hospital Problems    Diagnosis     **Acute UTI      Plan:   Telemetry   Labs, vitals and imaging reviewed  CT head negative  Start on IV fluids  Continue on IV Rocephin  Follow-up and urine culture  We will repeat orthostatic vital signs after fluid hydration  Pain control and antiemetics as ordered  We will hold amlodipine, lisinopril, hydrochlorothiazide and gabapentin  Medications reviewed, resume home medication regimen once reviewed by pharmacy  Monitor labs and vitals daily   Replace electrolytes as needed   PT/OT  Fall precautions   Cardiac diet   DVT and GI prophylaxis   Consult case management for discharge planning     DVT prophylaxis:  Medical DVT prophylaxis orders are present.    CODE STATUS:    Level Of Support Discussed With: Patient  Code Status (Patient has no pulse and is not breathing): CPR (Attempt to Resuscitate)  Medical Interventions (Patient has pulse or is breathing): Full Support  Release to patient: Routine Release    Disposition:  I expect patient to be discharged 1-2 days If remains  stable.    Leonidas Ch MD

## 2023-07-23 NOTE — PLAN OF CARE
Problem: Adult Inpatient Plan of Care  Goal: Plan of Care Review  Outcome: Ongoing, Progressing  Flowsheets (Taken 7/23/2023 0602)  Progress: improving  Goal: Patient-Specific Goal (Individualized)  Outcome: Ongoing, Progressing  Goal: Absence of Hospital-Acquired Illness or Injury  Outcome: Ongoing, Progressing  Goal: Optimal Comfort and Wellbeing  Outcome: Ongoing, Progressing  Goal: Readiness for Transition of Care  Outcome: Ongoing, Progressing     Problem: Fall Injury Risk  Goal: Absence of Fall and Fall-Related Injury  Outcome: Ongoing, Progressing     Problem: Pain Acute  Goal: Acceptable Pain Control and Functional Ability  Outcome: Ongoing, Progressing   Goal Outcome Evaluation:           Progress: improving

## 2023-07-23 NOTE — PLAN OF CARE
Goal Outcome Evaluation:  Plan of Care Reviewed With: patient           Outcome Evaluation: Pt is a 72 y.o. F adm to [facility] with c/o dizziness, confusion & near syncopal episode. Pt found to have UTI, acute kidney injury, orthostatic hypotension, deyhdration. Pt lives with adult daughter and grandchild, typoically is independent with all ADLs, ambulating without AD and had no recent falls. Pt reports she had a near fall at home prior to admission. BP assessed in supine, sitting and standing this date and recorded below. Pt without significant sypmtoms. Pt this date requires no physical assistance for mobility and appears safe to return home wiht family. Pt does not require PT services at this time and PT to sign off.      Anticipated Discharge Disposition (PT): home with assist

## 2023-07-24 ENCOUNTER — READMISSION MANAGEMENT (OUTPATIENT)
Dept: CALL CENTER | Facility: HOSPITAL | Age: 73
End: 2023-07-24
Payer: MEDICARE

## 2023-07-24 VITALS
TEMPERATURE: 97.6 F | RESPIRATION RATE: 15 BRPM | BODY MASS INDEX: 34.2 KG/M2 | SYSTOLIC BLOOD PRESSURE: 165 MMHG | HEART RATE: 55 BPM | DIASTOLIC BLOOD PRESSURE: 67 MMHG | OXYGEN SATURATION: 97 % | WEIGHT: 193 LBS | HEIGHT: 63 IN

## 2023-07-24 LAB
GLUCOSE BLDC GLUCOMTR-MCNC: 210 MG/DL (ref 70–105)
GLUCOSE BLDC GLUCOMTR-MCNC: 249 MG/DL (ref 70–105)

## 2023-07-24 PROCEDURE — 82948 REAGENT STRIP/BLOOD GLUCOSE: CPT

## 2023-07-24 PROCEDURE — 63710000001 INSULIN LISPRO (HUMAN) PER 5 UNITS: Performed by: STUDENT IN AN ORGANIZED HEALTH CARE EDUCATION/TRAINING PROGRAM

## 2023-07-24 PROCEDURE — G0378 HOSPITAL OBSERVATION PER HR: HCPCS

## 2023-07-24 RX ORDER — CEPHALEXIN 500 MG/1
500 CAPSULE ORAL 3 TIMES DAILY
Qty: 9 CAPSULE | Refills: 0 | Status: SHIPPED | OUTPATIENT
Start: 2023-07-24 | End: 2023-07-27

## 2023-07-24 RX ADMIN — BUPROPION HYDROCHLORIDE 150 MG: 150 TABLET, EXTENDED RELEASE ORAL at 09:29

## 2023-07-24 RX ADMIN — ESCITALOPRAM OXALATE 20 MG: 10 TABLET ORAL at 09:29

## 2023-07-24 RX ADMIN — INSULIN LISPRO 3 UNITS: 100 INJECTION, SOLUTION INTRAVENOUS; SUBCUTANEOUS at 13:02

## 2023-07-24 RX ADMIN — INSULIN LISPRO 3 UNITS: 100 INJECTION, SOLUTION INTRAVENOUS; SUBCUTANEOUS at 09:29

## 2023-07-24 RX ADMIN — Medication 10 ML: at 09:31

## 2023-07-24 RX ADMIN — ATORVASTATIN CALCIUM 20 MG: 20 TABLET, FILM COATED ORAL at 09:29

## 2023-07-24 NOTE — PROGRESS NOTES
[facility] Medicine Services  Discharge Summary    Date of Service: 2023  Patient Name: Dora Solorio  : 1950  MRN: [id number]    Date of Admission: 2023  Discharge Diagnosis: Urinary tract infection, presyncope, metabolic encephalopathy resolved  Date of Discharge: 2023  Primary Care Physician: Bhargavi Rodriguez APRN      Presenting Problem:   Orthostatic hypotension [I95.1]  Weakness [R53.1]  Acute UTI [N39.0]  Near syncope [R55]  Urinary tract infection without hematuria, site unspecified [N39.0]    Active and Resolved Hospital Problems:  Active Hospital Problems    Diagnosis POA    **Acute UTI [N39.0] Yes      Resolved Hospital Problems   No resolved problems to display.         Hospital Course     Hospital Course:  Dora Solorio is a 73 y.o. female  presents reports of a near syncopal episode. Patient states she was getting out of the car when she felt lightheaded.  Reports having an episode of diarrhea prior to this episode. She she also states associated nausea and shortness of breath.  Family noticed that the patient was more confused and having hallucinations.  She was found to have urinary tract infection, was rehydrated and treated with IV antibiotics with subsequent improvement in her mental status        DISCHARGE Follow Up Recommendations for labs and diagnostics:       Reasons For Change In Medications and Indications for New Medications:      Day of Discharge     Vital Signs:  Temp:  [97.6 °F (36.4 °C)-98.2 °F (36.8 °C)] 97.6 °F (36.4 °C)  Heart Rate:  [55-67] 55  Resp:  [14-19] 15  BP: (136-165)/(57-70) 165/67    Physical Exam:  Physical Exam  Vitals and nursing note reviewed.   Constitutional:       Appearance: Normal appearance.   HENT:      Head: Normocephalic.   Eyes:      Pupils: Pupils are equal, round, and reactive to light.   Cardiovascular:      Rate and Rhythm: Normal rate and regular rhythm.   Pulmonary:      Effort: Pulmonary effort is normal.    Abdominal:      General: Abdomen is flat.   Musculoskeletal:         General: No swelling.   Skin:     General: Skin is warm and dry.   Neurological:      General: No focal deficit present.      Mental Status: She is alert. Mental status is at baseline.          Pertinent  and/or Most Recent Results     LAB RESULTS:      Lab 07/23/23 0448 07/22/23  1857   WBC 8.60 8.20   HEMOGLOBIN 12.4 13.3   HEMATOCRIT 37.6 40.0   PLATELETS 190 219   NEUTROS ABS 5.60 5.50   LYMPHS ABS 2.30 1.80   MONOS ABS 0.60 0.60   EOS ABS 0.20 0.20   MCV 86.9 87.2         Lab 07/23/23 0448 07/22/23 1857   SODIUM 140 137   POTASSIUM 4.3 4.7   CHLORIDE 105 102   CO2 25.0 23.0   ANION GAP 10.0 12.0   BUN 22 20   CREATININE 1.43* 1.67*   EGFR 38.3* 32.0*   GLUCOSE 191* 230*   CALCIUM 8.9 9.5   MAGNESIUM 1.7 1.8   TSH  --  3.840         Lab 07/22/23 1857   TOTAL PROTEIN 8.0   ALBUMIN 4.1   GLOBULIN 3.9   ALT (SGPT) 9   AST (SGOT) 11   BILIRUBIN 0.3   ALK PHOS 89         Lab 07/22/23 2057 07/22/23 1857   PROBNP  --  156.1   HSTROP T 12* 12*                 Brief Urine Lab Results  (Last result in the past 365 days)        Color   Clarity   Blood   Leuk Est   Nitrite   Protein   CREAT   Urine HCG        07/22/23 2031 Dark Yellow  Comment: Result checked     Cloudy   Negative   Moderate (2+)   Positive   100 mg/dL (2+)                 Microbiology Results (last 10 days)       Procedure Component Value - Date/Time    Urine Culture - Urine, Urine, Clean Catch [[id number]]  (Abnormal) Collected: 07/22/23 2031    Lab Status: Preliminary result Specimen: Urine, Clean Catch Updated: 07/24/23 1254     Urine Culture 25,000 CFU/mL Gram Positive Cocci    Narrative:      Colonization of the urinary tract without infection is common. Treatment is discouraged unless the patient is symptomatic, pregnant, or undergoing an invasive urologic procedure.    Respiratory Panel PCR w/COVID-19(SARS-CoV-2) KEITH/LISANDRO/LILIA/PAD/COR/MAD/LIZBET In-House, NP Swab in UTM/VTM, 3-4  HR TAT - Swab, Nasopharynx [[id number]]  (Normal) Collected: 07/22/23 2004    Lab Status: Final result Specimen: Swab from Nasopharynx Updated: 07/22/23 2056     ADENOVIRUS, PCR Not Detected     Coronavirus 229E Not Detected     Coronavirus HKU1 Not Detected     Coronavirus NL63 Not Detected     Coronavirus OC43 Not Detected     COVID19 Not Detected     Human Metapneumovirus Not Detected     Human Rhinovirus/Enterovirus Not Detected     Influenza A PCR Not Detected     Influenza B PCR Not Detected     Parainfluenza Virus 1 Not Detected     Parainfluenza Virus 2 Not Detected     Parainfluenza Virus 3 Not Detected     Parainfluenza Virus 4 Not Detected     RSV, PCR Not Detected     Bordetella pertussis pcr Not Detected     Bordetella parapertussis PCR Not Detected     Chlamydophila pneumoniae PCR Not Detected     Mycoplasma pneumo by PCR Not Detected    Narrative:      In the setting of a positive respiratory panel with a viral infection PLUS a negative procalcitonin without other underlying concern for bacterial infection, consider observing off antibiotics or discontinuation of antibiotics and continue supportive care. If the respiratory panel is positive for atypical bacterial infection (Bordetella pertussis, Chlamydophila pneumoniae, or Mycoplasma pneumoniae), consider antibiotic de-escalation to target atypical bacterial infection.            CT Head Without Contrast    Result Date: 7/22/2023  Impression: Impression: No acute intracranial process. Electronically Signed: Keiko Preston  7/22/2023 8:34 PM EDT  Workstation ID: [id number]    XR Chest 1 View    Result Date: 7/22/2023  Impression: No evidence of acute disease in the chest. Electronically Signed: Bacilio Mallory  7/22/2023 7:59 PM EDT  Workstation ID: [id number]    DEXA Bone Density Axial    Result Date: 7/19/2023  Impression: Osteopenia. Based upon the National Osteoporosis Foundation Guide, patient's FRAX score does meet criteria for pharmacologic treatment to  prevent osteoporosis. Individual treatment decisions should be made based upon each patient's full clinical history and risk factors.   Copies of the computerized summary reports can be obtained from [employer] via the health information department of [facility].   Electronically Signed By-Stephenie Rosas MD On:7/19/2023 10:47 AM               Results for orders placed during the hospital encounter of 09/09/22    Adult Transthoracic Echo Complete W/ Cont if Necessary Per Protocol (With Agitated Saline)    Interpretation Summary  · Left ventricular wall thickness is consistent with mild concentric hypertrophy.  · Estimated left ventricular EF = 65% Estimated left ventricular EF was in agreement with the calculated left ventricular EF. Left ventricular systolic function is normal.  · Left ventricular diastolic function is consistent with (grade I) impaired relaxation.  · Saline test results are negative for right to left atrial level shunt.  · Estimated right ventricular systolic pressure from tricuspid regurgitation is normal (<35 mmHg).      Labs Pending at Discharge:  Pending Labs       Order Current Status    Urine Culture - Urine, Urine, Clean Catch Preliminary result            Procedures Performed           Consults:   Consults       No orders found from 6/23/2023 to 7/23/2023.              Discharge Details        Discharge Medications        New Medications        Instructions Start Date   cephalexin 500 MG capsule  Commonly known as: Keflex  Notes to patient: Take this medication till it is all gone to prevent UTI from returning. May cause stomach upset or diarrhea;can be taken with food.   500 mg, Oral, 3 Times Daily             Continue These Medications        Instructions Start Date   Accu-Chek Guide Me w/Device kit   use daily to check blood sugar      Accu-Chek Softclix Lancets lancets   Use to test blood sugar once daily      Alcohol Prep 70 % pads   1 each, Apply externally, Daily      amLODIPine  10 MG tablet  Commonly known as: NORVASC   Take 1 tablet by mouth once daily      atorvastatin 20 MG tablet  Commonly known as: LIPITOR   Take 1 tablet by mouth once daily      bisoprolol 10 MG tablet  Commonly known as: ZEBeta   Take 1 tablet by mouth once daily      buPROPion  MG 24 hr tablet  Commonly known as: Wellbutrin XL   150 mg, Oral, Daily      DULoxetine 20 MG capsule  Commonly known as: CYMBALTA   20 mg, Oral, Daily      escitalopram 20 MG tablet  Commonly known as: Lexapro   20 mg, Oral, Daily      gabapentin 600 MG tablet  Commonly known as: NEURONTIN   600 mg, Oral, 3 Times Daily      glimepiride 2 MG tablet  Commonly known as: AMARYL   4 mg, Oral, 2 Times Daily      glucose blood test strip  Commonly known as: Accu-Chek Guide   test once daily      Accu-Chek Guide test strip  Generic drug: glucose blood   Use as instructed  once daily   dx e 11.9      hydroCHLOROthiazide 50 MG tablet  Commonly known as: HYDRODIURIL   50 mg, Oral, Daily      Insulin Degludec 100 UNIT/ML solution injection  Commonly known as: Tresiba   10 Units, Subcutaneous, Nightly      lisinopril 20 MG tablet  Commonly known as: PRINIVIL,ZESTRIL   20 mg, Oral, 2 Times Daily      metFORMIN 1000 MG tablet  Commonly known as: GLUCOPHAGE   TAKE 1 TABLET BY MOUTH TWICE DAILY WITH MEALS      Vitamin B-12 1000 MCG sublingual tablet   1,000 mcg, Sublingual, Daily               Allergies   Allergen Reactions    Sulfa Antibiotics Rash         Discharge Disposition: Home with family  Home or Self Care    Diet:  Hospital:  No active diet order        Discharge Activity: As tolerated        CODE STATUS:  Code Status and Medical Interventions:   Ordered at: 07/22/23 2127     Level Of Support Discussed With:    Patient     Code Status (Patient has no pulse and is not breathing):    CPR (Attempt to Resuscitate)     Medical Interventions (Patient has pulse or is breathing):    Full Support     Release to patient:    Routine Release          Future Appointments   Date Time Provider Department Center   8/10/2023  3:00 PM Adolph Tillman MD [facility]   1/11/2024 10:00 AM Bhargavi Rodriguez APRN [facility]       [unfilled]    Time spent on Discharge including face to face service: 35 minutes          Signature: Electronically signed by Irais Kingsley MD, 07/24/23, 16:56 EDT.  [facility] Hospitalist Team

## 2023-07-24 NOTE — OUTREACH NOTE
Prep Survey      Flowsheet Row Responses   [facility]   Is LACE score < 7 ? Yes   Eligibility [facility]   Date of Admission 07/22/23   Date of Discharge 07/24/23   Discharge Disposition Home or Self Care   Discharge diagnosis Acute UTI, presyncope, metabolic encephalopathy   Does the patient have one of the following disease processes/diagnoses(primary or secondary)? Other   Does the patient have Home health ordered? No   Is there a DME ordered? No   Prep survey completed? Yes            Carey PEREIRA - Registered Nurse

## 2023-07-24 NOTE — CASE MANAGEMENT/SOCIAL WORK
Case Management Discharge Note      Final Note: Home         Selected Continued Care - Discharged on 7/24/2023 Admission date: 7/22/2023 - Discharge disposition: Home or Self Care             Transportation Services  Private: Car    Final Discharge Disposition Code: 01 - home or self-care

## 2023-07-24 NOTE — PLAN OF CARE
VSS, up w/standby assist for IV pole, otherwise ambulating at baseline, IVF and abx continue, no c/o pain. PRN melatonin order obtained per pt request and pt slept well overnight. Up to BRP multiple times w/good urine output and BM this shift. No urinary complaints. No c/o pain. Very pleasant. Celebrated b-day w/family last evening. Will continue to monitor.       Problem: Adult Inpatient Plan of Care  Goal: Absence of Hospital-Acquired Illness or Injury  Intervention: Identify and Manage Fall Risk  Recent Flowsheet Documentation  Taken 7/24/2023 0600 by Kamron Lake RN  Safety Promotion/Fall Prevention: safety round/check completed  Taken 7/24/2023 0402 by Kamron Lake RN  Safety Promotion/Fall Prevention: safety round/check completed  Taken 7/24/2023 0202 by Kamron Lake RN  Safety Promotion/Fall Prevention: safety round/check completed  Taken 7/24/2023 0002 by Kamron Lake RN  Safety Promotion/Fall Prevention: safety round/check completed  Taken 7/23/2023 2200 by Kamron Lake RN  Safety Promotion/Fall Prevention: safety round/check completed  Taken 7/23/2023 2000 by Kamron Lake RN  Safety Promotion/Fall Prevention: safety round/check completed  Intervention: Prevent Skin Injury  Recent Flowsheet Documentation  Taken 7/24/2023 0600 by Kamron Lake RN  Body Position: position changed independently  Taken 7/24/2023 0402 by Kamron Lake RN  Body Position: position changed independently  Taken 7/24/2023 0202 by Kamron Lake RN  Body Position: position changed independently  Taken 7/24/2023 0002 by Kamron Lake RN  Body Position:   sitting up in bed   position changed independently  Taken 7/23/2023 2200 by Kamron Lake RN  Body Position: sitting up in bed  Taken 7/23/2023 2000 by Kamron Lake RN  Body Position:   sitting up in bed   position changed independently  Intervention: Prevent and Manage VTE (Venous  Thromboembolism) Risk  Recent Flowsheet Documentation  Taken 7/24/2023 0600 by Kamron Lake, RN  Activity Management: ambulated to bathroom  Taken 7/24/2023 0402 by Kamron Lake, RN  Activity Management: activity minimized  Taken 7/24/2023 0202 by Kamron Lake, RN  Activity Management: activity minimized  Taken 7/24/2023 0002 by Kamron Lake, RN  Activity Management: activity minimized  Taken 7/23/2023 2200 by Kamron Lake, RN  Activity Management: sitting, edge of bed  Taken 7/23/2023 2000 by Kamron Lake, RN  Activity Management: sitting, edge of bed  Goal: Optimal Comfort and Wellbeing  Intervention: Provide Person-Centered Care  Recent Flowsheet Documentation  Taken 7/23/2023 2000 by Kamron Lake, RN  Trust Relationship/Rapport:   care explained   reassurance provided   Goal Outcome Evaluation:

## 2023-07-25 ENCOUNTER — TRANSITIONAL CARE MANAGEMENT TELEPHONE ENCOUNTER (OUTPATIENT)
Dept: CALL CENTER | Facility: HOSPITAL | Age: 73
End: 2023-07-25
Payer: MEDICARE

## 2023-07-25 LAB — BACTERIA SPEC AEROBE CULT: ABNORMAL

## 2023-07-25 NOTE — OUTREACH NOTE
Call Center TCM Note      Flowsheet Row Responses   [facility] patient discharged from? [facility]   Does the patient have one of the following disease processes/diagnoses(primary or secondary)? Other   TCM attempt successful? Yes   Call start time 1110   Call end time 1111   Discharge diagnosis Acute UTI, presyncope, metabolic encephalopathy   Meds reviewed with patient/caregiver? Yes   Is the patient having any side effects they believe may be caused by any medication additions or changes? No   Does the patient have all medications ordered at discharge? Yes   Is the patient taking all medications as directed (includes completed medication regime)? Yes   Comments Hospital f/u with PCP BRANDON Anthony for 8/3   Does the patient have an appointment with their PCP within 7-14 days of discharge? Yes   Has home health visited the patient within 72 hours of discharge? N/A   Psychosocial issues? No   Did the patient receive a copy of their discharge instructions? Yes   Nursing interventions Reviewed instructions with patient   What is the patient's perception of their health status since discharge? Improving   Is the patient/caregiver able to teach back signs and symptoms related to disease process for when to call PCP? Yes   Is the patient/caregiver able to teach back signs and symptoms related to disease process for when to call 911? Yes   Is the patient/caregiver able to teach back the hierarchy of who to call/visit for symptoms/problems? PCP, Specialist, Home health nurse, Urgent Care, ED, 911 Yes   TCM call completed? Yes   Wrap up additional comments Doing well, no questions, confirmed f/u appt with PCP for 8/3.   Call end time 1111   Would this patient benefit from a Referral to Amb Social Work? No   Is the patient interested in additional calls from an ambulatory ?  NOTE:  applies to high risk patients requiring additional follow-up. No            Genoveva Petit RN    7/25/2023, 11:12  EDT

## 2023-08-03 ENCOUNTER — LAB (OUTPATIENT)
Dept: FAMILY MEDICINE CLINIC | Facility: CLINIC | Age: 73
End: 2023-08-03
Payer: MEDICARE

## 2023-08-03 ENCOUNTER — OFFICE VISIT (OUTPATIENT)
Dept: FAMILY MEDICINE CLINIC | Facility: CLINIC | Age: 73
End: 2023-08-03
Payer: MEDICARE

## 2023-08-03 VITALS
SYSTOLIC BLOOD PRESSURE: 153 MMHG | HEIGHT: 63 IN | HEART RATE: 53 BPM | BODY MASS INDEX: 34.55 KG/M2 | RESPIRATION RATE: 16 BRPM | WEIGHT: 195 LBS | DIASTOLIC BLOOD PRESSURE: 76 MMHG | TEMPERATURE: 97.5 F | OXYGEN SATURATION: 97 %

## 2023-08-03 DIAGNOSIS — N39.0 RECURRENT UTI: ICD-10-CM

## 2023-08-03 DIAGNOSIS — Z09 HOSPITAL DISCHARGE FOLLOW-UP: Primary | ICD-10-CM

## 2023-08-03 DIAGNOSIS — N17.9 AKI (ACUTE KIDNEY INJURY): ICD-10-CM

## 2023-08-03 DIAGNOSIS — E11.65 TYPE 2 DIABETES MELLITUS WITH HYPERGLYCEMIA, WITHOUT LONG-TERM CURRENT USE OF INSULIN: ICD-10-CM

## 2023-08-03 PROCEDURE — 36415 COLL VENOUS BLD VENIPUNCTURE: CPT

## 2023-08-03 PROCEDURE — 81001 URINALYSIS AUTO W/SCOPE: CPT | Performed by: NURSE PRACTITIONER

## 2023-08-03 PROCEDURE — 80048 BASIC METABOLIC PNL TOTAL CA: CPT | Performed by: NURSE PRACTITIONER

## 2023-08-03 PROCEDURE — 82570 ASSAY OF URINE CREATININE: CPT | Performed by: NURSE PRACTITIONER

## 2023-08-03 PROCEDURE — 82043 UR ALBUMIN QUANTITATIVE: CPT | Performed by: NURSE PRACTITIONER

## 2023-08-03 RX ORDER — INSULIN DEGLUDEC 100 U/ML
20 INJECTION, SOLUTION SUBCUTANEOUS NIGHTLY
Qty: 100 ML | Refills: 1 | COMMUNITY
Start: 2023-08-03

## 2023-08-03 RX ORDER — MAGNESIUM 200 MG
1000 TABLET ORAL DAILY
Qty: 100 EACH | Refills: 4 | Status: SHIPPED | OUTPATIENT
Start: 2023-08-03

## 2023-08-04 LAB
ANION GAP SERPL CALCULATED.3IONS-SCNC: 8 MMOL/L (ref 5–15)
BACTERIA UR QL AUTO: ABNORMAL /HPF
BILIRUB UR QL STRIP: NEGATIVE
BUN SERPL-MCNC: 19 MG/DL (ref 8–23)
BUN/CREAT SERPL: 14.6 (ref 7–25)
CALCIUM SPEC-SCNC: 9.2 MG/DL (ref 8.6–10.5)
CHLORIDE SERPL-SCNC: 108 MMOL/L (ref 98–107)
CLARITY UR: ABNORMAL
CO2 SERPL-SCNC: 24 MMOL/L (ref 22–29)
COLOR UR: YELLOW
CREAT SERPL-MCNC: 1.3 MG/DL (ref 0.57–1)
EGFRCR SERPLBLD CKD-EPI 2021: 43 ML/MIN/1.73
GLUCOSE SERPL-MCNC: 119 MG/DL (ref 65–99)
GLUCOSE UR STRIP-MCNC: NEGATIVE MG/DL
HGB UR QL STRIP.AUTO: NEGATIVE
HYALINE CASTS UR QL AUTO: ABNORMAL /LPF
KETONES UR QL STRIP: NEGATIVE
LEUKOCYTE ESTERASE UR QL STRIP.AUTO: ABNORMAL
NITRITE UR QL STRIP: NEGATIVE
PH UR STRIP.AUTO: 5.5 [PH] (ref 5–8)
POTASSIUM SERPL-SCNC: 4.8 MMOL/L (ref 3.5–5.2)
PROT UR QL STRIP: ABNORMAL
RBC # UR STRIP: ABNORMAL /HPF
REF LAB TEST METHOD: ABNORMAL
SODIUM SERPL-SCNC: 140 MMOL/L (ref 136–145)
SP GR UR STRIP: 1.02 (ref 1–1.03)
SQUAMOUS #/AREA URNS HPF: ABNORMAL /HPF
UROBILINOGEN UR QL STRIP: ABNORMAL
WBC # UR STRIP: ABNORMAL /HPF

## 2023-08-10 ENCOUNTER — OFFICE VISIT (OUTPATIENT)
Dept: ENDOCRINOLOGY | Facility: CLINIC | Age: 73
End: 2023-08-10
Payer: MEDICARE

## 2023-08-10 VITALS
WEIGHT: 198 LBS | HEART RATE: 50 BPM | SYSTOLIC BLOOD PRESSURE: 145 MMHG | DIASTOLIC BLOOD PRESSURE: 80 MMHG | OXYGEN SATURATION: 99 % | BODY MASS INDEX: 35.07 KG/M2

## 2023-08-10 DIAGNOSIS — E11.42 DIABETIC PERIPHERAL NEUROPATHY: ICD-10-CM

## 2023-08-10 DIAGNOSIS — Z79.4 TYPE 2 DIABETES MELLITUS WITH HYPERGLYCEMIA, WITH LONG-TERM CURRENT USE OF INSULIN: Primary | ICD-10-CM

## 2023-08-10 DIAGNOSIS — E78.2 MIXED HYPERLIPIDEMIA: ICD-10-CM

## 2023-08-10 DIAGNOSIS — I10 ESSENTIAL HYPERTENSION: ICD-10-CM

## 2023-08-10 DIAGNOSIS — E11.65 TYPE 2 DIABETES MELLITUS WITH HYPERGLYCEMIA, WITH LONG-TERM CURRENT USE OF INSULIN: Primary | ICD-10-CM

## 2023-08-10 LAB — GLUCOSE BLDC GLUCOMTR-MCNC: 261 MG/DL (ref 70–105)

## 2023-08-10 PROCEDURE — 82948 REAGENT STRIP/BLOOD GLUCOSE: CPT | Performed by: INTERNAL MEDICINE

## 2023-08-10 NOTE — PROGRESS NOTES
Endocrine Progress Note Outpatient     Patient Care Team:  Bhargavi Rodriguez APRN as PCP - General    Chief Complaint: Follow-up type 2 diabetes          HPI: This is a 73-year-old female with history of type 2 diabetes, hypertension, hyperlipidemia and diabetic neuropathy is here for follow-up.    For type 2 diabetes: She is currently using glimepiride 2 mg 2 tablets twice a day, insulin Tresiba 20 units subcu daily and she takes metformin 1000 mg twice a day.    Hypertension: Blood pressure is little bit on the high side today.    Hyperlipidemia: Currently on atorvastatin.    Diabetic peripheral neuropathy:: She is on vitamin D and B12 supplementation.    Past Medical History:   Diagnosis Date    Adjustment disorder with depressed mood 09/12/2018    Allergic Years    Sulfa drugs    Anxiety     Arthritis Years    [place] joints. Mostly back, knees, hips, fingers    Depression 09/12/2018    Diabetes mellitus, type II 07/03/2013    Essential hypertension 07/03/2013    Gout 08/12/2014    Heart attack     Hyperlipidemia 06/02/2020    Hypertension     Inflammatory bowel disease     Irritable bowel syndrome without diarrhea 04/17/2017    Kidney stone ?August 2016?    In hospital overnight    Osteopenia 04/17/2017    Peripheral neuropathy 04/17/2017       Social History     Socioeconomic History    Marital status:      Spouse name: Rishabh    Number of children: 2    Years of education: 12   Tobacco Use    Smoking status: Never    Smokeless tobacco: Never   Vaping Use    Vaping Use: Never used   Substance and Sexual Activity    Alcohol use: Never    Drug use: Never    Sexual activity: Not Currently     Partners: Male     Birth control/protection: Other       Family History   Adopted: Yes   Problem Relation Age of Onset    Epilepsy Mother     Colon cancer Mother     Diabetes Mother     Hypertension Mother     No Known Problems Sister     No Known Problems Maternal Grandmother     No Known Problems Maternal  Grandfather     No Known Problems Paternal Grandmother     No Known Problems Paternal Grandfather     Other Maternal Uncle         alzhimers    Diabetes Daughter        Allergies   Allergen Reactions    Sulfa Antibiotics Rash       ROS:   Constitutional:  Admit fatigue, tiredness.    Eyes:  Denies change in visual acuity   HENT:  Denies nasal congestion or sore throat   Respiratory: denies cough, shortness of breath.   Cardiovascular:  denies chest pain, edema   GI:  Denies abdominal pain, nausea, vomiting.   Musculoskeletal:  Denies back pain or joint pain   Integument:  Denies dry skin and rash   Neurologic:  Denies headache, focal weakness or sensory changes   Endocrine:  Denies polyuria or polydipsia   Psychiatric:  Denies depression or anxiety      Vitals:    08/10/23 1408   BP: 145/80   Pulse: 50   SpO2: 99%     Body mass index is 35.07 kg/mý.     Physical Exam:  GEN: NAD, conversant  EYES: EOMI, PERRL, no conjunctival erythema  NECK: no thyromegaly, full ROM   CV: RRR, no murmurs/rubs/gallops, no peripheral edema  LUNG: CTAB, no wheezes/rales/ronchi  SKIN: no rashes, no acanthosis  MSK: no deformities, full ROM of all extremities  NEURO: no tremors, DTR normal  PSYCH: AOX3, appropriate mood, affect normal  FEET: Bilateral bunions with redness of the skin over it.  Monofilament test was absent on both feet.  Dorsal pedis pulses were palpable on both feet.  No ulcer seen at this time.    Results Review:     I reviewed the patient's new clinical results.    Lab Results   Component Value Date    HGBA1C 13.70 (H) 06/06/2023    HGBA1C 11.0 (H) 09/10/2022    HGBA1C 9.6 (H) 04/07/2022      Lab Results   Component Value Date    GLUCOSE 119 (H) 08/03/2023    BUN 19 08/03/2023    CREATININE 1.30 (H) 08/03/2023    EGFRIFNONA 53 (L) 10/27/2021    EGFRIFAFRI 70 04/18/2017    BCR 14.6 08/03/2023    K 4.8 08/03/2023    CO2 24.0 08/03/2023    CALCIUM 9.2 08/03/2023    ALBUMIN 4.1 07/22/2023    LABIL2 1.0 12/12/2018    AST 11  07/22/2023    ALT 9 07/22/2023    CHOL 178 06/06/2023    TRIG 243 (H) 06/06/2023    LDL 93 06/06/2023    HDL 44 06/06/2023     Lab Results   Component Value Date    TSH 3.840 07/22/2023    FREET4 0.93 09/09/2022         Medication Review: Reviewed.       Current Outpatient Medications:     Accu-Chek Softclix Lancets lancets, Use to test blood sugar once daily, Disp: 100 each, Rfl: 2    Alcohol Swabs (Alcohol Wipes) 70 % pads, Apply 1 each topically Daily., Disp: 100 each, Rfl: 2    amLODIPine (NORVASC) 10 MG tablet, Take 1 tablet by mouth once daily, Disp: 90 tablet, Rfl: 0    atorvastatin (LIPITOR) 20 MG tablet, Take 1 tablet by mouth once daily, Disp: 90 tablet, Rfl: 2    bisoprolol (ZEBeta) 10 MG tablet, Take 1 tablet by mouth once daily, Disp: 90 tablet, Rfl: 0    Blood Glucose Monitoring Suppl (Accu-Chek Guide Me) w/Device kit, use daily to check blood sugar, Disp: 1 kit, Rfl: 0    buPROPion XL (Wellbutrin XL) 150 MG 24 hr tablet, Take 1 tablet by mouth Daily., Disp: 30 tablet, Rfl: 5    Cyanocobalamin (Vitamin B-12) 1000 MCG sublingual tablet, Place 1 tablet under the tongue Daily., Disp: 100 each, Rfl: 4    DULoxetine (CYMBALTA) 20 MG capsule, Take 1 capsule by mouth Daily., Disp: , Rfl:     escitalopram (Lexapro) 20 MG tablet, Take 1 tablet by mouth Daily., Disp: 90 tablet, Rfl: 1    gabapentin (NEURONTIN) 600 MG tablet, Take 1 tablet by mouth 3 (Three) Times a Day., Disp: 90 tablet, Rfl: 2    glimepiride (AMARYL) 2 MG tablet, Take 2 tablets by mouth 2 (Two) Times a Day., Disp: 360 tablet, Rfl: 0    glucose blood (Accu-Chek Guide) test strip, test once daily, Disp: 100 each, Rfl: 2    glucose blood (Accu-Chek Guide) test strip, Use as instructed  once daily   dx e 11.9, Disp: 200 each, Rfl: 12    hydroCHLOROthiazide (HYDRODIURIL) 50 MG tablet, Take 1 tablet by mouth Daily for 90 days., Disp: 90 tablet, Rfl: 2    Insulin Degludec (Tresiba) 100 UNIT/ML solution injection, Inject 20 Units under the skin into  the appropriate area as directed Every Night., Disp: 100 mL, Rfl: 1    lisinopril (PRINIVIL,ZESTRIL) 20 MG tablet, Take 1 tablet by mouth 2 (Two) Times a Day., Disp: 180 tablet, Rfl: 0    metFORMIN (GLUCOPHAGE) 1000 MG tablet, TAKE 1 TABLET BY MOUTH TWICE DAILY WITH MEALS, Disp: 180 tablet, Rfl: 0          Assessment and plan:  Diabetes mellitus type 2 with hyperglycemia: Uncontrolled, she tells me since starting insulin her blood sugars have improved.  Unfortunately she could not afford Jardiance and GLP-1 agonist therapy.  I will continue glimepiride along with metformin and Tresiba and follow blood sugars and A1c.  Recommend to continue to work on diet and activity.    Hypertension: Fair control, CPM    Hyperlipidemia: Currently on atorvastatin, follow lipid panel    Diabetic peripheral neuropathy: She is currently on gabapentin along with vitamin D and B12 supplementation.  She does have bilateral bunions and will benefit from diabetic shoes.  Shoe form was signed today.           Adolph Tillman MD FACE.

## 2023-08-10 NOTE — PATIENT INSTRUCTIONS
Continue current medications  Continue to work on your diet and activity  Always keep glucose source in case of low blood sugar  Labs before follow-up.

## 2023-08-11 ENCOUNTER — LAB (OUTPATIENT)
Dept: LAB | Facility: HOSPITAL | Age: 73
End: 2023-08-11
Payer: MEDICARE

## 2023-08-14 RX ORDER — INSULIN DEGLUDEC 100 U/ML
20 INJECTION, SOLUTION SUBCUTANEOUS NIGHTLY
Qty: 100 ML | Refills: 1 | COMMUNITY
Start: 2023-08-14 | End: 2023-08-18 | Stop reason: SDUPTHER

## 2023-08-14 RX ORDER — AMLODIPINE BESYLATE 10 MG/1
TABLET ORAL
Qty: 90 TABLET | Refills: 0 | Status: SHIPPED | OUTPATIENT
Start: 2023-08-14

## 2023-08-18 RX ORDER — INSULIN DEGLUDEC 100 U/ML
20 INJECTION, SOLUTION SUBCUTANEOUS NIGHTLY
Qty: 100 ML | Refills: 1 | COMMUNITY
Start: 2023-08-18

## 2023-08-31 DIAGNOSIS — E11.42 DIABETIC PERIPHERAL NEUROPATHY: ICD-10-CM

## 2023-08-31 RX ORDER — INSULIN DEGLUDEC 100 U/ML
20 INJECTION, SOLUTION SUBCUTANEOUS NIGHTLY
Qty: 12 ML | Refills: 1 | COMMUNITY
Start: 2023-08-31 | End: 2023-09-01 | Stop reason: SDUPTHER

## 2023-08-31 RX ORDER — GABAPENTIN 600 MG/1
TABLET ORAL
Qty: 90 TABLET | Refills: 0 | Status: SHIPPED | OUTPATIENT
Start: 2023-08-31

## 2023-09-01 RX ORDER — INSULIN DEGLUDEC 100 U/ML
20 INJECTION, SOLUTION SUBCUTANEOUS NIGHTLY
Qty: 12 ML | Refills: 2 | Status: SHIPPED | OUTPATIENT
Start: 2023-09-01

## 2023-09-07 ENCOUNTER — TELEPHONE (OUTPATIENT)
Dept: FAMILY MEDICINE CLINIC | Facility: CLINIC | Age: 73
End: 2023-09-07
Payer: MEDICARE

## 2023-09-07 RX ORDER — PEN NEEDLE, DIABETIC 31 GX5/16"
NEEDLE, DISPOSABLE MISCELLANEOUS
Qty: 100 EACH | Refills: 3 | Status: SHIPPED | OUTPATIENT
Start: 2023-09-07

## 2023-09-07 NOTE — TELEPHONE ENCOUNTER
----- Message from BRANDON Levy sent at 9/7/2023  7:51 AM EDT -----  Regarding: FW: Needles for Tresiba  Contact: [phone]  Please send in needles please.   ----- Message -----  From: Dora Solorio  Sent: 9/6/2023   8:05 PM EDT  To: BRANDON Levy  Subject: Needles for Tresiba                              I got my prescription for Tresiba but I have no needles. I didn’t know they didn’t come with them. Dora Solorio. 1950.   [phone]. Thank you.

## 2023-09-08 ENCOUNTER — TRANSCRIBE ORDERS (OUTPATIENT)
Dept: ADMINISTRATIVE | Facility: HOSPITAL | Age: 73
End: 2023-09-08
Payer: MEDICARE

## 2023-09-08 DIAGNOSIS — E11.65 TYPE 2 DIABETES MELLITUS WITH HYPERGLYCEMIA, WITHOUT LONG-TERM CURRENT USE OF INSULIN: ICD-10-CM

## 2023-09-08 DIAGNOSIS — N18.31 CHRONIC KIDNEY DISEASE, STAGE 3A: Primary | ICD-10-CM

## 2023-09-08 RX ORDER — GLIMEPIRIDE 2 MG/1
TABLET ORAL
Qty: 360 TABLET | Refills: 0 | Status: SHIPPED | OUTPATIENT
Start: 2023-09-08

## 2023-09-21 ENCOUNTER — LAB (OUTPATIENT)
Dept: LAB | Facility: HOSPITAL | Age: 73
End: 2023-09-21

## 2023-09-21 ENCOUNTER — HOSPITAL ENCOUNTER (OUTPATIENT)
Dept: ULTRASOUND IMAGING | Facility: HOSPITAL | Age: 73
Discharge: HOME | End: 2023-09-21
Payer: MEDICARE

## 2023-09-21 ENCOUNTER — TRANSCRIBE ORDERS (OUTPATIENT)
Dept: ADMINISTRATIVE | Facility: HOSPITAL | Age: 73
End: 2023-09-21

## 2023-09-21 DIAGNOSIS — N18.31 CHRONIC KIDNEY DISEASE (CKD) STAGE G3A/A1, MODERATELY DECREASED GLOMERULAR FILTRATION RATE (GFR) BETWEEN 45-59 ML/MIN/1.73 SQUARE METER AND ALBUMINURIA CREATININE RATIO LESS THAN 30 MG/G (CMS/H*: ICD-10-CM

## 2023-09-21 DIAGNOSIS — E11.8 TYPE 2 DIABETES MELLITUS WITH UNSPECIFIED COMPLICATIONS: Primary | ICD-10-CM

## 2023-09-21 DIAGNOSIS — E11.8 TYPE 2 DIABETES MELLITUS WITH UNSPECIFIED COMPLICATIONS: ICD-10-CM

## 2023-09-21 DIAGNOSIS — E11.65 TYPE 2 DIABETES MELLITUS WITH HYPERGLYCEMIA, WITH LONG-TERM CURRENT USE OF INSULIN: ICD-10-CM

## 2023-09-21 DIAGNOSIS — Z79.4 TYPE 2 DIABETES MELLITUS WITH HYPERGLYCEMIA, WITH LONG-TERM CURRENT USE OF INSULIN: ICD-10-CM

## 2023-09-21 DIAGNOSIS — N18.31 CHRONIC KIDNEY DISEASE, STAGE 3A: ICD-10-CM

## 2023-09-21 LAB
25(OH)D3 SERPL-MCNC: 25.7 NG/ML (ref 30–100)
ALBUMIN SERPL-MCNC: 3.9 G/DL (ref 3.5–5.2)
ALBUMIN/GLOB SERPL: 1 G/DL
ALP SERPL-CCNC: 74 U/L (ref 39–117)
ALT SERPL W P-5'-P-CCNC: 14 U/L (ref 1–33)
ANION GAP SERPL CALCULATED.3IONS-SCNC: 12.9 MMOL/L (ref 5–15)
AST SERPL-CCNC: 12 U/L (ref 1–32)
BACTERIA UR QL AUTO: ABNORMAL /HPF
BASOPHILS # BLD AUTO: 0.04 10*3/MM3 (ref 0–0.2)
BASOPHILS NFR BLD AUTO: 0.4 % (ref 0–1.5)
BILIRUB SERPL-MCNC: 0.3 MG/DL (ref 0–1.2)
BILIRUB UR QL STRIP: NEGATIVE
BUN SERPL-MCNC: 27 MG/DL (ref 8–23)
BUN/CREAT SERPL: 22.5 (ref 7–25)
CALCIUM SPEC-SCNC: 9 MG/DL (ref 8.6–10.5)
CHLORIDE SERPL-SCNC: 103 MMOL/L (ref 98–107)
CK SERPL-CCNC: 31 U/L (ref 20–180)
CLARITY UR: CLEAR
CO2 SERPL-SCNC: 22.1 MMOL/L (ref 22–29)
COLOR UR: YELLOW
CREAT SERPL-MCNC: 1.2 MG/DL (ref 0.57–1)
CREAT UR-MCNC: 126.6 MG/DL
DEPRECATED RDW RBC AUTO: 37.3 FL (ref 37–54)
EGFRCR SERPLBLD CKD-EPI 2021: 47.3 ML/MIN/1.73
EOSINOPHIL # BLD AUTO: 0.39 10*3/MM3 (ref 0–0.4)
EOSINOPHIL NFR BLD AUTO: 4.2 % (ref 0.3–6.2)
ERYTHROCYTE [DISTWIDTH] IN BLOOD BY AUTOMATED COUNT: 12 % (ref 12.3–15.4)
GLOBULIN UR ELPH-MCNC: 3.9 GM/DL
GLUCOSE SERPL-MCNC: 127 MG/DL (ref 65–99)
GLUCOSE UR STRIP-MCNC: NEGATIVE MG/DL
HBA1C MFR BLD: 7.8 % (ref 4.8–5.6)
HCT VFR BLD AUTO: 38.1 % (ref 34–46.6)
HGB BLD-MCNC: 12.7 G/DL (ref 12–15.9)
HGB UR QL STRIP.AUTO: NEGATIVE
HYALINE CASTS UR QL AUTO: ABNORMAL /LPF
IMM GRANULOCYTES # BLD AUTO: 0.03 10*3/MM3 (ref 0–0.05)
IMM GRANULOCYTES NFR BLD AUTO: 0.3 % (ref 0–0.5)
KETONES UR QL STRIP: NEGATIVE
LEUKOCYTE ESTERASE UR QL STRIP.AUTO: ABNORMAL
LYMPHOCYTES # BLD AUTO: 2.82 10*3/MM3 (ref 0.7–3.1)
LYMPHOCYTES NFR BLD AUTO: 30.1 % (ref 19.6–45.3)
MAGNESIUM SERPL-MCNC: 2 MG/DL (ref 1.6–2.4)
MCH RBC QN AUTO: 28.5 PG (ref 26.6–33)
MCHC RBC AUTO-ENTMCNC: 33.3 G/DL (ref 31.5–35.7)
MCV RBC AUTO: 85.4 FL (ref 79–97)
MONOCYTES # BLD AUTO: 0.61 10*3/MM3 (ref 0.1–0.9)
MONOCYTES NFR BLD AUTO: 6.5 % (ref 5–12)
NEUTROPHILS NFR BLD AUTO: 5.47 10*3/MM3 (ref 1.7–7)
NEUTROPHILS NFR BLD AUTO: 58.5 % (ref 42.7–76)
NITRITE UR QL STRIP: NEGATIVE
NRBC BLD AUTO-RTO: 0 /100 WBC (ref 0–0.2)
PH UR STRIP.AUTO: 5.5 [PH] (ref 5–8)
PHOSPHATE SERPL-MCNC: 3.6 MG/DL (ref 2.5–4.5)
PLATELET # BLD AUTO: 213 10*3/MM3 (ref 140–450)
PMV BLD AUTO: 10.6 FL (ref 6–12)
POTASSIUM SERPL-SCNC: 5 MMOL/L (ref 3.5–5.2)
PROT ?TM UR-MCNC: 18.9 MG/DL
PROT SERPL-MCNC: 7.8 G/DL (ref 6–8.5)
PROT UR QL STRIP: ABNORMAL
PROT/CREAT UR: 149.3 MG/G CREA (ref 0–200)
PTH-INTACT SERPL-MCNC: 63.8 PG/ML (ref 15–65)
RBC # BLD AUTO: 4.46 10*6/MM3 (ref 3.77–5.28)
RBC # UR STRIP: ABNORMAL /HPF
REF LAB TEST METHOD: ABNORMAL
SODIUM SERPL-SCNC: 138 MMOL/L (ref 136–145)
SP GR UR STRIP: 1.02 (ref 1–1.03)
SQUAMOUS #/AREA URNS HPF: ABNORMAL /HPF
TRANS CELLS #/AREA URNS HPF: ABNORMAL /HPF
URATE SERPL-MCNC: 7.2 MG/DL (ref 2.4–5.7)
UROBILINOGEN UR QL STRIP: ABNORMAL
WBC # UR STRIP: ABNORMAL /HPF
WBC NRBC COR # BLD: 9.36 10*3/MM3 (ref 3.4–10.8)

## 2023-09-21 PROCEDURE — 80053 COMPREHEN METABOLIC PANEL: CPT

## 2023-09-21 PROCEDURE — 83036 HEMOGLOBIN GLYCOSYLATED A1C: CPT

## 2023-09-21 PROCEDURE — 85025 COMPLETE CBC W/AUTO DIFF WBC: CPT

## 2023-09-21 PROCEDURE — 84100 ASSAY OF PHOSPHORUS: CPT

## 2023-09-21 PROCEDURE — 82570 ASSAY OF URINE CREATININE: CPT

## 2023-09-21 PROCEDURE — 84550 ASSAY OF BLOOD/URIC ACID: CPT

## 2023-09-21 PROCEDURE — 86335 IMMUNFIX E-PHORSIS/URINE/CSF: CPT

## 2023-09-21 PROCEDURE — 84156 ASSAY OF PROTEIN URINE: CPT

## 2023-09-21 PROCEDURE — 82784 ASSAY IGA/IGD/IGG/IGM EACH: CPT

## 2023-09-21 PROCEDURE — 83735 ASSAY OF MAGNESIUM: CPT

## 2023-09-21 PROCEDURE — 86334 IMMUNOFIX E-PHORESIS SERUM: CPT

## 2023-09-21 PROCEDURE — 82550 ASSAY OF CK (CPK): CPT

## 2023-09-21 PROCEDURE — 36415 COLL VENOUS BLD VENIPUNCTURE: CPT

## 2023-09-21 PROCEDURE — 83970 ASSAY OF PARATHORMONE: CPT

## 2023-09-21 PROCEDURE — 76775 US EXAM ABDO BACK WALL LIM: CPT

## 2023-09-21 PROCEDURE — 82306 VITAMIN D 25 HYDROXY: CPT

## 2023-09-21 PROCEDURE — 81001 URINALYSIS AUTO W/SCOPE: CPT

## 2023-09-22 LAB
IGA SERPL-MCNC: 190 MG/DL (ref 64–422)
IGG SERPL-MCNC: 1412 MG/DL (ref 586–1602)
IGM SERPL-MCNC: 150 MG/DL (ref 26–217)
PROT PATTERN SERPL IFE-IMP: NORMAL

## 2023-09-25 LAB — INTERPRETATION UR IFE-IMP: NORMAL

## 2023-10-19 DIAGNOSIS — I10 ESSENTIAL HYPERTENSION: ICD-10-CM

## 2023-10-19 RX ORDER — BISOPROLOL FUMARATE 10 MG/1
TABLET, FILM COATED ORAL
Qty: 90 TABLET | Refills: 0 | Status: SHIPPED | OUTPATIENT
Start: 2023-10-19

## 2023-11-19 RX ORDER — AMLODIPINE BESYLATE 10 MG/1
TABLET ORAL
Qty: 90 TABLET | Refills: 0 | Status: SHIPPED | OUTPATIENT
Start: 2023-11-19

## 2023-11-20 ENCOUNTER — TELEPHONE (OUTPATIENT)
Dept: ENDOCRINOLOGY | Facility: CLINIC | Age: 73
End: 2023-11-20

## 2023-11-20 NOTE — TELEPHONE ENCOUNTER
Caller: Dora Solorio    Relationship: Self    Best call back number: [phone]    What orders are you requesting (i.e. lab or imaging): LABS    In what timeframe would the patient need to come in: PATIENT STATES AT HER LAST APPOINTMENT, SHE WAS SCHEDULED FOR LABS ON 11/22/23, HOWEVER THE APPOINTMENT IS MISSING FROM HER MYCHART. SHE WOULD LIKE TO BE SCHEDULED THE SAME DAY.    Where will you receive your lab/imaging services: [facility]

## 2023-11-22 ENCOUNTER — LAB (OUTPATIENT)
Dept: LAB | Facility: HOSPITAL | Age: 73
End: 2023-11-22
Payer: MEDICARE

## 2023-11-22 DIAGNOSIS — Z79.4 TYPE 2 DIABETES MELLITUS WITH HYPERGLYCEMIA, WITH LONG-TERM CURRENT USE OF INSULIN: ICD-10-CM

## 2023-11-22 DIAGNOSIS — E11.65 TYPE 2 DIABETES MELLITUS WITH HYPERGLYCEMIA, WITH LONG-TERM CURRENT USE OF INSULIN: ICD-10-CM

## 2023-11-22 LAB
ALBUMIN SERPL-MCNC: 3.6 G/DL (ref 3.5–5.2)
ALBUMIN UR-MCNC: 4.4 MG/DL
ALBUMIN/GLOB SERPL: 0.9 G/DL
ALP SERPL-CCNC: 78 U/L (ref 39–117)
ALT SERPL W P-5'-P-CCNC: 11 U/L (ref 1–33)
ANION GAP SERPL CALCULATED.3IONS-SCNC: 11.1 MMOL/L (ref 5–15)
AST SERPL-CCNC: 11 U/L (ref 1–32)
BILIRUB SERPL-MCNC: 0.4 MG/DL (ref 0–1.2)
BUN SERPL-MCNC: 24 MG/DL (ref 8–23)
BUN/CREAT SERPL: 18.8 (ref 7–25)
CALCIUM SPEC-SCNC: 8.9 MG/DL (ref 8.6–10.5)
CHLORIDE SERPL-SCNC: 102 MMOL/L (ref 98–107)
CHOLEST SERPL-MCNC: 119 MG/DL (ref 0–200)
CO2 SERPL-SCNC: 24.9 MMOL/L (ref 22–29)
CREAT SERPL-MCNC: 1.28 MG/DL (ref 0.57–1)
CREAT UR-MCNC: 114.6 MG/DL
EGFRCR SERPLBLD CKD-EPI 2021: 43.8 ML/MIN/1.73
GLOBULIN UR ELPH-MCNC: 3.8 GM/DL
GLUCOSE SERPL-MCNC: 181 MG/DL (ref 65–99)
HDLC SERPL-MCNC: 24 MG/DL (ref 40–60)
LDLC SERPL CALC-MCNC: 64 MG/DL (ref 0–100)
LDLC/HDLC SERPL: 2.42 {RATIO}
MICROALBUMIN/CREAT UR: 38.4 MG/G (ref 0–29)
POTASSIUM SERPL-SCNC: 4.6 MMOL/L (ref 3.5–5.2)
PROT SERPL-MCNC: 7.4 G/DL (ref 6–8.5)
SODIUM SERPL-SCNC: 138 MMOL/L (ref 136–145)
TRIGL SERPL-MCNC: 185 MG/DL (ref 0–150)
VLDLC SERPL-MCNC: 31 MG/DL (ref 5–40)

## 2023-11-22 PROCEDURE — 80053 COMPREHEN METABOLIC PANEL: CPT

## 2023-11-22 PROCEDURE — 36415 COLL VENOUS BLD VENIPUNCTURE: CPT

## 2023-11-22 PROCEDURE — 82043 UR ALBUMIN QUANTITATIVE: CPT

## 2023-11-22 PROCEDURE — 80061 LIPID PANEL: CPT

## 2023-11-22 PROCEDURE — 82570 ASSAY OF URINE CREATININE: CPT

## 2023-12-07 DIAGNOSIS — E11.65 TYPE 2 DIABETES MELLITUS WITH HYPERGLYCEMIA, WITHOUT LONG-TERM CURRENT USE OF INSULIN: ICD-10-CM

## 2023-12-07 RX ORDER — GLIMEPIRIDE 2 MG/1
TABLET ORAL
Qty: 360 TABLET | Refills: 0 | Status: SHIPPED | OUTPATIENT
Start: 2023-12-07

## 2023-12-28 DIAGNOSIS — F32.9 REACTIVE DEPRESSION: ICD-10-CM

## 2023-12-28 RX ORDER — BUPROPION HYDROCHLORIDE 150 MG/1
150 TABLET ORAL DAILY
Qty: 30 TABLET | Refills: 1 | Status: SHIPPED | OUTPATIENT
Start: 2023-12-28

## 2023-12-29 RX ORDER — ESCITALOPRAM OXALATE 20 MG/1
20 TABLET ORAL DAILY
Qty: 90 TABLET | Refills: 0 | Status: SHIPPED | OUTPATIENT
Start: 2023-12-29

## 2024-01-04 ENCOUNTER — APPOINTMENT (OUTPATIENT)
Dept: GENERAL RADIOLOGY | Facility: HOSPITAL | Age: 74
End: 2024-01-04
Payer: MEDICARE

## 2024-01-04 ENCOUNTER — APPOINTMENT (OUTPATIENT)
Dept: CT IMAGING | Facility: HOSPITAL | Age: 74
End: 2024-01-04
Payer: MEDICARE

## 2024-01-04 ENCOUNTER — HOSPITAL ENCOUNTER (INPATIENT)
Facility: HOSPITAL | Age: 74
LOS: 5 days | Discharge: NURSING, REHAB OR LONG-TERM CARE | End: 2024-01-09
Attending: EMERGENCY MEDICINE | Admitting: INTERNAL MEDICINE
Payer: MEDICARE
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
## 2024-01-04 DIAGNOSIS — S82.842A CLOSED BIMALLEOLAR FRACTURE OF LEFT ANKLE, INITIAL ENCOUNTER: ICD-10-CM

## 2024-01-04 DIAGNOSIS — S82.892A CLOSED FRACTURE OF LEFT ANKLE, INITIAL ENCOUNTER: ICD-10-CM

## 2024-01-04 DIAGNOSIS — S82.892A CLOSED FRACTURE SUBLUXATION OF LEFT ANKLE JOINT, INITIAL ENCOUNTER: ICD-10-CM

## 2024-01-04 LAB
ALBUMIN SERPL-MCNC: 4.1 G/DL (ref 3.5–5.2)
ALBUMIN/GLOB SERPL: 1.1 G/DL
ALP SERPL-CCNC: 84 U/L (ref 39–117)
ALT SERPL W P-5'-P-CCNC: 10 U/L (ref 1–33)
ANION GAP SERPL CALCULATED.3IONS-SCNC: 8 MMOL/L (ref 5–15)
APTT PPP: 25.1 SECONDS (ref 24–31)
AST SERPL-CCNC: 10 U/L (ref 1–32)
BASOPHILS # BLD AUTO: 0 10*3/MM3 (ref 0–0.2)
BASOPHILS NFR BLD AUTO: 0.5 % (ref 0–1.5)
BILIRUB SERPL-MCNC: 0.2 MG/DL (ref 0–1.2)
BUN SERPL-MCNC: 17 MG/DL (ref 8–23)
BUN/CREAT SERPL: 17.5 (ref 7–25)
CALCIUM SPEC-SCNC: 9.4 MG/DL (ref 8.6–10.5)
CHLORIDE SERPL-SCNC: 105 MMOL/L (ref 98–107)
CO2 SERPL-SCNC: 26 MMOL/L (ref 22–29)
CREAT SERPL-MCNC: 0.97 MG/DL (ref 0.57–1)
DEPRECATED RDW RBC AUTO: 43.8 FL (ref 37–54)
EGFRCR SERPLBLD CKD-EPI 2021: 61.1 ML/MIN/1.73
EOSINOPHIL # BLD AUTO: 0.3 10*3/MM3 (ref 0–0.4)
EOSINOPHIL NFR BLD AUTO: 3 % (ref 0.3–6.2)
ERYTHROCYTE [DISTWIDTH] IN BLOOD BY AUTOMATED COUNT: 14.1 % (ref 12.3–15.4)
GLOBULIN UR ELPH-MCNC: 3.8 GM/DL
GLUCOSE BLDC GLUCOMTR-MCNC: 141 MG/DL (ref 70–105)
GLUCOSE BLDC GLUCOMTR-MCNC: 153 MG/DL (ref 70–105)
GLUCOSE SERPL-MCNC: 198 MG/DL (ref 65–99)
HBA1C MFR BLD: 8.5 % (ref 4.8–5.6)
HCT VFR BLD AUTO: 40.2 % (ref 34–46.6)
HGB BLD-MCNC: 13.1 G/DL (ref 12–15.9)
INR PPP: 0.97 (ref 0.93–1.1)
LYMPHOCYTES # BLD AUTO: 2 10*3/MM3 (ref 0.7–3.1)
LYMPHOCYTES NFR BLD AUTO: 21.8 % (ref 19.6–45.3)
MCH RBC QN AUTO: 27.4 PG (ref 26.6–33)
MCHC RBC AUTO-ENTMCNC: 32.6 G/DL (ref 31.5–35.7)
MCV RBC AUTO: 84 FL (ref 79–97)
MONOCYTES # BLD AUTO: 0.6 10*3/MM3 (ref 0.1–0.9)
MONOCYTES NFR BLD AUTO: 6.7 % (ref 5–12)
NEUTROPHILS NFR BLD AUTO: 6.1 10*3/MM3 (ref 1.7–7)
NEUTROPHILS NFR BLD AUTO: 68 % (ref 42.7–76)
NRBC BLD AUTO-RTO: 0 /100 WBC (ref 0–0.2)
PLATELET # BLD AUTO: 209 10*3/MM3 (ref 140–450)
PMV BLD AUTO: 8.1 FL (ref 6–12)
POTASSIUM SERPL-SCNC: 5.3 MMOL/L (ref 3.5–5.2)
PROT SERPL-MCNC: 7.9 G/DL (ref 6–8.5)
PROTHROMBIN TIME: 10.6 SECONDS (ref 9.6–11.7)
QT INTERVAL: 474 MS
QTC INTERVAL: 437 MS
RBC # BLD AUTO: 4.78 10*6/MM3 (ref 3.77–5.28)
SODIUM SERPL-SCNC: 139 MMOL/L (ref 136–145)
WBC NRBC COR # BLD AUTO: 9 10*3/MM3 (ref 3.4–10.8)

## 2024-01-04 PROCEDURE — 70450 CT HEAD/BRAIN W/O DYE: CPT

## 2024-01-04 PROCEDURE — 85730 THROMBOPLASTIN TIME PARTIAL: CPT | Performed by: NURSE PRACTITIONER

## 2024-01-04 PROCEDURE — 72125 CT NECK SPINE W/O DYE: CPT

## 2024-01-04 PROCEDURE — 73610 X-RAY EXAM OF ANKLE: CPT

## 2024-01-04 PROCEDURE — 80053 COMPREHEN METABOLIC PANEL: CPT | Performed by: NURSE PRACTITIONER

## 2024-01-04 PROCEDURE — 63710000001 INSULIN GLARGINE PER 5 UNITS: Performed by: STUDENT IN AN ORGANIZED HEALTH CARE EDUCATION/TRAINING PROGRAM

## 2024-01-04 PROCEDURE — 73590 X-RAY EXAM OF LOWER LEG: CPT

## 2024-01-04 PROCEDURE — 93005 ELECTROCARDIOGRAM TRACING: CPT | Performed by: NURSE PRACTITIONER

## 2024-01-04 PROCEDURE — 83036 HEMOGLOBIN GLYCOSYLATED A1C: CPT | Performed by: INTERNAL MEDICINE

## 2024-01-04 PROCEDURE — 25010000002 ONDANSETRON PER 1 MG: Performed by: NURSE PRACTITIONER

## 2024-01-04 PROCEDURE — 82948 REAGENT STRIP/BLOOD GLUCOSE: CPT

## 2024-01-04 PROCEDURE — 25010000002 HYDROMORPHONE 1 MG/ML SOLUTION: Performed by: NURSE PRACTITIONER

## 2024-01-04 PROCEDURE — 85610 PROTHROMBIN TIME: CPT | Performed by: NURSE PRACTITIONER

## 2024-01-04 PROCEDURE — 71045 X-RAY EXAM CHEST 1 VIEW: CPT

## 2024-01-04 PROCEDURE — 25010000002 MORPHINE PER 10 MG: Performed by: STUDENT IN AN ORGANIZED HEALTH CARE EDUCATION/TRAINING PROGRAM

## 2024-01-04 PROCEDURE — 99285 EMERGENCY DEPT VISIT HI MDM: CPT

## 2024-01-04 PROCEDURE — 0QSHXZZ REPOSITION LEFT TIBIA, EXTERNAL APPROACH: ICD-10-PCS | Performed by: EMERGENCY MEDICINE

## 2024-01-04 PROCEDURE — 85025 COMPLETE CBC W/AUTO DIFF WBC: CPT | Performed by: NURSE PRACTITIONER

## 2024-01-04 RX ORDER — BISOPROLOL FUMARATE 5 MG/1
10 TABLET, FILM COATED ORAL DAILY
Status: DISCONTINUED | OUTPATIENT
Start: 2024-01-05 | End: 2024-01-09 | Stop reason: HOSPADM

## 2024-01-04 RX ORDER — AMOXICILLIN 250 MG
2 CAPSULE ORAL 2 TIMES DAILY
Status: DISCONTINUED | OUTPATIENT
Start: 2024-01-04 | End: 2024-01-09 | Stop reason: HOSPADM

## 2024-01-04 RX ORDER — BUPROPION HYDROCHLORIDE 150 MG/1
150 TABLET ORAL DAILY
Status: DISCONTINUED | OUTPATIENT
Start: 2024-01-05 | End: 2024-01-09 | Stop reason: HOSPADM

## 2024-01-04 RX ORDER — SODIUM CHLORIDE 0.9 % (FLUSH) 0.9 %
10 SYRINGE (ML) INJECTION EVERY 12 HOURS SCHEDULED
Status: DISCONTINUED | OUTPATIENT
Start: 2024-01-04 | End: 2024-01-09 | Stop reason: HOSPADM

## 2024-01-04 RX ORDER — ATORVASTATIN CALCIUM 20 MG/1
20 TABLET, FILM COATED ORAL DAILY
Status: DISCONTINUED | OUTPATIENT
Start: 2024-01-05 | End: 2024-01-09 | Stop reason: HOSPADM

## 2024-01-04 RX ORDER — NITROGLYCERIN 0.4 MG/1
0.4 TABLET SUBLINGUAL
Status: DISCONTINUED | OUTPATIENT
Start: 2024-01-04 | End: 2024-01-09 | Stop reason: HOSPADM

## 2024-01-04 RX ORDER — ACETAMINOPHEN 325 MG/1
650 TABLET ORAL EVERY 4 HOURS PRN
Status: DISCONTINUED | OUTPATIENT
Start: 2024-01-04 | End: 2024-01-09 | Stop reason: HOSPADM

## 2024-01-04 RX ORDER — ESCITALOPRAM OXALATE 10 MG/1
10 TABLET ORAL DAILY
Status: DISCONTINUED | OUTPATIENT
Start: 2024-01-05 | End: 2024-01-09 | Stop reason: HOSPADM

## 2024-01-04 RX ORDER — GABAPENTIN 600 MG/1
600 TABLET ORAL EVERY 12 HOURS SCHEDULED
Status: DISCONTINUED | OUTPATIENT
Start: 2024-01-04 | End: 2024-01-08

## 2024-01-04 RX ORDER — SODIUM CHLORIDE 0.9 % (FLUSH) 0.9 %
10 SYRINGE (ML) INJECTION AS NEEDED
Status: DISCONTINUED | OUTPATIENT
Start: 2024-01-04 | End: 2024-01-09 | Stop reason: HOSPADM

## 2024-01-04 RX ORDER — NICOTINE POLACRILEX 4 MG
15 LOZENGE BUCCAL
Status: DISCONTINUED | OUTPATIENT
Start: 2024-01-04 | End: 2024-01-09 | Stop reason: HOSPADM

## 2024-01-04 RX ORDER — INSULIN LISPRO 100 [IU]/ML
2-9 INJECTION, SOLUTION INTRAVENOUS; SUBCUTANEOUS EVERY 6 HOURS SCHEDULED
Status: DISCONTINUED | OUTPATIENT
Start: 2024-01-04 | End: 2024-01-04

## 2024-01-04 RX ORDER — ONDANSETRON 2 MG/ML
4 INJECTION INTRAMUSCULAR; INTRAVENOUS EVERY 6 HOURS PRN
Status: DISCONTINUED | OUTPATIENT
Start: 2024-01-04 | End: 2024-01-06

## 2024-01-04 RX ORDER — INSULIN LISPRO 100 [IU]/ML
1-200 INJECTION, SOLUTION INTRAVENOUS; SUBCUTANEOUS AS NEEDED
Status: DISCONTINUED | OUTPATIENT
Start: 2024-01-04 | End: 2024-01-09 | Stop reason: HOSPADM

## 2024-01-04 RX ORDER — SODIUM CHLORIDE 9 MG/ML
40 INJECTION, SOLUTION INTRAVENOUS AS NEEDED
Status: DISCONTINUED | OUTPATIENT
Start: 2024-01-04 | End: 2024-01-09 | Stop reason: HOSPADM

## 2024-01-04 RX ORDER — DEXTROSE MONOHYDRATE 25 G/50ML
10-50 INJECTION, SOLUTION INTRAVENOUS
Status: DISCONTINUED | OUTPATIENT
Start: 2024-01-04 | End: 2024-01-09 | Stop reason: HOSPADM

## 2024-01-04 RX ORDER — BISACODYL 5 MG/1
5 TABLET, DELAYED RELEASE ORAL DAILY PRN
Status: DISCONTINUED | OUTPATIENT
Start: 2024-01-04 | End: 2024-01-09 | Stop reason: HOSPADM

## 2024-01-04 RX ORDER — ONDANSETRON 2 MG/ML
4 INJECTION INTRAMUSCULAR; INTRAVENOUS ONCE
Status: COMPLETED | OUTPATIENT
Start: 2024-01-04 | End: 2024-01-04

## 2024-01-04 RX ORDER — IBUPROFEN 600 MG/1
1 TABLET ORAL
Status: DISCONTINUED | OUTPATIENT
Start: 2024-01-04 | End: 2024-01-09 | Stop reason: HOSPADM

## 2024-01-04 RX ORDER — POLYETHYLENE GLYCOL 3350 17 G/17G
17 POWDER, FOR SOLUTION ORAL DAILY PRN
Status: DISCONTINUED | OUTPATIENT
Start: 2024-01-04 | End: 2024-01-09 | Stop reason: HOSPADM

## 2024-01-04 RX ORDER — BISACODYL 10 MG
10 SUPPOSITORY, RECTAL RECTAL DAILY PRN
Status: DISCONTINUED | OUTPATIENT
Start: 2024-01-04 | End: 2024-01-09 | Stop reason: HOSPADM

## 2024-01-04 RX ORDER — DEXTROSE MONOHYDRATE 25 G/50ML
25 INJECTION, SOLUTION INTRAVENOUS
Status: DISCONTINUED | OUTPATIENT
Start: 2024-01-04 | End: 2024-01-09 | Stop reason: HOSPADM

## 2024-01-04 RX ORDER — IBUPROFEN 600 MG/1
1 TABLET ORAL
Status: DISCONTINUED | OUTPATIENT
Start: 2024-01-04 | End: 2024-01-04 | Stop reason: SDUPTHER

## 2024-01-04 RX ORDER — INSULIN LISPRO 100 [IU]/ML
1-200 INJECTION, SOLUTION INTRAVENOUS; SUBCUTANEOUS
Status: DISCONTINUED | OUTPATIENT
Start: 2024-01-04 | End: 2024-01-09 | Stop reason: HOSPADM

## 2024-01-04 RX ORDER — NICOTINE POLACRILEX 4 MG
15 LOZENGE BUCCAL
Status: DISCONTINUED | OUTPATIENT
Start: 2024-01-04 | End: 2024-01-04 | Stop reason: SDUPTHER

## 2024-01-04 RX ORDER — GABAPENTIN 600 MG/1
600 TABLET ORAL 3 TIMES DAILY
COMMUNITY
End: 2024-01-16

## 2024-01-04 RX ORDER — ETOMIDATE 2 MG/ML
0.3 INJECTION INTRAVENOUS ONCE
Status: COMPLETED | OUTPATIENT
Start: 2024-01-04 | End: 2024-01-04

## 2024-01-04 RX ADMIN — HYDROMORPHONE HYDROCHLORIDE 0.5 MG: 1 INJECTION, SOLUTION INTRAMUSCULAR; INTRAVENOUS; SUBCUTANEOUS at 11:56

## 2024-01-04 RX ADMIN — INSULIN GLARGINE 22 UNITS: 100 INJECTION, SOLUTION SUBCUTANEOUS at 21:40

## 2024-01-04 RX ADMIN — ONDANSETRON 4 MG: 2 INJECTION INTRAMUSCULAR; INTRAVENOUS at 11:56

## 2024-01-04 RX ADMIN — Medication 10 ML: at 20:37

## 2024-01-04 RX ADMIN — MORPHINE SULFATE 2 MG: 4 INJECTION, SOLUTION INTRAMUSCULAR; INTRAVENOUS at 20:37

## 2024-01-04 RX ADMIN — GABAPENTIN 600 MG: 600 TABLET, FILM COATED ORAL at 20:37

## 2024-01-04 RX ADMIN — ETOMIDATE 12 MG: 20 INJECTION, SOLUTION INTRAVENOUS at 13:27

## 2024-01-04 RX ADMIN — DOCUSATE SODIUM AND SENNOSIDES 2 TABLET: 8.6; 5 TABLET, FILM COATED ORAL at 20:37

## 2024-01-04 RX ADMIN — HYDROMORPHONE HYDROCHLORIDE 0.5 MG: 1 INJECTION, SOLUTION INTRAMUSCULAR; INTRAVENOUS; SUBCUTANEOUS at 14:25

## 2024-01-04 NOTE — Clinical Note
Level of Care: Med/Surg [1]   Admitting Physician: ALLISON SANCHEZ [[id number]]   Attending Physician: ALLISON SANCHEZ [[id number]]

## 2024-01-04 NOTE — CASE MANAGEMENT/SOCIAL WORK
Discharge Planning Assessment   Vick     Patient Name: Dora Solorio  MRN: [id number]  Today's Date: 1/4/2024    Admit Date: 1/4/2024    Plan: From home with daughter, Pending POst op PT eval   Discharge Needs Assessment       Row Name 01/04/24 1804       Living Environment    People in Home child(rupinder), adult    Current Living Arrangements home    Potentially Unsafe Housing Conditions none    In the past 12 months has the electric, gas, oil, or water company threatened to shut off services in your home? No    Primary Care Provided by self    Provides Primary Care For no one    Quality of Family Relationships supportive    Able to Return to Prior Arrangements yes       Resource/Environmental Concerns    Resource/Environmental Concerns none    Transportation Concerns none       Food Insecurity    Within the past 12 months, you worried that your food would run out before you got the money to buy more. Never true    Within the past 12 months, the food you bought just didn't last and you didn't have money to get more. Never true       Transition Planning    Patient/Family Anticipates Transition to home    Transportation Anticipated family or friend will provide       Discharge Needs Assessment    Readmission Within the Last 30 Days no previous admission in last 30 days    Equipment Currently Used at Home none    Concerns to be Addressed denies needs/concerns at this time    Anticipated Changes Related to Illness none    Equipment Needed After Discharge none                   Discharge Plan       Row Name 01/04/24 1804       Plan    Plan From home with daughter, Pending POst op PT eval    Patient/Family in Agreement with Plan yes    Plan Comments Met with Patient at bedside Lives at home with daughter and granddaughter. IADL's PCP and PHarmacy verified, able to afford medications. daughter drives. DC BArriers: Planned Surgery 1/5                  Continued Care and Services - Admitted Since 1/4/2024    Coordination has  not been started for this encounter.       Expected Discharge Date and Time       Expected Discharge Date Expected Discharge Time    Jan 7, 2024            Demographic Summary       Row Name 01/04/24 1803       General Information    Admission Type inpatient    Arrived From emergency department    Required Notices Provided Important Message from Medicare    Referral Source admission list    Reason for Consult discharge planning    Preferred Language English                   Functional Status       Row Name 01/04/24 1804       Functional Status    Usual Activity Tolerance good    Current Activity Tolerance moderate       Functional Status, IADL    Medications independent    Meal Preparation independent    Housekeeping independent    Laundry independent    Shopping independent       Mental Status    General Appearance WDL WDL       Mental Status Summary    Recent Changes in Mental Status/Cognitive Functioning no changes                            Patient Forms       Row Name 01/04/24 1803       Patient Forms    Important Message from Medicare (IMM) --  IMM 1/4 per tabitha Farah RN

## 2024-01-04 NOTE — H&P
"History and Physical   Dora Solorio : 1950 MRN:[id number] LOS:0     Reason for admission: Closed left ankle fracture     Assessment / Plan     Left comminuted bimalleolar fracture subluxation   Same-level fall  -LLE plain films reviewed  -Will get CT Head and C-spine   -Ortho aware of pt in ER  -NPO  -Pain control      DM-II, insulin-dependent w/ peripheral neuropathy  -Neuropathy may have contributed to her fall  -Since NPO, will do q6h accuchecks and SSI      Chronic & Stable:  HTN  HLD  Depression  IBS   -Resume home meds as appropriate              Level Of Support Discussed With: Patient  Code Status (Patient has no pulse and is not breathing): CPR (Attempt to Resuscitate)  Medical Interventions (Patient has pulse or is breathing): Full Support       Nutrition: NPO Diet NPO Type: Strict NPO     DVT Prophylaxis:   Mechanical Order History:       None          Pharmalogical Order History:       None             History of Present illness     73-year-old white female with history of hypertension high cholesterol chronic kidney disease peripheral neuropathy, diabetes.  She presents today from home by EMS with complaints of left ankle pain.  She states she slipped on a wet floor this morning and fell.  She states she bumped her head but \"not very hard.\"  She denies any loss of consciousness.  She denies any headache dizziness or pain to the scalp.  She denies blood thinner use.  She does complain of pain with obvious deformity at the left ankle.  Has not been able to bear weight.  No numbness tingling or weakness in the extremity.  She denies any other injury or complaint.     Patient afebrile, vital signs stable.  Chemistry panel only noted for very mildly elevated potassium at 5.3, creatinine within normal limits, glucose elevated at 198.  Coags are normal.  CBC completely unremarkable.  X-ray noted for left comminuted bimalleolar fracture with subluxation.    ER NP discussed case with orthopedics who are " planning for surgery.  Hospitalist consulted for admission.    Subjective / Review of systems     Review of Systems   Pt denies CP or SOB.  Denies f/c/s/n/v/d.    Past Medical/Surgical/Social/Family History & Allergies     Past Medical History:   Diagnosis Date    Adjustment disorder with depressed mood 2018    Allergic Years    Sulfa drugs    Anxiety     Arthritis Years    Lotsvof joints. Mostly back, knees, hips, fingers    Depression 2018    Diabetes mellitus, type II 2013    Essential hypertension 2013    Gout 2014    Heart attack     Hyperlipidemia 2020    Hypertension     Inflammatory bowel disease     Irritable bowel syndrome without diarrhea 2017    Kidney disease     Kidney stone ?2016?    In hospital overnight    Osteopenia 2017    Peripheral neuropathy 2017      Past Surgical History:   Procedure Laterality Date     SECTION      x2    COLON SURGERY      ENDOMETRIAL ABLATION      KNEE ARTHROSCOPY Bilateral     TUBAL ABDOMINAL LIGATION  1973      Social History     Socioeconomic History    Marital status:      Spouse name: Rishabh    Number of children: 2    Years of education: 12   Tobacco Use    Smoking status: Never     Passive exposure: Never    Smokeless tobacco: Never   Vaping Use    Vaping Use: Never used   Substance and Sexual Activity    Alcohol use: Never    Drug use: Never    Sexual activity: Not Currently     Partners: Male     Birth control/protection: Other      Family History   Adopted: Yes   Problem Relation Age of Onset    Epilepsy Mother     Colon cancer Mother     Diabetes Mother     Hypertension Mother     No Known Problems Sister     No Known Problems Maternal Grandmother     No Known Problems Maternal Grandfather     No Known Problems Paternal Grandmother     No Known Problems Paternal Grandfather     Other Maternal Uncle         alzhimers    Diabetes Daughter       Allergies   Allergen Reactions    Sulfa  Antibiotics Rash        Home Medications     Prior to Admission medications    Medication Sig Start Date End Date Taking? Authorizing Provider   Accu-Chek Softclix Lancets lancets Use to test blood sugar once daily 9/13/22   Deniz Geiger MD   Alcohol Swabs (Alcohol Wipes) 70 % pads Apply 1 each topically Daily. 9/13/22   Deniz Geiger MD   amLODIPine (NORVASC) 10 MG tablet Take 1 tablet by mouth once daily 11/19/23   Bhargavi Rodriguez APRN   atorvastatin (LIPITOR) 20 MG tablet Take 1 tablet by mouth once daily 6/25/23   Bhargavi Rodriguez APRN   bisoprolol (ZEBeta) 10 MG tablet Take 1 tablet by mouth once daily 10/19/23   Bhargavi Rodriguez APRN   Blood Glucose Monitoring Suppl (Accu-Chek Guide Me) w/Device kit use daily to check blood sugar 9/13/22   Deniz Geiger MD   buPROPion XL (WELLBUTRIN XL) 150 MG 24 hr tablet Take 1 tablet by mouth once daily 12/28/23   Bhargavi Rodriguez APRN   colchicine 0.6 MG tablet Take 2 tablets initially, then repeat with 1 tablet in 1 hour if not better 11/27/23   Arielle Knowles APRN   Cyanocobalamin (Vitamin B-12) 1000 MCG sublingual tablet Place 1 tablet under the tongue Daily. 8/3/23   Bhargavi Rodriguez APRN   DULoxetine (CYMBALTA) 20 MG capsule Take 1 capsule by mouth Daily.    Provider, MD Jaspreet   escitalopram (LEXAPRO) 20 MG tablet Take 1 tablet by mouth once daily 12/29/23   Bhargavi Rodriguez APRN   gabapentin (NEURONTIN) 600 MG tablet TAKE 1 TABLET BY MOUTH THREE TIMES DAILY 8/31/23   Bhargavi Rodriguez APRN   glimepiride (AMARYL) 2 MG tablet Take 2 tablets by mouth twice daily 12/7/23   Bhargavi Rodriguez APRN   glucose blood (Accu-Chek Guide) test strip test once daily 6/6/23   Bhargavi Rodriguez APRN   glucose blood (Accu-Chek Guide) test strip Use as instructed  once daily   dx e 11.9 6/14/23   Bhargavi Rodriguez APRN   hydroCHLOROthiazide (HYDRODIURIL) 50 MG tablet Take 1 tablet by mouth Daily for 90 days. 7/11/23 10/9/23  Michael  "BRANDON Loera   Insulin Degludec (Tresiba) 100 UNIT/ML solution injection Inject 20 Units under the skin into the appropriate area as directed Every Night. 9/1/23   Isauro Regalado MD   Insulin Pen Needle (Pen Needles 5/16\") 31G X 8 MM misc To use with tresiba  as directed 9/7/23   Bhargavi Rodriguez APRN   lisinopril (PRINIVIL,ZESTRIL) 20 MG tablet Take 1 tablet by mouth 2 (Two) Times a Day. 12/19/22   Bhargavi Rodriguez APRN   metFORMIN (GLUCOPHAGE) 1000 MG tablet TAKE 1 TABLET BY MOUTH TWICE DAILY WITH MEALS 12/18/23   Bhargavi Rodriguez APRN   predniSONE (DELTASONE) 5 MG tablet Take 8 on day 1, 7 on day 2, 6 on day 3, 5 on day 4, 4 on day 5, 3 on day 6, 2 on day 7, 1 on day 8 11/27/23   Arielle Knowles APRN      Objective / Physical Exam   Vital signs:  Temp: 98.1 °F (36.7 °C)  BP: 168/68  Heart Rate: 99  Resp: 15  SpO2: 98 %  Weight: 88.9 kg (196 lb)    Admission Weight: Weight: 88.9 kg (196 lb)    Physical Exam     GEN:  Pleasant.  Appears appropriate for stated age.  WD/WN/WH.  Sitting up in bed on RA.  NAD.  NEURO:  Brainstem reflexes intact.  No obvious focal deficit.  Moves all 4 ext.  HEENT:  N/AT.  PERRL.  MMM.  Oropharynx non-erythematous.  No drainage from the eyes/ears/nose.  No conjunctival petechiae.  No oral thrush.  Auditory and visual acuity grossly wnl. Voice normal.  NECK:  Supple, NT, trachea midline.  No meningismus.  No ROM limitation.  No torticollis.  No JVD.  No thyromegaly.    CHEST/LUNGS:  Breath sounds are clear and equal bilaterally.  No w/r/r.  Chest excursion equal bilaterally.    CARDIOVASCULAR:  RRR w/o murmur noted.  PMI not displaced.  GI:  Abdomen soft, NT, ND, +BS.  No HSM.  :  No rob cath in place.  EXTREMITIES:  LLE wrapped and splinted.  No cyanosis, edema, or asymmetry.  Pulses 2+ and equal in BLE's.    SKIN:  Warm, dry, and pink.  No rash, breakdown, or track marks noted.  LYMPHATICS/HEME:  No overt LAD or abnormal bruising.  No lymphedema.  MSK:  Normal ROM.  " No joint abnormalities noted.  Strength is 5/5 and equal in BUE and BLE's.  PSYCH:  Pleasant.  A&Ox 3.  Normal mood and affect.  Responds appropriately to commands and appears to comprehend instructions.        Labs     Results from last 7 days   Lab Units 01/04/24  1146   WBC 10*3/mm3 9.00   HEMATOCRIT % 40.2   PLATELETS 10*3/mm3 209      Results from last 7 days   Lab Units 01/04/24  1146   SODIUM mmol/L 139   POTASSIUM mmol/L 5.3*   CHLORIDE mmol/L 105   CO2 mmol/L 26.0   BUN mg/dL 17   CREATININE mg/dL 0.97        Imaging     Chest X ray: Pending    Current Medications   Scheduled Meds:HYDROmorphone, 0.5 mg, Intravenous, Once  senna-docusate sodium, 2 tablet, Oral, BID  sodium chloride, 10 mL, Intravenous, Q12H         Continuous Infusions:      Drew Grayson DO  Critical Care  01/04/24   14:21 EST

## 2024-01-04 NOTE — ED PROVIDER NOTES
"Subjective   History of Present Illness  Patient is a 73-year-old white female with history of hypertension high cholesterol chronic kidney disease peripheral neuropathy, diabetes.  She presents today from home by EMS with complaints of left ankle pain.  She states she slipped on a wet floor this morning and fell.  She states she bumped her head but \"not very hard.\"  She denies any loss of consciousness.  She denies any headache dizziness or pain to the scalp.  She denies blood thinner use.  She does complain of pain with obvious deformity at the left ankle.  Has not been able to bear weight.  No numbness tingling or weakness in the extremity.  She denies any other injury or complaint.      Review of Systems   Eyes:  Negative for visual disturbance.   Respiratory:  Negative for shortness of breath.    Cardiovascular:  Negative for chest pain.   Gastrointestinal:  Negative for abdominal pain.   Musculoskeletal:  Negative for back pain and neck pain.        Left ankle pain/injury   Skin:  Negative for wound.   Neurological:  Negative for dizziness, syncope, weakness, light-headedness, numbness and headaches.       Past Medical History:   Diagnosis Date    Adjustment disorder with depressed mood 2018    Allergic Years    Sulfa drugs    Anxiety     Arthritis Years    Lotsvof joints. Mostly back, knees, hips, fingers    Depression 2018    Diabetes mellitus, type II 2013    Essential hypertension 2013    Gout 2014    Heart attack     Hyperlipidemia 2020    Hypertension     Inflammatory bowel disease     Irritable bowel syndrome without diarrhea 2017    Kidney disease     Kidney stone ?2016?    In hospital overnight    Osteopenia 2017    Peripheral neuropathy 2017       Allergies   Allergen Reactions    Sulfa Antibiotics Rash       Past Surgical History:   Procedure Laterality Date     SECTION      x2    COLON SURGERY      ENDOMETRIAL ABLATION      KNEE " ARTHROSCOPY Bilateral     TUBAL ABDOMINAL LIGATION  05/1973       Family History   Adopted: Yes   Problem Relation Age of Onset    Epilepsy Mother     Colon cancer Mother     Diabetes Mother     Hypertension Mother     No Known Problems Sister     No Known Problems Maternal Grandmother     No Known Problems Maternal Grandfather     No Known Problems Paternal Grandmother     No Known Problems Paternal Grandfather     Other Maternal Uncle         alzhimers    Diabetes Daughter        Social History     Socioeconomic History    Marital status:      Spouse name: Rishabh    Number of children: 2    Years of education: 12   Tobacco Use    Smoking status: Never     Passive exposure: Never    Smokeless tobacco: Never   Vaping Use    Vaping Use: Never used   Substance and Sexual Activity    Alcohol use: Never    Drug use: Never    Sexual activity: Not Currently     Partners: Male     Birth control/protection: Other           Objective   Physical Exam  Vital signs and triage nurse note reviewed.  Constitutional: Awake, alert; well-developed and well-nourished. No acute distress is noted.  HEENT: Normocephalic,; pupils are PERRL with intact EOM; oropharynx is pink and moist without exudate or erythema.  No drooling or pooling of oral secretions.  No visible sign of trauma to the face or scalp.  No Gaston sign noted.  No raccoon eyes.  No hemotympanum.  Neck: Supple, full range of motion without pain.  No midline tenderness crepitus or step-off noted.; no cervical lymphadenopathy. Normal phonation.  Cardiovascular: Regular rate and rhythm, normal S1-S2.  No murmur noted.  Pulmonary: Respiratory effort regular nonlabored, breath sounds clear to auscultation all fields.  Abdomen: Soft, nontender, nondistended with normoactive bowel sounds; no rebound or guarding.  Musculoskeletal: Obvious deformity of the left ankle with diffuse tenderness.  The left foot is externally rotated.  There is good cap refill and sensation  "distally.  Strong DP pulse.  Neuro: Alert oriented x3, speech is clear and appropriate, GCS 15.    Skin: Flesh tone, warm, dry, intact; no erythematous or petechial rash or lesion.    Lower Extremity Dislocation    Date/Time: 1/4/2024 1:45 PM    Performed by: Mara Lemus APRN  Authorized by: Roamn Wise MD  Consent: Verbal consent obtained.  Risks and benefits: risks, benefits and alternatives were discussed  Consent given by: patient  Imaging studies: imaging studies available  Patient identity confirmed: verbally with patient and arm band  Time out: Immediately prior to procedure a \"time out\" was called to verify the correct patient, procedure, equipment, support staff and site/side marked as required.  Injury location: ankle  Location details: left ankle  Injury type: fracture-dislocation  Fracture type: bimalleolar  Pre-procedure neurovascular assessment: neurovascularly intact  Pre-procedure distal perfusion: normal  Pre-procedure neurological function: normal  Pre-procedure range of motion: reduced    Sedation:  Patient sedated: yes  Sedatives: etomidate and see MAR for details  Analgesia: hydromorphone  Vitals: Vital signs were monitored during sedation.    Manipulation performed: yes  Reduction successful: partial.  Immobilization: splint  Splint type: short leg (posterior with stirrup)  Supplies used: Ortho-Glass  Post-procedure neurovascular assessment: post-procedure neurovascularly intact  Post-procedure distal perfusion: normal  Post-procedure neurological function: normal  Post-procedure range of motion: unchanged  Patient tolerance: patient tolerated the procedure well with no immediate complications                 ED Course      Labs Reviewed   COMPREHENSIVE METABOLIC PANEL - Abnormal; Notable for the following components:       Result Value    Glucose 198 (*)     Potassium 5.3 (*)     All other components within normal limits    Narrative:     GFR Normal >60  Chronic Kidney Disease " <60  Kidney Failure <15    The GFR formula is only valid for adults with stable renal function between ages 18 and 70.   PROTIME-INR - Normal   APTT - Normal   CBC WITH AUTO DIFFERENTIAL - Normal   CBC AND DIFFERENTIAL    Narrative:     The following orders were created for panel order CBC & Differential.  Procedure                               Abnormality         Status                     ---------                               -----------         ------                     CBC Auto Differential[[id number]]        Normal              Final result                 Please view results for these tests on the individual orders.     XR Chest 1 View    Result Date: 1/4/2024  Impression: Limited exam. No definite acute process. Electronically Signed: Emily Estrada MD  1/4/2024 2:31 PM EST  Workstation ID: [id number]    XR Ankle 3+ View Left    Result Date: 1/4/2024  Impression: Slight interval improvement in alignment of the tibiotalar joint. Electronically Signed: Emily Estrada MD  1/4/2024 2:00 PM EST  Workstation ID: [id number]    XR Ankle 3+ View Left    Result Date: 1/4/2024  Impression: Comminuted bimalleolar fracture subluxation. Electronically Signed: Emily Estrada MD  1/4/2024 12:47 PM EST  Workstation ID: [id number]    XR Tibia Fibula 2 View Left    Result Date: 1/4/2024  Impression: Comminuted bimalleolar fracture subluxation. Electronically Signed: Emily Estrada MD  1/4/2024 12:47 PM EST  Workstation ID: [id number]   Medications   sodium chloride 0.9 % flush 10 mL (has no administration in time range)   sodium chloride 0.9 % flush 10 mL (10 mL Intravenous Not Given 1/4/24 4038)   sodium chloride 0.9 % flush 10 mL (has no administration in time range)   sodium chloride 0.9 % infusion 40 mL (has no administration in time range)   sennosides-docusate (PERICOLACE) 8.6-50 MG per tablet 2 tablet (has no administration in time range)     And   polyethylene glycol (MIRALAX) packet 17 g (has no administration in time  range)     And   bisacodyl (DULCOLAX) EC tablet 5 mg (has no administration in time range)     And   bisacodyl (DULCOLAX) suppository 10 mg (has no administration in time range)   nitroglycerin (NITROSTAT) SL tablet 0.4 mg (has no administration in time range)   acetaminophen (TYLENOL) tablet 650 mg (has no administration in time range)   ondansetron (ZOFRAN) injection 4 mg (has no administration in time range)   ondansetron (ZOFRAN) injection 4 mg (4 mg Intravenous Given 1/4/24 1156)   HYDROmorphone (DILAUDID) injection 0.5 mg (0.5 mg Intravenous Given 1/4/24 1156)   HYDROmorphone (DILAUDID) injection 0.5 mg (0.5 mg Intravenous Given 1/4/24 1425)   etomidate (AMIDATE) injection 26.68 mg (12 mg Intravenous Given 1/4/24 1327)                                            Medical Decision Making  Patient presents today with the above complaint.    She had the above exam and evaluation.  IV was established.  X-rays were obtained.  Patient was given Dilaudid for pain.    Workup: X-rays show comminuted bimalleolar fracture subluxation.    Preop labs chest x-ray and EKG were obtained.    Patient had reduction and splinting under conscious sedation as noted above.    Postreduction xray shows slight interval improvement in alignment of the tibiotalar joint.    Additional stirrup splint was applied to help improve alignment.    Case discussed with Dr. Muñoz, orthopedic surgeon on-call, who recommends admission to the hospital and plans for surgery.    Case and plan discussed with hospitalist.    Patient will be admitted for further orthopedic management.  Findings and plan discussed with patient and family at bedside who are agreeable to plan above.    Amount and/or Complexity of Data Reviewed  Labs: ordered.  Radiology: ordered.  ECG/medicine tests: ordered.    Risk  Prescription drug management.        Final diagnoses:   Fall, initial encounter   Closed bimalleolar fracture of left ankle, initial encounter   Closed fracture  subluxation of left ankle joint, initial encounter       ED Disposition  ED Disposition       ED Disposition   Decision to Admit    Condition   --    Comment   Level of Care: Med/Surg [1]   Diagnosis: Closed left ankle fracture [336851]   Admitting Physician: LESLY WHYTE [[id number]]   Attending Physician: ALLISON SANCHEZ [[id number]]   Bed Request Comments: SIPS   Certification: I Certify That Inpatient Hospital Services Are Medically Necessary For Greater Than 2 Midnights                 No follow-up provider specified.       Medication List      No changes were made to your prescriptions during this visit.            Mara Lemus, APRN  01/04/24 1451

## 2024-01-05 LAB
ABO GROUP BLD: NORMAL
ANION GAP SERPL CALCULATED.3IONS-SCNC: 9 MMOL/L (ref 5–15)
BASOPHILS # BLD AUTO: 0.1 10*3/MM3 (ref 0–0.2)
BASOPHILS NFR BLD AUTO: 0.6 % (ref 0–1.5)
BLD GP AB SCN SERPL QL: NEGATIVE
BUN SERPL-MCNC: 22 MG/DL (ref 8–23)
BUN/CREAT SERPL: 16.3 (ref 7–25)
CALCIUM SPEC-SCNC: 8.3 MG/DL (ref 8.6–10.5)
CHLORIDE SERPL-SCNC: 100 MMOL/L (ref 98–107)
CO2 SERPL-SCNC: 26 MMOL/L (ref 22–29)
CREAT SERPL-MCNC: 1.35 MG/DL (ref 0.57–1)
DEPRECATED RDW RBC AUTO: 42.9 FL (ref 37–54)
EGFRCR SERPLBLD CKD-EPI 2021: 41.1 ML/MIN/1.73
EOSINOPHIL # BLD AUTO: 0.1 10*3/MM3 (ref 0–0.4)
EOSINOPHIL NFR BLD AUTO: 0.6 % (ref 0.3–6.2)
ERYTHROCYTE [DISTWIDTH] IN BLOOD BY AUTOMATED COUNT: 14.1 % (ref 12.3–15.4)
GLUCOSE BLDC GLUCOMTR-MCNC: 126 MG/DL (ref 70–105)
GLUCOSE BLDC GLUCOMTR-MCNC: 132 MG/DL (ref 70–105)
GLUCOSE BLDC GLUCOMTR-MCNC: 149 MG/DL (ref 70–105)
GLUCOSE BLDC GLUCOMTR-MCNC: 175 MG/DL (ref 70–105)
GLUCOSE BLDC GLUCOMTR-MCNC: 209 MG/DL (ref 70–105)
GLUCOSE BLDC GLUCOMTR-MCNC: 221 MG/DL (ref 70–105)
GLUCOSE BLDC GLUCOMTR-MCNC: 276 MG/DL (ref 70–105)
GLUCOSE SERPL-MCNC: 284 MG/DL (ref 65–99)
HCT VFR BLD AUTO: 35.4 % (ref 34–46.6)
HGB BLD-MCNC: 11.3 G/DL (ref 12–15.9)
LYMPHOCYTES # BLD AUTO: 2.4 10*3/MM3 (ref 0.7–3.1)
LYMPHOCYTES NFR BLD AUTO: 18.2 % (ref 19.6–45.3)
MCH RBC QN AUTO: 27.6 PG (ref 26.6–33)
MCHC RBC AUTO-ENTMCNC: 32 G/DL (ref 31.5–35.7)
MCV RBC AUTO: 86.5 FL (ref 79–97)
MONOCYTES # BLD AUTO: 1.7 10*3/MM3 (ref 0.1–0.9)
MONOCYTES NFR BLD AUTO: 12.9 % (ref 5–12)
NEUTROPHILS NFR BLD AUTO: 67.7 % (ref 42.7–76)
NEUTROPHILS NFR BLD AUTO: 8.8 10*3/MM3 (ref 1.7–7)
NRBC BLD AUTO-RTO: 0 /100 WBC (ref 0–0.2)
PLATELET # BLD AUTO: 186 10*3/MM3 (ref 140–450)
PMV BLD AUTO: 8.4 FL (ref 6–12)
POTASSIUM SERPL-SCNC: 5.4 MMOL/L (ref 3.5–5.2)
RBC # BLD AUTO: 4.09 10*6/MM3 (ref 3.77–5.28)
RH BLD: POSITIVE
SODIUM SERPL-SCNC: 135 MMOL/L (ref 136–145)
T&S EXPIRATION DATE: NORMAL
WBC NRBC COR # BLD AUTO: 13 10*3/MM3 (ref 3.4–10.8)

## 2024-01-05 PROCEDURE — 82948 REAGENT STRIP/BLOOD GLUCOSE: CPT

## 2024-01-05 PROCEDURE — 99222 1ST HOSP IP/OBS MODERATE 55: CPT | Performed by: ORTHOPAEDIC SURGERY

## 2024-01-05 PROCEDURE — 85025 COMPLETE CBC W/AUTO DIFF WBC: CPT | Performed by: INTERNAL MEDICINE

## 2024-01-05 PROCEDURE — 63710000001 INSULIN LISPRO (HUMAN) PER 5 UNITS: Performed by: STUDENT IN AN ORGANIZED HEALTH CARE EDUCATION/TRAINING PROGRAM

## 2024-01-05 PROCEDURE — 86850 RBC ANTIBODY SCREEN: CPT | Performed by: INTERNAL MEDICINE

## 2024-01-05 PROCEDURE — 86900 BLOOD TYPING SEROLOGIC ABO: CPT | Performed by: INTERNAL MEDICINE

## 2024-01-05 PROCEDURE — 80048 BASIC METABOLIC PNL TOTAL CA: CPT | Performed by: INTERNAL MEDICINE

## 2024-01-05 PROCEDURE — 86901 BLOOD TYPING SEROLOGIC RH(D): CPT | Performed by: INTERNAL MEDICINE

## 2024-01-05 PROCEDURE — 25010000002 MORPHINE PER 10 MG: Performed by: STUDENT IN AN ORGANIZED HEALTH CARE EDUCATION/TRAINING PROGRAM

## 2024-01-05 RX ADMIN — MORPHINE SULFATE 2 MG: 4 INJECTION, SOLUTION INTRAMUSCULAR; INTRAVENOUS at 09:52

## 2024-01-05 RX ADMIN — ESCITALOPRAM OXALATE 10 MG: 10 TABLET ORAL at 08:00

## 2024-01-05 RX ADMIN — DOCUSATE SODIUM AND SENNOSIDES 2 TABLET: 8.6; 5 TABLET, FILM COATED ORAL at 08:00

## 2024-01-05 RX ADMIN — BISOPROLOL FUMARATE 10 MG: 5 TABLET, FILM COATED ORAL at 07:59

## 2024-01-05 RX ADMIN — MORPHINE SULFATE 2 MG: 4 INJECTION, SOLUTION INTRAMUSCULAR; INTRAVENOUS at 21:12

## 2024-01-05 RX ADMIN — DOCUSATE SODIUM AND SENNOSIDES 2 TABLET: 8.6; 5 TABLET, FILM COATED ORAL at 21:07

## 2024-01-05 RX ADMIN — INSULIN LISPRO 10 UNITS: 100 INJECTION, SOLUTION INTRAVENOUS; SUBCUTANEOUS at 13:57

## 2024-01-05 RX ADMIN — GABAPENTIN 600 MG: 600 TABLET, FILM COATED ORAL at 21:07

## 2024-01-05 RX ADMIN — MORPHINE SULFATE 2 MG: 4 INJECTION, SOLUTION INTRAMUSCULAR; INTRAVENOUS at 04:32

## 2024-01-05 RX ADMIN — Medication 10 ML: at 08:00

## 2024-01-05 RX ADMIN — ATORVASTATIN CALCIUM 20 MG: 20 TABLET, FILM COATED ORAL at 08:00

## 2024-01-05 RX ADMIN — Medication 10 ML: at 21:08

## 2024-01-05 RX ADMIN — INSULIN LISPRO 9 UNITS: 100 INJECTION, SOLUTION INTRAVENOUS; SUBCUTANEOUS at 09:52

## 2024-01-05 RX ADMIN — BUPROPION HYDROCHLORIDE 150 MG: 150 TABLET, EXTENDED RELEASE ORAL at 08:00

## 2024-01-05 RX ADMIN — MORPHINE SULFATE 2 MG: 4 INJECTION, SOLUTION INTRAMUSCULAR; INTRAVENOUS at 15:19

## 2024-01-05 NOTE — NURSING NOTE
Pt arrived to unit from ED. No acute distress noted at this time. LLE splinted upon arrival. Plan for surgery tomorrow. Pt and family aware. No voiced complaints of at this time. Plan of care ongoing.

## 2024-01-05 NOTE — CASE MANAGEMENT/SOCIAL WORK
Continued Stay Note  [facility]     Patient Name: Dora Solorio  MRN: [id number]  Today's Date: 1/5/2024    Admit Date: 1/4/2024    Plan: DC PLAN: Return home with daughter. Pending PT eval Postop   Discharge Plan       Row Name 01/05/24 1226       Plan    Plan Comments Choices: 1. [place].  2. [facility] if SNF is needed.      Row Name 01/05/24 6776       Plan    Plan DC PLAN: Return home with daughter. Pending PT eval Postop    Patient/Family in Agreement with Plan yes    Plan Comments DC BARRIERS: Surgery for 1/6 NPO at midnight tonight. PASRR requested, Pending PT eval post op.                      Expected Discharge Date and Time       Expected Discharge Date Expected Discharge Time    Jan 7, 2024               Danielle Walden RN

## 2024-01-05 NOTE — CASE MANAGEMENT/SOCIAL WORK
Social Work Assessment  [facility]     Patient Name: Dora Solorio  MRN: [id number]  Today's Date: 1/5/2024    Admit Date: 1/4/2024     Discharge Plan       Row Name 01/05/24 1315       Plan    Plan DC PLAN: Return home with daughter. PT eval post-op (PASRR QFR, no precert required).         01/05/24 1314   PASRR   PASRR Status Under clinical review  (Obtained MD exemption, QFR.)     DANNIE Vásquez, MSSW, [facility]    Phone: [phone]  Cell: [phone]  Fax: [phone]  [email or web address]

## 2024-01-05 NOTE — H&P (VIEW-ONLY)
Patient ID: Dora Solorio is a 73 y.o. female.    Chief Complaint:    Chief Complaint   Patient presents with    Fall       HPI:  This is a 73-year-old female who suffered a left ankle injury yesterday it was a closed injury closed reduced in the emergency room I saw her at that time she related no other acute complaints  Past Medical History:   Diagnosis Date    Adjustment disorder with depressed mood 2018    Allergic Years    Sulfa drugs    Anxiety     Arthritis Years    Lotsvof joints. Mostly back, knees, hips, fingers    Depression 2018    Diabetes mellitus, type II 2013    Essential hypertension 2013    Gout 2014    Heart attack     Hyperlipidemia 2020    Hypertension     Inflammatory bowel disease     Irritable bowel syndrome without diarrhea 2017    Kidney disease     Kidney stone ?2016?    In hospital overnight    Osteopenia 2017    Peripheral neuropathy 2017       Past Surgical History:   Procedure Laterality Date     SECTION      x2    COLON SURGERY      ENDOMETRIAL ABLATION      KNEE ARTHROSCOPY Bilateral     TUBAL ABDOMINAL LIGATION  1973       Family History   Adopted: Yes   Problem Relation Age of Onset    Epilepsy Mother     Colon cancer Mother     Diabetes Mother     Hypertension Mother     No Known Problems Sister     No Known Problems Maternal Grandmother     No Known Problems Maternal Grandfather     No Known Problems Paternal Grandmother     No Known Problems Paternal Grandfather     Other Maternal Uncle         alzhimers    Diabetes Daughter           Social History     Occupational History    Occupation: retired    Tobacco Use    Smoking status: Never     Passive exposure: Never    Smokeless tobacco: Never   Vaping Use    Vaping Use: Never used   Substance and Sexual Activity    Alcohol use: Never    Drug use: Never    Sexual activity: Not Currently     Partners: Male     Birth control/protection: Other      Review of  "Systems   Cardiovascular:  Negative for chest pain.   Musculoskeletal:  Positive for arthralgias.       Objective:    /62 (BP Location: Right arm, Patient Position: Lying)   Pulse 61   Temp 98.1 °F (36.7 °C) (Oral)   Resp 15   Ht 162.6 cm (64\")   Wt 88.9 kg (196 lb)   SpO2 91%   BMI 33.64 kg/m²     Physical Examination:  Left leg is in a well-fitting short leg splint toes are warm and perfused capillary refill is about 3 seconds she has baseline neuropathy with some globally diminished sensation to the toes but gross sensation intact no pain on passive stretch of the toes    Imaging:  X-rays demonstrate displaced trimalleolar ankle fracture with improved alignment on postreduction x-rays    Assessment:  Unstable left ankle fracture    Plan:  Treatment options discussed I recommend open reduction internal fixation.  Surgery is likely on Saturday continue the splint ice and elevate mechanical DVT prophylaxis n.p.o. at midnight  Risks and benefits of surgery including bleeding, scar, infection, stiffness, nerve tendon or artery damage, malunion,nonunion, DVT, repeat surgery including hardware removal, loss of life or limb were discussed. All questions were answered and addressed     Disclaimer: Part of this note may be an electronic transcription/translation of spoken language to printed text using the Dragon Dictation System   "

## 2024-01-05 NOTE — SIGNIFICANT NOTE
01/05/24 1012   OTHER   Discipline occupational therapist;physical therapist   Rehab Time/Intention   Session Not Performed other (see comments)  (Ankle fx, awaiting ORIF currently scheduled 1/6/24.)   Recommendation   OT - Next Appointment 01/06/24

## 2024-01-05 NOTE — PROGRESS NOTES
"Expand All Collapse All[]Expand All by Default    History and Physical   Dora Solorio : 1950 MRN:[id number] LOS:0      Reason for admission: Closed left ankle fracture       Patient seen per orthopedic  Patient status post slip and fall  Awaiting surgery tomorrow as per orthopedic  Continue pain management and DVT prophylaxis  Patient needs 30 days or less of skilled services.      Assessment / Plan      Left comminuted bimalleolar fracture subluxation   Same-level fall  -LLE plain films reviewed  -Will get CT Head and C-spine   -Ortho aware of pt in ER  -NPO  -Pain control        DM-II, insulin-dependent w/ peripheral neuropathy  -Neuropathy may have contributed to her fall  -Since NPO, will do q6h accuchecks and SSI        Chronic & Stable:  HTN  HLD  Depression  IBS              -Resume home meds as appropriate                    Level Of Support Discussed With: Patient  Code Status (Patient has no pulse and is not breathing): CPR (Attempt to Resuscitate)  Medical Interventions (Patient has pulse or is breathing): Full Support        Nutrition: NPO Diet NPO Type: Strict NPO      DVT Prophylaxis:   Mechanical Order History:         None             Pharmalogical Order History:         None                History of Present illness      73-year-old white female with history of hypertension high cholesterol chronic kidney disease peripheral neuropathy, diabetes.  She presents today from home by EMS with complaints of left ankle pain.  She states she slipped on a wet floor this morning and fell.  She states she bumped her head but \"not very hard.\"  She denies any loss of consciousness.  She denies any headache dizziness or pain to the scalp.  She denies blood thinner use.  She does complain of pain with obvious deformity at the left ankle.  Has not been able to bear weight.  No numbness tingling or weakness in the extremity.  She denies any other injury or complaint.      Patient afebrile, vital signs " stable.  Chemistry panel only noted for very mildly elevated potassium at 5.3, creatinine within normal limits, glucose elevated at 198.  Coags are normal.  CBC completely unremarkable.  X-ray noted for left comminuted bimalleolar fracture with subluxation.     ER NP discussed case with orthopedics who are planning for surgery.  Hospitalist consulted for admission.     Subjective / Review of systems      Review of Systems   Pt denies CP or SOB.  Denies f/c/s/n/v/d.    Past Medical/Surgical/Social/Family History & Allergies      Medical History        Past Medical History:   Diagnosis Date    Adjustment disorder with depressed mood 2018    Allergic Years     Sulfa drugs    Anxiety      Arthritis Years     Lotsvof joints. Mostly back, knees, hips, fingers    Depression 2018    Diabetes mellitus, type II 2013    Essential hypertension 2013    Gout 2014    Heart attack      Hyperlipidemia 2020    Hypertension      Inflammatory bowel disease      Irritable bowel syndrome without diarrhea 2017    Kidney disease      Kidney stone ?2016?     In hospital overnight    Osteopenia 2017    Peripheral neuropathy 2017         Surgical History         Past Surgical History:   Procedure Laterality Date     SECTION         x2    COLON SURGERY        ENDOMETRIAL ABLATION        KNEE ARTHROSCOPY Bilateral      TUBAL ABDOMINAL LIGATION   1973         Social History   Social History            Socioeconomic History    Marital status:        Spouse name: Rishabh    Number of children: 2    Years of education: 12   Tobacco Use    Smoking status: Never       Passive exposure: Never    Smokeless tobacco: Never   Vaping Use    Vaping Use: Never used   Substance and Sexual Activity    Alcohol use: Never    Drug use: Never    Sexual activity: Not Currently       Partners: Male       Birth control/protection: Other               Family History   Adopted: Yes   Problem  Relation Age of Onset    Epilepsy Mother      Colon cancer Mother      Diabetes Mother      Hypertension Mother      No Known Problems Sister      No Known Problems Maternal Grandmother      No Known Problems Maternal Grandfather      No Known Problems Paternal Grandmother      No Known Problems Paternal Grandfather      Other Maternal Uncle           alzhimers    Diabetes Daughter             Allergies   Allergen Reactions    Sulfa Antibiotics Rash         Home Medications              Prior to Admission medications    Medication Sig Start Date End Date Taking? Authorizing Provider   Accu-Chek Softclix Lancets lancets Use to test blood sugar once daily 9/13/22     Deniz Geiger MD   Alcohol Swabs (Alcohol Wipes) 70 % pads Apply 1 each topically Daily. 9/13/22     Deniz Geiger MD   amLODIPine (NORVASC) 10 MG tablet Take 1 tablet by mouth once daily 11/19/23     Bhargavi Rodriguez APRN   atorvastatin (LIPITOR) 20 MG tablet Take 1 tablet by mouth once daily 6/25/23     Bhargavi Rodriguez APRN   bisoprolol (ZEBeta) 10 MG tablet Take 1 tablet by mouth once daily 10/19/23     Bhargavi Rodriguez APRN   Blood Glucose Monitoring Suppl (Accu-Chek Guide Me) w/Device kit use daily to check blood sugar 9/13/22     Deniz Geiger MD   buPROPion XL (WELLBUTRIN XL) 150 MG 24 hr tablet Take 1 tablet by mouth once daily 12/28/23     Bhargavi Rodriguez APRN   colchicine 0.6 MG tablet Take 2 tablets initially, then repeat with 1 tablet in 1 hour if not better 11/27/23     Arielle Knowles APRN   Cyanocobalamin (Vitamin B-12) 1000 MCG sublingual tablet Place 1 tablet under the tongue Daily. 8/3/23     Bhargavi Rodriguez APRN   DULoxetine (CYMBALTA) 20 MG capsule Take 1 capsule by mouth Daily.       Provider, MD Jaspreet   escitalopram (LEXAPRO) 20 MG tablet Take 1 tablet by mouth once daily 12/29/23     Bhargavi Rodriguez APRN   gabapentin (NEURONTIN) 600 MG tablet TAKE 1 TABLET BY MOUTH THREE TIMES DAILY  "8/31/23     Bhargavi Rodriguez APRN   glimepiride (AMARYL) 2 MG tablet Take 2 tablets by mouth twice daily 12/7/23     Bhargavi Rodriguez APRN   glucose blood (Accu-Chek Guide) test strip test once daily 6/6/23     Bhargavi Rodriguez APRN   glucose blood (Accu-Chek Guide) test strip Use as instructed  once daily   dx e 11.9 6/14/23     Bhargavi Rodriguez APRN   hydroCHLOROthiazide (HYDRODIURIL) 50 MG tablet Take 1 tablet by mouth Daily for 90 days. 7/11/23 10/9/23   Bhargavi Rodriguez APRN   Insulin Degludec (Tresiba) 100 UNIT/ML solution injection Inject 20 Units under the skin into the appropriate area as directed Every Night. 9/1/23     Isauro Regalado MD   Insulin Pen Needle (Pen Needles 5/16\") 31G X 8 MM misc To use with tresiba  as directed 9/7/23     Bhargavi Rodriguez APRN   lisinopril (PRINIVIL,ZESTRIL) 20 MG tablet Take 1 tablet by mouth 2 (Two) Times a Day. 12/19/22     Bhargavi Rodriguez APRN   metFORMIN (GLUCOPHAGE) 1000 MG tablet TAKE 1 TABLET BY MOUTH TWICE DAILY WITH MEALS 12/18/23     Bhargavi Rodriguez APRN   predniSONE (DELTASONE) 5 MG tablet Take 8 on day 1, 7 on day 2, 6 on day 3, 5 on day 4, 4 on day 5, 3 on day 6, 2 on day 7, 1 on day 8 11/27/23     Arielle Knowles APRN      Objective / Physical Exam   Vital signs:  Temp: 98.1 °F (36.7 °C)  BP: 168/68  Heart Rate: 99  Resp: 15  SpO2: 98 %  Weight: 88.9 kg (196 lb)     Admission Weight: Weight: 88.9 kg (196 lb)     Physical Exam      GEN:  Pleasant.  Appears appropriate for stated age.  WD/WN/WH.  Sitting up in bed on RA.  NAD.  NEURO:  Brainstem reflexes intact.  No obvious focal deficit.  Moves all 4 ext.  HEENT:  N/AT.  PERRL.  MMM.  Oropharynx non-erythematous.  No drainage from the eyes/ears/nose.  No conjunctival petechiae.  No oral thrush.  Auditory and visual acuity grossly wnl. Voice normal.  NECK:  Supple, NT, trachea midline.  No meningismus.  No ROM limitation.  No torticollis.  No JVD.  No thyromegaly.    CHEST/LUNGS:  Breath " sounds are clear and equal bilaterally.  No w/r/r.  Chest excursion equal bilaterally.    CARDIOVASCULAR:  RRR w/o murmur noted.  PMI not displaced.  GI:  Abdomen soft, NT, ND, +BS.  No HSM.  :  No rob cath in place.  EXTREMITIES:  LLE wrapped and splinted.  No cyanosis, edema, or asymmetry.  Pulses 2+ and equal in BLE's.    SKIN:  Warm, dry, and pink.  No rash, breakdown, or track marks noted.  LYMPHATICS/HEME:  No overt LAD or abnormal bruising.  No lymphedema.  MSK:  Normal ROM.  No joint abnormalities noted.  Strength is 5/5 and equal in BUE and BLE's.  PSYCH:  Pleasant.  A&Ox 3.  Normal mood and affect.  Responds appropriately to commands and appears to comprehend instructions.          Labs           Results from last 7 days   Lab Units 01/04/24  1146   WBC 10*3/mm3 9.00   HEMATOCRIT % 40.2   PLATELETS 10*3/mm3 209           Results from last 7 days   Lab Units 01/04/24  1146   SODIUM mmol/L 139   POTASSIUM mmol/L 5.3*   CHLORIDE mmol/L 105   CO2 mmol/L 26.0   BUN mg/dL 17   CREATININE mg/dL 0.97         Imaging      Chest X ray: Pending     Current Medications   Scheduled Meds:HYDROmorphone, 0.5 mg, Intravenous, Once  senna-docusate sodium, 2 tablet, Oral, BID  sodium chloride, 10 mL, Intravenous,

## 2024-01-05 NOTE — CASE MANAGEMENT/SOCIAL WORK
Continued Stay Note   Vick     Patient Name: Dora Solorio  MRN: [id number]  Today's Date: 1/5/2024    Admit Date: 1/4/2024    Plan: DC PLAN: Return home with daughter. Pending PT eval Postop     Discharge Plan       Row Name 01/05/24 1125       Plan    Plan DC PLAN: Return home with daughter. Pending PT eval Postop    Patient/Family in Agreement with Plan yes    Plan Comments DC BARRIERS: Surgery for 1/6 NPO at midnight tonight. PASRR requested, Pending PT eval post op.                  Expected Discharge Date and Time       Expected Discharge Date Expected Discharge Time    Jan 7, 2024           Cierra Lew RN

## 2024-01-05 NOTE — PLAN OF CARE
Goal Outcome Evaluation:      Pt is aox4 and able to make needs/concerns known. Pain management utilized - see MAR. NPO. Potential surgery. Plan of care ongoing.

## 2024-01-06 ENCOUNTER — APPOINTMENT (OUTPATIENT)
Dept: GENERAL RADIOLOGY | Facility: HOSPITAL | Age: 74
End: 2024-01-06
Payer: MEDICARE

## 2024-01-06 ENCOUNTER — ANESTHESIA EVENT (OUTPATIENT)
Dept: PERIOP | Facility: HOSPITAL | Age: 74
End: 2024-01-06
Payer: MEDICARE

## 2024-01-06 ENCOUNTER — ANESTHESIA (OUTPATIENT)
Dept: PERIOP | Facility: HOSPITAL | Age: 74
End: 2024-01-06
Payer: MEDICARE

## 2024-01-06 LAB
ANION GAP SERPL CALCULATED.3IONS-SCNC: 10 MMOL/L (ref 5–15)
BASOPHILS # BLD AUTO: 0.1 10*3/MM3 (ref 0–0.2)
BASOPHILS NFR BLD AUTO: 0.9 % (ref 0–1.5)
BUN SERPL-MCNC: 26 MG/DL (ref 8–23)
BUN/CREAT SERPL: 20.6 (ref 7–25)
CALCIUM SPEC-SCNC: 8.6 MG/DL (ref 8.6–10.5)
CHLORIDE SERPL-SCNC: 98 MMOL/L (ref 98–107)
CO2 SERPL-SCNC: 25 MMOL/L (ref 22–29)
CREAT SERPL-MCNC: 1.26 MG/DL (ref 0.57–1)
DEPRECATED RDW RBC AUTO: 43.3 FL (ref 37–54)
EGFRCR SERPLBLD CKD-EPI 2021: 44.6 ML/MIN/1.73
EOSINOPHIL # BLD AUTO: 0.2 10*3/MM3 (ref 0–0.4)
EOSINOPHIL NFR BLD AUTO: 1.6 % (ref 0.3–6.2)
ERYTHROCYTE [DISTWIDTH] IN BLOOD BY AUTOMATED COUNT: 13.8 % (ref 12.3–15.4)
GLUCOSE BLDC GLUCOMTR-MCNC: 123 MG/DL (ref 70–105)
GLUCOSE BLDC GLUCOMTR-MCNC: 131 MG/DL (ref 70–105)
GLUCOSE BLDC GLUCOMTR-MCNC: 167 MG/DL (ref 70–105)
GLUCOSE BLDC GLUCOMTR-MCNC: 208 MG/DL (ref 70–105)
GLUCOSE BLDC GLUCOMTR-MCNC: 419 MG/DL (ref 70–105)
GLUCOSE BLDC GLUCOMTR-MCNC: 421 MG/DL (ref 70–105)
GLUCOSE SERPL-MCNC: 125 MG/DL (ref 65–99)
HCT VFR BLD AUTO: 31.4 % (ref 34–46.6)
HGB BLD-MCNC: 10 G/DL (ref 12–15.9)
LYMPHOCYTES # BLD AUTO: 3.1 10*3/MM3 (ref 0.7–3.1)
LYMPHOCYTES NFR BLD AUTO: 22.7 % (ref 19.6–45.3)
MCH RBC QN AUTO: 26.8 PG (ref 26.6–33)
MCHC RBC AUTO-ENTMCNC: 31.8 G/DL (ref 31.5–35.7)
MCV RBC AUTO: 84.4 FL (ref 79–97)
MONOCYTES # BLD AUTO: 1.7 10*3/MM3 (ref 0.1–0.9)
MONOCYTES NFR BLD AUTO: 12.4 % (ref 5–12)
NEUTROPHILS NFR BLD AUTO: 62.4 % (ref 42.7–76)
NEUTROPHILS NFR BLD AUTO: 8.4 10*3/MM3 (ref 1.7–7)
NRBC BLD AUTO-RTO: 0.1 /100 WBC (ref 0–0.2)
PLATELET # BLD AUTO: 154 10*3/MM3 (ref 140–450)
PMV BLD AUTO: 8.5 FL (ref 6–12)
POTASSIUM SERPL-SCNC: 4.8 MMOL/L (ref 3.5–5.2)
RBC # BLD AUTO: 3.72 10*6/MM3 (ref 3.77–5.28)
SODIUM SERPL-SCNC: 133 MMOL/L (ref 136–145)
WBC NRBC COR # BLD AUTO: 13.5 10*3/MM3 (ref 3.4–10.8)

## 2024-01-06 PROCEDURE — 0SSG0ZZ REPOSITION LEFT ANKLE JOINT, OPEN APPROACH: ICD-10-PCS | Performed by: ORTHOPAEDIC SURGERY

## 2024-01-06 PROCEDURE — 80048 BASIC METABOLIC PNL TOTAL CA: CPT | Performed by: INTERNAL MEDICINE

## 2024-01-06 PROCEDURE — 25010000002 DEXAMETHASONE PER 1 MG: Performed by: ANESTHESIOLOGY

## 2024-01-06 PROCEDURE — 76000 FLUOROSCOPY <1 HR PHYS/QHP: CPT

## 2024-01-06 PROCEDURE — C1713 ANCHOR/SCREW BN/BN,TIS/BN: HCPCS | Performed by: ORTHOPAEDIC SURGERY

## 2024-01-06 PROCEDURE — 85025 COMPLETE CBC W/AUTO DIFF WBC: CPT | Performed by: INTERNAL MEDICINE

## 2024-01-06 PROCEDURE — 25010000002 FENTANYL CITRATE (PF) 50 MCG/ML SOLUTION: Performed by: ANESTHESIOLOGY

## 2024-01-06 PROCEDURE — 27822 TREATMENT OF ANKLE FRACTURE: CPT | Performed by: PHYSICIAN ASSISTANT

## 2024-01-06 PROCEDURE — 0QSK04Z REPOSITION LEFT FIBULA WITH INTERNAL FIXATION DEVICE, OPEN APPROACH: ICD-10-PCS | Performed by: ORTHOPAEDIC SURGERY

## 2024-01-06 PROCEDURE — 25010000002 CEFAZOLIN PER 500 MG: Performed by: ORTHOPAEDIC SURGERY

## 2024-01-06 PROCEDURE — 25010000002 ROPIVACAINE PER 1 MG: Performed by: ANESTHESIOLOGY

## 2024-01-06 PROCEDURE — 27822 TREATMENT OF ANKLE FRACTURE: CPT | Performed by: ORTHOPAEDIC SURGERY

## 2024-01-06 PROCEDURE — 25810000003 LACTATED RINGERS PER 1000 ML: Performed by: NURSE ANESTHETIST, CERTIFIED REGISTERED

## 2024-01-06 PROCEDURE — 82948 REAGENT STRIP/BLOOD GLUCOSE: CPT

## 2024-01-06 PROCEDURE — 25010000002 PROPOFOL 10 MG/ML EMULSION: Performed by: NURSE ANESTHETIST, CERTIFIED REGISTERED

## 2024-01-06 PROCEDURE — 25010000002 PHENYLEPHRINE 10 MG/ML SOLUTION 5 ML VIAL: Performed by: NURSE ANESTHETIST, CERTIFIED REGISTERED

## 2024-01-06 PROCEDURE — 27829 TREAT LOWER LEG JOINT: CPT | Performed by: PHYSICIAN ASSISTANT

## 2024-01-06 PROCEDURE — 0QSH04Z REPOSITION LEFT TIBIA WITH INTERNAL FIXATION DEVICE, OPEN APPROACH: ICD-10-PCS | Performed by: ORTHOPAEDIC SURGERY

## 2024-01-06 PROCEDURE — 63710000001 INSULIN GLARGINE PER 5 UNITS: Performed by: ORTHOPAEDIC SURGERY

## 2024-01-06 PROCEDURE — 73600 X-RAY EXAM OF ANKLE: CPT

## 2024-01-06 PROCEDURE — 63710000001 INSULIN LISPRO (HUMAN) PER 5 UNITS: Performed by: ORTHOPAEDIC SURGERY

## 2024-01-06 PROCEDURE — 25010000002 MORPHINE PER 10 MG: Performed by: STUDENT IN AN ORGANIZED HEALTH CARE EDUCATION/TRAINING PROGRAM

## 2024-01-06 PROCEDURE — 25010000002 MIDAZOLAM PER 1 MG: Performed by: ANESTHESIOLOGY

## 2024-01-06 PROCEDURE — 27829 TREAT LOWER LEG JOINT: CPT | Performed by: ORTHOPAEDIC SURGERY

## 2024-01-06 PROCEDURE — 25810000003 SODIUM CHLORIDE 0.9 % SOLUTION 250 ML FLEX CONT: Performed by: NURSE ANESTHETIST, CERTIFIED REGISTERED

## 2024-01-06 PROCEDURE — 25810000003 SODIUM CHLORIDE 0.9 % SOLUTION: Performed by: ORTHOPAEDIC SURGERY

## 2024-01-06 DEVICE — SCRW ULS LK 2.7X18MM: Type: IMPLANTABLE DEVICE | Site: ANKLE | Status: FUNCTIONAL

## 2024-01-06 DEVICE — SCRW PERIART S/TAP HD/2.7MM 3.5X12MM: Type: IMPLANTABLE DEVICE | Site: ANKLE | Status: FUNCTIONAL

## 2024-01-06 DEVICE — SCRW ULS LK 2.7X20MM: Type: IMPLANTABLE DEVICE | Site: ANKLE | Status: FUNCTIONAL

## 2024-01-06 DEVICE — SYS SYNDESMOSIS REPR ANKL ZIPTIGHT W/TOGLOC: Type: IMPLANTABLE DEVICE | Site: ANKLE | Status: FUNCTIONAL

## 2024-01-06 DEVICE — IMPLANTABLE DEVICE: Type: IMPLANTABLE DEVICE | Site: ANKLE | Status: FUNCTIONAL

## 2024-01-06 DEVICE — SCRW ULS LK PT S/TAP 2.7X14MM: Type: IMPLANTABLE DEVICE | Site: ANKLE | Status: FUNCTIONAL

## 2024-01-06 DEVICE — SCRW PERIART S/TAP HD/2.7MM 3.5X14MM: Type: IMPLANTABLE DEVICE | Site: ANKLE | Status: FUNCTIONAL

## 2024-01-06 DEVICE — SCRW ULS LK 2.7X16MM: Type: IMPLANTABLE DEVICE | Site: ANKLE | Status: FUNCTIONAL

## 2024-01-06 DEVICE — SCRW ULS LK 2.7X12MM: Type: IMPLANTABLE DEVICE | Site: ANKLE | Status: FUNCTIONAL

## 2024-01-06 DEVICE — SCRW CANN SD/ST PT 3.5X50MM: Type: IMPLANTABLE DEVICE | Site: ANKLE | Status: FUNCTIONAL

## 2024-01-06 DEVICE — SUT NONABS MAXBRAID/PE NMBR2 HC5 38IN BLU 900334: Type: IMPLANTABLE DEVICE | Site: ANKLE | Status: FUNCTIONAL

## 2024-01-06 RX ORDER — ALBUTEROL SULFATE 2.5 MG/3ML
2.5 SOLUTION RESPIRATORY (INHALATION) ONCE AS NEEDED
Status: DISCONTINUED | OUTPATIENT
Start: 2024-01-06 | End: 2024-01-06 | Stop reason: HOSPADM

## 2024-01-06 RX ORDER — SODIUM CHLORIDE 0.9 % (FLUSH) 0.9 %
3-10 SYRINGE (ML) INJECTION AS NEEDED
Status: DISCONTINUED | OUTPATIENT
Start: 2024-01-06 | End: 2024-01-09 | Stop reason: HOSPADM

## 2024-01-06 RX ORDER — SODIUM CHLORIDE, SODIUM LACTATE, POTASSIUM CHLORIDE, CALCIUM CHLORIDE 600; 310; 30; 20 MG/100ML; MG/100ML; MG/100ML; MG/100ML
INJECTION, SOLUTION INTRAVENOUS CONTINUOUS PRN
Status: DISCONTINUED | OUTPATIENT
Start: 2024-01-06 | End: 2024-01-06 | Stop reason: SURG

## 2024-01-06 RX ORDER — MIDAZOLAM HYDROCHLORIDE 1 MG/ML
INJECTION INTRAMUSCULAR; INTRAVENOUS
Status: COMPLETED | OUTPATIENT
Start: 2024-01-06 | End: 2024-01-06

## 2024-01-06 RX ORDER — HYDROCODONE BITARTRATE AND ACETAMINOPHEN 7.5; 325 MG/1; MG/1
1 TABLET ORAL EVERY 4 HOURS PRN
Status: DISCONTINUED | OUTPATIENT
Start: 2024-01-06 | End: 2024-01-06 | Stop reason: HOSPADM

## 2024-01-06 RX ORDER — SODIUM CHLORIDE 9 MG/ML
40 INJECTION, SOLUTION INTRAVENOUS AS NEEDED
Status: DISCONTINUED | OUTPATIENT
Start: 2024-01-06 | End: 2024-01-09 | Stop reason: HOSPADM

## 2024-01-06 RX ORDER — ONDANSETRON 2 MG/ML
4 INJECTION INTRAMUSCULAR; INTRAVENOUS EVERY 6 HOURS PRN
Status: DISCONTINUED | OUTPATIENT
Start: 2024-01-06 | End: 2024-01-09 | Stop reason: HOSPADM

## 2024-01-06 RX ORDER — FAMOTIDINE 20 MG/1
20 TABLET, FILM COATED ORAL DAILY
Status: DISCONTINUED | OUTPATIENT
Start: 2024-01-06 | End: 2024-01-09 | Stop reason: HOSPADM

## 2024-01-06 RX ORDER — EPHEDRINE SULFATE 5 MG/ML
INJECTION INTRAVENOUS AS NEEDED
Status: DISCONTINUED | OUTPATIENT
Start: 2024-01-06 | End: 2024-01-06 | Stop reason: SURG

## 2024-01-06 RX ORDER — PHENYLEPHRINE HCL IN 0.9% NACL 1 MG/10 ML
SYRINGE (ML) INTRAVENOUS AS NEEDED
Status: DISCONTINUED | OUTPATIENT
Start: 2024-01-06 | End: 2024-01-06 | Stop reason: SURG

## 2024-01-06 RX ORDER — DIPHENHYDRAMINE HYDROCHLORIDE 50 MG/ML
12.5 INJECTION INTRAMUSCULAR; INTRAVENOUS
Status: DISCONTINUED | OUTPATIENT
Start: 2024-01-06 | End: 2024-01-06 | Stop reason: HOSPADM

## 2024-01-06 RX ORDER — ENOXAPARIN SODIUM 100 MG/ML
40 INJECTION SUBCUTANEOUS DAILY
Status: DISCONTINUED | OUTPATIENT
Start: 2024-01-07 | End: 2024-01-09 | Stop reason: HOSPADM

## 2024-01-06 RX ORDER — ONDANSETRON 2 MG/ML
4 INJECTION INTRAMUSCULAR; INTRAVENOUS ONCE AS NEEDED
Status: DISCONTINUED | OUTPATIENT
Start: 2024-01-06 | End: 2024-01-06 | Stop reason: HOSPADM

## 2024-01-06 RX ORDER — LABETALOL HYDROCHLORIDE 5 MG/ML
5 INJECTION, SOLUTION INTRAVENOUS
Status: DISCONTINUED | OUTPATIENT
Start: 2024-01-06 | End: 2024-01-06 | Stop reason: HOSPADM

## 2024-01-06 RX ORDER — ACETAMINOPHEN 325 MG/1
650 TABLET ORAL ONCE AS NEEDED
Status: DISCONTINUED | OUTPATIENT
Start: 2024-01-06 | End: 2024-01-06 | Stop reason: HOSPADM

## 2024-01-06 RX ORDER — NALOXONE HCL 0.4 MG/ML
0.4 VIAL (ML) INJECTION AS NEEDED
Status: DISCONTINUED | OUTPATIENT
Start: 2024-01-06 | End: 2024-01-06 | Stop reason: HOSPADM

## 2024-01-06 RX ORDER — FENTANYL CITRATE 50 UG/ML
INJECTION, SOLUTION INTRAMUSCULAR; INTRAVENOUS
Status: COMPLETED | OUTPATIENT
Start: 2024-01-06 | End: 2024-01-06

## 2024-01-06 RX ORDER — FLUMAZENIL 0.1 MG/ML
0.1 INJECTION INTRAVENOUS AS NEEDED
Status: DISCONTINUED | OUTPATIENT
Start: 2024-01-06 | End: 2024-01-06 | Stop reason: HOSPADM

## 2024-01-06 RX ORDER — PROCHLORPERAZINE EDISYLATE 5 MG/ML
10 INJECTION INTRAMUSCULAR; INTRAVENOUS ONCE AS NEEDED
Status: DISCONTINUED | OUTPATIENT
Start: 2024-01-06 | End: 2024-01-06 | Stop reason: HOSPADM

## 2024-01-06 RX ORDER — DEXAMETHASONE SODIUM PHOSPHATE 4 MG/ML
INJECTION, SOLUTION INTRA-ARTICULAR; INTRALESIONAL; INTRAMUSCULAR; INTRAVENOUS; SOFT TISSUE AS NEEDED
Status: DISCONTINUED | OUTPATIENT
Start: 2024-01-06 | End: 2024-01-06 | Stop reason: SURG

## 2024-01-06 RX ORDER — HYDRALAZINE HYDROCHLORIDE 20 MG/ML
5 INJECTION INTRAMUSCULAR; INTRAVENOUS
Status: DISCONTINUED | OUTPATIENT
Start: 2024-01-06 | End: 2024-01-06 | Stop reason: HOSPADM

## 2024-01-06 RX ORDER — NALOXONE HCL 0.4 MG/ML
0.4 VIAL (ML) INJECTION
Status: DISCONTINUED | OUTPATIENT
Start: 2024-01-06 | End: 2024-01-09 | Stop reason: HOSPADM

## 2024-01-06 RX ORDER — PROPOFOL 10 MG/ML
VIAL (ML) INTRAVENOUS AS NEEDED
Status: DISCONTINUED | OUTPATIENT
Start: 2024-01-06 | End: 2024-01-06 | Stop reason: SURG

## 2024-01-06 RX ORDER — ROPIVACAINE HYDROCHLORIDE 5 MG/ML
INJECTION, SOLUTION EPIDURAL; INFILTRATION; PERINEURAL
Status: COMPLETED | OUTPATIENT
Start: 2024-01-06 | End: 2024-01-06

## 2024-01-06 RX ORDER — ACETAMINOPHEN 650 MG/1
325 SUPPOSITORY RECTAL EVERY 4 HOURS PRN
Status: DISCONTINUED | OUTPATIENT
Start: 2024-01-06 | End: 2024-01-06 | Stop reason: HOSPADM

## 2024-01-06 RX ORDER — OXYCODONE HYDROCHLORIDE 5 MG/1
5 TABLET ORAL EVERY 4 HOURS PRN
Status: DISCONTINUED | OUTPATIENT
Start: 2024-01-06 | End: 2024-01-09

## 2024-01-06 RX ORDER — MORPHINE SULFATE 2 MG/ML
1 INJECTION, SOLUTION INTRAMUSCULAR; INTRAVENOUS EVERY 4 HOURS PRN
Status: ACTIVE | OUTPATIENT
Start: 2024-01-06 | End: 2024-01-09

## 2024-01-06 RX ORDER — DEXAMETHASONE SODIUM PHOSPHATE 4 MG/ML
INJECTION, SOLUTION INTRA-ARTICULAR; INTRALESIONAL; INTRAMUSCULAR; INTRAVENOUS; SOFT TISSUE
Status: COMPLETED | OUTPATIENT
Start: 2024-01-06 | End: 2024-01-06

## 2024-01-06 RX ORDER — ONDANSETRON 4 MG/1
4 TABLET, ORALLY DISINTEGRATING ORAL EVERY 6 HOURS PRN
Status: DISCONTINUED | OUTPATIENT
Start: 2024-01-06 | End: 2024-01-09 | Stop reason: HOSPADM

## 2024-01-06 RX ORDER — FENTANYL CITRATE 50 UG/ML
25 INJECTION, SOLUTION INTRAMUSCULAR; INTRAVENOUS
Status: DISCONTINUED | OUTPATIENT
Start: 2024-01-06 | End: 2024-01-06 | Stop reason: HOSPADM

## 2024-01-06 RX ORDER — FENTANYL CITRATE 50 UG/ML
50 INJECTION, SOLUTION INTRAMUSCULAR; INTRAVENOUS
Status: DISCONTINUED | OUTPATIENT
Start: 2024-01-06 | End: 2024-01-06 | Stop reason: HOSPADM

## 2024-01-06 RX ORDER — SODIUM CHLORIDE 9 MG/ML
75 INJECTION, SOLUTION INTRAVENOUS CONTINUOUS
Status: DISCONTINUED | OUTPATIENT
Start: 2024-01-06 | End: 2024-01-09 | Stop reason: HOSPADM

## 2024-01-06 RX ORDER — FAMOTIDINE 20 MG/1
40 TABLET, FILM COATED ORAL DAILY
Status: DISCONTINUED | OUTPATIENT
Start: 2024-01-06 | End: 2024-01-06

## 2024-01-06 RX ORDER — SODIUM CHLORIDE 0.9 % (FLUSH) 0.9 %
3 SYRINGE (ML) INJECTION EVERY 12 HOURS SCHEDULED
Status: DISCONTINUED | OUTPATIENT
Start: 2024-01-06 | End: 2024-01-09 | Stop reason: HOSPADM

## 2024-01-06 RX ADMIN — Medication 200 MCG: at 09:34

## 2024-01-06 RX ADMIN — BISOPROLOL FUMARATE 10 MG: 5 TABLET, FILM COATED ORAL at 12:47

## 2024-01-06 RX ADMIN — LIDOCAINE HYDROCHLORIDE 60 MG: 20 INJECTION, SOLUTION EPIDURAL; INFILTRATION; INTRACAUDAL; PERINEURAL at 09:28

## 2024-01-06 RX ADMIN — CEFAZOLIN 2 G: 2 INJECTION, POWDER, FOR SOLUTION INTRAMUSCULAR; INTRAVENOUS at 09:30

## 2024-01-06 RX ADMIN — SODIUM CHLORIDE 75 ML/HR: 9 INJECTION, SOLUTION INTRAVENOUS at 15:48

## 2024-01-06 RX ADMIN — FENTANYL CITRATE 100 MCG: 50 INJECTION, SOLUTION INTRAMUSCULAR; INTRAVENOUS at 08:46

## 2024-01-06 RX ADMIN — MORPHINE SULFATE 2 MG: 4 INJECTION, SOLUTION INTRAMUSCULAR; INTRAVENOUS at 04:21

## 2024-01-06 RX ADMIN — INSULIN LISPRO 6 UNITS: 100 INJECTION, SOLUTION INTRAVENOUS; SUBCUTANEOUS at 21:37

## 2024-01-06 RX ADMIN — GABAPENTIN 600 MG: 600 TABLET, FILM COATED ORAL at 21:18

## 2024-01-06 RX ADMIN — INSULIN LISPRO 11 UNITS: 100 INJECTION, SOLUTION INTRAVENOUS; SUBCUTANEOUS at 17:36

## 2024-01-06 RX ADMIN — DEXAMETHASONE SODIUM PHOSPHATE 8 MG: 4 INJECTION, SOLUTION INTRAMUSCULAR; INTRAVENOUS at 08:46

## 2024-01-06 RX ADMIN — Medication 3 ML: at 21:18

## 2024-01-06 RX ADMIN — SODIUM CHLORIDE 2000 MG: 900 INJECTION INTRAVENOUS at 17:36

## 2024-01-06 RX ADMIN — PROPOFOL 150 MG: 10 INJECTION, EMULSION INTRAVENOUS at 09:28

## 2024-01-06 RX ADMIN — Medication 100 MCG: at 09:58

## 2024-01-06 RX ADMIN — OXYCODONE HYDROCHLORIDE 5 MG: 5 TABLET ORAL at 21:18

## 2024-01-06 RX ADMIN — EPHEDRINE SULFATE 10 MG: 5 INJECTION INTRAVENOUS at 10:21

## 2024-01-06 RX ADMIN — SODIUM CHLORIDE, SODIUM LACTATE, POTASSIUM CHLORIDE, AND CALCIUM CHLORIDE: .6; .31; .03; .02 INJECTION, SOLUTION INTRAVENOUS at 09:20

## 2024-01-06 RX ADMIN — FAMOTIDINE 20 MG: 20 TABLET ORAL at 15:46

## 2024-01-06 RX ADMIN — DOCUSATE SODIUM AND SENNOSIDES 2 TABLET: 8.6; 5 TABLET, FILM COATED ORAL at 21:18

## 2024-01-06 RX ADMIN — BUPROPION HYDROCHLORIDE 150 MG: 150 TABLET, EXTENDED RELEASE ORAL at 12:47

## 2024-01-06 RX ADMIN — DEXAMETHASONE SODIUM PHOSPHATE 4 MG: 4 INJECTION, SOLUTION INTRA-ARTICULAR; INTRALESIONAL; INTRAMUSCULAR; INTRAVENOUS; SOFT TISSUE at 09:28

## 2024-01-06 RX ADMIN — MIDAZOLAM 2 MG: 1 INJECTION INTRAMUSCULAR; INTRAVENOUS at 08:46

## 2024-01-06 RX ADMIN — EPHEDRINE SULFATE 10 MG: 5 INJECTION INTRAVENOUS at 09:32

## 2024-01-06 RX ADMIN — OXYCODONE HYDROCHLORIDE 5 MG: 5 TABLET ORAL at 15:58

## 2024-01-06 RX ADMIN — INSULIN GLARGINE 22 UNITS: 100 INJECTION, SOLUTION SUBCUTANEOUS at 21:36

## 2024-01-06 RX ADMIN — Medication 3 ML: at 15:45

## 2024-01-06 RX ADMIN — ESCITALOPRAM OXALATE 10 MG: 10 TABLET ORAL at 12:48

## 2024-01-06 RX ADMIN — PHENYLEPHRINE HYDROCHLORIDE 0.6 MCG/KG/MIN: 10 INJECTION INTRAVENOUS at 09:34

## 2024-01-06 RX ADMIN — DOCUSATE SODIUM AND SENNOSIDES 2 TABLET: 8.6; 5 TABLET, FILM COATED ORAL at 12:47

## 2024-01-06 RX ADMIN — ATORVASTATIN CALCIUM 20 MG: 20 TABLET, FILM COATED ORAL at 12:48

## 2024-01-06 RX ADMIN — Medication 10 ML: at 21:18

## 2024-01-06 RX ADMIN — ROPIVACAINE HYDROCHLORIDE 60 ML: 5 INJECTION EPIDURAL; INFILTRATION; PERINEURAL at 08:46

## 2024-01-06 NOTE — ANESTHESIA PROCEDURE NOTES
Peripheral Block    Pre-sedation assessment completed: 1/6/2024 8:46 AM    Patient reassessed immediately prior to procedure    Patient location during procedure: pre-op  Start time: 1/6/2024 8:46 AM  Stop time: 1/6/2024 8:53 AM  Reason for block: at surgeon's request and post-op pain management  Performed by  Anesthesiologist: Damian Engel MD  Preanesthetic Checklist  Completed: patient identified, IV checked, site marked, risks and benefits discussed, surgical consent, monitors and equipment checked, pre-op evaluation and timeout performed  Prep:  Pt Position: supine  Sterile barriers:cap, gloves, mask and washed/disinfected hands  Prep: ChloraPrep  Patient monitoring: blood pressure monitoring, continuous pulse oximetry and EKG  Procedure    Sedation: yes    Guidance:ultrasound guided    ULTRASOUND INTERPRETATION.  Using ultrasound guidance a 20 G gauge needle was placed in close proximity to the nerve, at which point, under ultrasound guidance anesthetic was injected in the area of the nerve and spread of the anesthesia was seen on ultrasound in close proximity thereto.  There were no abnormalities seen on ultrasound; a digital image was taken; and the patient tolerated the procedure with no complications. Images:still images obtained, printed/placed on chart    Laterality:left  Block Type:adductor canal block and popliteal  Injection Technique:single-shot  Needle Type:echogenic  Resistance on Injection: none  Sedation medications used: midazolam (VERSED) injection - Intravenous   2 mg - 1/6/2024 8:46:00 AM  fentaNYL citrate (PF) (SUBLIMAZE) injection - Intravenous   100 mcg - 1/6/2024 8:46:00 AM  Medications Used: dexamethasone (DECADRON) injection - Injection   8 mg - 1/6/2024 8:46:00 AM  ropivacaine (NAROPIN) 0.5 % injection - Perineural   60 mL - 1/6/2024 8:46:00 AM      Post Assessment  Injection Assessment: negative aspiration for heme, no paresthesia on injection and incremental injection  Patient  Tolerance:comfortable throughout block  Complications:no

## 2024-01-06 NOTE — ANESTHESIA PROCEDURE NOTES
Airway  Urgency: elective    Date/Time: 1/6/2024 9:28 AM  Airway not difficult    General Information and Staff    Patient location during procedure: OR  CRNA/CAA: Barron Ortiz CRNA    Indications and Patient Condition  Indications for airway management: airway protection    Preoxygenated: yes  Mask difficulty assessment: 1 - vent by mask    Final Airway Details  Final airway type: supraglottic airway      Successful airway: LMA and I-gel  Size 4     Number of attempts at approach: 1  Assessment: lips, teeth, and gum same as pre-op and atraumatic intubation

## 2024-01-06 NOTE — OP NOTE
ANKLE OPEN REDUCTION INTERNAL FIXATION  Procedure Report    Patient Name:  Dora Solorio  YOB: 1950    Date of Surgery:  1/6/2024     Indications: This is a 73 y.o. female with an injury to the left ankle.  Imaging demonstrated displaced trimalleolar ankle fracture fracture.Treatment options were discussed.  They desired to proceed with open reduction internal fixation after discussing the risks including bleeding, scarring,infection, stiffness, nerve damage, tendon damage, artery damage, continued pain, DVT, malunion, nonunion, loss of life or limb, and a need for further surgery including hardware removal.      Pre-op Diagnosis:   Left trimalleolar ankle fracture         Post-op Diagnosis:    Same    Procedure/CPT® Codes: 33855 [id number]    Procedure(s): Left  ANKLE OPEN REDUCTION INTERNAL FIXATION with fixation of medial and lateral malleolus components and syndesmosis fixation    Assistant: Deondre Jasso physician assistant    was responsible for performing the following activities: Retraction, Suction, Irrigation, Suturing, Closing, and Placing Dressing and their skilled assistance was necessary for the success of this case.         Anesthesia: General with Block    IVF: See anesthesia record    Estimated Blood Loss:  50 cc    Implants:    Implant Name Type Inv. Item Serial No.  Lot No. LRB No. Used Action   SUT NONABS MAXBRAID/PE NMBR2 HC5 38IN [employer] [id number] - [id number] Implant SUT NONABS MAXBRAID/PE NMBR2 HC5 38IN [employer] [id number]  [employer] [id number] Left 1 Implanted   SYS SYNDESMOSIS REPR ANKL ZIPTIGHT W/TOGLOC - [id number] Implant SYS SYNDESMOSIS REPR ANKL ZIPTIGHT W/TOGLOC  [employer] [id number] Left 1 Implanted   SCRW ULS LK 2.7X12MM - [id number] Implant SCRW ULS LK 2.7X12MM  [employer] . Left 1 Implanted   SCRW ULS LK PT S/TAP 2.7X14MM - [id number] Implant SCRW ULS LK PT S/TAP 2.7X14MM  [employer] . Left 1 Implanted   SCRW ULS LK 2.7X16MM - [id number] Implant SCRW ULS LK  2.7X16MM  [employer] . Left 1 Implanted   SCRW ULS LK 2.7X18MM - [id number] Implant SCRW ULS LK 2.7X18MM  [employer] . Left 1 Implanted   SCRW ULS LK 2.7X20MM - [id number] Implant SCRW ULS LK 2.7X20MM  [employer] . Left 1 Implanted   SCRW PERIART S/TAP HD/2.7MM 3.5X12MM - [id number] Implant SCRW PERIART S/TAP HD/2.7MM 3.5X12MM  [employer] . Left 3 Implanted   SCRW PERIART S/TAP HD/2.7MM 3.5X14MM - [id number] Implant SCRW PERIART S/TAP HD/2.7MM 3.5X14MM  [employer] . Left 1 Implanted   PLT FIB PERIART LK D/L 10H 158MM LT - [id number] Implant PLT FIB PERIART LK D/L 10H 158MM LT  [employer] . Left 1 Implanted   SCRW AVRIL SD/ST PT 3.5X50MM - [id number] Implant SCRW AVRIL SD/ST PT 3.5X50MM  [employer] . Left 1 Implanted   SCRW AVRIL SD/ST PT 3.5X46MM - [id number] Implant SCRW AVRIL SD/ST PT 3.5X46MM  [employer] . Left 1 Implanted         Complications: None    Tourniquet: 65 minutes    Specimens:none    Description of Procedure: The patient's operative site was marked.  Regional  anesthesia was administered.  Patient was brought to the operating room and placed on the operating room table.  General anesthesia was administered. Antibiotics were dosed.  A timeout was taken to confirm the correct operative site and procedure.    The ankle was then prepped and draped in a standard surgical fashion and a tourniquet placed on the upper leg.  Leg was exsanguinated and tourniquet inflated.    Incision was marked and opened over the lateral malleolus.  Sharp dissection distally with blunt dissection proximally identified the fracture.  I fracture was cleaned there was significant comminution prevented leg screw fixation the plate was secured distally and then proximally bringing the fibula out the length, large butterfly fragment anteriorly was repaired with suture secured to the plate    Medial malleolus was exposed with blunt dissection protecting the saphenous neurovascular bundle.  Fracture was secured  with a clamp and secured with 2 wires screws placed over the wires.  Syndesmosis button was placed through the plate with the ankle in neutral dorsiflexion to secure the syndesmosis    Fluoroscopy confirmed reduction of the fracture and mortise with hardware in position  Wound was irrigated and closed in layers with suture and staples.   A sterile dressing was applied.  Tourniquet released and a short leg splint placed.  Patient was awakened and taken to the recovery room.  There were no complications.  I was present for all portions.  Counts were correct.  Good capillary refill was noted of the digits.        Eriberto Muñoz MD     Date: 1/6/2024  Time: 10:51 EST

## 2024-01-06 NOTE — ANESTHESIA PREPROCEDURE EVALUATION
Anesthesia Evaluation     NPO Solid Status: > 8 hours  NPO Liquid Status: > 8 hours           Airway   Mallampati: II  TM distance: >3 FB  Neck ROM: full  No difficulty expected  Dental - normal exam     Pulmonary - normal exam   Cardiovascular - normal exam    (+) hypertension, past MI , hyperlipidemia      Neuro/Psych  (+) dizziness/light headedness, numbness, psychiatric history Depression and Anxiety  GI/Hepatic/Renal/Endo    (+) obesity, renal disease-, diabetes mellitus type 2    Musculoskeletal     Abdominal  - normal exam    Bowel sounds: normal.   Substance History      OB/GYN          Other   arthritis,                 Anesthesia Plan    ASA 3     general with block     intravenous induction     Anesthetic plan, risks, benefits, and alternatives have been provided, discussed and informed consent has been obtained with: patient.  Pre-procedure education provided  Plan discussed with CRNA.    CODE STATUS:    Level Of Support Discussed With: Patient  Code Status (Patient has no pulse and is not breathing): CPR (Attempt to Resuscitate)  Medical Interventions (Patient has pulse or is breathing): Full Support

## 2024-01-06 NOTE — PLAN OF CARE
Goal Outcome Evaluation:      C/o pain on her left ankle, medicated per MAR. Skin is warm to touch and is able to move all extremities. NPO since midnight for planned surgery today. External catheter in place with yellow urine. Appears to be sinus rhythm on tele. Continuous oxygen saturation monitor in place and is reading in the 90s on 2L/nasal cannula. Plan of care is ongoing. Call light is within reach.

## 2024-01-06 NOTE — PROGRESS NOTES
"Expand All Collapse All[]Expand All by Default    History and Physical   Dora Solorio : 1950 MRN:[id number] LOS:0      Reason for admission: Closed left ankle fracture       Patient seen per orthopedic  Patient status post slip and fall  Awaiting surgery tomorrow as per orthopedic  Continue pain management and DVT prophylaxis  Patient needs 30 days or less of skilled services.      Patient is getting surgery with ankle open reduction internal fixation today  Hemodynamically stable  Assessment / Plan      Left comminuted bimalleolar fracture subluxation   Same-level fall  -LLE plain films reviewed  -Will get CT Head and C-spine   -Ortho aware of pt in ER  -NPO  -Pain control        DM-II, insulin-dependent w/ peripheral neuropathy  -Neuropathy may have contributed to her fall  -Since NPO, will do q6h accuchecks and SSI        Chronic & Stable:  HTN  HLD  Depression  IBS              -Resume home meds as appropriate                    Level Of Support Discussed With: Patient  Code Status (Patient has no pulse and is not breathing): CPR (Attempt to Resuscitate)  Medical Interventions (Patient has pulse or is breathing): Full Support        Nutrition: NPO Diet NPO Type: Strict NPO      DVT Prophylaxis:   Mechanical Order History:         None             Pharmalogical Order History:         None                History of Present illness      73-year-old white female with history of hypertension high cholesterol chronic kidney disease peripheral neuropathy, diabetes.  She presents today from home by EMS with complaints of left ankle pain.  She states she slipped on a wet floor this morning and fell.  She states she bumped her head but \"not very hard.\"  She denies any loss of consciousness.  She denies any headache dizziness or pain to the scalp.  She denies blood thinner use.  She does complain of pain with obvious deformity at the left ankle.  Has not been able to bear weight.  No numbness tingling or " weakness in the extremity.  She denies any other injury or complaint.      Patient afebrile, vital signs stable.  Chemistry panel only noted for very mildly elevated potassium at 5.3, creatinine within normal limits, glucose elevated at 198.  Coags are normal.  CBC completely unremarkable.  X-ray noted for left comminuted bimalleolar fracture with subluxation.     ER NP discussed case with orthopedics who are planning for surgery.  Hospitalist consulted for admission.     Subjective / Review of systems      Review of Systems   Pt denies CP or SOB.  Denies f/c/s/n/v/d.    Past Medical/Surgical/Social/Family History & Allergies      Medical History        Past Medical History:   Diagnosis Date    Adjustment disorder with depressed mood 2018    Allergic Years     Sulfa drugs    Anxiety      Arthritis Years     Lotsvof joints. Mostly back, knees, hips, fingers    Depression 2018    Diabetes mellitus, type II 2013    Essential hypertension 2013    Gout 2014    Heart attack      Hyperlipidemia 2020    Hypertension      Inflammatory bowel disease      Irritable bowel syndrome without diarrhea 2017    Kidney disease      Kidney stone ?2016?     In hospital overnight    Osteopenia 2017    Peripheral neuropathy 2017         Surgical History         Past Surgical History:   Procedure Laterality Date     SECTION         x2    COLON SURGERY        ENDOMETRIAL ABLATION        KNEE ARTHROSCOPY Bilateral      TUBAL ABDOMINAL LIGATION   1973         Social History   Social History            Socioeconomic History    Marital status:        Spouse name: Rishabh    Number of children: 2    Years of education: 12   Tobacco Use    Smoking status: Never       Passive exposure: Never    Smokeless tobacco: Never   Vaping Use    Vaping Use: Never used   Substance and Sexual Activity    Alcohol use: Never    Drug use: Never    Sexual activity: Not Currently        Partners: Male       Birth control/protection: Other               Family History   Adopted: Yes   Problem Relation Age of Onset    Epilepsy Mother      Colon cancer Mother      Diabetes Mother      Hypertension Mother      No Known Problems Sister      No Known Problems Maternal Grandmother      No Known Problems Maternal Grandfather      No Known Problems Paternal Grandmother      No Known Problems Paternal Grandfather      Other Maternal Uncle           alzhimers    Diabetes Daughter             Allergies   Allergen Reactions    Sulfa Antibiotics Rash         Home Medications              Prior to Admission medications    Medication Sig Start Date End Date Taking? Authorizing Provider   Accu-Chek Softclix Lancets lancets Use to test blood sugar once daily 9/13/22     Deniz Geiger MD   Alcohol Swabs (Alcohol Wipes) 70 % pads Apply 1 each topically Daily. 9/13/22     Deniz Geiger MD   amLODIPine (NORVASC) 10 MG tablet Take 1 tablet by mouth once daily 11/19/23     Bhargavi Rodriguez APRN   atorvastatin (LIPITOR) 20 MG tablet Take 1 tablet by mouth once daily 6/25/23     Bhargavi Rodriguez APRN   bisoprolol (ZEBeta) 10 MG tablet Take 1 tablet by mouth once daily 10/19/23     Bhragavi Rodriguez APRN   Blood Glucose Monitoring Suppl (Accu-Chek Guide Me) w/Device kit use daily to check blood sugar 9/13/22     Deniz Geiger MD   buPROPion XL (WELLBUTRIN XL) 150 MG 24 hr tablet Take 1 tablet by mouth once daily 12/28/23     Bhargavi Rodriguez APRN   colchicine 0.6 MG tablet Take 2 tablets initially, then repeat with 1 tablet in 1 hour if not better 11/27/23     Arielle Knowles APRN   Cyanocobalamin (Vitamin B-12) 1000 MCG sublingual tablet Place 1 tablet under the tongue Daily. 8/3/23     Bhargavi Rodriguez APRN   DULoxetine (CYMBALTA) 20 MG capsule Take 1 capsule by mouth Daily.       Provider, MD Jaspreet   escitalopram (LEXAPRO) 20 MG tablet Take 1 tablet by mouth once daily 12/29/23   "   Bhargavi Rodriguez APRN   gabapentin (NEURONTIN) 600 MG tablet TAKE 1 TABLET BY MOUTH THREE TIMES DAILY 8/31/23     Bhargavi Rodriguez APRN   glimepiride (AMARYL) 2 MG tablet Take 2 tablets by mouth twice daily 12/7/23     Bhargavi Rodriguez APRN   glucose blood (Accu-Chek Guide) test strip test once daily 6/6/23     Bhargavi Rodriguez APRN   glucose blood (Accu-Chek Guide) test strip Use as instructed  once daily   dx e 11.9 6/14/23     Bhargavi Rodriguez APRN   hydroCHLOROthiazide (HYDRODIURIL) 50 MG tablet Take 1 tablet by mouth Daily for 90 days. 7/11/23 10/9/23   Bhargavi Rodriguez APRN   Insulin Degludec (Tresiba) 100 UNIT/ML solution injection Inject 20 Units under the skin into the appropriate area as directed Every Night. 9/1/23     Isauro Regalado MD   Insulin Pen Needle (Pen Needles 5/16\") 31G X 8 MM misc To use with tresiba  as directed 9/7/23     Bhargavi Rodriguez APRN   lisinopril (PRINIVIL,ZESTRIL) 20 MG tablet Take 1 tablet by mouth 2 (Two) Times a Day. 12/19/22     Bhargavi Rodriguez APRN   metFORMIN (GLUCOPHAGE) 1000 MG tablet TAKE 1 TABLET BY MOUTH TWICE DAILY WITH MEALS 12/18/23     Bhargavi Rodriguez APRN   predniSONE (DELTASONE) 5 MG tablet Take 8 on day 1, 7 on day 2, 6 on day 3, 5 on day 4, 4 on day 5, 3 on day 6, 2 on day 7, 1 on day 8 11/27/23     Arielle Knowles APRN      Objective / Physical Exam   Vital signs:  Temp: 98.1 °F (36.7 °C)  BP: 168/68  Heart Rate: 99  Resp: 15  SpO2: 98 %  Weight: 88.9 kg (196 lb)     Admission Weight: Weight: 88.9 kg (196 lb)     Physical Exam      GEN:  Pleasant.  Appears appropriate for stated age.  WD/WN/WH.  Sitting up in bed on RA.  NAD.  NEURO:  Brainstem reflexes intact.  No obvious focal deficit.  Moves all 4 ext.  HEENT:  N/AT.  PERRL.  MMM.  Oropharynx non-erythematous.  No drainage from the eyes/ears/nose.  No conjunctival petechiae.  No oral thrush.  Auditory and visual acuity grossly wnl. Voice normal.  NECK:  Supple, NT, trachea midline.  No " meningismus.  No ROM limitation.  No torticollis.  No JVD.  No thyromegaly.    CHEST/LUNGS:  Breath sounds are clear and equal bilaterally.  No w/r/r.  Chest excursion equal bilaterally.    CARDIOVASCULAR:  RRR w/o murmur noted.  PMI not displaced.  GI:  Abdomen soft, NT, ND, +BS.  No HSM.  :  No rob cath in place.  EXTREMITIES:  LLE wrapped and splinted.  No cyanosis, edema, or asymmetry.  Pulses 2+ and equal in BLE's.    SKIN:  Warm, dry, and pink.  No rash, breakdown, or track marks noted.  LYMPHATICS/HEME:  No overt LAD or abnormal bruising.  No lymphedema.  MSK:  Normal ROM.  No joint abnormalities noted.  Strength is 5/5 and equal in BUE and BLE's.  PSYCH:  Pleasant.  A&Ox 3.  Normal mood and affect.  Responds appropriately to commands and appears to comprehend instructions.          Labs           Results from last 7 days   Lab Units 01/04/24  1146   WBC 10*3/mm3 9.00   HEMATOCRIT % 40.2   PLATELETS 10*3/mm3 209           Results from last 7 days   Lab Units 01/04/24  1146   SODIUM mmol/L 139   POTASSIUM mmol/L 5.3*   CHLORIDE mmol/L 105   CO2 mmol/L 26.0   BUN mg/dL 17   CREATININE mg/dL 0.97         Imaging      Chest X ray: Pending     Current Medications   Scheduled Meds:HYDROmorphone, 0.5 mg, Intravenous, Once  senna-docusate sodium, 2 tablet, Oral, BID  sodium chloride, 10 mL, Intravenous,

## 2024-01-06 NOTE — ANESTHESIA POSTPROCEDURE EVALUATION
Patient: Dora Solorio    Procedure Summary       Date: 01/06/24 Room / Location: [facility] OR  / [facility] MAIN OR    Anesthesia Start: 0920 Anesthesia Stop: 1053    Procedure: ANKLE OPEN REDUCTION INTERNAL FIXATION (Left: Ankle) Diagnosis:       Closed fracture of left ankle, initial encounter      (Closed fracture of left ankle, initial encounter [S82.892A])    Surgeons: Eriberto Muñoz MD Provider: Damian Engel MD    Anesthesia Type: general with block ASA Status: 3            Anesthesia Type: general with block    Vitals  Vitals Value Taken Time   /66 01/06/24 1108   Temp 98.6 °F (37 °C) 01/06/24 1055   Pulse 71 01/06/24 1109   Resp 11 01/06/24 1105   SpO2 99 % 01/06/24 1109   Vitals shown include unfiled device data.        Post Anesthesia Care and Evaluation    Patient location during evaluation: PACU  Patient participation: complete - patient participated  Level of consciousness: awake  Pain scale: See nurse's notes for pain score.  Pain management: adequate    Airway patency: patent  Anesthetic complications: No anesthetic complications  PONV Status: none  Cardiovascular status: acceptable  Respiratory status: acceptable and spontaneous ventilation  Hydration status: acceptable    Comments: Patient seen and examined postoperatively; vital signs stable; SpO2 greater than or equal to 90%; cardiopulmonary status stable; nausea/vomiting adequately controlled; pain adequately controlled; no apparent anesthesia complications; patient discharged from anesthesia care when discharge criteria were met

## 2024-01-07 LAB
ANION GAP SERPL CALCULATED.3IONS-SCNC: 12 MMOL/L (ref 5–15)
BASOPHILS # BLD AUTO: 0.1 10*3/MM3 (ref 0–0.2)
BASOPHILS NFR BLD AUTO: 0.4 % (ref 0–1.5)
BUN SERPL-MCNC: 33 MG/DL (ref 8–23)
BUN/CREAT SERPL: 25.4 (ref 7–25)
CALCIUM SPEC-SCNC: 8.1 MG/DL (ref 8.6–10.5)
CHLORIDE SERPL-SCNC: 99 MMOL/L (ref 98–107)
CO2 SERPL-SCNC: 21 MMOL/L (ref 22–29)
CREAT SERPL-MCNC: 1.3 MG/DL (ref 0.57–1)
DEPRECATED RDW RBC AUTO: 42.4 FL (ref 37–54)
EGFRCR SERPLBLD CKD-EPI 2021: 43 ML/MIN/1.73
EOSINOPHIL # BLD AUTO: 0 10*3/MM3 (ref 0–0.4)
EOSINOPHIL NFR BLD AUTO: 0 % (ref 0.3–6.2)
ERYTHROCYTE [DISTWIDTH] IN BLOOD BY AUTOMATED COUNT: 14 % (ref 12.3–15.4)
GLUCOSE BLDC GLUCOMTR-MCNC: 157 MG/DL (ref 70–105)
GLUCOSE BLDC GLUCOMTR-MCNC: 255 MG/DL (ref 70–105)
GLUCOSE BLDC GLUCOMTR-MCNC: 339 MG/DL (ref 70–105)
GLUCOSE BLDC GLUCOMTR-MCNC: 340 MG/DL (ref 70–105)
GLUCOSE BLDC GLUCOMTR-MCNC: 378 MG/DL (ref 70–105)
GLUCOSE BLDC GLUCOMTR-MCNC: 380 MG/DL (ref 70–105)
GLUCOSE BLDC GLUCOMTR-MCNC: 387 MG/DL (ref 70–105)
GLUCOSE SERPL-MCNC: 420 MG/DL (ref 65–99)
HCT VFR BLD AUTO: 27.5 % (ref 34–46.6)
HCT VFR BLD AUTO: 27.7 % (ref 34–46.6)
HGB BLD-MCNC: 8.7 G/DL (ref 12–15.9)
HGB BLD-MCNC: 8.8 G/DL (ref 12–15.9)
LYMPHOCYTES # BLD AUTO: 0.6 10*3/MM3 (ref 0.7–3.1)
LYMPHOCYTES NFR BLD AUTO: 4.2 % (ref 19.6–45.3)
MCH RBC QN AUTO: 27.2 PG (ref 26.6–33)
MCHC RBC AUTO-ENTMCNC: 31.8 G/DL (ref 31.5–35.7)
MCV RBC AUTO: 85.5 FL (ref 79–97)
MONOCYTES # BLD AUTO: 1 10*3/MM3 (ref 0.1–0.9)
MONOCYTES NFR BLD AUTO: 7.6 % (ref 5–12)
NEUTROPHILS NFR BLD AUTO: 11.8 10*3/MM3 (ref 1.7–7)
NEUTROPHILS NFR BLD AUTO: 87.8 % (ref 42.7–76)
NRBC BLD AUTO-RTO: 0 /100 WBC (ref 0–0.2)
PLATELET # BLD AUTO: 162 10*3/MM3 (ref 140–450)
PMV BLD AUTO: 8.8 FL (ref 6–12)
POTASSIUM SERPL-SCNC: 4.8 MMOL/L (ref 3.5–5.2)
RBC # BLD AUTO: 3.24 10*6/MM3 (ref 3.77–5.28)
SODIUM SERPL-SCNC: 132 MMOL/L (ref 136–145)
WBC NRBC COR # BLD AUTO: 13.4 10*3/MM3 (ref 3.4–10.8)

## 2024-01-07 PROCEDURE — 85025 COMPLETE CBC W/AUTO DIFF WBC: CPT | Performed by: ORTHOPAEDIC SURGERY

## 2024-01-07 PROCEDURE — 85014 HEMATOCRIT: CPT | Performed by: ORTHOPAEDIC SURGERY

## 2024-01-07 PROCEDURE — 97166 OT EVAL MOD COMPLEX 45 MIN: CPT | Performed by: OCCUPATIONAL THERAPIST

## 2024-01-07 PROCEDURE — 82948 REAGENT STRIP/BLOOD GLUCOSE: CPT

## 2024-01-07 PROCEDURE — 25010000002 CEFAZOLIN PER 500 MG: Performed by: ORTHOPAEDIC SURGERY

## 2024-01-07 PROCEDURE — 63710000001 INSULIN LISPRO (HUMAN) PER 5 UNITS: Performed by: ORTHOPAEDIC SURGERY

## 2024-01-07 PROCEDURE — 63710000001 INSULIN GLARGINE PER 5 UNITS: Performed by: ORTHOPAEDIC SURGERY

## 2024-01-07 PROCEDURE — 97162 PT EVAL MOD COMPLEX 30 MIN: CPT

## 2024-01-07 PROCEDURE — 85018 HEMOGLOBIN: CPT | Performed by: ORTHOPAEDIC SURGERY

## 2024-01-07 PROCEDURE — 25010000002 ONDANSETRON PER 1 MG: Performed by: ORTHOPAEDIC SURGERY

## 2024-01-07 PROCEDURE — 25010000002 ENOXAPARIN PER 10 MG: Performed by: ORTHOPAEDIC SURGERY

## 2024-01-07 PROCEDURE — 80048 BASIC METABOLIC PNL TOTAL CA: CPT | Performed by: ORTHOPAEDIC SURGERY

## 2024-01-07 PROCEDURE — 99222 1ST HOSP IP/OBS MODERATE 55: CPT | Performed by: INTERNAL MEDICINE

## 2024-01-07 RX ADMIN — BUPROPION HYDROCHLORIDE 150 MG: 150 TABLET, EXTENDED RELEASE ORAL at 10:00

## 2024-01-07 RX ADMIN — INSULIN LISPRO 13 UNITS: 100 INJECTION, SOLUTION INTRAVENOUS; SUBCUTANEOUS at 10:00

## 2024-01-07 RX ADMIN — INSULIN GLARGINE 29 UNITS: 100 INJECTION, SOLUTION SUBCUTANEOUS at 20:12

## 2024-01-07 RX ADMIN — OXYCODONE HYDROCHLORIDE 5 MG: 5 TABLET ORAL at 19:49

## 2024-01-07 RX ADMIN — ENOXAPARIN SODIUM 40 MG: 100 INJECTION SUBCUTANEOUS at 10:00

## 2024-01-07 RX ADMIN — Medication 3 ML: at 20:13

## 2024-01-07 RX ADMIN — GABAPENTIN 600 MG: 600 TABLET, FILM COATED ORAL at 20:11

## 2024-01-07 RX ADMIN — FAMOTIDINE 20 MG: 20 TABLET ORAL at 10:00

## 2024-01-07 RX ADMIN — ONDANSETRON 4 MG: 2 INJECTION INTRAMUSCULAR; INTRAVENOUS at 19:49

## 2024-01-07 RX ADMIN — INSULIN LISPRO 15 UNITS: 100 INJECTION, SOLUTION INTRAVENOUS; SUBCUTANEOUS at 18:02

## 2024-01-07 RX ADMIN — DOCUSATE SODIUM AND SENNOSIDES 2 TABLET: 8.6; 5 TABLET, FILM COATED ORAL at 10:00

## 2024-01-07 RX ADMIN — GABAPENTIN 600 MG: 600 TABLET, FILM COATED ORAL at 10:00

## 2024-01-07 RX ADMIN — ATORVASTATIN CALCIUM 20 MG: 20 TABLET, FILM COATED ORAL at 10:00

## 2024-01-07 RX ADMIN — SODIUM CHLORIDE 2000 MG: 900 INJECTION INTRAVENOUS at 01:00

## 2024-01-07 RX ADMIN — INSULIN LISPRO 8 UNITS: 100 INJECTION, SOLUTION INTRAVENOUS; SUBCUTANEOUS at 03:28

## 2024-01-07 RX ADMIN — Medication 10 ML: at 20:13

## 2024-01-07 RX ADMIN — ESCITALOPRAM OXALATE 10 MG: 10 TABLET ORAL at 10:00

## 2024-01-07 RX ADMIN — INSULIN LISPRO 14 UNITS: 100 INJECTION, SOLUTION INTRAVENOUS; SUBCUTANEOUS at 14:19

## 2024-01-07 RX ADMIN — BISOPROLOL FUMARATE 10 MG: 5 TABLET, FILM COATED ORAL at 10:00

## 2024-01-07 NOTE — PLAN OF CARE
Problem: Pain Acute  Goal: Acceptable Pain Control and Functional Ability  Intervention: Optimize Psychosocial Wellbeing  Recent Flowsheet Documentation  Taken 1/6/2024 1200 by Shelbie Hill LPN  Diversional Activities: television   Goal Outcome Evaluation:

## 2024-01-07 NOTE — THERAPY EVALUATION
Patient Name: Dora Solorio  : 1950    MRN: [id number]                              Today's Date: 2024       Admit Date: 2024    Visit Dx:     ICD-10-CM ICD-9-CM   1. Fall, initial encounter  W19.XXXA E888.9   2. Closed bimalleolar fracture of left ankle, initial encounter  S82.662A 824.4   3. Closed fracture subluxation of left ankle joint, initial encounter  S82.782A 824.8   4. Closed fracture of left ankle, initial encounter  S82.892A 824.8     Patient Active Problem List   Diagnosis    Adjustment disorder with depressed mood    Depression    Type 2 diabetes mellitus with hyperglycemia, with long-term current use of insulin    Diabetic peripheral neuropathy    Essential hypertension    Gout    Mixed hyperlipidemia    Irritable bowel syndrome without diarrhea    Obesity    Osteopenia    Peripheral neuropathy    Vitamin D deficiency    Dizzy    Acute UTI    Closed left ankle fracture     Past Medical History:   Diagnosis Date    Adjustment disorder with depressed mood 2018    Allergic Years    Sulfa drugs    Anxiety     Arthritis Years    Lotsvof joints. Mostly back, knees, hips, fingers    Depression 2018    Diabetes mellitus, type II 2013    Essential hypertension 2013    Gout 2014    Heart attack     Hyperlipidemia 2020    Hypertension     Inflammatory bowel disease     Irritable bowel syndrome without diarrhea 2017    Kidney disease     Kidney stone ?2016?    In hospital overnight    Osteopenia 2017    Peripheral neuropathy 2017     Past Surgical History:   Procedure Laterality Date     SECTION      x2    COLON SURGERY      ENDOMETRIAL ABLATION      KNEE ARTHROSCOPY Bilateral     TUBAL ABDOMINAL LIGATION  1973      General Information       Row Name 24 1809          Physical Therapy Time and Intention    Document Type evaluation  -EL     Mode of Treatment physical therapy  -EL       Row Name 24 1809           General Information    Patient Profile Reviewed yes  -EL     Prior Level of Function independent:;all household mobility;ADL's  -EL     Existing Precautions/Restrictions non-weight bearing;left;oxygen therapy device and L/min  -EL       Row Name 01/07/24 1809          Living Environment    People in Home child(rupinder), adult;other (see comments)  Daughter, WHIT and grandchild  -EL       Row Name 01/07/24 1809          Home Main Entrance    Number of Stairs, Main Entrance one;other (see comments)  2 single steps to enter home, can navigate with RWx  -EL       Row Name 01/07/24 1809          Stairs Within Home, Primary    Number of Stairs, Within Home, Primary none  -EL       Row Name 01/07/24 1809          Cognition    Orientation Status (Cognition) oriented x 4  -EL       Row Name 01/07/24 1809          Safety Issues, Functional Mobility    Impairments Affecting Function (Mobility) balance;endurance/activity tolerance;sensation/sensory awareness;strength  -EL               User Key  (r) = Recorded By, (t) = Taken By, (c) = Cosigned By      Initials Name Provider Type    Amador Ramsay, PT Physical Therapist                   Mobility       Row Name 01/07/24 1814          Bed Mobility    Bed Mobility supine-sit  -EL     Supine-Sit [place] (Bed Mobility) standby assist  -EL     Assistive Device (Bed Mobility) bed rails;head of bed elevated  -EL       Row Name 01/07/24 1814          Bed-Chair Transfer    Bed-Chair [place] (Transfers) minimum assist (75% patient effort);2 person assist;verbal cues  -EL     Assistive Device (Bed-Chair Transfers) walker, front-wheeled  -EL       Row Name 01/07/24 1814          Sit-Stand Transfer    Sit-Stand [place] (Transfers) verbal cues;minimum assist (75% patient effort);2 person assist  -EL     Assistive Device (Sit-Stand Transfers) walker, front-wheeled  -EL     Comment, (Sit-Stand Transfer) Needing a lot of cues for remaining NWB  -EL       Row Name 01/07/24 1814           Mobility    Extremity Weight-bearing Status left lower extremity  -EL     Left Lower Extremity (Weight-bearing Status) non weight-bearing (NWB)  -EL               User Key  (r) = Recorded By, (t) = Taken By, (c) = Cosigned By      Initials Name Provider Type    Amador Ramsay PT Physical Therapist                   Obj/Interventions       Row Name 01/07/24 1815          Range of Motion Comprehensive    General Range of Motion lower extremity range of motion deficits identified  -EL     Comment, General Range of Motion WFL except for L ankle  -EL       Row Name 01/07/24 1815          Strength Comprehensive (MMT)    General Manual Muscle Testing (MMT) Assessment no strength deficits identified  -EL     Comment, General Manual Muscle Testing (MMT) Assessment BLE grossly WFL  -EL       Row Name 01/07/24 1815          Balance    Balance Assessment sitting static balance;standing static balance;standing dynamic balance  -EL     Static Sitting Balance independent  -EL     Static Standing Balance minimal assist  -EL     Dynamic Standing Balance minimal assist  -EL     Position/Device Used, Standing Balance walker, front-wheeled  -       Row Name 01/07/24 1815          Sensory Assessment (Somatosensory)    Sensory Assessment (Somatosensory) other (see comments)  Neuropathy in feet  -EL               User Key  (r) = Recorded By, (t) = Taken By, (c) = Cosigned By      Initials Name Provider Type    Amador Ramsay PT Physical Therapist                   Goals/Plan       Row Name 01/07/24 1823          Bed Mobility Goal 1 (PT)    Activity/Assistive Device (Bed Mobility Goal 1, PT) bed mobility activities, all  -EL     [place] Level/Cues Needed (Bed Mobility Goal 1, PT) independent  -EL     Time Frame (Bed Mobility Goal 1, PT) long term goal (LTG);2 weeks  -       Row Name 01/07/24 1823          Transfer Goal 1 (PT)    Activity/Assistive Device (Transfer Goal 1, PT) transfers, all;walker, rolling  -EL     [place]  Level/Cues Needed (Transfer Goal 1, PT) supervision required  -EL     Time Frame (Transfer Goal 1, PT) long term goal (LTG);2 weeks  -EL       Row Name 01/07/24 1823          Gait Training Goal 1 (PT)    Activity/Assistive Device (Gait Training Goal 1, PT) gait (walking locomotion);walker, rolling  -EL     [place] Level (Gait Training Goal 1, PT) supervision required  -EL     Distance (Gait Training Goal 1, PT) 15  -EL     Time Frame (Gait Training Goal 1, PT) long term goal (LTG);2 weeks  -EL       Row Name 01/07/24 1823          Therapy Assessment/Plan (PT)    Planned Therapy Interventions (PT) neuromuscular re-education;balance training;bed mobility training;transfer training;gait training;patient/family education;strengthening  -EL               User Key  (r) = Recorded By, (t) = Taken By, (c) = Cosigned By      Initials Name Provider Type    Amador Ramsay, PT Physical Therapist                   Clinical Impression       Row Name 01/07/24 1817          Pain    Pretreatment Pain Rating 0/10 - no pain  -EL     Posttreatment Pain Rating 0/10 - no pain  -EL       Row Name 01/07/24 1817          Plan of Care Review    Plan of Care Reviewed With patient;daughter  -EL     Outcome Evaluation Pt is a 72 y.o. F adm to [facility] on [facility]24 after falling at home. She suffered a left comminuted bimalleolar fracture subluxation & underwent left ankle ORIF on 01/06/24. She is currently NWB. Pt AOx4 but demonstrates mild confusion with conversation. Pt states she lives with daughter and family with 2 single steps into home, that she can navigate with a RWx as needed. Pt demonstrates good strength this date and reports no pain. Pt requiring no assistance for bed mobility and MIN A x2 to transfer to bedside chair. Pt did require consistent cueing to maintain NWB, but demonstrated good ability to do so. PT recommendation this date is Acute IP rehab following d/c.  -EL       Row Name 01/07/24 1817          Therapy Assessment/Plan  (PT)    Rehab Potential (PT) good, to achieve stated therapy goals  -EL     Criteria for Skilled Interventions Met (PT) yes  -EL     Therapy Frequency (PT) 5 times/wk  -EL     Predicted Duration of Therapy Intervention (PT) until d/c  -EL       Row Name 01/07/24 1817          Vital Signs    O2 Delivery Pre Treatment supplemental O2  -EL     O2 Delivery Intra Treatment supplemental O2  -EL     O2 Delivery Post Treatment supplemental O2  -EL     Pre Patient Position Supine  -EL     Intra Patient Position Standing  -EL     Post Patient Position Sitting  -EL       Row Name 01/07/24 1817          Positioning and Restraints    Pre-Treatment Position in bed  -EL     Post Treatment Position chair  -EL     In Chair notified nsg;reclined;call light within reach;encouraged to call for assist;exit alarm on  -EL               User Key  (r) = Recorded By, (t) = Taken By, (c) = Cosigned By      Initials Name Provider Type    Amador Ramsay, PT Physical Therapist                   Outcome Measures       Row Name 01/07/24 1828 01/07/24 0804       How much help from another person do you currently need...    Turning from your back to your side while in flat bed without using bedrails? 4  -EL 3  -SS    Moving from lying on back to sitting on the side of a flat bed without bedrails? 3  -EL 3  -SS    Moving to and from a bed to a chair (including a wheelchair)? 2  -EL 2  -SS    Standing up from a chair using your arms (e.g., wheelchair, bedside chair)? 2  -EL 2  -SS    Climbing 3-5 steps with a railing? 1  -EL 1  -SS    To walk in hospital room? 1  -EL 2  -SS    AM-PAC 6 Clicks Score (PT) 13  -EL 13  -SS    Highest Level of Mobility Goal 4 --> Transfer to chair/commode  -EL 4 --> Transfer to chair/commode  -SS      Row Name 01/07/24 1828          Functional Assessment    Outcome Measure Options AM-PAC 6 Clicks Basic Mobility (PT)  -EL               User Key  (r) = Recorded By, (t) = Taken By, (c) = Cosigned By      Initials Name Provider  Type    Amador Ramsay, PT Physical Therapist    Molly Méndez RN Registered Nurse                                 Physical Therapy Education       Title: PT OT SLP Therapies (In Progress)       Topic: Physical Therapy (Done)       Point: Mobility training (Done)       Learning Progress Summary             Patient Acceptance, E,TB, VU by DHEERAJ at 1/7/2024 1828                         Point: Precautions (Done)       Learning Progress Summary             Patient Acceptance, E,TB, VU by DHEERAJ at 1/7/2024 1828                                         User Key       Initials Effective Dates Name Provider Type Discipline     06/23/20 -  Amador Zamudio, PT Physical Therapist PT                  PT Recommendation and Plan  Planned Therapy Interventions (PT): neuromuscular re-education, balance training, bed mobility training, transfer training, gait training, patient/family education, strengthening  Plan of Care Reviewed With: patient, daughter  Outcome Evaluation: Pt is a 72 y.o. F adm to [facility] on 01/04/24 after falling at home. She suffered a left comminuted bimalleolar fracture subluxation & underwent left ankle ORIF on 01/06/24. She is currently NWB. Pt AOx4 but demonstrates mild confusion with conversation. Pt states she lives with daughter and family with 2 single steps into home, that she can navigate with a RWx as needed. Pt demonstrates good strength this date and reports no pain. Pt requiring no assistance for bed mobility and MIN A x2 to transfer to bedside chair. Pt did require consistent cueing to maintain NWB, but demonstrated good ability to do so. PT recommendation this date is Acute IP rehab following d/c.     Time Calculation:   PT Evaluation Complexity  History, PT Evaluation Complexity: 1-2 personal factors and/or comorbidities  Examination of Body Systems (PT Eval Complexity): total of 3 or more elements  Clinical Presentation (PT Evaluation Complexity): evolving  Clinical Decision Making (PT Evaluation  Complexity): moderate complexity  Overall Complexity (PT Evaluation Complexity): moderate complexity     PT Charges       Row Name 01/07/24 1828             Time Calculation    Start Time 0852  -EL      Stop Time 0914  -EL      Time Calculation (min) 22 min  -EL      PT Received On 01/07/24  -EL      PT - Next Appointment 01/08/24  -EL      PT Goal Re-Cert Due Date 01/21/24  -EL                User Key  (r) = Recorded By, (t) = Taken By, (c) = Cosigned By      Initials Name Provider Type    Amador Ramsay PT Physical Therapist                  Therapy Charges for Today       Code Description Service Date Service Provider Modifiers Qty    [id number] HC PT EVAL MOD COMPLEXITY 4 1/7/2024 Amador Zamudio, PT GP 1            PT G-Codes  Outcome Measure Options: AM-PAC 6 Clicks Basic Mobility (PT)  AM-PAC 6 Clicks Score (PT): 13  PT Discharge Summary  Anticipated Discharge Disposition (PT): inpatient rehabilitation facility    Amador Zamudio PT  1/7/2024

## 2024-01-07 NOTE — PROGRESS NOTES
Orthopedic Progress Note        Patient: Dora Solorio    Date of Admission: 1/4/2024 10:55 AM    YOB: 1950    Medical Record Number: [id number]    Attending Physician: Stella Bridges MD      POD # 1     Status post-left ankle open reduction internal fixation of medial and lateral malleolus components and syndesmosis fixation    -Upon entering the room patient was sitting upright in her bedside recliner with operative leg elevated and her daughter, Emily, at her side.    Systemic or Specific Complaints: No Complaints      Allergies:   Allergies   Allergen Reactions    Sulfa Antibiotics Rash       Medications:   Current Medications:  Scheduled Meds:atorvastatin, 20 mg, Oral, Daily  bisoprolol, 10 mg, Oral, Daily  buPROPion XL, 150 mg, Oral, Daily  enoxaparin, 40 mg, Subcutaneous, Daily  escitalopram, 10 mg, Oral, Daily  famotidine, 20 mg, Oral, Daily  gabapentin, 600 mg, Oral, Q12H  insulin glargine, 1-200 Units, Subcutaneous, Nightly - Glucommander  insulin lispro, 1-200 Units, Subcutaneous, 4x Daily With Meals & Nightly  senna-docusate sodium, 2 tablet, Oral, BID  sodium chloride, 10 mL, Intravenous, Q12H  sodium chloride, 3 mL, Intravenous, Q12H      Continuous Infusions:sodium chloride, 75 mL/hr, Last Rate: 75 mL/hr (01/06/24 1548)      PRN Meds:.  acetaminophen    senna-docusate sodium **AND** polyethylene glycol **AND** bisacodyl **AND** bisacodyl    dextrose    dextrose    dextrose    glucagon (human recombinant)    insulin lispro    Morphine **AND** naloxone    Morphine    nitroglycerin    ondansetron ODT **OR** ondansetron    oxyCODONE    [COMPLETED] Insert Peripheral IV **AND** sodium chloride    sodium chloride    sodium chloride    sodium chloride    sodium chloride      Physical Exam: 73 y.o. female  General Appearance:    alert and oriented                Pain Relief: Patient reports some relief     Vitals:    01/06/24 1956 01/06/24 2142 01/07/24 0012 01/07/24 0444   BP: 121/53  126/60     BP Location: Right arm  Right arm    Patient Position: Lying  Lying    Pulse: 78      Resp: 16 15 14 13   Temp:  98.5 °F (36.9 °C) 98.9 °F (37.2 °C) 97.9 °F (36.6 °C)   TempSrc:  Oral Oral Oral   SpO2: 93%  92%    Weight:       Height:              HEENT:    Normocephalic, without obvious abnormality, atraumatic.     Trachea midline.      Lungs:     Clear to auscultation,respirations regular, even and unlabored         Abdomen:     no guarding       Extremities:   Operative extremity (LLE and ankle) neurovascular status intact. ROM intact at the knee. Incision intact w/out signs or  symptoms of infection. No edema, no cyanosis, no calf tenderness     Pulses:    Popliteal pulses palpable; capillary refill less than 2 seconds all digits     Skin:     Skin Warm/Dry w/out ulceration, ecchymosis, rash, or   cyanosis     Activity: Mobilizing Per P.T.   Weight Bearing: NWB    Diagnostic Tests:   [unfilled]      [unfilled]    Assessment:    No admission procedures for hospital encounter.  Post-operative Pain  Immobility    Plan:    Continue efforts to mobilize with walker  Maintain nonweightbearing status to the left lower extremity  Maintain splint and bandage in place until follow-up with Dr. Muñoz  Continue Pain Control Measures  Continue polar care  Continue elevation of operative extremity  Continue incisional Care      Discharge Plan: DC to rehab facility per hospitalist recommendation when ready.    Date: 1/7/2024   Time: 10:23 CHRISTOPHER Jasso PA-C

## 2024-01-07 NOTE — PLAN OF CARE
Goal Outcome Evaluation:  Plan of Care Reviewed With: patient, daughter           Outcome Evaluation: Pt is a 72 y.o. F adm to [facility] on 01/04/24 after falling at home. She suffered a left comminuted bimalleolar fracture subluxation & underwent left ankle ORIF on 01/06/24. She is currently NWB on LLE.  PMH: UTI,  HTN, DM type 2, CAD, peripheral neuropathy, depression. At baseline, pt lives with daughter & grandchildren in a H with 2 total TONY (steps spaced apart to allow for RW PRN). She has a RW, but does not use it at baseline. She reports she is ind with all BADLs & IADLs. She does not drive & family provides transportation. Upon eval Pt is A&O X4. There is a monitor in the room as she was reportedly confused prior to sx from the morphine she was receiving. Pt is not confused at time of the eval & able to follow all 1-2 step commands. She completed ADL transfer bed>chair with min A X2 using a RW with min v.c. to follow NWB prec. Pt reports she has BLE neuropathy & having trouble determining where LE is in space. Pt requiring assist for toileting, bathing & dressing. She has generalized dec strength & activity tolerance. OT will continue to follow pt for tx in acute care setting & recommends inpt rehab upon discharge. Pt & dtr are in agreement with the recommendations at this time.      Anticipated Discharge Disposition (OT): inpatient rehabilitation facility

## 2024-01-07 NOTE — PLAN OF CARE
Goal Outcome Evaluation:         Pt alert and able to make needs known. Remote sitter removed from bedside as patient's condition has improved and no longer appears to be confused. Pt states that morphine had made her very confused in the past. Patient worked with physical therapy. Up in chair. Family at bedside, plan of care ongoing.

## 2024-01-07 NOTE — CONSULTS
Nutrition Services    Patient Name: Dora Solorio  YOB: 1950  MRN: [id number]  Admission date: 1/4/2024    PPE Documentation        PPE Worn By Provider Did not enter room this encounter   PPE Worn By Patient  N/A     NUTRITION SCREENING      Encounter Information: 1/7: Pt being assessed for malnutrition screening tool score of 3 (unsure of wt loss and reported decreased appetite). Pt admitted to [facility] with a closed L ankle fracture. Pt sp ORIF.        PO Diet: Diet: Diabetic Diets; Consistent Carbohydrate; Texture: Regular Texture (IDDSI 7); Fluid Consistency: Thin (IDDSI 0)   PO Supplements: -   PO Intake:  %       Labs (reviewed below): Hyperglycemia - consistent CHO diet        GI Function:  + BM on 1/6       Skin: Intact        Weight Hx Review: 1/7: 196# (stated wt on admission)  Wt stable for > 1 year       Nutrition Intervention: Continue current diet and encourage good PO intake.       Results from last 7 days   Lab Units 01/07/24  0108 01/06/24  0409 01/05/24  1340 01/04/24  1146   SODIUM mmol/L 132* 133* 135* 139   POTASSIUM mmol/L 4.8 4.8 5.4* 5.3*   CHLORIDE mmol/L 99 98 100 105   CO2 mmol/L 21.0* 25.0 26.0 26.0   BUN mg/dL 33* 26* 22 17   CREATININE mg/dL 1.30* 1.26* 1.35* 0.97   CALCIUM mg/dL 8.1* 8.6 8.3* 9.4   BILIRUBIN mg/dL  --   --   --  0.2   ALK PHOS U/L  --   --   --  84   ALT (SGPT) U/L  --   --   --  10   AST (SGOT) U/L  --   --   --  10   GLUCOSE mg/dL 420* 125* 284* 198*     Results from last 7 days   Lab Units 01/07/24  0108   HEMOGLOBIN g/dL 8.8*   HEMATOCRIT % 27.7*     COVID19   Date Value Ref Range Status   07/22/2023 Not Detected Not Detected - Ref. Range Final     Lab Results   Component Value Date    HGBA1C 8.50 (H) 01/04/2024       RD to follow up per protocol.    Electronically signed by:  Lacy Kim RD  01/07/24 10:57 EST

## 2024-01-07 NOTE — PLAN OF CARE
Goal Outcome Evaluation:  Plan of Care Reviewed With: patient, daughter           Outcome Evaluation: Pt is a 72 y.o. F adm to [facility] on 01/04/24 after falling at home. She suffered a left comminuted bimalleolar fracture subluxation & underwent left ankle ORIF on 01/06/24. She is currently NWB. Pt AOx4 but demonstrates mild confusion with conversation. Pt states she lives with daughter and family with 2 single steps into home, that she can navigate with a RWx as needed. Pt demonstrates good strength this date and reports no pain. Pt requiring no assistance for bed mobility and MIN A x2 to transfer to bedside chair. Pt did require consistent cueing to maintain NWB, but demonstrated good ability to do so. PT recommendation this date is Acute IP rehab following d/c.      Anticipated Discharge Disposition (PT): inpatient rehabilitation facility

## 2024-01-07 NOTE — THERAPY EVALUATION
Patient Name: Dora Solorio  : 1950    MRN: [id number]                              Today's Date: 2024       Admit Date: 2024    Visit Dx:     ICD-10-CM ICD-9-CM   1. Fall, initial encounter  W19.XXXA E888.9   2. Closed bimalleolar fracture of left ankle, initial encounter  S82.032A 824.4   3. Closed fracture subluxation of left ankle joint, initial encounter  S82.762A 824.8   4. Closed fracture of left ankle, initial encounter  S82.892A 824.8     Patient Active Problem List   Diagnosis    Adjustment disorder with depressed mood    Depression    Type 2 diabetes mellitus with hyperglycemia, with long-term current use of insulin    Diabetic peripheral neuropathy    Essential hypertension    Gout    Mixed hyperlipidemia    Irritable bowel syndrome without diarrhea    Obesity    Osteopenia    Peripheral neuropathy    Vitamin D deficiency    Dizzy    Acute UTI    Closed left ankle fracture     Past Medical History:   Diagnosis Date    Adjustment disorder with depressed mood 2018    Allergic Years    Sulfa drugs    Anxiety     Arthritis Years    [place] joints. Mostly back, knees, hips, fingers    Depression 2018    Diabetes mellitus, type II 2013    Essential hypertension 2013    Gout 2014    Heart attack     Hyperlipidemia 2020    Hypertension     Inflammatory bowel disease     Irritable bowel syndrome without diarrhea 2017    Kidney disease     Kidney stone ?2016?    In hospital overnight    Osteopenia 2017    Peripheral neuropathy 2017     Past Surgical History:   Procedure Laterality Date     SECTION      x2    COLON SURGERY      ENDOMETRIAL ABLATION      KNEE ARTHROSCOPY Bilateral     TUBAL ABDOMINAL LIGATION  1973      General Information       Row Name 24 1309          OT Time and Intention    Document Type evaluation  -DT     Mode of Treatment occupational therapy  -DT       Row Name 24 1309          General  Information    Patient Profile Reviewed yes  -DT     Prior Level of Function independent:;all household mobility;ADL's  Has tub/shower combo.  -DT     Existing Precautions/Restrictions non-weight bearing;left;oxygen therapy device and L/min  -DT       Row Name 01/07/24 1309          Living Environment    People in Home child(rupinder), adult  Pt lives with her dtr (who is a nurse, but is not currently working), son-in-law & grand-child.  -DT       Row Name 01/07/24 1309          Home Main Entrance    Number of Stairs, Main Entrance other (see comments)  2 steps total to enter, spaced apart & allows for space to place RW PRN.  -DT       Row Name 01/07/24 1309          Stairs Within Home, Primary    Number of Stairs, Within Home, Primary none  -DT       Row Name 01/07/24 1309          Cognition    Orientation Status (Cognition) oriented x 4  -DT       Row Name 01/07/24 1309          Safety Issues, Functional Mobility    Impairments Affecting Function (Mobility) balance;endurance/activity tolerance;sensation/sensory awareness;strength  -DT               User Key  (r) = Recorded By, (t) = Taken By, (c) = Cosigned By      Initials Name Provider Type    DT Roberta Bird, OT Occupational Therapist                     Mobility/ADL's       Row Name 01/07/24 1322          Bed Mobility    Bed Mobility supine-sit  -DT     Supine-Sit [place] (Bed Mobility) standby assist  -DT     Assistive Device (Bed Mobility) bed rails;head of bed elevated  -DT       Row Name 01/07/24 1322          Transfers    Transfers bed-chair transfer;sit-stand transfer  -DT       Row Name 01/07/24 1322          Bed-Chair Transfer    Bed-Chair [place] (Transfers) minimum assist (75% patient effort);2 person assist;verbal cues  -DT     Assistive Device (Bed-Chair Transfers) walker, front-wheeled  -DT     Comment, (Bed-Chair Transfer) min v.c. for NWB  -DT       Row Name 01/07/24 1322          Sit-Stand Transfer    Sit-Stand [place]  (Transfers) verbal cues;minimum assist (75% patient effort);2 person assist  -DT     Assistive Device (Sit-Stand Transfers) walker, front-wheeled  -DT       Row Name 01/07/24 1322          Activities of Daily Living    BADL Assessment/Intervention lower body dressing;grooming  -DT       Row Name 01/07/24 1322          Mobility    Extremity Weight-bearing Status left lower extremity  -DT     Left Lower Extremity (Weight-bearing Status) non weight-bearing (NWB)  -DT       Row Name 01/07/24 1322          Lower Body Dressing Assessment/Training    [place] Level (Lower Body Dressing) lower body dressing skills;moderate assist (50% patient effort)  -DT     Position (Lower Body Dressing) edge of bed sitting  -DT       Row Name 01/07/24 1322          Grooming Assessment/Training    [place] Level (Grooming) grooming skills;independent  -DT     Position (Grooming) supported sitting  -DT               User Key  (r) = Recorded By, (t) = Taken By, (c) = Cosigned By      Initials Name Provider Type    DT Roberta Bird, OT Occupational Therapist                   Obj/Interventions       Row Name 01/07/24 1325          Sensory Assessment (Somatosensory)    Sensory Assessment Pt reports BLE neuropathy  -DT       Row Name 01/07/24 1325          Range of Motion Comprehensive    General Range of Motion bilateral upper extremity ROM WNL  -DT       Row Name 01/07/24 1325          Strength Comprehensive (MMT)    General Manual Muscle Testing (MMT) Assessment upper extremity strength deficits identified  -DT     Comment, General Manual Muscle Testing (MMT) Assessment BUE=4-/5  -DT       Row Name 01/07/24 1325          Balance    Balance Assessment sitting static balance;sitting dynamic balance;standing static balance  -DT     Static Sitting Balance independent  -DT     Dynamic Sitting Balance independent  -DT     Position, Sitting Balance sitting edge of bed  -DT     Static Standing Balance minimal assist;contact  guard;2-person assist  -DT     Position/Device Used, Standing Balance walker, front-wheeled  -DT               User Key  (r) = Recorded By, (t) = Taken By, (c) = Cosigned By      Initials Name Provider Type    DT Rboerta Bird, OT Occupational Therapist                   Goals/Plan       Row Name 01/07/24 1331          Transfer Goal 1 (OT)    Activity/Assistive Device (Transfer Goal 1, OT) transfers, all  -DT     Strong Level/Cues Needed (Transfer Goal 1, OT) contact guard required  -DT     Time Frame (Transfer Goal 1, OT) 2 weeks  -DT       Row Name 01/07/24 1331          Bathing Goal 1 (OT)    Activity/Device (Bathing Goal 1, OT) bathing skills, all  -DT     Strong Level/Cues Needed (Bathing Goal 1, OT) minimum assist (75% or more patient effort)  -DT     Time Frame (Bathing Goal 1, OT) 2 weeks  -DT       Row Name 01/07/24 1331          Dressing Goal 1 (OT)    Activity/Device (Dressing Goal 1, OT) dressing skills, all  -DT     Strong/Cues Needed (Dressing Goal 1, OT) contact guard required  -DT     Time Frame (Dressing Goal 1, OT) 2 weeks  -DT       Row Name 01/07/24 1331          Toileting Goal 1 (OT)    Activity/Device (Toileting Goal 1, OT) toileting skills, all  -DT     Strong Level/Cues Needed (Toileting Goal 1, OT) standby assist  -DT     Time Frame (Toileting Goal 1, OT) 2 weeks  -DT       Row Name 01/07/24 1331          Therapy Assessment/Plan (OT)    Planned Therapy Interventions (OT) activity tolerance training;adaptive equipment training;BADL retraining;neuromuscular control/coordination retraining;occupation/activity based interventions;patient/caregiver education/training;ROM/therapeutic exercise;strengthening exercise;transfer/mobility retraining  -DT               User Key  (r) = Recorded By, (t) = Taken By, (c) = Cosigned By      Initials Name Provider Type    DT Roberta Bird, OT Occupational Therapist                   Clinical Impression       Row Name  01/07/24 1326          Pain Assessment    Pretreatment Pain Rating 0/10 - no pain  -DT     Posttreatment Pain Rating 0/10 - no pain  -DT       Row Name 01/07/24 1326          Plan of Care Review    Plan of Care Reviewed With patient;daughter  -DT     Outcome Evaluation Pt is a 72 y.o. F adm to [facility] on 01/04/24 after falling at home. She suffered a left comminuted bimalleolar fracture subluxation & underwent left ankle ORIF on 01/06/24. She is currently NWB on LLE.  PMH: UTI,  HTN, DM type 2, CAD, peripheral neuropathy, depression. At baseline, pt lives with daughter & grandchildren in a H with 2 total TONY (steps spaced apart to allow for RW PRN). She has a RW, but does not use it at baseline. She reports she is ind with all BADLs & IADLs. She does not drive & family provides transportation. Upon eval Pt is A&O X4. There is a monitor in the room as she was reportedly confused prior to sx from the morphine she was receiving. Pt is not confused at time of the eval & able to follow all 1-2 step commands. She completed ADL transfer bed>chair with min A X2 using a RW with min v.c. to follow NWB prec. Pt reports she has BLE neuropathy & having trouble determining where LE is in space. Pt requiring assist for toileting, bathing & dressing. She has generalized dec strength & activity tolerance. OT will continue to follow pt for tx in acute care setting & recommends inpt rehab upon discharge. Pt & dtr are in agreement with the recommendations at this time.  -DT       Row Name 01/07/24 1326          Therapy Assessment/Plan (OT)    Rehab Potential (OT) good, to achieve stated therapy goals  -DT     Criteria for Skilled Therapeutic Interventions Met (OT) yes;meets criteria;skilled treatment is necessary  -DT     Therapy Frequency (OT) 5 times/wk  -DT     Predicted Duration of Therapy Intervention (OT) until d/c  -DT       Row Name 01/07/24 1326          Therapy Plan Review/Discharge Plan (OT)    Anticipated Discharge  Disposition (OT) inpatient rehabilitation facility  -DT       Row Name 01/07/24 1326          Vital Signs    O2 Delivery Pre Treatment supplemental O2  2L  -DT     O2 Delivery Intra Treatment supplemental O2  2L  -DT     O2 Delivery Post Treatment supplemental O2  2L  -DT       Row Name 01/07/24 1326          Positioning and Restraints    Pre-Treatment Position in bed  -DT     Post Treatment Position chair  -DT     In Chair notified nsg;call light within reach;encouraged to call for assist;exit alarm on;with family/caregiver;legs elevated  -DT               User Key  (r) = Recorded By, (t) = Taken By, (c) = Cosigned By      Initials Name Provider Type    DT Roberta Bird, OT Occupational Therapist                   Outcome Measures       Row Name 01/07/24 0804 01/07/24 0445       How much help from another person do you currently need...    Turning from your back to your side while in flat bed without using bedrails? 3  -SS 3  -LR    Moving from lying on back to sitting on the side of a flat bed without bedrails? 3  -SS 3  -LR    Moving to and from a bed to a chair (including a wheelchair)? 2  -SS 2  -LR    Standing up from a chair using your arms (e.g., wheelchair, bedside chair)? 2  -SS 2  -LR    Climbing 3-5 steps with a railing? 1  -SS 1  -LR    To walk in hospital room? 2  -SS 2  -LR    AM-PAC 6 Clicks Score (PT) 13  -SS 13  -LR    Highest Level of Mobility Goal 4 --> Transfer to chair/commode  -SS 4 --> Transfer to chair/commode  -LR              User Key  (r) = Recorded By, (t) = Taken By, (c) = Cosigned By      Initials Name Provider Type    LR Lucy Scott, RN Registered Nurse    SS Molly Ford RN Registered Nurse                    Occupational Therapy Education       Title: PT OT SLP Therapies (In Progress)       Topic: Occupational Therapy (In Progress)       Point: ADL training (Done)       Description:   Instruct learner(s) on proper safety adaptation and remediation techniques during self  care or transfers.   Instruct in proper use of assistive devices.                  Learning Progress Summary             Patient Acceptance, E,TB, VU by DT at 1/7/2024 1332    Comment: Role of OT, goals & POC, safety prec in hospital setting, NWB status.   Family Acceptance, E,TB, VU by DT at 1/7/2024 1332    Comment: Role of OT, goals & POC, safety prec in hospital setting, NWB status.                         Point: Home exercise program (Not Started)       Description:   Instruct learner(s) on appropriate technique for monitoring, assisting and/or progressing therapeutic exercises/activities.                  Learner Progress:  Not documented in this visit.              Point: Precautions (Done)       Description:   Instruct learner(s) on prescribed precautions during self-care and functional transfers.                  Learning Progress Summary             Patient Acceptance, E,TB, VU by DT at 1/7/2024 1332    Comment: Role of OT, goals & POC, safety prec in hospital setting, NWB status.   Family Acceptance, E,TB, VU by DT at 1/7/2024 1332    Comment: Role of OT, goals & POC, safety prec in hospital setting, NWB status.                         Point: Body mechanics (Done)       Description:   Instruct learner(s) on proper positioning and spine alignment during self-care, functional mobility activities and/or exercises.                  Learning Progress Summary             Patient Acceptance, E,TB, VU by DT at 1/7/2024 1332    Comment: Role of OT, goals & POC, safety prec in hospital setting, NWB status.   Family Acceptance, E,TB, VU by DT at 1/7/2024 1332    Comment: Role of OT, goals & POC, safety prec in hospital setting, NWB status.                                         User Key       Initials Effective Dates Name Provider Type Discipline    DT 07/11/23 -  Roberta Bird, OT Occupational Therapist OT                  OT Recommendation and Plan  Planned Therapy Interventions (OT): activity tolerance  training, adaptive equipment training, BADL retraining, neuromuscular control/coordination retraining, occupation/activity based interventions, patient/caregiver education/training, ROM/therapeutic exercise, strengthening exercise, transfer/mobility retraining  Therapy Frequency (OT): 5 times/wk  Plan of Care Review  Plan of Care Reviewed With: patient, daughter  Outcome Evaluation: Pt is a 72 y.o. F adm to [facility] on 01/04/24 after falling at home. She suffered a left comminuted bimalleolar fracture subluxation & underwent left ankle ORIF on 01/06/24. She is currently NWB on LLE.  PMH: UTI,  HTN, DM type 2, CAD, peripheral neuropathy, depression. At baseline, pt lives with daughter & grandchildren in a H with 2 total TONY (steps spaced apart to allow for RW PRN). She has a RW, but does not use it at baseline. She reports she is ind with all BADLs & IADLs. She does not drive & family provides transportation. Upon eval Pt is A&O X4. There is a monitor in the room as she was reportedly confused prior to sx from the morphine she was receiving. Pt is not confused at time of the eval & able to follow all 1-2 step commands. She completed ADL transfer bed>chair with min A X2 using a RW with min v.c. to follow NWB prec. Pt reports she has BLE neuropathy & having trouble determining where LE is in space. Pt requiring assist for toileting, bathing & dressing. She has generalized dec strength & activity tolerance. OT will continue to follow pt for tx in acute care setting & recommends inpt rehab upon discharge. Pt & dtr are in agreement with the recommendations at this time.     Time Calculation:         Time Calculation- OT       Row Name 01/07/24 1333             Time Calculation- OT    OT Start Time 0853  -DT      OT Stop Time 0918  -DT      OT Time Calculation (min) 25 min  -DT      OT Received On 01/07/24  -DT      OT - Next Appointment 01/08/24  -DT      OT Goal Re-Cert Due Date 01/21/24  -DT                User Key  (r) =  Recorded By, (t) = Taken By, (c) = Cosigned By      Initials Name Provider Type    Roberta Castellanos, OT Occupational Therapist                           Roberta Bird, UMAIR  1/7/2024

## 2024-01-07 NOTE — CONSULTS
[facility]    Referring Provider: Dr. Stella Bridges  Reason for Consultation: Diabetes evaluation & management.    Patient Care Team:  Bhargavi Rodriguez APRN as PCP - General  Rob Wick MD as Consulting Physician (Nephrology)    Chief complaint Fall      Subjective .     History of present illness:    This is a  73 y.o. female with type 2 Diabetes since .  On Tresiba insulin 20 units QHS, metformin& glimepiride @ home.  Started on Jardiance in , but could not afford.  Admitted with L ankle fracture after a fall.  ORIF done on .  Will be going to rehab.  On sq glucommander.      Review of Systems  Review of Systems   HENT:  Negative for trouble swallowing.    Eyes:  Negative for blurred vision.   Gastrointestinal:  Negative for nausea.   Endocrine: Negative for polyuria.   Musculoskeletal:         L leg pain   Neurological:  Negative for headache.       History  Past Medical History:   Diagnosis Date    Adjustment disorder with depressed mood 2018    Allergic Years    Sulfa drugs    Anxiety     Arthritis Years    Lotsvof joints. Mostly back, knees, hips, fingers    Depression 2018    Diabetes mellitus, type II 2013    Essential hypertension 2013    Gout 2014    Heart attack     Hyperlipidemia 2020    Hypertension     Inflammatory bowel disease     Irritable bowel syndrome without diarrhea 2017    Kidney disease     Kidney stone ?2016?    In hospital overnight    Osteopenia 2017    Peripheral neuropathy 2017     Past Surgical History:   Procedure Laterality Date     SECTION      x2    COLON SURGERY      ENDOMETRIAL ABLATION      KNEE ARTHROSCOPY Bilateral     TUBAL ABDOMINAL LIGATION  1973     Family History   Adopted: Yes   Problem Relation Age of Onset    Epilepsy Mother     Colon cancer Mother     Diabetes Mother     Hypertension Mother     No Known Problems Sister     No Known Problems Maternal  Grandmother     No Known Problems Maternal Grandfather     No Known Problems Paternal Grandmother     No Known Problems Paternal Grandfather     Other Maternal Uncle         alzhimers    Diabetes Daughter      Social History     Tobacco Use    Smoking status: Never     Passive exposure: Never    Smokeless tobacco: Never   Vaping Use    Vaping Use: Never used   Substance Use Topics    Alcohol use: Never    Drug use: Never     Medications Prior to Admission   Medication Sig Dispense Refill Last Dose    amLODIPine (NORVASC) 10 MG tablet Take 1 tablet by mouth once daily 90 tablet 0     atorvastatin (LIPITOR) 20 MG tablet Take 1 tablet by mouth once daily 90 tablet 2     bisoprolol (ZEBeta) 10 MG tablet Take 1 tablet by mouth once daily 90 tablet 0     buPROPion XL (WELLBUTRIN XL) 150 MG 24 hr tablet Take 1 tablet by mouth once daily 30 tablet 1     Cyanocobalamin (Vitamin B-12) 1000 MCG sublingual tablet Place 1 tablet under the tongue Daily. 100 each 4     escitalopram (LEXAPRO) 20 MG tablet Take 1 tablet by mouth once daily 90 tablet 0     gabapentin (NEURONTIN) 600 MG tablet Take 1 tablet by mouth 3 (Three) Times a Day.       glimepiride (AMARYL) 2 MG tablet Take 2 tablets by mouth twice daily 360 tablet 0     hydroCHLOROthiazide (HYDRODIURIL) 50 MG tablet Take 1 tablet by mouth Daily for 90 days. 90 tablet 2     Insulin Degludec (Tresiba) 100 UNIT/ML solution injection Inject 20 Units under the skin into the appropriate area as directed Every Night. 12 mL 2     metFORMIN (GLUCOPHAGE) 1000 MG tablet Take 1 tablet by mouth 2 (Two) Times a Day With Meals.        Scheduled Meds:  atorvastatin, 20 mg, Oral, Daily  bisoprolol, 10 mg, Oral, Daily  buPROPion XL, 150 mg, Oral, Daily  enoxaparin, 40 mg, Subcutaneous, Daily  escitalopram, 10 mg, Oral, Daily  famotidine, 20 mg, Oral, Daily  gabapentin, 600 mg, Oral, Q12H  insulin glargine, 1-200 Units, Subcutaneous, Nightly - Glucommander  insulin lispro, 1-200 Units,  Subcutaneous, 4x Daily With Meals & Nightly  senna-docusate sodium, 2 tablet, Oral, BID  sodium chloride, 10 mL, Intravenous, Q12H  sodium chloride, 3 mL, Intravenous, Q12H      Continuous Infusions:  sodium chloride, 75 mL/hr, Last Rate: 75 mL/hr (01/06/24 1548)      PRN Meds:    acetaminophen    senna-docusate sodium **AND** polyethylene glycol **AND** bisacodyl **AND** bisacodyl    dextrose    dextrose    dextrose    glucagon (human recombinant)    insulin lispro    Morphine **AND** naloxone    Morphine    nitroglycerin    ondansetron ODT **OR** ondansetron    oxyCODONE    [COMPLETED] Insert Peripheral IV **AND** sodium chloride    sodium chloride    sodium chloride    sodium chloride    sodium chloride  Allergies:  Sulfa antibiotics    Objective     Vital Signs   Temp:  [97.8 °F (36.6 °C)-98.9 °F (37.2 °C)] 97.8 °F (36.6 °C)  Heart Rate:  [78] 78  Resp:  [13-16] 14  BP: (121-126)/(53-60) 126/60    Physical Exam:     General Appearance:    Alert, cooperative, in no acute distress, obese   Head:    Normocephalic, without obvious abnormality, atraumatic   Eyes:            Lids and lashes normal, conjunctivae and sclerae normal, no   icterus, no pallor, corneas clear, PERRLA   Throat:   No oral lesions,  oral mucosa moist. Missing teeth   Neck:   No adenopathy, supple,  no thyromegaly, no  carotid bruit   Lungs:     Clear     Heart:    Regular rhythm and normal rate   Chest Wall:    No abnormalities observed   Abdomen:     Normal bowel sounds, soft                 Extremities:   L leg wrapped               Pulses:   Pulses palpable and equal bilaterally   Skin:   Dry   Neurologic:  DTR absent, able to feel the 10g monofilament       Results Review  I have reviewed the patient's new clinical results, labs & imaging.    Lab Results (last 24 hours)       Procedure Component Value Units Date/Time    POC Glucose Once [[id number]]  (Abnormal) Collected: 01/07/24 1758    Specimen: Blood Updated: 01/07/24 1800     Glucose  340 mg/dL      Comment: Serial Number: [id number]:  [id number]       POC Glucose Once [[id number]]  (Abnormal) Collected: 01/07/24 1343    Specimen: Blood Updated: 01/07/24 1346     Glucose 378 mg/dL      Comment: Serial Number: [id number]:  [id number]       POC Glucose Once [[id number]]  (Abnormal) Collected: 01/07/24 0947    Specimen: Blood Updated: 01/07/24 0949     Glucose 339 mg/dL      Comment: Serial Number: [id number]:  [id number]       POC Glucose Once [[id number]]  (Abnormal) Collected: 01/07/24 0302    Specimen: Blood Updated: 01/07/24 0303     Glucose 387 mg/dL      Comment: Serial Number: [id number]:  [id number]       Basic Metabolic Panel [[id number]]  (Abnormal) Collected: 01/07/24 0108    Specimen: Blood Updated: 01/07/24 0230     Glucose 420 mg/dL      BUN 33 mg/dL      Creatinine 1.30 mg/dL      Sodium 132 mmol/L      Potassium 4.8 mmol/L      Chloride 99 mmol/L      CO2 21.0 mmol/L      Calcium 8.1 mg/dL      BUN/Creatinine Ratio 25.4     Anion Gap 12.0 mmol/L      eGFR 43.0 mL/min/1.73     Narrative:      GFR Normal >60  Chronic Kidney Disease <60  Kidney Failure <15    The GFR formula is only valid for adults with stable renal function between ages 18 and 70.    CBC & Differential [[id number]]  (Abnormal) Collected: 01/07/24 0108    Specimen: Blood Updated: 01/07/24 0123    Narrative:      The following orders were created for panel order CBC & Differential.  Procedure                               Abnormality         Status                     ---------                               -----------         ------                     CBC Auto Differential[[id number]]        Abnormal            Final result                 Please view results for these tests on the individual orders.    CBC Auto Differential [[id number]]  (Abnormal) Collected: 01/07/24 0108    Specimen: Blood Updated: 01/07/24 0123     WBC 13.40 10*3/mm3      RBC 3.24 10*6/mm3      Hemoglobin 8.8 g/dL      Hematocrit  27.7 %      MCV 85.5 fL      MCH 27.2 pg      MCHC 31.8 g/dL      RDW 14.0 %      RDW-SD 42.4 fl      MPV 8.8 fL      Platelets 162 10*3/mm3      Neutrophil % 87.8 %      Lymphocyte % 4.2 %      Monocyte % 7.6 %      Eosinophil % 0.0 %      Basophil % 0.4 %      Neutrophils, Absolute 11.80 10*3/mm3      Lymphocytes, Absolute 0.60 10*3/mm3      Monocytes, Absolute 1.00 10*3/mm3      Eosinophils, Absolute 0.00 10*3/mm3      Basophils, Absolute 0.10 10*3/mm3      nRBC 0.0 /100 WBC     POC Glucose Once [[id number]]  (Abnormal) Collected: 01/07/24 0059    Specimen: Blood Updated: 01/07/24 0101     Glucose 380 mg/dL      Comment: Serial Number: [id number]:  [id number]       POC Glucose Once [[id number]]  (Abnormal) Collected: 01/06/24 2116    Specimen: Blood Updated: 01/06/24 2118     Glucose 419 mg/dL      Comment: Serial Number: [id number]:  [id number]             Lab Results   Component Value Date    HGBA1C 8.50 (H) 01/04/2024     Lab Results   Component Value Date    TSH 3.840 07/22/2023       Assessment & Plan     Diabetes type 2, with hyperglycemia  S/p ORIF L ankle    Continue sq glucommander.  Will follow with you.  Thank you for the consult.    I discussed the patients findings and my recommendations with patient    Levar Key MD  01/07/24  18:46 EST

## 2024-01-07 NOTE — PROGRESS NOTES
"Expand All Collapse All[]Expand All by Default    History and Physical   Dora Solorio : 1950 MRN:[id number] LOS:0      Reason for admission: Closed left ankle fracture       Patient seen per orthopedic  Patient status post slip and fall  Awaiting surgery tomorrow as per orthopedic  Continue pain management and DVT prophylaxis  Patient needs 30 days or less of skilled services.      Patient is getting surgery with ankle open reduction internal fixation today  Hemodynamically stable      Patient seen with the daughter at bedside  Status post surgery yesterday with ORIF of the lateral malleolus left ankle    Hemoglobin stable at 8.8  Discharge planning skilled nursing facility once bed is available    Assessment / Plan      Left comminuted bimalleolar fracture subluxation   Same-level fall  -LLE plain films reviewed  -Will get CT Head and C-spine   -Ortho aware of pt in ER  -NPO  -Pain control        DM-II, insulin-dependent w/ peripheral neuropathy  -Neuropathy may have contributed to her fall  -Since NPO, will do q6h accuchecks and SSI        Chronic & Stable:  HTN  HLD  Depression  IBS              -Resume home meds as appropriate                    Level Of Support Discussed With: Patient  Code Status (Patient has no pulse and is not breathing): CPR (Attempt to Resuscitate)  Medical Interventions (Patient has pulse or is breathing): Full Support        Nutrition: NPO Diet NPO Type: Strict NPO      DVT Prophylaxis:   Mechanical Order History:         None             Pharmalogical Order History:         None                History of Present illness      73-year-old white female with history of hypertension high cholesterol chronic kidney disease peripheral neuropathy, diabetes.  She presents today from home by EMS with complaints of left ankle pain.  She states she slipped on a wet floor this morning and fell.  She states she bumped her head but \"not very hard.\"  She denies any loss of " consciousness.  She denies any headache dizziness or pain to the scalp.  She denies blood thinner use.  She does complain of pain with obvious deformity at the left ankle.  Has not been able to bear weight.  No numbness tingling or weakness in the extremity.  She denies any other injury or complaint.      Patient afebrile, vital signs stable.  Chemistry panel only noted for very mildly elevated potassium at 5.3, creatinine within normal limits, glucose elevated at 198.  Coags are normal.  CBC completely unremarkable.  X-ray noted for left comminuted bimalleolar fracture with subluxation.     ER NP discussed case with orthopedics who are planning for surgery.  Hospitalist consulted for admission.     Subjective / Review of systems      Review of Systems   Pt denies CP or SOB.  Denies f/c/s/n/v/d.    Past Medical/Surgical/Social/Family History & Allergies      Medical History        Past Medical History:   Diagnosis Date    Adjustment disorder with depressed mood 2018    Allergic Years     Sulfa drugs    Anxiety      Arthritis Years     Lotsvof joints. Mostly back, knees, hips, fingers    Depression 2018    Diabetes mellitus, type II 2013    Essential hypertension 2013    Gout 2014    Heart attack      Hyperlipidemia 2020    Hypertension      Inflammatory bowel disease      Irritable bowel syndrome without diarrhea 2017    Kidney disease      Kidney stone ?2016?     In hospital overnight    Osteopenia 2017    Peripheral neuropathy 2017         Surgical History         Past Surgical History:   Procedure Laterality Date     SECTION         x2    COLON SURGERY        ENDOMETRIAL ABLATION        KNEE ARTHROSCOPY Bilateral      TUBAL ABDOMINAL LIGATION   1973         Social History   Social History            Socioeconomic History    Marital status:        Spouse name: Rishabh    Number of children: 2    Years of education: 12   Tobacco Use     Smoking status: Never       Passive exposure: Never    Smokeless tobacco: Never   Vaping Use    Vaping Use: Never used   Substance and Sexual Activity    Alcohol use: Never    Drug use: Never    Sexual activity: Not Currently       Partners: Male       Birth control/protection: Other               Family History   Adopted: Yes   Problem Relation Age of Onset    Epilepsy Mother      Colon cancer Mother      Diabetes Mother      Hypertension Mother      No Known Problems Sister      No Known Problems Maternal Grandmother      No Known Problems Maternal Grandfather      No Known Problems Paternal Grandmother      No Known Problems Paternal Grandfather      Other Maternal Uncle           alzhimers    Diabetes Daughter             Allergies   Allergen Reactions    Sulfa Antibiotics Rash         Home Medications              Prior to Admission medications    Medication Sig Start Date End Date Taking? Authorizing Provider   Accu-Chek Softclix Lancets lancets Use to test blood sugar once daily 9/13/22     Deniz Geiger MD   Alcohol Swabs (Alcohol Wipes) 70 % pads Apply 1 each topically Daily. 9/13/22     Deniz Geiger MD   amLODIPine (NORVASC) 10 MG tablet Take 1 tablet by mouth once daily 11/19/23     Bhargavi Rodriguez APRN   atorvastatin (LIPITOR) 20 MG tablet Take 1 tablet by mouth once daily 6/25/23     Bhargavi Rodriguez APRN   bisoprolol (ZEBeta) 10 MG tablet Take 1 tablet by mouth once daily 10/19/23     Bhargavi Rodriguez APRN   Blood Glucose Monitoring Suppl (Accu-Chek Guide Me) w/Device kit use daily to check blood sugar 9/13/22     Deniz Geiger MD   buPROPion XL (WELLBUTRIN XL) 150 MG 24 hr tablet Take 1 tablet by mouth once daily 12/28/23     Bhargavi Rodriguez APRN   colchicine 0.6 MG tablet Take 2 tablets initially, then repeat with 1 tablet in 1 hour if not better 11/27/23     Arielle Knowles APRN   Cyanocobalamin (Vitamin B-12) 1000 MCG sublingual tablet Place 1 tablet under  "the tongue Daily. 8/3/23     Bhargavi Rodriguez APRN   DULoxetine (CYMBALTA) 20 MG capsule Take 1 capsule by mouth Daily.       Provider, MD Jaspreet   escitalopram (LEXAPRO) 20 MG tablet Take 1 tablet by mouth once daily 12/29/23     Bhargavi Rodriguez APRN   gabapentin (NEURONTIN) 600 MG tablet TAKE 1 TABLET BY MOUTH THREE TIMES DAILY 8/31/23     Bhargavi Rodriguez APRN   glimepiride (AMARYL) 2 MG tablet Take 2 tablets by mouth twice daily 12/7/23     Bhargavi Rodriguez APRN   glucose blood (Accu-Chek Guide) test strip test once daily 6/6/23     Bhargavi Rodriguez APRN   glucose blood (Accu-Chek Guide) test strip Use as instructed  once daily   dx e 11.9 6/14/23     Bhargavi Rodriguez APRN   hydroCHLOROthiazide (HYDRODIURIL) 50 MG tablet Take 1 tablet by mouth Daily for 90 days. 7/11/23 10/9/23   Bhargavi Rodriguez APRN   Insulin Degludec (Tresiba) 100 UNIT/ML solution injection Inject 20 Units under the skin into the appropriate area as directed Every Night. 9/1/23     Isauro Regalado MD   Insulin Pen Needle (Pen Needles 5/16\") 31G X 8 MM misc To use with tresiba  as directed 9/7/23     Bhargavi Rodriguez APRN   lisinopril (PRINIVIL,ZESTRIL) 20 MG tablet Take 1 tablet by mouth 2 (Two) Times a Day. 12/19/22     Bhargavi Rodriguez APRN   metFORMIN (GLUCOPHAGE) 1000 MG tablet TAKE 1 TABLET BY MOUTH TWICE DAILY WITH MEALS 12/18/23     Bhargavi Rodriguez APRN   predniSONE (DELTASONE) 5 MG tablet Take 8 on day 1, 7 on day 2, 6 on day 3, 5 on day 4, 4 on day 5, 3 on day 6, 2 on day 7, 1 on day 8 11/27/23     Arielle Knowles APRN      Objective / Physical Exam   Vital signs:  Temp: 98.1 °F (36.7 °C)  BP: 168/68  Heart Rate: 99  Resp: 15  SpO2: 98 %  Weight: 88.9 kg (196 lb)     Admission Weight: Weight: 88.9 kg (196 lb)     Physical Exam      GEN:  Pleasant.  Appears appropriate for stated age.  WD/WN/WH.  Sitting up in bed on RA.  NAD.  NEURO:  Brainstem reflexes intact.  No obvious focal deficit.  Moves all 4 " ext.  HEENT:  N/AT.  PERRL.  MMM.  Oropharynx non-erythematous.  No drainage from the eyes/ears/nose.  No conjunctival petechiae.  No oral thrush.  Auditory and visual acuity grossly wnl. Voice normal.  NECK:  Supple, NT, trachea midline.  No meningismus.  No ROM limitation.  No torticollis.  No JVD.  No thyromegaly.    CHEST/LUNGS:  Breath sounds are clear and equal bilaterally.  No w/r/r.  Chest excursion equal bilaterally.    CARDIOVASCULAR:  RRR w/o murmur noted.  PMI not displaced.  GI:  Abdomen soft, NT, ND, +BS.  No HSM.  :  No rob cath in place.  EXTREMITIES:  LLE wrapped and splinted.  No cyanosis, edema, or asymmetry.  Pulses 2+ and equal in BLE's.    SKIN:  Warm, dry, and pink.  No rash, breakdown, or track marks noted.  LYMPHATICS/HEME:  No overt LAD or abnormal bruising.  No lymphedema.  MSK:  Normal ROM.  No joint abnormalities noted.  Strength is 5/5 and equal in BUE and BLE's.  PSYCH:  Pleasant.  A&Ox 3.  Normal mood and affect.  Responds appropriately to commands and appears to comprehend instructions.          Labs           Results from last 7 days   Lab Units 01/04/24  1146   WBC 10*3/mm3 9.00   HEMATOCRIT % 40.2   PLATELETS 10*3/mm3 209           Results from last 7 days   Lab Units 01/04/24  1146   SODIUM mmol/L 139   POTASSIUM mmol/L 5.3*   CHLORIDE mmol/L 105   CO2 mmol/L 26.0   BUN mg/dL 17   CREATININE mg/dL 0.97         Imaging      Chest X ray: Pending     Current Medications   Scheduled Meds:HYDROmorphone, 0.5 mg, Intravenous, Once  senna-docusate sodium, 2 tablet, Oral, BID  sodium chloride, 10 mL, Intravenous,

## 2024-01-08 LAB
GLUCOSE BLDC GLUCOMTR-MCNC: 120 MG/DL (ref 70–105)
GLUCOSE BLDC GLUCOMTR-MCNC: 162 MG/DL (ref 70–105)
GLUCOSE BLDC GLUCOMTR-MCNC: 182 MG/DL (ref 70–105)
GLUCOSE BLDC GLUCOMTR-MCNC: 245 MG/DL (ref 70–105)

## 2024-01-08 PROCEDURE — 99232 SBSQ HOSP IP/OBS MODERATE 35: CPT | Performed by: INTERNAL MEDICINE

## 2024-01-08 PROCEDURE — 97112 NEUROMUSCULAR REEDUCATION: CPT

## 2024-01-08 PROCEDURE — 63710000001 INSULIN LISPRO (HUMAN) PER 5 UNITS: Performed by: ORTHOPAEDIC SURGERY

## 2024-01-08 PROCEDURE — 99221 1ST HOSP IP/OBS SF/LOW 40: CPT

## 2024-01-08 PROCEDURE — 63710000001 INSULIN GLARGINE PER 5 UNITS: Performed by: ORTHOPAEDIC SURGERY

## 2024-01-08 PROCEDURE — 97530 THERAPEUTIC ACTIVITIES: CPT

## 2024-01-08 PROCEDURE — 25010000002 ENOXAPARIN PER 10 MG: Performed by: ORTHOPAEDIC SURGERY

## 2024-01-08 PROCEDURE — 82948 REAGENT STRIP/BLOOD GLUCOSE: CPT

## 2024-01-08 RX ORDER — GABAPENTIN 300 MG/1
600 CAPSULE ORAL EVERY 12 HOURS
Status: DISCONTINUED | OUTPATIENT
Start: 2024-01-08 | End: 2024-01-09 | Stop reason: HOSPADM

## 2024-01-08 RX ADMIN — DOCUSATE SODIUM AND SENNOSIDES 2 TABLET: 8.6; 5 TABLET, FILM COATED ORAL at 10:27

## 2024-01-08 RX ADMIN — ESCITALOPRAM OXALATE 10 MG: 10 TABLET ORAL at 10:27

## 2024-01-08 RX ADMIN — FAMOTIDINE 20 MG: 20 TABLET ORAL at 10:27

## 2024-01-08 RX ADMIN — BUPROPION HYDROCHLORIDE 150 MG: 150 TABLET, EXTENDED RELEASE ORAL at 10:27

## 2024-01-08 RX ADMIN — Medication 3 ML: at 10:28

## 2024-01-08 RX ADMIN — ENOXAPARIN SODIUM 40 MG: 100 INJECTION SUBCUTANEOUS at 10:27

## 2024-01-08 RX ADMIN — INSULIN LISPRO 7 UNITS: 100 INJECTION, SOLUTION INTRAVENOUS; SUBCUTANEOUS at 13:20

## 2024-01-08 RX ADMIN — GABAPENTIN 600 MG: 600 TABLET, FILM COATED ORAL at 10:27

## 2024-01-08 RX ADMIN — Medication 3 ML: at 20:15

## 2024-01-08 RX ADMIN — ACETAMINOPHEN 650 MG: 325 TABLET, FILM COATED ORAL at 20:13

## 2024-01-08 RX ADMIN — OXYCODONE HYDROCHLORIDE 5 MG: 5 TABLET ORAL at 00:57

## 2024-01-08 RX ADMIN — INSULIN GLARGINE 26 UNITS: 100 INJECTION, SOLUTION SUBCUTANEOUS at 20:14

## 2024-01-08 RX ADMIN — OXYCODONE HYDROCHLORIDE 5 MG: 5 TABLET ORAL at 06:09

## 2024-01-08 RX ADMIN — INSULIN LISPRO 4 UNITS: 100 INJECTION, SOLUTION INTRAVENOUS; SUBCUTANEOUS at 20:15

## 2024-01-08 RX ADMIN — Medication 10 ML: at 20:15

## 2024-01-08 RX ADMIN — ATORVASTATIN CALCIUM 20 MG: 20 TABLET, FILM COATED ORAL at 10:27

## 2024-01-08 RX ADMIN — GABAPENTIN 600 MG: 300 CAPSULE ORAL at 20:13

## 2024-01-08 NOTE — THERAPY TREATMENT NOTE
Subjective: Pt agreeable to therapeutic plan of care.    Objective:     Bed mobility - N/A or Not attempted.  Transfers - CGA and with rolling walker  Ambulation - N/A feet N/A or Not attempted.    Therapeutic Exercise - 10 Reps Bilaterally AROM supported sitting / chair and standing    Weight-bearing status: NWB LLE s/p ORIF    Vitals: WNL    Pain: 4 VAS   Location: L ankle incisional  Intervention for pain: Repositioned, Increased Activity, and Therapeutic Presence    Education: Provided education on the importance of mobility in the acute care setting, Verbal/Tactile Cues, Transfer Training, Gait Training, and WB'ing status    Assessment: Dora Solorio is POD#2 s/p L ankle ORIF and presents with functional mobility impairments which indicate the need for skilled intervention. Pt requires verbal cues and demo to maintain NWB status LLE while standing. Pt with reduced LLE hip flexion endurance, able to maintain standing march position for 20 seconds before requiring rest. Instructed pt through STS transfers and primed ambulation by working on S/L heel raises and UE presses on the RW. Tolerating session today without incident. Will continue to follow and progress as tolerated.     Plan/Recommendations:   If medically appropriate, High Intensity Therapy recommended post-acute care. This is recommended as therapy feels the patient would require 5-6 days per week, 2-3 hours per day. At this time, inpatient rehabilitation (acute rehab) would be the first choice and SNF would be second. Pt requires no DME at discharge.     Pt desires Inpatient Rehabilitation placement at discharge. Pt cooperative; agreeable to therapeutic recommendations and plan of care.         Basic Mobility 6-click:  Rollin = Total, A lot = 2, A little = 3; 4 = None  Supine>Sit:   1 = Total, A lot = 2, A little = 3; 4 = None   Sit>Stand with arms:  1 = Total, A lot = 2, A little = 3; 4 = None  Bed>Chair:   1 = Total, A lot = 2, A little =  3; 4 = None  Ambulate in room:  1 = Total, A lot = 2, A little = 3; 4 = None  3-5 Steps with railin = Total, A lot = 2, A little = 3; 4 = None  Score: 17    Modified Pearce: N/A = No pre-op stroke/TIA    Post-Tx Position: Up in Chair, Alarms activated, and Call light and personal items within reach  PPE: gloves

## 2024-01-08 NOTE — DISCHARGE PLACEMENT REQUEST
"Randy Solorio (73 y.o. Female)       Date of Birth   1950    Social Security Number       Address   [address] [place] [address]    Home Phone   [phone]    MRN   [id number]       [place]   None    Marital Status                               Admission Date   1/4/24    Admission Type   Emergency    Admitting Provider   Drew Grayson DO    Attending Provider   Stella Bridges MD    Department, Room/Bed   [facility] SURGICAL INPATIENT, 4105/1       Discharge Date       Discharge Disposition       Discharge Destination                                 Attending Provider: Stella Bridges MD    Allergies: Sulfa Antibiotics    Isolation: None   Infection: None   Code Status: CPR    Ht: 162.6 cm (64\")   Wt: 88.9 kg (196 lb)    Admission Cmt: None   Principal Problem: Closed left ankle fracture [S82.892A]                   Active Insurance as of 1/4/2024       Primary Coverage       Payor Plan Insurance Group Employer/Plan Group    MEDICARE MEDICARE A & B        Payor Plan Address Payor Plan Phone Number Payor Plan Fax Number Effective Dates    [address] [phone]  7/1/2013 - None Entered    [facility] [id number]         Subscriber Name Subscriber Birth Date Member ID       RANDY SOLORIO 1950 [id number]               Secondary Coverage       Payor Plan Insurance Group Employer/Plan Group    [employer] PLAN N       Payor Plan Address Payor Plan Phone Number Payor Plan Fax Number Effective Dates    [address] [phone]  7/1/2013 - None Entered    [facility] [id number]         Subscriber Name Subscriber Birth Date Member ID       RANDY SOLORIO 1950 [id number]                     Emergency Contacts        (Rel.) Home Phone Work Phone Mobile Phone    PHIL VARELA (Daughter) -- -- [phone]                "

## 2024-01-08 NOTE — PROGRESS NOTES
Expand All Collapse All[]Expand All by Default    History and Physical   Dora Solorio : 1950 MRN:[id number] LOS:0      Reason for admission: Closed left ankle fracture       Patient seen per orthopedic  Patient status post slip and fall  Awaiting surgery tomorrow as per orthopedic  Continue pain management and DVT prophylaxis  Patient needs 30 days or less of skilled services.      Patient is getting surgery with ankle open reduction internal fixation today  Hemodynamically stable      Patient seen with the daughter at bedside  Status post surgery yesterday with ORIF of the lateral malleolus left ankle    Hemoglobin stable at 8.8  Discharge planning skilled nursing facility once bed is available      Patient doing fine no new complaint except for pain at site of surgery  Awaiting discharge planning skilled nursing facility  Hemodynamically stable  Hemoglobin is stable at 8.7  Overnight, this patient was hallucinating severely. (seeing dead relatives, trying to get out of bed on her ow   To get psych consult and hold pain medication for now  Assessment / Plan      Left comminuted bimalleolar fracture subluxation   Same-level fall  -LLE plain films reviewed  -Will get CT Head and C-spine   -Ortho aware of pt in ER  -NPO  -Pain control        DM-II, insulin-dependent w/ peripheral neuropathy  -Neuropathy may have contributed to her fall  -Since NPO, will do q6h accuchecks and SSI        Chronic & Stable:  HTN  HLD  Depression  IBS              -Resume home meds as appropriate                    Level Of Support Discussed With: Patient  Code Status (Patient has no pulse and is not breathing): CPR (Attempt to Resuscitate)  Medical Interventions (Patient has pulse or is breathing): Full Support        Nutrition: NPO Diet NPO Type: Strict NPO      DVT Prophylaxis:   Mechanical Order History:         None             Pharmalogical Order History:         None                History of Present illness     "  73-year-old white female with history of hypertension high cholesterol chronic kidney disease peripheral neuropathy, diabetes.  She presents today from home by EMS with complaints of left ankle pain.  She states she slipped on a wet floor this morning and fell.  She states she bumped her head but \"not very hard.\"  She denies any loss of consciousness.  She denies any headache dizziness or pain to the scalp.  She denies blood thinner use.  She does complain of pain with obvious deformity at the left ankle.  Has not been able to bear weight.  No numbness tingling or weakness in the extremity.  She denies any other injury or complaint.      Patient afebrile, vital signs stable.  Chemistry panel only noted for very mildly elevated potassium at 5.3, creatinine within normal limits, glucose elevated at 198.  Coags are normal.  CBC completely unremarkable.  X-ray noted for left comminuted bimalleolar fracture with subluxation.     ER NP discussed case with orthopedics who are planning for surgery.  Hospitalist consulted for admission.     Subjective / Review of systems      Review of Systems   Pt denies CP or SOB.  Denies f/c/s/n/v/d.    Past Medical/Surgical/Social/Family History & Allergies      Medical History        Past Medical History:   Diagnosis Date    Adjustment disorder with depressed mood 09/12/2018    Allergic Years     Sulfa drugs    Anxiety      Arthritis Years     Lotsvof joints. Mostly back, knees, hips, fingers    Depression 09/12/2018    Diabetes mellitus, type II 07/03/2013    Essential hypertension 07/03/2013    Gout 08/12/2014    Heart attack      Hyperlipidemia 06/02/2020    Hypertension      Inflammatory bowel disease      Irritable bowel syndrome without diarrhea 04/17/2017    Kidney disease      Kidney stone ?August 2016?     In hospital overnight    Osteopenia 04/17/2017    Peripheral neuropathy 04/17/2017         Surgical History         Past Surgical History:   Procedure Laterality Date    "  SECTION         x2    COLON SURGERY        ENDOMETRIAL ABLATION        KNEE ARTHROSCOPY Bilateral      TUBAL ABDOMINAL LIGATION   1973         Social History   Social History            Socioeconomic History    Marital status:        Spouse name: Rishabh    Number of children: 2    Years of education: 12   Tobacco Use    Smoking status: Never       Passive exposure: Never    Smokeless tobacco: Never   Vaping Use    Vaping Use: Never used   Substance and Sexual Activity    Alcohol use: Never    Drug use: Never    Sexual activity: Not Currently       Partners: Male       Birth control/protection: Other               Family History   Adopted: Yes   Problem Relation Age of Onset    Epilepsy Mother      Colon cancer Mother      Diabetes Mother      Hypertension Mother      No Known Problems Sister      No Known Problems Maternal Grandmother      No Known Problems Maternal Grandfather      No Known Problems Paternal Grandmother      No Known Problems Paternal Grandfather      Other Maternal Uncle           alzhimers    Diabetes Daughter             Allergies   Allergen Reactions    Sulfa Antibiotics Rash         Home Medications              Prior to Admission medications    Medication Sig Start Date End Date Taking? Authorizing Provider   Accu-Chek Softclix Lancets lancets Use to test blood sugar once daily 22     Deniz Geiger MD   Alcohol Swabs (Alcohol Wipes) 70 % pads Apply 1 each topically Daily. 22     Deniz Geiger MD   amLODIPine (NORVASC) 10 MG tablet Take 1 tablet by mouth once daily 23     Bhargavi Rodriguez APRN   atorvastatin (LIPITOR) 20 MG tablet Take 1 tablet by mouth once daily 23     Bhargavi Rodriguez APRN   bisoprolol (ZEBeta) 10 MG tablet Take 1 tablet by mouth once daily 10/19/23     Bhargavi Rodriguez APRN   Blood Glucose Monitoring Suppl (Accu-Chek Guide Me) w/Device kit use daily to check blood sugar 22     Deniz Geiger  "MD   buPROPion XL (WELLBUTRIN XL) 150 MG 24 hr tablet Take 1 tablet by mouth once daily 12/28/23     Bhargavi Rodriguez APRN   colchicine 0.6 MG tablet Take 2 tablets initially, then repeat with 1 tablet in 1 hour if not better 11/27/23     Arielle Knowles APRN   Cyanocobalamin (Vitamin B-12) 1000 MCG sublingual tablet Place 1 tablet under the tongue Daily. 8/3/23     Bhargavi Rodriguez APRN   DULoxetine (CYMBALTA) 20 MG capsule Take 1 capsule by mouth Daily.       Provider, MD Jaspreet   escitalopram (LEXAPRO) 20 MG tablet Take 1 tablet by mouth once daily 12/29/23     Bhargavi Rodriguez APRN   gabapentin (NEURONTIN) 600 MG tablet TAKE 1 TABLET BY MOUTH THREE TIMES DAILY 8/31/23     Bhargavi Rodriguez APRN   glimepiride (AMARYL) 2 MG tablet Take 2 tablets by mouth twice daily 12/7/23     Bhargavi Rodriguez APRN   glucose blood (Accu-Chek Guide) test strip test once daily 6/6/23     Bhargavi Rodriguez APRN   glucose blood (Accu-Chek Guide) test strip Use as instructed  once daily   dx e 11.9 6/14/23     Bhargavi Rodriguez APRN   hydroCHLOROthiazide (HYDRODIURIL) 50 MG tablet Take 1 tablet by mouth Daily for 90 days. 7/11/23 10/9/23   Bhargavi Rodriguez APRN   Insulin Degludec (Tresiba) 100 UNIT/ML solution injection Inject 20 Units under the skin into the appropriate area as directed Every Night. 9/1/23     Isauro Regalado MD   Insulin Pen Needle (Pen Needles 5/16\") 31G X 8 MM misc To use with tresiba  as directed 9/7/23     Bhargavi Rodriguez APRN   lisinopril (PRINIVIL,ZESTRIL) 20 MG tablet Take 1 tablet by mouth 2 (Two) Times a Day. 12/19/22     Bhargavi Rodriguez APRN   metFORMIN (GLUCOPHAGE) 1000 MG tablet TAKE 1 TABLET BY MOUTH TWICE DAILY WITH MEALS 12/18/23     Bhargavi Rodriguez APRN   predniSONE (DELTASONE) 5 MG tablet Take 8 on day 1, 7 on day 2, 6 on day 3, 5 on day 4, 4 on day 5, 3 on day 6, 2 on day 7, 1 on day 8 11/27/23     Arielle Knowles APRN      Objective / Physical Exam   Vital signs:  Temp: " 98.1 °F (36.7 °C)  BP: 168/68  Heart Rate: 99  Resp: 15  SpO2: 98 %  Weight: 88.9 kg (196 lb)     Admission Weight: Weight: 88.9 kg (196 lb)     Physical Exam      GEN:  Pleasant.  Appears appropriate for stated age.  WD/WN/WH.  Sitting up in bed on RA.  NAD.  NEURO:  Brainstem reflexes intact.  No obvious focal deficit.  Moves all 4 ext.  HEENT:  N/AT.  PERRL.  MMM.  Oropharynx non-erythematous.  No drainage from the eyes/ears/nose.  No conjunctival petechiae.  No oral thrush.  Auditory and visual acuity grossly wnl. Voice normal.  NECK:  Supple, NT, trachea midline.  No meningismus.  No ROM limitation.  No torticollis.  No JVD.  No thyromegaly.    CHEST/LUNGS:  Breath sounds are clear and equal bilaterally.  No w/r/r.  Chest excursion equal bilaterally.    CARDIOVASCULAR:  RRR w/o murmur noted.  PMI not displaced.  GI:  Abdomen soft, NT, ND, +BS.  No HSM.  :  No rob cath in place.  EXTREMITIES:  LLE wrapped and splinted.  No cyanosis, edema, or asymmetry.  Pulses 2+ and equal in BLE's.    SKIN:  Warm, dry, and pink.  No rash, breakdown, or track marks noted.  LYMPHATICS/HEME:  No overt LAD or abnormal bruising.  No lymphedema.  MSK:  Normal ROM.  No joint abnormalities noted.  Strength is 5/5 and equal in BUE and BLE's.  PSYCH:  Pleasant.  A&Ox 3.  Normal mood and affect.  Responds appropriately to commands and appears to comprehend instructions.          Labs           Results from last 7 days   Lab Units 01/04/24  1146   WBC 10*3/mm3 9.00   HEMATOCRIT % 40.2   PLATELETS 10*3/mm3 209           Results from last 7 days   Lab Units 01/04/24  1146   SODIUM mmol/L 139   POTASSIUM mmol/L 5.3*   CHLORIDE mmol/L 105   CO2 mmol/L 26.0   BUN mg/dL 17   CREATININE mg/dL 0.97         Imaging      Chest X ray: Pending     Current Medications   Scheduled Meds:HYDROmorphone, 0.5 mg, Intravenous, Once  senna-docusate sodium, 2 tablet, Oral, BID  sodium chloride, 10 mL, Intravenous,

## 2024-01-08 NOTE — CASE MANAGEMENT/SOCIAL WORK
Continued Stay Note   Vick     Patient Name: Dora Solorio  MRN: [id number]  Today's Date: 1/8/2024    Admit Date: 1/4/2024    Plan: Referral to [facility] pending acceptance. No precert needed.  PASRR approved however will not need for IPR.   Discharge Plan       Row Name 01/08/24 1600       Plan    Plan Referral to [facility] pending acceptance. No precert needed.  PASRR approved however will not need for IPR.    Plan Comments DC barriers: hallucinations overnight, psych consult, pod #2 orif lt ankle. NWB.  referral pending to [facility].  if needs snf choices are 1. [facility] 2. [facility]               Expected Discharge Date and Time       Expected Discharge Date Expected Discharge Time    Jan 9, 2024             Cassy Thomas RN

## 2024-01-08 NOTE — CONSULTS
Referring Provider: Stella Bridges MD  Reason for Consultation: hallucinations       Chief complaint : hallucinations.     Subjective .     History of present illness:  The patient is a 73 y.o. female who was admitted secondary to a fall resulting in a  left ankle fracture.    PMH: depression, anxiety, diabetes, hypertension, hyperlipidemia, inflammatory bowel disease, kidney disease    Psych was consulted due to hallucinations and agitation overnight.     The patient was seen this afternoon. She was alert and oriented. The patient remembers being confused overnight, and she states pain medication has caused that reaction in her in the past. She received several doses overnight, but has not had any since 6am this morning. Her mental status has improved.     She does report a history of depression, but states it is well controlled on Wellbutrin and Lexapro. She was concerned these were not ordered for her here at the hospital, but they listed on her orders.     Patient denies any suicidal ideation.       Review of Systems   All systems were reviewed and negative except for:  Behavioral/Psych: positive for  depression    The following portions of the patient's history were reviewed and updated as appropriate: allergies, current medications, past family history, past medical history, past social history, past surgical history and problem list.    History    Past psychiatric history: depression, anxiety.     Past Medical History:   Diagnosis Date    Adjustment disorder with depressed mood 09/12/2018    Allergic Years    Sulfa drugs    Anxiety     Arthritis Years    Lotsvof joints. Mostly back, knees, hips, fingers    Depression 09/12/2018    Diabetes mellitus, type II 07/03/2013    Essential hypertension 07/03/2013    Gout 08/12/2014    Heart attack     Hyperlipidemia 06/02/2020    Hypertension     Inflammatory bowel disease     Irritable bowel syndrome without diarrhea 04/17/2017    Kidney disease     Kidney stone  ?August 2016?    In hospital overnight    Osteopenia 04/17/2017    Peripheral neuropathy 04/17/2017          Family History   Adopted: Yes   Problem Relation Age of Onset    Epilepsy Mother     Colon cancer Mother     Diabetes Mother     Hypertension Mother     No Known Problems Sister     No Known Problems Maternal Grandmother     No Known Problems Maternal Grandfather     No Known Problems Paternal Grandmother     No Known Problems Paternal Grandfather     Other Maternal Uncle         alzhimers    Diabetes Daughter         Social History     Tobacco Use    Smoking status: Never     Passive exposure: Never    Smokeless tobacco: Never   Vaping Use    Vaping Use: Never used   Substance Use Topics    Alcohol use: Never    Drug use: Never          Medications Prior to Admission   Medication Sig Dispense Refill Last Dose    amLODIPine (NORVASC) 10 MG tablet Take 1 tablet by mouth once daily 90 tablet 0     atorvastatin (LIPITOR) 20 MG tablet Take 1 tablet by mouth once daily 90 tablet 2     bisoprolol (ZEBeta) 10 MG tablet Take 1 tablet by mouth once daily 90 tablet 0     buPROPion XL (WELLBUTRIN XL) 150 MG 24 hr tablet Take 1 tablet by mouth once daily 30 tablet 1     Cyanocobalamin (Vitamin B-12) 1000 MCG sublingual tablet Place 1 tablet under the tongue Daily. 100 each 4     escitalopram (LEXAPRO) 20 MG tablet Take 1 tablet by mouth once daily 90 tablet 0     gabapentin (NEURONTIN) 600 MG tablet Take 1 tablet by mouth 3 (Three) Times a Day.       glimepiride (AMARYL) 2 MG tablet Take 2 tablets by mouth twice daily 360 tablet 0     hydroCHLOROthiazide (HYDRODIURIL) 50 MG tablet Take 1 tablet by mouth Daily for 90 days. 90 tablet 2     Insulin Degludec (Tresiba) 100 UNIT/ML solution injection Inject 20 Units under the skin into the appropriate area as directed Every Night. 12 mL 2     metFORMIN (GLUCOPHAGE) 1000 MG tablet Take 1 tablet by mouth 2 (Two) Times a Day With Meals.           Scheduled Meds:  atorvastatin, 20  "mg, Oral, Daily  bisoprolol, 10 mg, Oral, Daily  buPROPion XL, 150 mg, Oral, Daily  enoxaparin, 40 mg, Subcutaneous, Daily  escitalopram, 10 mg, Oral, Daily  famotidine, 20 mg, Oral, Daily  gabapentin, 600 mg, Oral, Q12H  insulin glargine, 1-200 Units, Subcutaneous, Nightly - Glucommander  insulin lispro, 1-200 Units, Subcutaneous, 4x Daily With Meals & Nightly  senna-docusate sodium, 2 tablet, Oral, BID  sodium chloride, 10 mL, Intravenous, Q12H  sodium chloride, 3 mL, Intravenous, Q12H         Continuous Infusions:  sodium chloride, 75 mL/hr, Last Rate: 75 mL/hr (01/06/24 3398)        PRN Meds:    acetaminophen    senna-docusate sodium **AND** polyethylene glycol **AND** bisacodyl **AND** bisacodyl    dextrose    dextrose    dextrose    glucagon (human recombinant)    insulin lispro    Morphine **AND** naloxone    Morphine    nitroglycerin    ondansetron ODT **OR** ondansetron    oxyCODONE    [COMPLETED] Insert Peripheral IV **AND** sodium chloride    sodium chloride    sodium chloride    sodium chloride    sodium chloride      Allergies:  Sulfa antibiotics      Objective     Vital Signs   /48   Pulse 72   Temp 97.6 °F (36.4 °C) (Oral)   Resp 11   Ht 162.6 cm (64\")   Wt 88.9 kg (196 lb)   SpO2 99%   BMI 33.64 kg/m²     Physical Exam:    Musculoskeletal:   Muscle strength and tone: equal bilaterally   Abnormal Movements: None noted.   Gait: DELIA, patient in bed.      General Appearance:    In bed, in NAD.      Mental Status Exam:   Hygiene:   good  Cooperation:  Cooperative  Eye Contact:  Good  Psychomotor Behavior:  Appropriate  Affect:  Appropriate  Mood: normal  Hopelessness: Denies  Speech:  Normal  Thought Process:  Goal directed and Linear  Thought Content:  Normal and Mood congruent  Suicidal:  None  Homicidal:  None  Hallucinations:  None  Delusion:  None  Memory:  Intact  Orientation:  Person, Place, Time, and Situation  Reliability:  good  Insight:  Good  Judgement:  Good  Impulse Control:  " Good  Physical/Medical Issues:  Yes          Medications and allergies reviewed.    Result Review:  I have personally reviewed the results from the time of this admission to 1/8/2024 14:58 EST and agree with these findings:  [x]  Laboratory  []  Microbiology  []  Radiology  []  EKG/Telemetry   []  Cardiology/Vascular   []  Pathology  []  Old records  []  Other:      Assessment & Plan       Closed left ankle fracture     Assessment: delirium due to medication   Treatment Plan: Patient presented with hallucinations and confusion overnight after receiving oxycodone. She reports she has had this reaction in the past to pain medications, so this was likely the cause.     Would recommend limiting opiate medications and trying non-narcotic options for pain relief if possible.     Continue supportive treatment.     Will follow PRN.   Treatment Plan discussed with: Patient    I discussed the patients findings and my recommendations with patient    I have reviewed and approved the behavioral health treatment plans and problem list. Yes  Thank you for the consult   Referring MD has access to consult report and progress notes in EMR     BRANDON Carmona  01/08/24  14:58 EST

## 2024-01-08 NOTE — PROGRESS NOTES
Daily Progress Note    Patient Care Team:  Bhargavi Rodriguez APRN as PCP - General  Rob Wick MD as Consulting Physician (Nephrology)    Chief Complaint: Follow-up type 2 diabetes    HPI: Patient seen, blood sugar log reviewed.  Currently on subcu Glucomander and blood sugars doing well.  Eating well.  No complaints at this time.    ROS:   Constitutional:  Denies fatigue, tiredness.    Respiratory: denies cough, shortness of breath.   Cardiovascular:  denies chest pain, edema   GI:  Denies abdominal pain, nausea, vomiting.         Vitals:    01/08/24 1027   BP: 142/48   Pulse: 72   Resp:    Temp:    SpO2:      Body mass index is 33.64 kg/m².    Physical Exam:  GEN: NAD, conversant  PSYCH: Awake and coherent      Results Review:     I reviewed the patient's new clinical results.    Glucose   Date Value Ref Range Status   01/07/2024 420 (C) 65 - 99 mg/dL Final     Sodium   Date Value Ref Range Status   01/07/2024 132 (L) 136 - 145 mmol/L Final     Potassium   Date Value Ref Range Status   01/07/2024 4.8 3.5 - 5.2 mmol/L Final     CO2   Date Value Ref Range Status   01/07/2024 21.0 (L) 22.0 - 29.0 mmol/L Final     Chloride   Date Value Ref Range Status   01/07/2024 99 98 - 107 mmol/L Final     Anion Gap   Date Value Ref Range Status   01/07/2024 12.0 5.0 - 15.0 mmol/L Final     Creatinine   Date Value Ref Range Status   01/07/2024 1.30 (H) 0.57 - 1.00 mg/dL Final     BUN   Date Value Ref Range Status   01/07/2024 33 (H) 8 - 23 mg/dL Final     BUN/Creatinine Ratio   Date Value Ref Range Status   01/07/2024 25.4 (H) 7.0 - 25.0 Final     Calcium   Date Value Ref Range Status   01/07/2024 8.1 (L) 8.6 - 10.5 mg/dL Final     Lab Results   Component Value Date    HGBA1C 8.50 (H) 01/04/2024    HGBA1C 7.80 (H) 09/21/2023    HGBA1C 13.70 (H) 06/06/2023     Lab Results   Component Value Date    MICROALBUR 4.4 11/22/2023     Results from last 7 days   Lab Units 01/08/24  1234 01/08/24  0752 01/07/24  4891  01/07/24  1958 01/07/24  1758 01/07/24  1343   GLUCOSE mg/dL 162* 120* 157* 255* 340* 378*             Medication Review: Reviewed.     atorvastatin, 20 mg, Oral, Daily  bisoprolol, 10 mg, Oral, Daily  buPROPion XL, 150 mg, Oral, Daily  enoxaparin, 40 mg, Subcutaneous, Daily  escitalopram, 10 mg, Oral, Daily  famotidine, 20 mg, Oral, Daily  gabapentin, 600 mg, Oral, Q12H  insulin glargine, 1-200 Units, Subcutaneous, Nightly - Glucommander  insulin lispro, 1-200 Units, Subcutaneous, 4x Daily With Meals & Nightly  senna-docusate sodium, 2 tablet, Oral, BID  sodium chloride, 10 mL, Intravenous, Q12H  sodium chloride, 3 mL, Intravenous, Q12H      Assessment and plan:  Diabetes mellitus type 2 with hyperglycemia: Currently on subcu Glucomander, will continue that and follow blood sugars.    Hyperlipidemia: Currently on atorvastatin    Closed left ankle fracture: Followed by orthopedics.    Adolph Tillman MD. FACE

## 2024-01-08 NOTE — PLAN OF CARE
Goal Outcome Evaluation:  Plan of Care Reviewed With: patient        Progress: no change  Outcome Evaluation: Pt VSS, Pt has been confused at different times this shift, NWB to LLE, 1 assist with walker to the BSC, Call light in reach,plan on going.

## 2024-01-08 NOTE — PLAN OF CARE
Assessment: Dora Solorio is POD#2 s/p L ankle ORIF and presents with functional mobility impairments which indicate the need for skilled intervention. Pt requires verbal cues and demo to maintain NWB status LLE while standing. Pt with reduced LLE hip flexion endurance, able to maintain standing march position for 20 seconds before requiring rest. Instructed pt through STS transfers and primed ambulation by working on S/L heel raises and UE presses on the RW. Tolerating session today without incident. Will continue to follow and progress as tolerated.

## 2024-01-09 VITALS
BODY MASS INDEX: 33.46 KG/M2 | RESPIRATION RATE: 16 BRPM | TEMPERATURE: 98.6 F | WEIGHT: 196 LBS | SYSTOLIC BLOOD PRESSURE: 167 MMHG | DIASTOLIC BLOOD PRESSURE: 77 MMHG | HEIGHT: 64 IN | OXYGEN SATURATION: 97 % | HEART RATE: 78 BPM

## 2024-01-09 LAB
GLUCOSE BLDC GLUCOMTR-MCNC: 124 MG/DL (ref 70–105)
GLUCOSE BLDC GLUCOMTR-MCNC: 167 MG/DL (ref 70–105)
HCT VFR BLD AUTO: 26.4 % (ref 34–46.6)
HGB BLD-MCNC: 8.4 G/DL (ref 12–15.9)

## 2024-01-09 PROCEDURE — 85014 HEMATOCRIT: CPT | Performed by: ORTHOPAEDIC SURGERY

## 2024-01-09 PROCEDURE — 97530 THERAPEUTIC ACTIVITIES: CPT

## 2024-01-09 PROCEDURE — 97116 GAIT TRAINING THERAPY: CPT

## 2024-01-09 PROCEDURE — 97112 NEUROMUSCULAR REEDUCATION: CPT

## 2024-01-09 PROCEDURE — 97530 THERAPEUTIC ACTIVITIES: CPT | Performed by: OCCUPATIONAL THERAPIST

## 2024-01-09 PROCEDURE — 25010000002 ENOXAPARIN PER 10 MG: Performed by: ORTHOPAEDIC SURGERY

## 2024-01-09 PROCEDURE — 82948 REAGENT STRIP/BLOOD GLUCOSE: CPT

## 2024-01-09 PROCEDURE — 97110 THERAPEUTIC EXERCISES: CPT | Performed by: OCCUPATIONAL THERAPIST

## 2024-01-09 PROCEDURE — 63710000001 INSULIN LISPRO (HUMAN) PER 5 UNITS: Performed by: ORTHOPAEDIC SURGERY

## 2024-01-09 PROCEDURE — 85018 HEMOGLOBIN: CPT | Performed by: ORTHOPAEDIC SURGERY

## 2024-01-09 PROCEDURE — 97535 SELF CARE MNGMENT TRAINING: CPT | Performed by: OCCUPATIONAL THERAPIST

## 2024-01-09 RX ADMIN — Medication 10 ML: at 08:58

## 2024-01-09 RX ADMIN — ESCITALOPRAM OXALATE 10 MG: 10 TABLET ORAL at 08:55

## 2024-01-09 RX ADMIN — ACETAMINOPHEN 650 MG: 325 TABLET, FILM COATED ORAL at 13:45

## 2024-01-09 RX ADMIN — BISOPROLOL FUMARATE 10 MG: 5 TABLET, FILM COATED ORAL at 08:55

## 2024-01-09 RX ADMIN — Medication 3 ML: at 08:58

## 2024-01-09 RX ADMIN — FAMOTIDINE 20 MG: 20 TABLET ORAL at 08:55

## 2024-01-09 RX ADMIN — INSULIN LISPRO 8 UNITS: 100 INJECTION, SOLUTION INTRAVENOUS; SUBCUTANEOUS at 13:43

## 2024-01-09 RX ADMIN — ENOXAPARIN SODIUM 40 MG: 100 INJECTION SUBCUTANEOUS at 08:56

## 2024-01-09 RX ADMIN — ACETAMINOPHEN 650 MG: 325 TABLET, FILM COATED ORAL at 04:22

## 2024-01-09 RX ADMIN — BUPROPION HYDROCHLORIDE 150 MG: 150 TABLET, EXTENDED RELEASE ORAL at 08:55

## 2024-01-09 RX ADMIN — INSULIN LISPRO 9 UNITS: 100 INJECTION, SOLUTION INTRAVENOUS; SUBCUTANEOUS at 08:55

## 2024-01-09 RX ADMIN — ATORVASTATIN CALCIUM 20 MG: 20 TABLET, FILM COATED ORAL at 08:55

## 2024-01-09 RX ADMIN — GABAPENTIN 600 MG: 300 CAPSULE ORAL at 08:55

## 2024-01-09 NOTE — THERAPY TREATMENT NOTE
Subjective: Pt agreeable to therapeutic plan of care.    Objective:     Bed mobility - Modified-Independent  Transfers - Min-A and with rolling walker  Ambulation - 2 x 4 feet Min-A and with rolling walker    Therapeutic Exercise - 10 Reps Bilaterally AROM unsupported sitting / EOB and standing    Weight-bearing status: NWB LLE    Vitals: WNL Removed 2L NC; pt sat'ing 93% on room air with activity.    Pain: 3 VAS   Location: LLE  Intervention for pain: Repositioned, Increased Activity, and Therapeutic Presence    Education: Provided education on the importance of mobility in the acute care setting, Verbal/Tactile Cues, Transfer Training, Gait Training, Energy conservation strategies, and WB'ing status    Assessment: Dora Solorio is POD#3 s/p L ankle ORIF and presents with functional mobility impairments which indicate the need for skilled intervention. Instructed pt through supine, seated, and standing there ex this date to prime ambulation with hop-to step pattern. Pt required CGA-Lon for 2 bouts of 4 ft ambulation with mod cues for keeping L foot off the ground. Pt able to balance with unilat UE support on FWW while standing to complete functional tasks, but pt requires Lon for balance without any UE support. Tolerating session today without incident. Will continue to follow and progress as tolerated.     Plan/Recommendations:   If medically appropriate, High Intensity Therapy recommended post-acute care. This is recommended as therapy feels the patient would require 5-6 days per week, 2-3 hours per day. At this time, inpatient rehabilitation (acute rehab) would be the first choice and SNF would be second. Pt requires no DME at discharge.     Pt desires Inpatient Rehabilitation placement at discharge. Pt cooperative; agreeable to therapeutic recommendations and plan of care.         Basic Mobility 6-click:  Rollin = Total, A lot = 2, A little = 3; 4 = None  Supine>Sit:   1 = Total, A lot = 2, A  little = 3; 4 = None   Sit>Stand with arms:  1 = Total, A lot = 2, A little = 3; 4 = None  Bed>Chair:   1 = Total, A lot = 2, A little = 3; 4 = None  Ambulate in room:  1 = Total, A lot = 2, A little = 3; 4 = None  3-5 Steps with railin = Total, A lot = 2, A little = 3; 4 = None  Score: 20    Modified Page: N/A = No pre-op stroke/TIA    Post-Tx Position: Up in Chair, Alarms activated, and Call light and personal items within reach  PPE: gloves

## 2024-01-09 NOTE — PLAN OF CARE
"Goal Outcome Evaluation:  Assessment: Dora Solorio presents with ADL impairments affecting function including endurance / activity tolerance, pain, and strength. Pt is making excellent progress. She continues to have dec tricep strength & tolerance using RW for amb & maintaining NWB status. She tolerated UE HEP well after demonstration. Pt progressing with ADLs & mobility. Demonstrated functioning below baseline abilities indicate the need for continued skilled intervention while inpatient. Tolerating session today without incident. Will continue to follow and progress as tolerated.     Plan/Recommendations:   Moderate Intensity Therapy recommended post-acute care. This is recommended as therapy feels the patient would require 3-4 days per week and wouldn't tolerate \"3 hour daily\" rehab intensity. SNF would be the preferred choice. If the patient does not agree to SNF, arrange HH or OP depending on home bound status. If patient is medically complex, consider LTACH.. Pt requires no DME at discharge.     Pt desires Skilled Rehab placement at discharge. Pt cooperative; agreeable to therapeutic recommendations and plan of care.     "

## 2024-01-09 NOTE — CASE MANAGEMENT/SOCIAL WORK
Case Management Discharge Note      Final Note: [facility]         Selected Continued Care - Discharged on 1/9/2024 Admission date: 1/4/2024 - Discharge disposition: Skilled Nursing Facility (DC - External)      Destination Coordination complete.      Service Provider Selected Services Address Phone Fax Patient Preferred    [facility] [address] [place] [id number] [phone] --                    Transportation Services  Private: Car    Final Discharge Disposition Code: 62 - inpatient rehab facility

## 2024-01-09 NOTE — DISCHARGE SUMMARY
Discharge Summary    Date of Service:    Patient Name: Dora Solorio  : 1950  MRN: [id number]    Date of Admission: 2024  Discharge Diagnosis: Patient with closed left ankle fracture status post ORIF per orthopedic  History of hypertension  History of hyperlipidemia  History of depression  History of diabetes since  on Tresiba insulin 20 units nightly and metformin and glimepiride at home  Patient was started on Jardiance in  but could not afford it  Admitted with left ankle fracture following fall  Anemia of chronic disease with stable hemoglobin of 8.7 following the surgery  Date of Discharge:    Primary Care Physician: Bhargavi Rodriguez APRN      Presenting Problem:   Closed left ankle fracture [S82.892A]  Fall, initial encounter [W19.XXXA]  Closed fracture of left ankle, initial encounter [S82.892A]  Closed bimalleolar fracture of left ankle, initial encounter [S82.842A]  Closed fracture subluxation of left ankle joint, initial encounter [S82.892A]    Active and Resolved Hospital Problems:  Active Hospital Problems    Diagnosis POA    **Closed left ankle fracture [S82.892A] Yes      Resolved Hospital Problems   No resolved problems to display.         Hospital Course     Hospital Course:  Dora Solorio is a 73 y.o. female admitted with left ankle fracture following fall  Status post ORIF of the left ankle  Patient will be discharged to skilled nursing facility on         DISCHARGE Follow Up Recommendations for labs and diagnostics: Skilled nursing facility      Reasons For Change In Medications and Indications for New Medications:      Day of Discharge     Vital Signs:  Temp:  [97.9 °F (36.6 °C)-98.9 °F (37.2 °C)] 98.6 °F (37 °C)  Heart Rate:  [71-78] 78  Resp:  [11-16] 16  BP: (141-168)/(63-79) 167/77  Flow (L/min):  [2] 2    Physical Exam:  Physical Exam   Awake alert oriented x 3  HEENT exam is normal  Neck supple  Chest crystallization  Heart S1-S2 no  murmur  Abdomen soft benign nontender bowel sounds positive      Pertinent  and/or Most Recent Results     LAB RESULTS:      Lab 01/09/24  0344 01/07/24  2301 01/07/24  0108 01/06/24  0409 01/05/24  1623 01/04/24  1146   WBC  --   --  13.40* 13.50* 13.00* 9.00   HEMOGLOBIN 8.4* 8.7* 8.8* 10.0* 11.3* 13.1   HEMATOCRIT 26.4* 27.5* 27.7* 31.4* 35.4 40.2   PLATELETS  --   --  162 154 186 209   NEUTROS ABS  --   --  11.80* 8.40* 8.80* 6.10   LYMPHS ABS  --   --  0.60* 3.10 2.40 2.00   MONOS ABS  --   --  1.00* 1.70* 1.70* 0.60   EOS ABS  --   --  0.00 0.20 0.10 0.30   MCV  --   --  85.5 84.4 86.5 84.0   PROTIME  --   --   --   --   --  10.6   APTT  --   --   --   --   --  25.1         Lab 01/07/24  0108 01/06/24  0409 01/05/24  1340 01/04/24  1146   SODIUM 132* 133* 135* 139   POTASSIUM 4.8 4.8 5.4* 5.3*   CHLORIDE 99 98 100 105   CO2 21.0* 25.0 26.0 26.0   ANION GAP 12.0 10.0 9.0 8.0   BUN 33* 26* 22 17   CREATININE 1.30* 1.26* 1.35* 0.97   EGFR 43.0* 44.6* 41.1* 61.1   GLUCOSE 420* 125* 284* 198*   CALCIUM 8.1* 8.6 8.3* 9.4   HEMOGLOBIN A1C  --   --   --  8.50*         Lab 01/04/24  1146   TOTAL PROTEIN 7.9   ALBUMIN 4.1   GLOBULIN 3.8   ALT (SGPT) 10   AST (SGOT) 10   BILIRUBIN 0.2   ALK PHOS 84         Lab 01/04/24  1146   PROTIME 10.6   INR 0.97             Lab 01/05/24  1622   ABO TYPING B   RH TYPING Positive   ANTIBODY SCREEN Negative         Brief Urine Lab Results  (Last result in the past 365 days)        Color   Clarity   Blood   Leuk Est   Nitrite   Protein   CREAT   Urine HCG        11/22/23 1005             114.6               Microbiology Results (last 10 days)       ** No results found for the last 240 hours. **            CT Head Without Contrast    Result Date: 1/4/2024  Impression: Impression: No acute intracranial abnormality. No acute fracture or traumatic malalignment of the cervical spine. Electronically Signed: Bacilio Mallory MD  1/4/2024 4:32 PM EST  Workstation ID: [id number]    CT Cervical Spine  Without Contrast    Result Date: 1/4/2024  Impression: Impression: No acute intracranial abnormality. No acute fracture or traumatic malalignment of the cervical spine. Electronically Signed: Bacilio Mallory MD  1/4/2024 4:32 PM EST  Workstation ID: [id number]    XR Chest 1 View    Result Date: 1/4/2024  Impression: Impression: Limited exam. No definite acute process. Electronically Signed: Emily Estrada MD  1/4/2024 2:31 PM EST  Workstation ID: [id number]    XR Ankle 3+ View Left    Result Date: 1/4/2024  Impression: Impression: Slight interval improvement in alignment of the tibiotalar joint. Electronically Signed: Emily Estrada MD  1/4/2024 2:00 PM EST  Workstation ID: [id number]    XR Ankle 3+ View Left    Result Date: 1/4/2024  Impression: Impression: Comminuted bimalleolar fracture subluxation. Electronically Signed: Emily Estrada MD  1/4/2024 12:47 PM EST  Workstation ID: [id number]    XR Tibia Fibula 2 View Left    Result Date: 1/4/2024  Impression: Impression: Comminuted bimalleolar fracture subluxation. Electronically Signed: Emily Estrada MD  1/4/2024 12:47 PM EST  Workstation ID: [id number]             Results for orders placed during the hospital encounter of 09/09/22    Adult Transthoracic Echo Complete W/ Cont if Necessary Per Protocol (With Agitated Saline)    Interpretation Summary  · Left ventricular wall thickness is consistent with mild concentric hypertrophy.  · Estimated left ventricular EF = 65% Estimated left ventricular EF was in agreement with the calculated left ventricular EF. Left ventricular systolic function is normal.  · Left ventricular diastolic function is consistent with (grade I) impaired relaxation.  · Saline test results are negative for right to left atrial level shunt.  · Estimated right ventricular systolic pressure from tricuspid regurgitation is normal (<35 mmHg).      Labs Pending at Discharge:      Procedures Performed  Procedure(s):  ANKLE OPEN REDUCTION INTERNAL  FIXATION         Consults:   Consults       Date and Time Order Name Status Description    1/8/2024 12:21 PM Inpatient Psychiatrist Consult Completed     1/8/2024 11:28 AM Inpatient Psychiatrist Consult      1/7/2024  9:11 AM Inpatient Endocrinology Consult Completed     1/4/2024  2:06 PM Hospitalist (on-call MD unless specified)      1/4/2024  1:09 PM Ortho (on-call MD unless specified) Completed               Discharge Details        Discharge Medications        ASK your doctor about these medications        Instructions Start Date   amLODIPine 10 MG tablet  Commonly known as: NORVASC   Take 1 tablet by mouth once daily      atorvastatin 20 MG tablet  Commonly known as: LIPITOR   Take 1 tablet by mouth once daily      bisoprolol 10 MG tablet  Commonly known as: ZEBeta   Take 1 tablet by mouth once daily      buPROPion  MG 24 hr tablet  Commonly known as: WELLBUTRIN XL   150 mg, Oral, Daily      escitalopram 20 MG tablet  Commonly known as: LEXAPRO   20 mg, Oral, Daily      gabapentin 600 MG tablet  Commonly known as: NEURONTIN  Ask about: Which instructions should I use?   600 mg, Oral, 3 Times Daily      glimepiride 2 MG tablet  Commonly known as: AMARYL   Take 2 tablets by mouth twice daily      hydroCHLOROthiazide 50 MG tablet  Commonly known as: HYDRODIURIL   50 mg, Oral, Daily      Insulin Degludec 100 UNIT/ML solution injection  Commonly known as: Tresiba   20 Units, Subcutaneous, Nightly      metFORMIN 1000 MG tablet  Commonly known as: GLUCOPHAGE  Ask about: Which instructions should I use?   1,000 mg, Oral, 2 Times Daily With Meals      Vitamin B-12 1000 MCG sublingual tablet   1,000 mcg, Sublingual, Daily               Allergies   Allergen Reactions    Oxycodone Mental Status Change and Hallucinations    Sulfa Antibiotics Rash         Discharge Disposition: snf      Diet:  Hospital:  Diet Order   Procedures    Diet: Diabetic Diets; Consistent Carbohydrate; Texture: Regular Texture (IDDSI 7); Fluid  Consistency: Thin (IDDSI 0)         Discharge Activity:         CODE STATUS:  Code Status and Medical Interventions:   Ordered at: 01/04/24 1420     Level Of Support Discussed With:    Patient     Code Status (Patient has no pulse and is not breathing):    CPR (Attempt to Resuscitate)     Medical Interventions (Patient has pulse or is breathing):    Full Support         No future appointments.        Time spent on Discharge including face to face service:  35 minutes          Signature: Electronically signed by Stella Bridges MD, 01/09/24, 12:03 EST.  [facility]

## 2024-01-09 NOTE — THERAPY TREATMENT NOTE
"Subjective: Pt agreeable to therapeutic plan of care.  Cognition: oriented to Person, Place, Time, and Situation    Objective:     Bed Mobility: Modified-Independent   Functional Transfers: Min-A using RW with v.c. to follow NWB status.      Balance: sitting EOB Independent; standing= min A.   Functional Ambulation: Min-A and with rolling walker    Lower Body Dressing and Toileting: Min-A  ADL Position: edge of bed sitting & BSC respectively.   ADL Comments:     Grooming: Independent  ADL Position: supported sitting  ADL Comments:     Vitals: WNL. Pt on 1 L O2. On room air, pt 93%, so O2 removed with RN's approval.    Pain: 3 VAS  Location: LLE  Interventions for pain: Repositioned, Increased Activity, and Therapeutic Presence    Provided pt with min resistance theraband & instructed on UE HEP for UB strengthening required to use RW. Pt demo ind.     Education: Provided education on the importance of mobility in the acute care setting, Verbal/Tactile Cues, ADL training, and Transfer Training      Assessment: Dora Solorio presents with ADL impairments affecting function including endurance / activity tolerance, pain, and strength. Pt is making excellent progress. She continues to have dec tricep strength & tolerance using RW for amb & maintaining NWB status. She tolerated UE HEP well after demonstration. Pt progressing with ADLs & mobility. Demonstrated functioning below baseline abilities indicate the need for continued skilled intervention while inpatient. Tolerating session today without incident. Will continue to follow and progress as tolerated.     Plan/Recommendations:   Moderate Intensity Therapy recommended post-acute care. This is recommended as therapy feels the patient would require 3-4 days per week and wouldn't tolerate \"3 hour daily\" rehab intensity. SNF would be the preferred choice. If the patient does not agree to SNF, arrange HH or OP depending on home bound status. If patient is medically " complex, consider LTACH.. Pt requires no DME at discharge.     Pt desires Skilled Rehab placement at discharge. Pt cooperative; agreeable to therapeutic recommendations and plan of care.     Modified Radha: N/A = No pre-op stroke/TIA    Post-Tx Position: Up in Chair, Alarms activated, and Call light and personal items within reach  PPE: gloves

## 2024-01-09 NOTE — PLAN OF CARE
Goal Outcome Evaluation:  Plan of Care Reviewed With: patient        Progress: no change  Outcome Evaluation: Pt VSS, NWB to LLE, 1 assist to BSC with walker, Call light in reach, Plan on going.

## 2024-01-09 NOTE — DISCHARGE INSTR - ACTIVITY
Maintain nonweightbearing status to the left lower extremity  Maintain splint and bandage in place until follow-up with Dr. Muñoz

## 2024-01-09 NOTE — PLAN OF CARE
Assessment: Dora Solorio is POD#3 s/p L ankle ORIF and presents with functional mobility impairments which indicate the need for skilled intervention. Instructed pt through supine, seated, and standing there ex this date to prime ambulation with hop-to step pattern. Pt required CGA-Lon for 2 bouts of 4 ft ambulation with mod cues for keeping L foot off the ground. Pt able to balance with unilat UE support on FWW while standing to complete functional tasks, but pt requires Lon for balance without any UE support. Tolerating session today without incident. Will continue to follow and progress as tolerated.

## 2024-01-14 DIAGNOSIS — I10 ESSENTIAL HYPERTENSION: ICD-10-CM

## 2024-01-14 RX ORDER — BISOPROLOL FUMARATE 10 MG/1
TABLET, FILM COATED ORAL
Qty: 90 TABLET | Refills: 0 | Status: SHIPPED | OUTPATIENT
Start: 2024-01-14

## 2024-01-16 RX ORDER — GABAPENTIN 600 MG/1
600 TABLET ORAL 3 TIMES DAILY
Qty: 90 TABLET | Refills: 0 | Status: SHIPPED | OUTPATIENT
Start: 2024-01-16

## 2024-01-17 RX ORDER — INSULIN DEGLUDEC INJECTION 100 U/ML
INJECTION, SOLUTION SUBCUTANEOUS
Qty: 18 ML | Refills: 0 | Status: SHIPPED | OUTPATIENT
Start: 2024-01-17

## 2024-01-18 ENCOUNTER — OFFICE VISIT (OUTPATIENT)
Dept: ORTHOPEDIC SURGERY | Facility: CLINIC | Age: 74
End: 2024-01-18
Payer: MEDICARE

## 2024-01-18 VITALS — HEART RATE: 64 BPM | HEIGHT: 64 IN | BODY MASS INDEX: 33.46 KG/M2 | WEIGHT: 196 LBS

## 2024-01-18 DIAGNOSIS — Z47.89 ORTHOPEDIC AFTERCARE: Primary | ICD-10-CM

## 2024-01-18 PROCEDURE — 97760 ORTHOTIC MGMT&TRAING 1ST ENC: CPT | Performed by: ORTHOPAEDIC SURGERY

## 2024-01-18 PROCEDURE — 99024 POSTOP FOLLOW-UP VISIT: CPT | Performed by: ORTHOPAEDIC SURGERY

## 2024-01-18 RX ORDER — CARVEDILOL 25 MG/1
TABLET ORAL
COMMUNITY
Start: 2024-01-15

## 2024-01-18 RX ORDER — ALLOPURINOL 100 MG/1
TABLET ORAL
COMMUNITY
Start: 2024-01-16

## 2024-01-18 NOTE — PROGRESS NOTES
"     Patient ID: Dora Solorio is a 73 y.o. female.    1/6/24 open reduction internal fixation left ankle  Pain controlled  Objective:    Pulse 64   Ht 162.6 cm (64\")   Wt 88.9 kg (196 lb)   BMI 33.64 kg/m²     Physical Examination:    Incisions are well-approximated some slight oozing serosanguineous from both sides no sign of infection Rima negative foot warm well-perfused gross sensation at baseline    Imaging:  left Ankle X-Ray  Indication: Postop ORIF ankle  Views: AP, Mortise Lateral  Findings: Maintenance of fracture and mortise reduction hardware in position  yes fracture  no bony lesion  Soft tissues normal  normal joint spaces  Hardware appropriately positioned yes    yes prior studies available for comparison.    Assessment:  Doing after ORIF ankle   Plan:Staples removed okay to shower we dispensed a fracture boot but she remains nonweightbearing can perform knee and ankle range of motion and therapy  See me with x-ray in a month  Greater than 15 minutes was spent demonstrating proper fit and use of the device and signs to monitor for complications   "

## 2024-01-19 ENCOUNTER — PATIENT ROUNDING (BHMG ONLY) (OUTPATIENT)
Dept: ORTHOPEDIC SURGERY | Facility: CLINIC | Age: 74
End: 2024-01-19
Payer: MEDICARE

## 2024-01-19 NOTE — PROGRESS NOTES
A My-Chart message has been sent to the patient for PATIENT ROUNDING with [facility]

## 2024-01-26 ENCOUNTER — TELEPHONE (OUTPATIENT)
Dept: FAMILY MEDICINE CLINIC | Facility: CLINIC | Age: 74
End: 2024-01-26
Payer: MEDICARE

## 2024-01-26 NOTE — TELEPHONE ENCOUNTER
Gave message to [place] with [facility] at 11:49am.  She will pass the word along to the patient and have her call for an appt.

## 2024-01-26 NOTE — TELEPHONE ENCOUNTER
Caller: BRIAN MATHIS [facility]    Best call back number: [phone]    What medication are you requesting: PLEASE ADVISE    What are your current symptoms:   GOUT IN LEFT THUMB/WRIST AREA    Have you had these symptoms before:    [] Yes  [x] No    Have you been treated for these symptoms before:   [] Yes  [x] No    If a prescription is needed, what is your preferred pharmacy and phone number: [address] [phone] [facility] [phone] [facility]     Additional notes:    PLEASE CALL TO CONFIRM OR DISCUSS.

## 2024-01-29 ENCOUNTER — OFFICE VISIT (OUTPATIENT)
Dept: FAMILY MEDICINE CLINIC | Facility: CLINIC | Age: 74
End: 2024-01-29
Payer: MEDICARE

## 2024-01-29 VITALS
BODY MASS INDEX: 33.64 KG/M2 | RESPIRATION RATE: 18 BRPM | DIASTOLIC BLOOD PRESSURE: 82 MMHG | OXYGEN SATURATION: 100 % | HEIGHT: 64 IN | HEART RATE: 66 BPM | SYSTOLIC BLOOD PRESSURE: 135 MMHG

## 2024-01-29 DIAGNOSIS — M25.532 LEFT WRIST PAIN: Primary | ICD-10-CM

## 2024-01-29 DIAGNOSIS — S82.892D CLOSED FRACTURE OF LEFT ANKLE WITH ROUTINE HEALING, SUBSEQUENT ENCOUNTER: ICD-10-CM

## 2024-01-29 PROCEDURE — 3079F DIAST BP 80-89 MM HG: CPT | Performed by: STUDENT IN AN ORGANIZED HEALTH CARE EDUCATION/TRAINING PROGRAM

## 2024-01-29 PROCEDURE — 99213 OFFICE O/P EST LOW 20 MIN: CPT | Performed by: STUDENT IN AN ORGANIZED HEALTH CARE EDUCATION/TRAINING PROGRAM

## 2024-01-29 PROCEDURE — 3075F SYST BP GE 130 - 139MM HG: CPT | Performed by: STUDENT IN AN ORGANIZED HEALTH CARE EDUCATION/TRAINING PROGRAM

## 2024-01-29 PROCEDURE — 3052F HG A1C>EQUAL 8.0%<EQUAL 9.0%: CPT | Performed by: STUDENT IN AN ORGANIZED HEALTH CARE EDUCATION/TRAINING PROGRAM

## 2024-01-29 NOTE — PROGRESS NOTES
"Chief Complaint  Chief Complaint   Patient presents with    Wrist Pain     Left wrist pain    Hospital Follow Up Visit     Subjective        Dora Solorio is a 73 y.o. female who presents to [facility].    History of Present Illness  Admitted 1/4 - 1/9 for fall w/ L ankle fracture.    She had surgery on 1/6 w/ Dr. Muñoz.  Following surgery she went to [facility].    She followed up with Dr. Muñoz on 1/18.  He removed the staples at that appt.    She thought she had gout in L wrist.  She had XR in [facility] rehab that was negative for fracture.  She had blood work that showed high uric acid.    She has never had gout in her wrist before but yes to ankles and toes.    Her uric acid was checked about 2 wks ago and she was started on allopurinol 100 mg daily.  She overall feels that is improving.    Objective   /82   Pulse 66   Resp 18   Ht 162.6 cm (64\")   SpO2 100%   BMI 33.64 kg/m²     Estimated body mass index is 33.64 kg/m² as calculated from the following:    Height as of this encounter: 162.6 cm (64\").    Weight as of 1/18/24: 88.9 kg (196 lb).     Physical Exam   GEN: In no acute distress, non toxic appearing  MSK: Left ankle in boot.  Left wrist without palpable abnormality.  Skin is normal in color no redness.  No tenderness to palpation of left wrist.  Full range of motion of left wrist.    PHQ-2 Depression Screening  Little interest or pleasure in doing things? 0-->not at all   Feeling down, depressed, or hopeless? 0-->not at all   PHQ-2 Total Score 0      Result Review :              Assessment and Plan     Diagnoses and all orders for this visit:    1. Left wrist pain (Primary)  Though wrist is uncommon place for gout it does sound consistent in her case.  Overall it is improving.  Continue topical diclofenac.  Avoid steroids given left ankle fracture.  Drink plenty of fluids.  Continue allopurinol 100 mg daily.  Recheck uric acid levels in the next 3 to 6 months to make sure " dose is appropriate to get uric acid level less than 6.    2. Closed fracture of left ankle with routine healing, subsequent encounter  Continue f/u with Dr. Muñoz.

## 2024-02-15 ENCOUNTER — OFFICE VISIT (OUTPATIENT)
Dept: ORTHOPEDIC SURGERY | Facility: CLINIC | Age: 74
End: 2024-02-15
Payer: MEDICARE

## 2024-02-15 VITALS — HEIGHT: 64 IN | RESPIRATION RATE: 18 BRPM | BODY MASS INDEX: 33.46 KG/M2 | WEIGHT: 196 LBS

## 2024-02-15 DIAGNOSIS — Z47.89 ORTHOPEDIC AFTERCARE: Primary | ICD-10-CM

## 2024-02-15 PROCEDURE — 99024 POSTOP FOLLOW-UP VISIT: CPT | Performed by: ORTHOPAEDIC SURGERY

## 2024-02-21 DIAGNOSIS — F32.9 REACTIVE DEPRESSION: ICD-10-CM

## 2024-02-21 RX ORDER — BUPROPION HYDROCHLORIDE 150 MG/1
150 TABLET ORAL DAILY
Qty: 90 TABLET | Refills: 0 | Status: SHIPPED | OUTPATIENT
Start: 2024-02-21

## 2024-02-29 ENCOUNTER — TELEPHONE (OUTPATIENT)
Dept: ORTHOPEDIC SURGERY | Facility: CLINIC | Age: 74
End: 2024-02-29
Payer: MEDICARE

## 2024-02-29 NOTE — TELEPHONE ENCOUNTER
FRANCO WITH [facility] CALLED AND WOULD LIKE A CALL BACK -[phone]. Patient can become weight baring tomorrow and she just wants to know if she can be re-evaluated by PT and OT before she starts weight baring.

## 2024-03-04 DIAGNOSIS — E11.65 TYPE 2 DIABETES MELLITUS WITH HYPERGLYCEMIA, WITHOUT LONG-TERM CURRENT USE OF INSULIN: ICD-10-CM

## 2024-03-04 RX ORDER — GLIMEPIRIDE 2 MG/1
TABLET ORAL
Qty: 360 TABLET | Refills: 0 | OUTPATIENT
Start: 2024-03-04

## 2024-03-04 NOTE — TELEPHONE ENCOUNTER
I am not sure the question they are asking, there is no need for an evaluation by me before beginning weightbearing

## 2024-03-18 RX ORDER — GABAPENTIN 600 MG/1
600 TABLET ORAL 3 TIMES DAILY
Qty: 90 TABLET | Refills: 0 | Status: SHIPPED | OUTPATIENT
Start: 2024-03-18

## 2024-03-21 ENCOUNTER — TRANSCRIBE ORDERS (OUTPATIENT)
Dept: LAB | Facility: HOSPITAL | Age: 74
End: 2024-03-21
Payer: MEDICARE

## 2024-03-21 ENCOUNTER — LAB (OUTPATIENT)
Dept: LAB | Facility: HOSPITAL | Age: 74
End: 2024-03-21
Payer: MEDICARE

## 2024-03-21 ENCOUNTER — TELEPHONE (OUTPATIENT)
Dept: ORTHOPEDIC SURGERY | Facility: CLINIC | Age: 74
End: 2024-03-21
Payer: MEDICARE

## 2024-03-21 DIAGNOSIS — N18.31 CHRONIC KIDNEY DISEASE (CKD) STAGE G3A/A1, MODERATELY DECREASED GLOMERULAR FILTRATION RATE (GFR) BETWEEN 45-59 ML/MIN/1.73 SQUARE METER AND ALBUMINURIA CREATININE RATIO LESS THAN 30 MG/G (CMS/H*: ICD-10-CM

## 2024-03-21 DIAGNOSIS — E11.8 DIABETIC COMPLICATION: ICD-10-CM

## 2024-03-21 DIAGNOSIS — E11.8 DIABETIC COMPLICATION: Primary | ICD-10-CM

## 2024-03-21 LAB
25(OH)D3 SERPL-MCNC: 17.4 NG/ML (ref 30–100)
ALBUMIN SERPL-MCNC: 3.9 G/DL (ref 3.5–5.2)
ALBUMIN/GLOB SERPL: 1.1 G/DL
ALP SERPL-CCNC: 89 U/L (ref 39–117)
ALT SERPL W P-5'-P-CCNC: 8 U/L (ref 1–33)
ANION GAP SERPL CALCULATED.3IONS-SCNC: 18.2 MMOL/L (ref 5–15)
AST SERPL-CCNC: 12 U/L (ref 1–32)
BACTERIA UR QL AUTO: ABNORMAL /HPF
BASOPHILS # BLD AUTO: 0.04 10*3/MM3 (ref 0–0.2)
BASOPHILS NFR BLD AUTO: 0.5 % (ref 0–1.5)
BILIRUB SERPL-MCNC: <0.2 MG/DL (ref 0–1.2)
BILIRUB UR QL STRIP: NEGATIVE
BUN SERPL-MCNC: 16 MG/DL (ref 8–23)
BUN/CREAT SERPL: 14.7 (ref 7–25)
CALCIUM SPEC-SCNC: 9 MG/DL (ref 8.6–10.5)
CHLORIDE SERPL-SCNC: 103 MMOL/L (ref 98–107)
CK SERPL-CCNC: 26 U/L (ref 20–180)
CLARITY UR: CLEAR
CO2 SERPL-SCNC: 19.8 MMOL/L (ref 22–29)
COLOR UR: YELLOW
CREAT SERPL-MCNC: 1.09 MG/DL (ref 0.57–1)
CREAT UR-MCNC: 105.7 MG/DL
DEPRECATED RDW RBC AUTO: 42.7 FL (ref 37–54)
EGFRCR SERPLBLD CKD-EPI 2021: 53.1 ML/MIN/1.73
EOSINOPHIL # BLD AUTO: 0.36 10*3/MM3 (ref 0–0.4)
EOSINOPHIL NFR BLD AUTO: 4.3 % (ref 0.3–6.2)
ERYTHROCYTE [DISTWIDTH] IN BLOOD BY AUTOMATED COUNT: 13.9 % (ref 12.3–15.4)
GLOBULIN UR ELPH-MCNC: 3.4 GM/DL
GLUCOSE SERPL-MCNC: 176 MG/DL (ref 65–99)
GLUCOSE UR STRIP-MCNC: NEGATIVE MG/DL
HCT VFR BLD AUTO: 36.1 % (ref 34–46.6)
HGB BLD-MCNC: 11.2 G/DL (ref 12–15.9)
HGB UR QL STRIP.AUTO: NEGATIVE
HYALINE CASTS UR QL AUTO: ABNORMAL /LPF
IMM GRANULOCYTES # BLD AUTO: 0.02 10*3/MM3 (ref 0–0.05)
IMM GRANULOCYTES NFR BLD AUTO: 0.2 % (ref 0–0.5)
KETONES UR QL STRIP: NEGATIVE
LEUKOCYTE ESTERASE UR QL STRIP.AUTO: ABNORMAL
LYMPHOCYTES # BLD AUTO: 2.11 10*3/MM3 (ref 0.7–3.1)
LYMPHOCYTES NFR BLD AUTO: 25.1 % (ref 19.6–45.3)
MAGNESIUM SERPL-MCNC: 1.7 MG/DL (ref 1.6–2.4)
MCH RBC QN AUTO: 26.4 PG (ref 26.6–33)
MCHC RBC AUTO-ENTMCNC: 31 G/DL (ref 31.5–35.7)
MCV RBC AUTO: 84.9 FL (ref 79–97)
MONOCYTES # BLD AUTO: 0.48 10*3/MM3 (ref 0.1–0.9)
MONOCYTES NFR BLD AUTO: 5.7 % (ref 5–12)
NEUTROPHILS NFR BLD AUTO: 5.39 10*3/MM3 (ref 1.7–7)
NEUTROPHILS NFR BLD AUTO: 64.2 % (ref 42.7–76)
NITRITE UR QL STRIP: NEGATIVE
NRBC BLD AUTO-RTO: 0 /100 WBC (ref 0–0.2)
PH UR STRIP.AUTO: 6.5 [PH] (ref 5–8)
PHOSPHATE SERPL-MCNC: 3.3 MG/DL (ref 2.5–4.5)
PLATELET # BLD AUTO: 235 10*3/MM3 (ref 140–450)
PMV BLD AUTO: 11.4 FL (ref 6–12)
POTASSIUM SERPL-SCNC: 4.7 MMOL/L (ref 3.5–5.2)
PROT ?TM UR-MCNC: 19.3 MG/DL
PROT SERPL-MCNC: 7.3 G/DL (ref 6–8.5)
PROT UR QL STRIP: ABNORMAL
PROT/CREAT UR: 182.6 MG/G CREA (ref 0–200)
PTH-INTACT SERPL-MCNC: 78 PG/ML (ref 15–65)
RBC # BLD AUTO: 4.25 10*6/MM3 (ref 3.77–5.28)
RBC # UR STRIP: ABNORMAL /HPF
REF LAB TEST METHOD: ABNORMAL
SODIUM SERPL-SCNC: 141 MMOL/L (ref 136–145)
SP GR UR STRIP: 1.02 (ref 1–1.03)
SQUAMOUS #/AREA URNS HPF: ABNORMAL /HPF
URATE SERPL-MCNC: 7.8 MG/DL (ref 2.4–5.7)
UROBILINOGEN UR QL STRIP: ABNORMAL
WBC # UR STRIP: ABNORMAL /HPF
WBC NRBC COR # BLD AUTO: 8.4 10*3/MM3 (ref 3.4–10.8)

## 2024-03-21 PROCEDURE — 80053 COMPREHEN METABOLIC PANEL: CPT

## 2024-03-21 PROCEDURE — 82570 ASSAY OF URINE CREATININE: CPT

## 2024-03-21 PROCEDURE — 85025 COMPLETE CBC W/AUTO DIFF WBC: CPT

## 2024-03-21 PROCEDURE — 84550 ASSAY OF BLOOD/URIC ACID: CPT

## 2024-03-21 PROCEDURE — 82306 VITAMIN D 25 HYDROXY: CPT

## 2024-03-21 PROCEDURE — 84100 ASSAY OF PHOSPHORUS: CPT

## 2024-03-21 PROCEDURE — 36415 COLL VENOUS BLD VENIPUNCTURE: CPT

## 2024-03-21 PROCEDURE — 83735 ASSAY OF MAGNESIUM: CPT

## 2024-03-21 PROCEDURE — 82550 ASSAY OF CK (CPK): CPT

## 2024-03-21 PROCEDURE — 81001 URINALYSIS AUTO W/SCOPE: CPT

## 2024-03-21 PROCEDURE — 83970 ASSAY OF PARATHORMONE: CPT

## 2024-03-21 PROCEDURE — 84156 ASSAY OF PROTEIN URINE: CPT

## 2024-03-21 NOTE — TELEPHONE ENCOUNTER
Patient's daughter called in stating patient had surgery 1/6/24. She has recently noticied that she is have swelling in the medial ankle area. She also states patient has bruising in the same place. Patient has appointment scheduled 3/26 with Dr Muñoz. I spoke with Radha and she advised me to let patient know to elevate the ankle, ice, and rest the ankle until follow up appointment. Patient daughter stated she would let her mom know and voiced understanding.

## 2024-03-26 DIAGNOSIS — I10 ESSENTIAL HYPERTENSION: ICD-10-CM

## 2024-03-26 RX ORDER — ESCITALOPRAM OXALATE 20 MG/1
20 TABLET ORAL DAILY
Qty: 90 TABLET | Refills: 0 | Status: SHIPPED | OUTPATIENT
Start: 2024-03-26

## 2024-03-26 RX ORDER — HYDROCHLOROTHIAZIDE 50 MG/1
50 TABLET ORAL DAILY
Qty: 90 TABLET | Refills: 0 | Status: SHIPPED | OUTPATIENT
Start: 2024-03-26

## 2024-03-26 RX ORDER — BISOPROLOL FUMARATE 10 MG/1
TABLET, FILM COATED ORAL
Qty: 90 TABLET | Refills: 0 | Status: SHIPPED | OUTPATIENT
Start: 2024-03-26

## 2024-03-28 ENCOUNTER — OFFICE VISIT (OUTPATIENT)
Dept: ORTHOPEDIC SURGERY | Facility: CLINIC | Age: 74
End: 2024-03-28
Payer: MEDICARE

## 2024-03-28 VITALS — HEIGHT: 64 IN | OXYGEN SATURATION: 97 % | BODY MASS INDEX: 33.46 KG/M2 | WEIGHT: 196 LBS

## 2024-03-28 DIAGNOSIS — Z47.89 ORTHOPEDIC AFTERCARE: Primary | ICD-10-CM

## 2024-03-28 PROCEDURE — 99024 POSTOP FOLLOW-UP VISIT: CPT | Performed by: ORTHOPAEDIC SURGERY

## 2024-03-28 RX ORDER — ERGOCALCIFEROL 1.25 MG/1
CAPSULE ORAL
COMMUNITY
Start: 2024-03-26

## 2024-03-28 RX ORDER — SODIUM BICARBONATE 325 MG/1
325 TABLET ORAL 4 TIMES DAILY
COMMUNITY

## 2024-03-28 NOTE — PROGRESS NOTES
"     Patient ID: Dora Solorio is a 73 y.o. female.    1/6/24 open reduction internal fixation left ankle   Has been walking in the boot pain minimal having some sores she feels because the boot is irritating her skin  Objective:    Ht 162.6 cm (64\")   Wt 88.9 kg (196 lb)   SpO2 97%   BMI 33.64 kg/m²     Physical Examination:  Left ankle demonstrates multiple well-demarcated eschars over the anterior aspect of the tibia there are no signs of infection no drainage no cellulitis incisions are all healed Rima is negative supple range of motion of foot and ankle       Imaging:  left Ankle X-Ray  Indication: Postop ORIF ankle  Views: AP, Mortise Lateral  Findings: Maintenance of fracture reduction hardware position intact mortise with blurring of the lateral malleolus fracture lines  yes fracture  no bony lesion  Soft tissues normal  normal joint spaces  Hardware appropriately positioned yes    yes prior studies available for comparison.    Assessment:  Healing ankle fracture    Plan:  Transition to weightbearing in shoe local wound care to the sores as needed see me with x-rays in 6 weeks  "

## 2024-04-08 ENCOUNTER — TELEPHONE (OUTPATIENT)
Dept: ORTHOPEDIC SURGERY | Facility: CLINIC | Age: 74
End: 2024-04-08
Payer: MEDICARE

## 2024-04-08 NOTE — TELEPHONE ENCOUNTER
The patient's daughter called to state her mother is having swelling in her ankle and pain on top of foot.  I advised RICE and to make sure she is transitioning to weightbearing in a shoe.  She hasn't been wearing shoes.  I advised the daughter to ice area on foot also and if still painful at next appointment to mention to us and we may need to Xray that area also.  She expressed an understanding.

## 2024-04-15 RX ORDER — GABAPENTIN 600 MG/1
600 TABLET ORAL 3 TIMES DAILY
Qty: 90 TABLET | Refills: 0 | Status: SHIPPED | OUTPATIENT
Start: 2024-04-15

## 2024-05-09 ENCOUNTER — OFFICE VISIT (OUTPATIENT)
Dept: ORTHOPEDIC SURGERY | Facility: CLINIC | Age: 74
End: 2024-05-09
Payer: MEDICARE

## 2024-05-09 VITALS — HEART RATE: 54 BPM | WEIGHT: 196 LBS | BODY MASS INDEX: 33.46 KG/M2 | HEIGHT: 64 IN

## 2024-05-09 DIAGNOSIS — Z47.89 ORTHOPEDIC AFTERCARE: Primary | ICD-10-CM

## 2024-05-09 DIAGNOSIS — S82.892D CLOSED FRACTURE OF LEFT ANKLE WITH ROUTINE HEALING, SUBSEQUENT ENCOUNTER: ICD-10-CM

## 2024-05-10 NOTE — NURSING NOTE
Message left for PICC team to replace IV in the RAC due to it is painful to the patient.  Unsuccessful attempt by nursing to replace IV site.  
Patient  Having nose bleed at this time.  Nurse applied cool rag and ice pack for three minutes.  Bleeding ceased.  Patient states that she gets nose bleeds at home occasionally.  Will inform the on coming shift nurse to monitor for nose bleeds and will add humidification to the oxygen.  
Patient has oxygen saturation in the upper 80`s while sleeping and nurse added 02 at 2L via nasal cannula at this time.   
Patient oxygen saturation dropped to the mid 80`s while this patient was sleeping.  Oxygen via nasal cannula placed at this time.  Patient oxygenating in the mid 80`s during sleep.  Patient oxygenating adequately at this time.  
[id number]

## 2024-05-20 RX ORDER — GABAPENTIN 600 MG/1
600 TABLET ORAL 3 TIMES DAILY
Qty: 90 TABLET | Refills: 0 | Status: SHIPPED | OUTPATIENT
Start: 2024-05-20

## 2024-05-21 RX ORDER — INSULIN DEGLUDEC 100 U/ML
INJECTION, SOLUTION SUBCUTANEOUS
Qty: 18 ML | Refills: 0 | Status: SHIPPED | OUTPATIENT
Start: 2024-05-21

## 2024-06-12 ENCOUNTER — APPOINTMENT (OUTPATIENT)
Dept: GENERAL RADIOLOGY | Facility: HOSPITAL | Age: 74
DRG: 309 | End: 2024-06-12
Payer: MEDICARE

## 2024-06-12 ENCOUNTER — HOSPITAL ENCOUNTER (INPATIENT)
Facility: HOSPITAL | Age: 74
LOS: 2 days | Discharge: HOME | DRG: 309 | End: 2024-06-14
Attending: EMERGENCY MEDICINE | Admitting: INTERNAL MEDICINE
Payer: MEDICARE

## 2024-06-12 DIAGNOSIS — I95.9 ACUTE HYPOTENSION: ICD-10-CM

## 2024-06-12 DIAGNOSIS — R00.1 BRADYCARDIA: Primary | ICD-10-CM

## 2024-06-12 DIAGNOSIS — I95.9 HYPOTENSION, UNSPECIFIED HYPOTENSION TYPE: ICD-10-CM

## 2024-06-12 LAB
ANION GAP SERPL CALCULATED.3IONS-SCNC: 11.4 MMOL/L (ref 5–15)
ANION GAP SERPL CALCULATED.3IONS-SCNC: 8.6 MMOL/L (ref 5–15)
BASOPHILS # BLD AUTO: 0.04 10*3/MM3 (ref 0–0.2)
BASOPHILS NFR BLD AUTO: 0.5 % (ref 0–1.5)
BUN SERPL-MCNC: 21 MG/DL (ref 8–23)
BUN SERPL-MCNC: 22 MG/DL (ref 8–23)
BUN/CREAT SERPL: 14.6 (ref 7–25)
BUN/CREAT SERPL: 17.5 (ref 7–25)
CALCIUM SPEC-SCNC: 8.5 MG/DL (ref 8.6–10.5)
CALCIUM SPEC-SCNC: 8.8 MG/DL (ref 8.6–10.5)
CHLORIDE SERPL-SCNC: 105 MMOL/L (ref 98–107)
CHLORIDE SERPL-SCNC: 108 MMOL/L (ref 98–107)
CO2 SERPL-SCNC: 22.6 MMOL/L (ref 22–29)
CO2 SERPL-SCNC: 23.4 MMOL/L (ref 22–29)
CREAT SERPL-MCNC: 1.26 MG/DL (ref 0.57–1)
CREAT SERPL-MCNC: 1.44 MG/DL (ref 0.57–1)
DEPRECATED RDW RBC AUTO: 43.8 FL (ref 37–54)
EGFRCR SERPLBLD CKD-EPI 2021: 38 ML/MIN/1.73
EGFRCR SERPLBLD CKD-EPI 2021: 44.6 ML/MIN/1.73
EOSINOPHIL # BLD AUTO: 0.39 10*3/MM3 (ref 0–0.4)
EOSINOPHIL NFR BLD AUTO: 4.4 % (ref 0.3–6.2)
ERYTHROCYTE [DISTWIDTH] IN BLOOD BY AUTOMATED COUNT: 14.1 % (ref 12.3–15.4)
GEN 5 2HR TROPONIN T REFLEX: 14 NG/L
GLUCOSE BLDC GLUCOMTR-MCNC: 146 MG/DL (ref 70–105)
GLUCOSE SERPL-MCNC: 135 MG/DL (ref 65–99)
GLUCOSE SERPL-MCNC: 162 MG/DL (ref 65–99)
HCT VFR BLD AUTO: 35.1 % (ref 34–46.6)
HGB BLD-MCNC: 10.7 G/DL (ref 12–15.9)
IMM GRANULOCYTES # BLD AUTO: 0.02 10*3/MM3 (ref 0–0.05)
IMM GRANULOCYTES NFR BLD AUTO: 0.2 % (ref 0–0.5)
LYMPHOCYTES # BLD AUTO: 1.86 10*3/MM3 (ref 0.7–3.1)
LYMPHOCYTES NFR BLD AUTO: 21 % (ref 19.6–45.3)
MAGNESIUM SERPL-MCNC: 1.9 MG/DL (ref 1.6–2.4)
MCH RBC QN AUTO: 25.9 PG (ref 26.6–33)
MCHC RBC AUTO-ENTMCNC: 30.5 G/DL (ref 31.5–35.7)
MCV RBC AUTO: 85 FL (ref 79–97)
MONOCYTES # BLD AUTO: 0.59 10*3/MM3 (ref 0.1–0.9)
MONOCYTES NFR BLD AUTO: 6.7 % (ref 5–12)
NEUTROPHILS NFR BLD AUTO: 5.95 10*3/MM3 (ref 1.7–7)
NEUTROPHILS NFR BLD AUTO: 67.2 % (ref 42.7–76)
NRBC BLD AUTO-RTO: 0 /100 WBC (ref 0–0.2)
PLATELET # BLD AUTO: 216 10*3/MM3 (ref 140–450)
PMV BLD AUTO: 11 FL (ref 6–12)
POTASSIUM SERPL-SCNC: 5.3 MMOL/L (ref 3.5–5.2)
POTASSIUM SERPL-SCNC: 6.3 MMOL/L (ref 3.5–5.2)
RBC # BLD AUTO: 4.13 10*6/MM3 (ref 3.77–5.28)
SODIUM SERPL-SCNC: 139 MMOL/L (ref 136–145)
SODIUM SERPL-SCNC: 140 MMOL/L (ref 136–145)
TROPONIN T DELTA: -2 NG/L
TROPONIN T SERPL HS-MCNC: 16 NG/L
TSH SERPL DL<=0.05 MIU/L-ACNC: 2.01 UIU/ML (ref 0.27–4.2)
WBC NRBC COR # BLD AUTO: 8.85 10*3/MM3 (ref 3.4–10.8)

## 2024-06-12 PROCEDURE — 63710000001 INSULIN REGULAR HUMAN PER 5 UNITS: Performed by: INTERNAL MEDICINE

## 2024-06-12 PROCEDURE — 80048 BASIC METABOLIC PNL TOTAL CA: CPT | Performed by: INTERNAL MEDICINE

## 2024-06-12 PROCEDURE — 80048 BASIC METABOLIC PNL TOTAL CA: CPT | Performed by: EMERGENCY MEDICINE

## 2024-06-12 PROCEDURE — 99223 1ST HOSP IP/OBS HIGH 75: CPT | Performed by: INTERNAL MEDICINE

## 2024-06-12 PROCEDURE — 93005 ELECTROCARDIOGRAM TRACING: CPT

## 2024-06-12 PROCEDURE — 25810000003 SODIUM CHLORIDE 0.9 % SOLUTION: Performed by: INTERNAL MEDICINE

## 2024-06-12 PROCEDURE — 93005 ELECTROCARDIOGRAM TRACING: CPT | Performed by: INTERNAL MEDICINE

## 2024-06-12 PROCEDURE — 85025 COMPLETE CBC W/AUTO DIFF WBC: CPT | Performed by: EMERGENCY MEDICINE

## 2024-06-12 PROCEDURE — 25010000002 ATROPINE SULFATE: Performed by: EMERGENCY MEDICINE

## 2024-06-12 PROCEDURE — 25010000002 FUROSEMIDE PER 20 MG: Performed by: INTERNAL MEDICINE

## 2024-06-12 PROCEDURE — 71045 X-RAY EXAM CHEST 1 VIEW: CPT

## 2024-06-12 PROCEDURE — 25810000003 SODIUM CHLORIDE 0.9 % SOLUTION: Performed by: EMERGENCY MEDICINE

## 2024-06-12 PROCEDURE — 5A1223Z PERFORMANCE OF CARDIAC PACING, CONTINUOUS: ICD-10-PCS | Performed by: INTERNAL MEDICINE

## 2024-06-12 PROCEDURE — C1894 INTRO/SHEATH, NON-LASER: HCPCS | Performed by: INTERNAL MEDICINE

## 2024-06-12 PROCEDURE — 84443 ASSAY THYROID STIM HORMONE: CPT | Performed by: EMERGENCY MEDICINE

## 2024-06-12 PROCEDURE — 83735 ASSAY OF MAGNESIUM: CPT | Performed by: EMERGENCY MEDICINE

## 2024-06-12 PROCEDURE — 36415 COLL VENOUS BLD VENIPUNCTURE: CPT

## 2024-06-12 PROCEDURE — 82948 REAGENT STRIP/BLOOD GLUCOSE: CPT | Performed by: INTERNAL MEDICINE

## 2024-06-12 PROCEDURE — 84484 ASSAY OF TROPONIN QUANT: CPT | Performed by: EMERGENCY MEDICINE

## 2024-06-12 PROCEDURE — 94799 UNLISTED PULMONARY SVC/PX: CPT

## 2024-06-12 PROCEDURE — 25010000002 LIDOCAINE 1 % SOLUTION: Performed by: INTERNAL MEDICINE

## 2024-06-12 PROCEDURE — 93005 ELECTROCARDIOGRAM TRACING: CPT | Performed by: EMERGENCY MEDICINE

## 2024-06-12 PROCEDURE — 33210 INSERT ELECTRD/PM CATH SNGL: CPT | Performed by: INTERNAL MEDICINE

## 2024-06-12 PROCEDURE — 94640 AIRWAY INHALATION TREATMENT: CPT

## 2024-06-12 PROCEDURE — 87481 CANDIDA DNA AMP PROBE: CPT | Performed by: INTERNAL MEDICINE

## 2024-06-12 PROCEDURE — 99285 EMERGENCY DEPT VISIT HI MDM: CPT

## 2024-06-12 RX ORDER — GABAPENTIN 600 MG/1
600 TABLET ORAL 2 TIMES DAILY
COMMUNITY

## 2024-06-12 RX ORDER — SODIUM BICARBONATE 650 MG/1
650 TABLET ORAL 2 TIMES DAILY
COMMUNITY

## 2024-06-12 RX ORDER — SODIUM CHLORIDE 0.9 % (FLUSH) 0.9 %
10 SYRINGE (ML) INJECTION EVERY 12 HOURS SCHEDULED
Status: DISCONTINUED | OUTPATIENT
Start: 2024-06-12 | End: 2024-06-14 | Stop reason: HOSPADM

## 2024-06-12 RX ORDER — ACETAMINOPHEN 325 MG/1
650 TABLET ORAL EVERY 4 HOURS PRN
Status: DISCONTINUED | OUTPATIENT
Start: 2024-06-12 | End: 2024-06-14 | Stop reason: HOSPADM

## 2024-06-12 RX ORDER — SODIUM CHLORIDE 0.9 % (FLUSH) 0.9 %
10 SYRINGE (ML) INJECTION AS NEEDED
Status: DISCONTINUED | OUTPATIENT
Start: 2024-06-12 | End: 2024-06-14 | Stop reason: HOSPADM

## 2024-06-12 RX ORDER — FUROSEMIDE 10 MG/ML
40 INJECTION INTRAMUSCULAR; INTRAVENOUS ONCE
Status: COMPLETED | OUTPATIENT
Start: 2024-06-12 | End: 2024-06-12

## 2024-06-12 RX ORDER — LIDOCAINE HYDROCHLORIDE 10 MG/ML
INJECTION, SOLUTION INFILTRATION; PERINEURAL
Status: DISCONTINUED | OUTPATIENT
Start: 2024-06-12 | End: 2024-06-12 | Stop reason: HOSPADM

## 2024-06-12 RX ORDER — BISACODYL 10 MG
10 SUPPOSITORY, RECTAL RECTAL DAILY PRN
Status: DISCONTINUED | OUTPATIENT
Start: 2024-06-12 | End: 2024-06-14 | Stop reason: HOSPADM

## 2024-06-12 RX ORDER — ESCITALOPRAM OXALATE 10 MG/1
20 TABLET ORAL DAILY
Status: DISCONTINUED | OUTPATIENT
Start: 2024-06-13 | End: 2024-06-14 | Stop reason: HOSPADM

## 2024-06-12 RX ORDER — AMLODIPINE BESYLATE 5 MG/1
10 TABLET ORAL DAILY
Status: DISCONTINUED | OUTPATIENT
Start: 2024-06-12 | End: 2024-06-14 | Stop reason: HOSPADM

## 2024-06-12 RX ORDER — SODIUM CHLORIDE 9 MG/ML
100 INJECTION, SOLUTION INTRAVENOUS CONTINUOUS
Status: CANCELLED | OUTPATIENT
Start: 2024-06-13

## 2024-06-12 RX ORDER — INSULIN LISPRO 100 [IU]/ML
1-200 INJECTION, SOLUTION INTRAVENOUS; SUBCUTANEOUS
Status: DISCONTINUED | OUTPATIENT
Start: 2024-06-12 | End: 2024-06-13

## 2024-06-12 RX ORDER — DEXTROSE MONOHYDRATE 25 G/50ML
10-50 INJECTION, SOLUTION INTRAVENOUS
Status: DISCONTINUED | OUTPATIENT
Start: 2024-06-12 | End: 2024-06-13

## 2024-06-12 RX ORDER — DEXTROSE MONOHYDRATE 25 G/50ML
50 INJECTION, SOLUTION INTRAVENOUS ONCE
Status: COMPLETED | OUTPATIENT
Start: 2024-06-12 | End: 2024-06-12

## 2024-06-12 RX ORDER — BISOPROLOL FUMARATE 5 MG/1
10 TABLET, FILM COATED ORAL DAILY
Status: DISCONTINUED | OUTPATIENT
Start: 2024-06-12 | End: 2024-06-14

## 2024-06-12 RX ORDER — POLYETHYLENE GLYCOL 3350 17 G/17G
17 POWDER, FOR SOLUTION ORAL DAILY PRN
Status: DISCONTINUED | OUTPATIENT
Start: 2024-06-12 | End: 2024-06-14 | Stop reason: HOSPADM

## 2024-06-12 RX ORDER — INSULIN DEGLUDEC 100 U/ML
20 INJECTION, SOLUTION SUBCUTANEOUS EVERY EVENING
Status: ON HOLD | COMMUNITY
End: 2024-06-13

## 2024-06-12 RX ORDER — BISACODYL 5 MG/1
5 TABLET, DELAYED RELEASE ORAL DAILY PRN
Status: DISCONTINUED | OUTPATIENT
Start: 2024-06-12 | End: 2024-06-14 | Stop reason: HOSPADM

## 2024-06-12 RX ORDER — BUPROPION HYDROCHLORIDE 150 MG/1
150 TABLET ORAL DAILY
Status: DISCONTINUED | OUTPATIENT
Start: 2024-06-13 | End: 2024-06-14 | Stop reason: HOSPADM

## 2024-06-12 RX ORDER — ONDANSETRON 4 MG/1
4 TABLET, ORALLY DISINTEGRATING ORAL EVERY 6 HOURS PRN
Status: DISCONTINUED | OUTPATIENT
Start: 2024-06-12 | End: 2024-06-14 | Stop reason: HOSPADM

## 2024-06-12 RX ORDER — NICOTINE POLACRILEX 4 MG
15 LOZENGE BUCCAL
Status: DISCONTINUED | OUTPATIENT
Start: 2024-06-12 | End: 2024-06-13

## 2024-06-12 RX ORDER — ATORVASTATIN CALCIUM 20 MG/1
20 TABLET, FILM COATED ORAL DAILY
Status: DISCONTINUED | OUTPATIENT
Start: 2024-06-13 | End: 2024-06-13

## 2024-06-12 RX ORDER — GABAPENTIN 300 MG/1
300 CAPSULE ORAL EVERY 12 HOURS SCHEDULED
Status: DISCONTINUED | OUTPATIENT
Start: 2024-06-12 | End: 2024-06-14 | Stop reason: HOSPADM

## 2024-06-12 RX ORDER — AMOXICILLIN 250 MG
2 CAPSULE ORAL 2 TIMES DAILY
Status: DISCONTINUED | OUTPATIENT
Start: 2024-06-12 | End: 2024-06-14 | Stop reason: HOSPADM

## 2024-06-12 RX ORDER — IBUPROFEN 600 MG/1
1 TABLET ORAL
Status: DISCONTINUED | OUTPATIENT
Start: 2024-06-12 | End: 2024-06-13

## 2024-06-12 RX ORDER — SODIUM CHLORIDE 9 MG/ML
40 INJECTION, SOLUTION INTRAVENOUS AS NEEDED
Status: DISCONTINUED | OUTPATIENT
Start: 2024-06-12 | End: 2024-06-14 | Stop reason: HOSPADM

## 2024-06-12 RX ORDER — NITROGLYCERIN 0.4 MG/1
0.4 TABLET SUBLINGUAL
Status: DISCONTINUED | OUTPATIENT
Start: 2024-06-12 | End: 2024-06-14 | Stop reason: HOSPADM

## 2024-06-12 RX ORDER — INSULIN LISPRO 100 [IU]/ML
1-200 INJECTION, SOLUTION INTRAVENOUS; SUBCUTANEOUS AS NEEDED
Status: DISCONTINUED | OUTPATIENT
Start: 2024-06-12 | End: 2024-06-13

## 2024-06-12 RX ORDER — SODIUM CHLORIDE 9 MG/ML
100 INJECTION, SOLUTION INTRAVENOUS CONTINUOUS
Status: DISCONTINUED | OUTPATIENT
Start: 2024-06-12 | End: 2024-06-13

## 2024-06-12 RX ADMIN — DEXTROSE MONOHYDRATE 50 ML: 25 INJECTION, SOLUTION INTRAVENOUS at 16:48

## 2024-06-12 RX ADMIN — ALBUTEROL SULFATE 10 MG: 2.5 SOLUTION RESPIRATORY (INHALATION) at 17:39

## 2024-06-12 RX ADMIN — ATROPINE SULFATE 1 MG: 0.1 INJECTION INTRAVENOUS at 14:20

## 2024-06-12 RX ADMIN — INSULIN HUMAN 10 UNITS: 100 INJECTION, SOLUTION PARENTERAL at 16:52

## 2024-06-12 RX ADMIN — SODIUM ZIRCONIUM CYCLOSILICATE 10 G: 10 POWDER, FOR SUSPENSION ORAL at 22:22

## 2024-06-12 RX ADMIN — SODIUM CHLORIDE 500 ML: 0.9 INJECTION, SOLUTION INTRAVENOUS at 14:20

## 2024-06-12 RX ADMIN — SODIUM CHLORIDE 100 ML/HR: 900 INJECTION, SOLUTION INTRAVENOUS at 16:55

## 2024-06-12 RX ADMIN — SODIUM ZIRCONIUM CYCLOSILICATE 10 G: 10 POWDER, FOR SUSPENSION ORAL at 17:10

## 2024-06-12 RX ADMIN — FUROSEMIDE 40 MG: 10 INJECTION, SOLUTION INTRAMUSCULAR; INTRAVENOUS at 16:54

## 2024-06-12 RX ADMIN — GABAPENTIN 300 MG: 300 CAPSULE ORAL at 22:16

## 2024-06-12 NOTE — ED PROVIDER NOTES
Subjective   History of Present Illness  Patient is a 73-year-old female complaint of several hour history of weakness dizziness and low heart rate.  She denies chest pain shortness of breath or other associated complaints.      Review of Systems    Past Medical History:   Diagnosis Date    Adjustment disorder with depressed mood 2018    Allergic Years    Sulfa drugs    Anxiety     Arthritis Years    Lotsvof joints. Mostly back, knees, hips, fingers    Cataract 2023    Depression 2018    Diabetes mellitus, type II 2013    Essential hypertension 2013    Gout 2014    Heart attack     Hyperlipidemia 2020    Hypertension     Inflammatory bowel disease     Irritable bowel syndrome without diarrhea 2017    Kidney disease     Kidney stone ?2016?    In hospital overnight    Osteopenia 2017    Peripheral neuropathy 2017    Renal insufficiency 2023    Stage 3       Allergies   Allergen Reactions    Oxycodone Mental Status Change and Hallucinations    Sulfa Antibiotics Rash    Allopurinol Nausea And Vomiting    Morphine Hallucinations       Past Surgical History:   Procedure Laterality Date    ANKLE OPEN REDUCTION INTERNAL FIXATION Left 2024    Procedure: ANKLE OPEN REDUCTION INTERNAL FIXATION;  Surgeon: Eriberto Muñoz MD;  Location: [facility] MAIN OR;  Service: Orthopedics;  Laterality: Left;     SECTION      x2    COLON SURGERY      ENDOMETRIAL ABLATION      FRACTURE SURGERY  24    KNEE ARTHROSCOPY Bilateral     TUBAL ABDOMINAL LIGATION  1973       Family History   Adopted: Yes   Problem Relation Age of Onset    Epilepsy Mother     Colon cancer Mother     Diabetes Mother     Hypertension Mother     No Known Problems Sister     No Known Problems Maternal Grandmother     No Known Problems Maternal Grandfather     No Known Problems Paternal Grandmother     No Known Problems Paternal Grandfather     Other Maternal Uncle         [email or web address]     Diabetes Daughter         Natalee initials       Social History     Socioeconomic History    Marital status:      Spouse name: Rishabh    Number of children: 2    Years of education: 12   Tobacco Use    Smoking status: Never     Passive exposure: Never    Smokeless tobacco: Never   Vaping Use    Vaping status: Never Used   Substance and Sexual Activity    Alcohol use: Never    Drug use: Never    Sexual activity: Not Currently     Partners: Male     Birth control/protection: Other, Tubal ligation           Objective   Physical Exam  Neck has no adenopathy JVD or bruits.  Lungs are clear.  Heart has a regular rate rhythm is bradycardic without murmur rub or gallop.  Chest is nontender.  Abdomen soft nontender.  Extremity exam unremarkable.  Procedures  My EKG interpretation is junctional bradycardia at a rate of 32 with no acute ST change         ED Course      Results for orders placed or performed during the hospital encounter of 06/12/24   CBC Auto Differential    Specimen: Blood   Result Value Ref Range    WBC 8.85 3.40 - 10.80 10*3/mm3    RBC 4.13 3.77 - 5.28 10*6/mm3    Hemoglobin 10.7 (L) 12.0 - 15.9 g/dL    Hematocrit 35.1 34.0 - 46.6 %    MCV 85.0 79.0 - 97.0 fL    MCH 25.9 (L) 26.6 - 33.0 pg    MCHC 30.5 (L) 31.5 - 35.7 g/dL    RDW 14.1 12.3 - 15.4 %    RDW-SD 43.8 37.0 - 54.0 fl    MPV 11.0 6.0 - 12.0 fL    Platelets 216 140 - 450 10*3/mm3    Neutrophil % 67.2 42.7 - 76.0 %    Lymphocyte % 21.0 19.6 - 45.3 %    Monocyte % 6.7 5.0 - 12.0 %    Eosinophil % 4.4 0.3 - 6.2 %    Basophil % 0.5 0.0 - 1.5 %    Immature Grans % 0.2 0.0 - 0.5 %    Neutrophils, Absolute 5.95 1.70 - 7.00 10*3/mm3    Lymphocytes, Absolute 1.86 0.70 - 3.10 10*3/mm3    Monocytes, Absolute 0.59 0.10 - 0.90 10*3/mm3    Eosinophils, Absolute 0.39 0.00 - 0.40 10*3/mm3    Basophils, Absolute 0.04 0.00 - 0.20 10*3/mm3    Immature Grans, Absolute 0.02 0.00 - 0.05 10*3/mm3    nRBC 0.0 0.0 - 0.2 /100 WBC   ECG 12 Lead Bradycardia    Result Value Ref Range    QT Interval 549 ms    QTC Interval 399 ms     XR Chest 1 View    Result Date: 6/12/2024  Impression: 1. Borderline cardiomegaly. 2. No active disease. Electronically Signed: Ángel Pinto MD  6/12/2024 2:45 PM EDT  Workstation ID: [id number]                                          Medical Decision Making  My chest x-ray interpretation is cardiomegaly without effusion or infiltrate.  BMP is pending at this time along with thyroid function tests and magnesium.  Patient has no leukocytosis no left shift and no anemia.  I did speak to the on-call cardiologist.  Patient be taken to the Cath Lab for transvenous pacemaker implantation.  Patient will then be admitted to the hospitalist who I did speak to.    Amount and/or Complexity of Data Reviewed  Labs: ordered. Decision-making details documented in ED Course.  Radiology: ordered and independent interpretation performed.  ECG/medicine tests: ordered and independent interpretation performed.    Risk  Prescription drug management.  Decision regarding hospitalization.        Final diagnoses:   Bradycardia   Hypotension, unspecified hypotension type       ED Disposition  ED Disposition       ED Disposition   Decision to Admit    Condition   --    Comment   --               No follow-up provider specified.       Medication List      No changes were made to your prescriptions during this visit.            Archie De MD  06/12/24 2605

## 2024-06-12 NOTE — NURSING NOTE
Transported patient to 2210 per stretcher; bedside report given to [facility] RN; transvenous temporary pacer intact through right femoral vein and site clean and dry; family in [facility] waiting room.

## 2024-06-12 NOTE — CONSULTS
NEPHROLOGY CONSULTATION-----[facility]    [facility]  [phone]  Rob Wick MD    Patient Care Team:  Bhargavi Rodriguez APRN as PCP - General  Rob Wick MD as Consulting Physician (Nephrology)    CC/REASON FOR CONSULTATION: Hyperkalemia    PHYSICIAN REQUESTING CONSULTATION: Dr. Grayson    History of Present Illness  73-year-old female with past medical history of CKD stage III, hypertension, diabetes presented to hospital on 2024 with shortness of breath, dizziness and lower extremity weakness.  She was found to have a potassium of 6.3.  In the ED she was noted to be bradycardic with heart rate in the 30s.  She is guarded temporary pacemaker now.  She was on lisinopril prior to admission.  She has been eating a banana every day.  Denies dysuria or gross hematuria.  No vomiting or diarrhea.    Review of Systems   As noted above otherwise 10 systems reviewed and were negative.    Past Medical History:   Diagnosis Date    Adjustment disorder with depressed mood 2018    Allergic Years    Sulfa drugs    Anxiety     Arthritis Years    Lotsvof joints. Mostly back, knees, hips, fingers    Cataract 2023    Depression 2018    Diabetes mellitus, type II 2013    Essential hypertension 2013    Gout 2014    Heart attack     Hyperlipidemia 2020    Hypertension     Inflammatory bowel disease     Irritable bowel syndrome without diarrhea 2017    Kidney disease     Kidney stone ?2016?    In hospital overnight    Osteopenia 2017    Peripheral neuropathy 2017    Renal insufficiency 2023    Stage 3       Past Surgical History:   Procedure Laterality Date    ANKLE OPEN REDUCTION INTERNAL FIXATION Left 2024    Procedure: ANKLE OPEN REDUCTION INTERNAL FIXATION;  Surgeon: Eriberto Muñoz MD;  Location: [facility] MAIN OR;  Service: Orthopedics;  Laterality: Left;     SECTION       x2    COLON SURGERY      ENDOMETRIAL ABLATION      FRACTURE SURGERY  1/06/24    KNEE ARTHROSCOPY Bilateral     TUBAL ABDOMINAL LIGATION  05/1973       Family History   Adopted: Yes   Problem Relation Age of Onset    Epilepsy Mother     Colon cancer Mother     Diabetes Mother     Hypertension Mother     No Known Problems Sister     No Known Problems Maternal Grandmother     No Known Problems Maternal Grandfather     No Known Problems Paternal Grandmother     No Known Problems Paternal Grandfather     Other Maternal Uncle         [email or web address]    Diabetes Daughter         Mothets initials       Social History     Tobacco Use    Smoking status: Never     Passive exposure: Never    Smokeless tobacco: Never   Vaping Use    Vaping status: Never Used   Substance Use Topics    Alcohol use: Never    Drug use: Never       Home Meds:   Medications Prior to Admission   Medication Sig Dispense Refill Last Dose    bisoprolol (ZEBeta) 10 MG tablet Take 1 tablet by mouth once daily 90 tablet 0 6/12/2024    Cyanocobalamin (Vitamin B-12) 1000 MCG sublingual tablet Place 1 tablet under the tongue Daily. 100 each 4 6/12/2024    escitalopram (LEXAPRO) 20 MG tablet Take 1 tablet by mouth once daily 90 tablet 0 6/11/2024    gabapentin (NEURONTIN) 600 MG tablet Take 1 tablet by mouth 2 (Two) Times a Day.   6/12/2024    hydroCHLOROthiazide 50 MG tablet Take 1 tablet by mouth once daily 90 tablet 0 6/12/2024    insulin degludec (Tresiba FlexTouch) 100 UNIT/ML solution pen-injector injection Inject 20 Units under the skin into the appropriate area as directed Every Evening.   6/11/2024    metFORMIN (GLUCOPHAGE) 1000 MG tablet TAKE 1 TABLET BY MOUTH TWICE DAILY WITH MEALS 180 tablet 0 6/12/2024    sodium bicarbonate 650 MG tablet Take 1 tablet by mouth 2 (Two) Times a Day.   6/12/2024    amLODIPine (NORVASC) 10 MG tablet Take 1 tablet by mouth once daily 90 tablet 0     atorvastatin (LIPITOR) 20 MG tablet Take 1 tablet by mouth once daily 90  tablet 2     buPROPion XL (WELLBUTRIN XL) 150 MG 24 hr tablet Take 1 tablet by mouth once daily 90 tablet 0     vitamin D (ERGOCALCIFEROL) 1.25 MG (24470 UT) capsule capsule    6/9/2024       Scheduled Meds:  [Transfer Hold] insulin glargine, 1-200 Units, Subcutaneous, Nightly - Glucommander  [Transfer Hold] insulin lispro, 1-200 Units, Subcutaneous, 4x Daily With Meals & Nightly  [Transfer Hold] senna-docusate sodium, 2 tablet, Oral, BID  [Transfer Hold] sodium chloride, 10 mL, Intravenous, Q12H  sodium zirconium cyclosilicate, 10 g, Oral, TID        Continuous Infusions:  sodium chloride, 100 mL/hr, Last Rate: 100 mL/hr (06/12/24 1655)        PRN Meds:    [Transfer Hold] acetaminophen    acetaminophen    [Transfer Hold] senna-docusate sodium **AND** [Transfer Hold] polyethylene glycol **AND** [Transfer Hold] bisacodyl **AND** [Transfer Hold] bisacodyl    [Transfer Hold] Calcium Replacement - Follow Nurse / BPA Driven Protocol    [Transfer Hold] dextrose    [Transfer Hold] dextrose    [Transfer Hold] glucagon (human recombinant)    [Transfer Hold] insulin lispro    [Transfer Hold] Magnesium Standard Dose Replacement - Follow Nurse / BPA Driven Protocol    [Transfer Hold] nitroglycerin    [Transfer Hold] ondansetron ODT    [Transfer Hold] Phosphorus Replacement - Follow Nurse / BPA Driven Protocol    Potassium Replacement - Follow Nurse / BPA Driven Protocol    [COMPLETED] Insert Peripheral IV **AND** [Transfer Hold] sodium chloride    [Transfer Hold] sodium chloride    [Transfer Hold] sodium chloride    Allergies:  Oxycodone, Sulfa antibiotics, Allopurinol, and Morphine    OBJECTIVE    Vital Signs  Temp:  [98 °F (36.7 °C)] 98 °F (36.7 °C)  Heart Rate:  [30-82] 80  Resp:  [12-17] 12  BP: ()/(55-85) 133/78    No intake/output data recorded.  No intake/output data recorded.    Physical Exam:  General Appearance: alert, appears stated age and cooperative  Head: normocephalic, without obvious abnormality and  "atraumatic  Eyes: conjunctivae and sclerae normal and no icterus  Neck: supple and no JVD  Lungs: clear to auscultation and respirations regular  Heart: regular rhythm & normal rate and normal S1, S2  Chest Wall: no abnormalities observed  Abdomen: normal bowel sounds and soft, nontender  Extremities: moves extremities well, no edema, no cyanosis  Skin: no bleeding, bruising or rash  Neurologic: Alert, and oriented. No focal deficits    Results Review:    I reviewed the patient's new clinical results.    WBC WBC   Date Value Ref Range Status   06/12/2024 8.85 3.40 - 10.80 10*3/mm3 Final      HGB Hemoglobin   Date Value Ref Range Status   06/12/2024 10.7 (L) 12.0 - 15.9 g/dL Final      HCT Hematocrit   Date Value Ref Range Status   06/12/2024 35.1 34.0 - 46.6 % Final      Platelets No results found for: \"LABPLAT\"   MCV MCV   Date Value Ref Range Status   06/12/2024 85.0 79.0 - 97.0 fL Final          Sodium Sodium   Date Value Ref Range Status   06/12/2024 140 136 - 145 mmol/L Final      Potassium Potassium   Date Value Ref Range Status   06/12/2024 6.3 (C) 3.5 - 5.2 mmol/L Final      Chloride Chloride   Date Value Ref Range Status   06/12/2024 108 (H) 98 - 107 mmol/L Final      CO2 CO2   Date Value Ref Range Status   06/12/2024 23.4 22.0 - 29.0 mmol/L Final      BUN BUN   Date Value Ref Range Status   06/12/2024 21 8 - 23 mg/dL Final      Creatinine Creatinine   Date Value Ref Range Status   06/12/2024 1.44 (H) 0.57 - 1.00 mg/dL Final      Calcium Calcium   Date Value Ref Range Status   06/12/2024 8.5 (L) 8.6 - 10.5 mg/dL Final      PO4 No results found for: \"CAPO4\"   Albumin No results found for: \"ALBUMIN\"   Magnesium Magnesium   Date Value Ref Range Status   06/12/2024 1.9 1.6 - 2.4 mg/dL Final      Uric Acid No results found for: \"URICACID\"       Imaging Results (Last 72 Hours)       Procedure Component Value Units Date/Time    XR Chest 1 View [[id number]] Collected: 06/12/24 1443     Updated: 06/12/24 1447    " Narrative:      XR CHEST 1 VW    Date of Exam: 6/12/2024 2:30 PM EDT    Indication: Chest pain    Comparison: 1/4/2024.    Findings:  The heart is borderline enlarged. The right hemidiaphragm is slightly elevated. The lungs and pleural spaces are clear. The pulmonary vascular markings are normal. There are chronic age-related changes involving the bony thorax and thoracic aorta.      Impression:      Impression:    1. Borderline cardiomegaly.  2. No active disease.      Electronically Signed: Ángel Pinto MD    6/12/2024 2:45 PM EDT    Workstation ID: [id number]              Results for orders placed during the hospital encounter of 06/12/24    XR Chest 1 View    Narrative  XR CHEST 1 VW    Date of Exam: 6/12/2024 2:30 PM EDT    Indication: Chest pain    Comparison: 1/4/2024.    Findings:  The heart is borderline enlarged. The right hemidiaphragm is slightly elevated. The lungs and pleural spaces are clear. The pulmonary vascular markings are normal. There are chronic age-related changes involving the bony thorax and thoracic aorta.    Impression  Impression:    1. Borderline cardiomegaly.  2. No active disease.      Electronically Signed: Ángel Pinto MD  6/12/2024 2:45 PM EDT  Workstation ID: [id number]      Results for orders placed in visit on 05/09/24    XR Ankle 3+ View Left    Narrative  left Ankle X-Ray  Indication: Postop ORIF ankle  Views: AP, Mortise Lateral  Findings: Healed ankle fracture hardware in position  no fracture  no bony lesion  Soft tissues normal  normal joint spaces  Hardware appropriately positioned yes    yes prior studies available for comparison.    X-RAY was ordered and reviewed by Eriberto Muñoz MD      Results for orders placed in visit on 03/28/24    XR Ankle 3+ View Left    Narrative  left Ankle X-Ray  Indication: Postop ORIF ankle  Views: AP, Mortise Lateral  Findings: Maintenance of fracture reduction hardware position intact mortise with blurring of the lateral malleolus fracture  lines  yes fracture  no bony lesion  Soft tissues normal  normal joint spaces  Hardware appropriately positioned yes    yes prior studies available for comparison.    X-RAY was ordered and reviewed by Eriberto Muñoz MD            ASSESSMENT / PLAN      Symptomatic bradycardia    Bradycardia    Acute hypotension      CKD stage III-CKD due to hypertensive nephrosclerosis and/or diabetic glomerulosclerosis  Hyperkalemia-due to ACE inhibitor use and high dietary potassium intake.  Received Lokelma and treated with insulin and D50.  Bradycardia-status post temporary pacemaker  Diabetes type 2  Hypertension-blood pressure low, hold antihypertensives  Hold lisinopril  Low potassium diet  Check potassium later this afternoon  Labs in a.m.    I discussed the patient's findings and my recommendations with patient and consulting provider    Will follow along closely. Thank you for allowing us to see this patient in renal consultation.    [facility]  [phone]  MD Rob Nascimento MD  06/12/24  17:47 EDT

## 2024-06-12 NOTE — CONSULTS
Cardiology Consult Note    Patient Identification:  Name: Dora Solorio  Age: 73 y.o.  Sex: female  :  1950  MRN: [id number]             Requesting Physician : Archie hernandez    Reason for Consultation / Chief Complaint :   Symptomatic bradycardia    History of Present Illness:      Ms. Keke Solorio has PMH of    Recurrent dizziness, history of ankle fracture, scalp hematoma  Hypertension  Dyslipidemia with low HDL  Diabetes  CKD  Carotid disease 60% at the origin of right internal carotid by CTA 2022  Tubal ligation, , endometrial ablation, ankle fracture surgery  Allergy/intolerance to sulfa    Presented with complaint of feeling dizzy lightheaded and near syncopal.  Was found to be bradycardic in 30s.  Patient has history of episodic dizziness previously.  But was never found to be bradycardic.    Data:  Labs 2024 reveal normal CBC.  Chest x-ray 2024 reveals borderline cardiomegaly  EKG done 2024 reviewed/interpreted by me reveals junctional bradycardia at the rate of 32 bpm with probable LVH and anterior Q waves.      Review of records:  Echocardiogram 2022 reveals normal LV systolic function EF of 65% with concentric LVH and grade 1 diastolic dysfunction      Assessment:  :    Junctional bradycardia  Acute hypotension  Dizziness, near syncope  Hypertension  Dyslipidemia  Diabetes  Obesity with BMI of 30      Recommendations / Plan:        Telemetry to monitor rhythm  Will proceed with temporary pacemaker since patient has junctional rhythm and hypotension.  Risk benefits alternatives discussed with patient and family.  Will hold bisoprolol and monitor 24 hours if heart rate does not recover will proceed with permanent pacemaker.  Check TSH  Check echocardiogram  Serial troponins  Will follow-up and consider further evaluation or treatment depending on how her condition evolves  Discussed with the ER physician Dr. Archie hernandez            No diagnosis found.           Past  Medical History:  Past Medical History:   Diagnosis Date    Adjustment disorder with depressed mood 2018    Allergic Years    Sulfa drugs    Anxiety     Arthritis Years    Lotsvof joints. Mostly back, knees, hips, fingers    Cataract 2023    Depression 2018    Diabetes mellitus, type II 2013    Essential hypertension 2013    Gout 2014    Heart attack     Hyperlipidemia 2020    Hypertension     Inflammatory bowel disease     Irritable bowel syndrome without diarrhea 2017    Kidney disease     Kidney stone ?2016?    In hospital overnight    Osteopenia 2017    Peripheral neuropathy 2017    Renal insufficiency 2023    Stage 3     Past Surgical History:  Past Surgical History:   Procedure Laterality Date    ANKLE OPEN REDUCTION INTERNAL FIXATION Left 2024    Procedure: ANKLE OPEN REDUCTION INTERNAL FIXATION;  Surgeon: Eriberto Muñoz MD;  Location: [facility] MAIN OR;  Service: Orthopedics;  Laterality: Left;     SECTION      x2    COLON SURGERY      ENDOMETRIAL ABLATION      FRACTURE SURGERY  24    KNEE ARTHROSCOPY Bilateral     TUBAL ABDOMINAL LIGATION  1973      Allergies:  Allergies   Allergen Reactions    Oxycodone Mental Status Change and Hallucinations    Sulfa Antibiotics Rash    Allopurinol Nausea And Vomiting    Morphine Hallucinations     Home Meds:  (Not in a hospital admission)    Current Meds:     Current Facility-Administered Medications:     sodium chloride 0.9 % bolus 500 mL, 500 mL, Intravenous, Once, Archie De MD, Last Rate: 1,000 mL/hr at 24 1420, 500 mL at 24 1420    [COMPLETED] Insert Peripheral IV, , , Once **AND** sodium chloride 0.9 % flush 10 mL, 10 mL, Intravenous, PRN, Archie De MD    Current Outpatient Medications:     allopurinol (ZYLOPRIM) 100 MG tablet, , Disp: , Rfl:     amLODIPine (NORVASC) 10 MG tablet, Take 1 tablet by mouth once daily, Disp: 90 tablet, Rfl: 0    atorvastatin  (LIPITOR) 20 MG tablet, Take 1 tablet by mouth once daily, Disp: 90 tablet, Rfl: 2    bisoprolol (ZEBeta) 10 MG tablet, Take 1 tablet by mouth once daily, Disp: 90 tablet, Rfl: 0    buPROPion XL (WELLBUTRIN XL) 150 MG 24 hr tablet, Take 1 tablet by mouth once daily, Disp: 90 tablet, Rfl: 0    Cyanocobalamin (Vitamin B-12) 1000 MCG sublingual tablet, Place 1 tablet under the tongue Daily., Disp: 100 each, Rfl: 4    escitalopram (LEXAPRO) 20 MG tablet, Take 1 tablet by mouth once daily, Disp: 90 tablet, Rfl: 0    gabapentin (NEURONTIN) 600 MG tablet, TAKE 1 TABLET BY MOUTH THREE TIMES DAILY, Disp: 90 tablet, Rfl: 0    hydroCHLOROthiazide 50 MG tablet, Take 1 tablet by mouth once daily, Disp: 90 tablet, Rfl: 0    Insulin Degludec FlexTouch 100 UNIT/ML solution pen-injector, INJECT 20 UNITS SUBCUTANEOUSLY IN THE EVENING, Disp: 18 mL, Rfl: 0    metFORMIN (GLUCOPHAGE) 1000 MG tablet, TAKE 1 TABLET BY MOUTH TWICE DAILY WITH MEALS, Disp: 180 tablet, Rfl: 0    sodium bicarbonate 325 MG tablet, Take 1 tablet by mouth 4 (Four) Times a Day., Disp: , Rfl:     vitamin D (ERGOCALCIFEROL) 1.25 MG (73829 UT) capsule capsule, , Disp: , Rfl:   Social History:   Social History     Tobacco Use    Smoking status: Never     Passive exposure: Never    Smokeless tobacco: Never   Substance Use Topics    Alcohol use: Never      Family History:  Family History   Adopted: Yes   Problem Relation Age of Onset    Epilepsy Mother     Colon cancer Mother     Diabetes Mother     Hypertension Mother     No Known Problems Sister     No Known Problems Maternal Grandmother     No Known Problems Maternal Grandfather     No Known Problems Paternal Grandmother     No Known Problems Paternal Grandfather     Other Maternal Uncle         alzhimers    Diabetes Daughter         Mothets initials        Review of Systems : Review of Systems   Constitutional: Positive for malaise/fatigue.   Cardiovascular:  Positive for near-syncope.   Neurological:  Positive for  "dizziness.   All other systems reviewed and are negative.                 Constitutional:  Temp:  [98 °F (36.7 °C)] 98 °F (36.7 °C)  Heart Rate:  [32-39] 37  Resp:  [17] 17  BP: ()/(55-59) 104/55    Physical Exam   /55   Pulse (!) 37   Temp 98 °F (36.7 °C) (Oral)   Resp 17   Ht 162.6 cm (64\")   Wt 89.4 kg (197 lb)   SpO2 98%   BMI 33.81 kg/m²   Physical Exam  General:  Appears in no acute distress  Eyes: Sclerae are anicteric,  conjunctivae are clear   HEENT:  No JVD. Thyroid not visibly enlarged. No mucosal pallor or cyanosis  Respiratory: Respirations regular and unlabored at rest.  Bilaterally good breath sounds with good air entry in all fields. No crackles, rubs or wheezes auscultated  Cardiovascular: S1,S2 Regular rate and rhythm. No murmur, rub or gallop auscultated. No pretibial pitting edema  Gastrointestinal: Abdomen soft, flat, nontender. Bowel sounds present.   Musculoskeletal:  No abnormal movements  Extremities: No digital clubbing or cyanosis  Skin: Color pink. Skin warm and dry to touch. No rashes  No xanthoma  Neuro: Alert and awake, no lateralizing deficits appreciated    Cardiographics  ECG: EKG tracing was  personally reviewed/interpreted by me  ECG 12 Lead Bradycardia   Preliminary Result   HEART RATE= 32  bpm   RR Interval= 1892  ms   LA Interval=   ms   P Horizontal Axis=   deg   P Front Axis=   deg   QRSD Interval= 81  ms   QT Interval= 549  ms   QTcB= 399  ms   QRS Axis= -9  deg   T Wave Axis= 8  deg   - ABNORMAL ECG -   Junctional rhythm   Probable left ventricular hypertrophy   Anterior Q waves, possibly due to LVH   Electronically Signed By:    Date and Time of Study: 2024-06-12 14:09:42          Telemetry: Junctional bradycardia    Echocardiogram:   Results for orders placed during the hospital encounter of 09/09/22    Adult Transthoracic Echo Complete W/ Cont if Necessary Per Protocol (With Agitated Saline)    Interpretation Summary  · Left ventricular wall thickness " is consistent with mild concentric hypertrophy.  · Estimated left ventricular EF = 65% Estimated left ventricular EF was in agreement with the calculated left ventricular EF. Left ventricular systolic function is normal.  · Left ventricular diastolic function is consistent with (grade I) impaired relaxation.  · Saline test results are negative for right to left atrial level shunt.  · Estimated right ventricular systolic pressure from tricuspid regurgitation is normal (<35 mmHg).      Imaging  Chest X-ray:   Imaging Results (Last 24 Hours)       Procedure Component Value Units Date/Time    XR Chest 1 View [[id number]] Resulted: 06/12/24 1436     Updated: 06/12/24 1436            Lab Review: I have reviewed the labs                      Results from last 7 days   Lab Units 06/12/24  1422   WBC 10*3/mm3 8.85   HEMOGLOBIN g/dL 10.7*   HEMATOCRIT % 35.1   PLATELETS 10*3/mm3 216                 Jose Shirley MD  6/12/2024, 14:38 EDT      EMR Dragon/Transcription:   Dictated utilizing Dragon dictation

## 2024-06-12 NOTE — H&P
[facility] Medicine Services  History & Physical    Patient Name: Dora Solorio  : 1950  MRN: [id number]  Primary Care Physician:  Bhargavi Rodriguez APRN  Date of admission: 2024  Date and Time of Service: 2024 at 3pm    Subjective      Chief Complaint: Shortness of breath, dizziness and leg weakness    History of Present Illness: Dora Solorio is a 73 y.o. female with a CMH of hypertension, hyperlipidemia, depression, diabetes who presented to [facility] on 2024 with shortness of breath, dizziness and leg weakness for several hours this afternoon..  The patient denied any similar episodes before.  In the ED the patient heart rate was found to be in the 30s with stable blood pressure.  The patient was evaluated by cardiology and decided to take the patient to the cardiac cath for transvenous pacemaker.  The patient denied any other complaints.  In the ED: Heart rate was 37, blood pressure  104/55 saturating 98% on room air and respirations 17  Hemoglobin 10.7  Chest x-ray with borderline cardiomegaly  BMP is pending      Review of Systems  Ten point ROS: reviewed negative, unless as noted in HPI.    Personal History     Past Medical History:   Diagnosis Date    Adjustment disorder with depressed mood 2018    Allergic Years    Sulfa drugs    Anxiety     Arthritis Years    Lotsvof joints. Mostly back, knees, hips, fingers    Cataract 2023    Depression 2018    Diabetes mellitus, type II 2013    Essential hypertension 2013    Gout 2014    Heart attack     Hyperlipidemia 2020    Hypertension     Inflammatory bowel disease     Irritable bowel syndrome without diarrhea 2017    Kidney disease     Kidney stone ?2016?    In hospital overnight    Osteopenia 2017    Peripheral neuropathy 2017    Renal insufficiency 2023    Stage 3       Past Surgical History:   Procedure Laterality Date    ANKLE OPEN REDUCTION INTERNAL  FIXATION Left 2024    Procedure: ANKLE OPEN REDUCTION INTERNAL FIXATION;  Surgeon: Eriberto Muñoz MD;  Location: [facility] MAIN OR;  Service: Orthopedics;  Laterality: Left;     SECTION      x2    COLON SURGERY      ENDOMETRIAL ABLATION      FRACTURE SURGERY  24    KNEE ARTHROSCOPY Bilateral     TUBAL ABDOMINAL LIGATION  1973       Family History: family history includes Colon cancer in her mother; Diabetes in her daughter and mother; Epilepsy in her mother; Hypertension in her mother; No Known Problems in her maternal grandfather, maternal grandmother, paternal grandfather, paternal grandmother, and sister; Other in her maternal uncle. She was adopted. Otherwise pertinent FHx was reviewed and not pertinent to current issue.    Social History:  reports that she has never smoked. She has never been exposed to tobacco smoke. She has never used smokeless tobacco. She reports that she does not drink alcohol and does not use drugs.    Home Medications:  Prior to Admission Medications       Prescriptions Last Dose Informant Patient Reported? Taking?    allopurinol (ZYLOPRIM) 100 MG tablet   Yes No    Patient not taking:  Reported on 2/15/2024    amLODIPine (NORVASC) 10 MG tablet   No No    Take 1 tablet by mouth once daily    atorvastatin (LIPITOR) 20 MG tablet   No No    Take 1 tablet by mouth once daily    bisoprolol (ZEBeta) 10 MG tablet   No No    Take 1 tablet by mouth once daily    buPROPion XL (WELLBUTRIN XL) 150 MG 24 hr tablet   No No    Take 1 tablet by mouth once daily    Cyanocobalamin (Vitamin B-12) 1000 MCG sublingual tablet   No No    Place 1 tablet under the tongue Daily.    escitalopram (LEXAPRO) 20 MG tablet   No No    Take 1 tablet by mouth once daily    gabapentin (NEURONTIN) 600 MG tablet   No No    TAKE 1 TABLET BY MOUTH THREE TIMES DAILY    hydroCHLOROthiazide 50 MG tablet   No No    Take 1 tablet by mouth once daily    Insulin Degludec FlexTouch 100 UNIT/ML solution  pen-injector   No No    INJECT 20 UNITS SUBCUTANEOUSLY IN THE EVENING    metFORMIN (GLUCOPHAGE) 1000 MG tablet   No No    TAKE 1 TABLET BY MOUTH TWICE DAILY WITH MEALS    sodium bicarbonate 325 MG tablet   Yes No    Take 1 tablet by mouth 4 (Four) Times a Day.    vitamin D (ERGOCALCIFEROL) 1.25 MG (02618 UT) capsule capsule   Yes No              Allergies:  Allergies   Allergen Reactions    Oxycodone Mental Status Change and Hallucinations    Sulfa Antibiotics Rash    Allopurinol Nausea And Vomiting    Morphine Hallucinations       Objective      Vitals:   Temp:  [98 °F (36.7 °C)] 98 °F (36.7 °C)  Heart Rate:  [32-39] 37  Resp:  [17] 17  BP: ()/(55-85) 121/85  Body mass index is 33.81 kg/m².  Physical Exam    General: Well appearing, and in no distress..  Cardiovascular:  normal rate, , normal S1, S2, no murmurs.    No peripheral edema.    Respiratory:  dimished breath sounds,  no wheezes, rales or rhonchi  Gastrointestinal: Soft, nontender, nondistended  Skin: Warm, Dry,  Neurologic: No focal findings or movement disorder noted.  Mental status: Alert, Oriented x3, Coherent,   Appropriate Affect, No agitation.     Diagnostic Data:  Lab Results (last 24 hours)       Procedure Component Value Units Date/Time    Basic Metabolic Panel [[id number]] Collected: 06/12/24 1459    Specimen: Blood Updated: 06/12/24 1459    High Sensitivity Troponin T [[id number]] Collected: 06/12/24 1459    Specimen: Blood Updated: 06/12/24 1459    TSH [[id number]] Collected: 06/12/24 1459    Specimen: Blood Updated: 06/12/24 1459    Magnesium [[id number]] Collected: 06/12/24 1459    Specimen: Blood Updated: 06/12/24 1459    CBC & Differential [[id number]]  (Abnormal) Collected: 06/12/24 1422    Specimen: Blood Updated: 06/12/24 1430    Narrative:      The following orders were created for panel order CBC & Differential.  Procedure                               Abnormality         Status                     ---------                                -----------         ------                     CBC Auto Differential[[id number]]        Abnormal            Final result                 Please view results for these tests on the individual orders.    CBC Auto Differential [[id number]]  (Abnormal) Collected: 06/12/24 1422    Specimen: Blood Updated: 06/12/24 1430     WBC 8.85 10*3/mm3      RBC 4.13 10*6/mm3      Hemoglobin 10.7 g/dL      Hematocrit 35.1 %      MCV 85.0 fL      MCH 25.9 pg      MCHC 30.5 g/dL      RDW 14.1 %      RDW-SD 43.8 fl      MPV 11.0 fL      Platelets 216 10*3/mm3      Neutrophil % 67.2 %      Lymphocyte % 21.0 %      Monocyte % 6.7 %      Eosinophil % 4.4 %      Basophil % 0.5 %      Immature Grans % 0.2 %      Neutrophils, Absolute 5.95 10*3/mm3      Lymphocytes, Absolute 1.86 10*3/mm3      Monocytes, Absolute 0.59 10*3/mm3      Eosinophils, Absolute 0.39 10*3/mm3      Basophils, Absolute 0.04 10*3/mm3      Immature Grans, Absolute 0.02 10*3/mm3      nRBC 0.0 /100 WBC              Imaging Results (Last 24 Hours)       Procedure Component Value Units Date/Time    XR Chest 1 View [[id number]] Collected: 06/12/24 1443     Updated: 06/12/24 1447    Narrative:      XR CHEST 1 VW    Date of Exam: 6/12/2024 2:30 PM EDT    Indication: Chest pain    Comparison: 1/4/2024.    Findings:  The heart is borderline enlarged. The right hemidiaphragm is slightly elevated. The lungs and pleural spaces are clear. The pulmonary vascular markings are normal. There are chronic age-related changes involving the bony thorax and thoracic aorta.      Impression:      Impression:    1. Borderline cardiomegaly.  2. No active disease.      Electronically Signed: Ángel Pinto MD    6/12/2024 2:45 PM EDT    Workstation ID: [id number]              Assessment & Plan        This is a 73 y.o. female with:    Active and Resolved Problems  Active Hospital Problems    Diagnosis  POA    **Symptomatic bradycardia [R00.1]  Yes    Bradycardia [R00.1]  Unknown    Acute hypotension  [I95.9]  Unknown      Resolved Hospital Problems   No resolved problems to display.       Symptomatic bradycardia  Junctional bradycardia  Recurrent history of dizziness  The patient was evaluated by cardiology in the ED and recommending temporary pacemaker  Echo  Serial troponin    Patient has past medical history of hypertension, hyperlipidemia, diabetes and CKD  Home medications were not verified at the time of my evaluation  Please reconcile home medications once verified    VTE Prophylaxis:  Mechanical VTE prophylaxis orders are present.        The patient desires to be as follows:    CODE STATUS:    Level Of Support Discussed With: Patient  Code Status (Patient has no pulse and is not breathing): CPR (Attempt to Resuscitate)  Medical Interventions (Patient has pulse or is breathing): Full Support          Admission Status:  I believe this patient meets inpatient status.    Expected Length of Stay: 2-3 days    PDMP and Medication Dispenses via Sidebar reviewed and consistent with patient reported medications.    I discussed the patient's findings and my recommendations with patient and family.      Signature:     This document has been electronically signed by Deysi Monk MD on June 12, 2024 15:04 EDT   [facility] Hospitalist Team

## 2024-06-13 ENCOUNTER — APPOINTMENT (OUTPATIENT)
Dept: CARDIOLOGY | Facility: HOSPITAL | Age: 74
DRG: 309 | End: 2024-06-13
Payer: MEDICARE

## 2024-06-13 LAB
ANION GAP SERPL CALCULATED.3IONS-SCNC: 8.5 MMOL/L (ref 5–15)
AORTIC DIMENSIONLESS INDEX: 0.76 (DI)
BASOPHILS # BLD AUTO: 0.02 10*3/MM3 (ref 0–0.2)
BASOPHILS NFR BLD AUTO: 0.3 % (ref 0–1.5)
BH CV ECHO LEFT VENTRICLE GLOBAL LONGITUDINAL STRAIN: -11.2 %
BH CV ECHO MEAS - AO MAX PG: 12.1 MMHG
BH CV ECHO MEAS - AO MEAN PG: 7 MMHG
BH CV ECHO MEAS - AO V2 MAX: 174 CM/SEC
BH CV ECHO MEAS - AO V2 VTI: 30.8 CM
BH CV ECHO MEAS - AVA(I,D): 2.31 CM2
BH CV ECHO MEAS - EDV(CUBED): 91.1 ML
BH CV ECHO MEAS - EDV(MOD-SP4): 78.5 ML
BH CV ECHO MEAS - EF(MOD-SP4): 60.3 %
BH CV ECHO MEAS - ESV(CUBED): 29.8 ML
BH CV ECHO MEAS - ESV(MOD-SP4): 31.2 ML
BH CV ECHO MEAS - FS: 31.1 %
BH CV ECHO MEAS - IVS/LVPW: 0.92 CM
BH CV ECHO MEAS - IVSD: 1.1 CM
BH CV ECHO MEAS - LA DIMENSION: 3.7 CM
BH CV ECHO MEAS - LAT PEAK E' VEL: 13.7 CM/SEC
BH CV ECHO MEAS - LV MASS(C)D: 186.4 GRAMS
BH CV ECHO MEAS - LV MAX PG: 7.1 MMHG
BH CV ECHO MEAS - LV MEAN PG: 4 MMHG
BH CV ECHO MEAS - LV V1 MAX: 133 CM/SEC
BH CV ECHO MEAS - LV V1 VTI: 31.4 CM
BH CV ECHO MEAS - LVIDD: 4.5 CM
BH CV ECHO MEAS - LVIDS: 3.1 CM
BH CV ECHO MEAS - LVOT AREA: 2.27 CM2
BH CV ECHO MEAS - LVOT DIAM: 1.7 CM
BH CV ECHO MEAS - LVPWD: 1.2 CM
BH CV ECHO MEAS - MED PEAK E' VEL: 14.1 CM/SEC
BH CV ECHO MEAS - MV DEC SLOPE: 679 CM/SEC2
BH CV ECHO MEAS - MV DEC TIME: 0.16 SEC
BH CV ECHO MEAS - MV E MAX VEL: 147 CM/SEC
BH CV ECHO MEAS - MV MAX PG: 5.8 MMHG
BH CV ECHO MEAS - MV MEAN PG: 3 MMHG
BH CV ECHO MEAS - MV P1/2T: 54.4 MSEC
BH CV ECHO MEAS - MV V2 VTI: 25.3 CM
BH CV ECHO MEAS - MVA(P1/2T): 4 CM2
BH CV ECHO MEAS - MVA(VTI): 2.8 CM2
BH CV ECHO MEAS - PA V2 MAX: 139.5 CM/SEC
BH CV ECHO MEAS - QP/QS: 0.46
BH CV ECHO MEAS - RAP SYSTOLE: 3 MMHG
BH CV ECHO MEAS - RV MAX PG: 1.55 MMHG
BH CV ECHO MEAS - RV V1 MAX: 62.3 CM/SEC
BH CV ECHO MEAS - RV V1 VTI: 9.5 CM
BH CV ECHO MEAS - RVDD: 3.8 CM
BH CV ECHO MEAS - RVOT DIAM: 2.1 CM
BH CV ECHO MEAS - RVSP: 28.8 MMHG
BH CV ECHO MEAS - SV(LVOT): 71.3 ML
BH CV ECHO MEAS - SV(MOD-SP4): 47.3 ML
BH CV ECHO MEAS - SV(RVOT): 32.8 ML
BH CV ECHO MEAS - TAPSE (>1.6): 2.06 CM
BH CV ECHO MEAS - TR MAX PG: 25.8 MMHG
BH CV ECHO MEAS - TR MAX VEL: 254 CM/SEC
BH CV ECHO MEAS RV FREE WALL STRAIN: -9.5 %
BH CV ECHO MEASUREMENTS AVERAGE E/E' RATIO: 10.58
BH CV XLRA - TDI S': 9.5 CM/SEC
BUN SERPL-MCNC: 17 MG/DL (ref 8–23)
BUN/CREAT SERPL: 19.1 (ref 7–25)
C AURIS DNA SPEC QL NAA+NON-PROBE: NOT DETECTED
CALCIUM SPEC-SCNC: 6.8 MG/DL (ref 8.6–10.5)
CHLORIDE SERPL-SCNC: 113 MMOL/L (ref 98–107)
CHOLEST SERPL-MCNC: 99 MG/DL (ref 0–200)
CO2 SERPL-SCNC: 20.5 MMOL/L (ref 22–29)
CREAT SERPL-MCNC: 0.89 MG/DL (ref 0.57–1)
DEPRECATED RDW RBC AUTO: 42.7 FL (ref 37–54)
EGFRCR SERPLBLD CKD-EPI 2021: 67.7 ML/MIN/1.73
EOSINOPHIL # BLD AUTO: 0.08 10*3/MM3 (ref 0–0.4)
EOSINOPHIL NFR BLD AUTO: 1 % (ref 0.3–6.2)
ERYTHROCYTE [DISTWIDTH] IN BLOOD BY AUTOMATED COUNT: 13.9 % (ref 12.3–15.4)
GLUCOSE BLDC GLUCOMTR-MCNC: 103 MG/DL (ref 70–105)
GLUCOSE BLDC GLUCOMTR-MCNC: 140 MG/DL (ref 70–105)
GLUCOSE BLDC GLUCOMTR-MCNC: 186 MG/DL (ref 70–105)
GLUCOSE BLDC GLUCOMTR-MCNC: 220 MG/DL (ref 70–105)
GLUCOSE BLDC GLUCOMTR-MCNC: 83 MG/DL (ref 70–105)
GLUCOSE SERPL-MCNC: 98 MG/DL (ref 65–99)
HBA1C MFR BLD: 8.36 % (ref 4.8–5.6)
HCT VFR BLD AUTO: 29.4 % (ref 34–46.6)
HDLC SERPL-MCNC: 32 MG/DL (ref 40–60)
HGB BLD-MCNC: 8.9 G/DL (ref 12–15.9)
IMM GRANULOCYTES # BLD AUTO: 0.02 10*3/MM3 (ref 0–0.05)
IMM GRANULOCYTES NFR BLD AUTO: 0.3 % (ref 0–0.5)
LDLC SERPL CALC-MCNC: 46 MG/DL (ref 0–100)
LDLC/HDLC SERPL: 1.38 {RATIO}
LYMPHOCYTES # BLD AUTO: 1.52 10*3/MM3 (ref 0.7–3.1)
LYMPHOCYTES NFR BLD AUTO: 19.1 % (ref 19.6–45.3)
MCH RBC QN AUTO: 25.5 PG (ref 26.6–33)
MCHC RBC AUTO-ENTMCNC: 30.3 G/DL (ref 31.5–35.7)
MCV RBC AUTO: 84.2 FL (ref 79–97)
MONOCYTES # BLD AUTO: 0.53 10*3/MM3 (ref 0.1–0.9)
MONOCYTES NFR BLD AUTO: 6.7 % (ref 5–12)
NEUTROPHILS NFR BLD AUTO: 5.79 10*3/MM3 (ref 1.7–7)
NEUTROPHILS NFR BLD AUTO: 72.6 % (ref 42.7–76)
NRBC BLD AUTO-RTO: 0 /100 WBC (ref 0–0.2)
PLATELET # BLD AUTO: 145 10*3/MM3 (ref 140–450)
PMV BLD AUTO: 10.3 FL (ref 6–12)
POTASSIUM SERPL-SCNC: 3.6 MMOL/L (ref 3.5–5.2)
QT INTERVAL: 420 MS
QT INTERVAL: 422 MS
QT INTERVAL: 549 MS
QTC INTERVAL: 399 MS
QTC INTERVAL: 435 MS
QTC INTERVAL: 438 MS
RBC # BLD AUTO: 3.49 10*6/MM3 (ref 3.77–5.28)
SINUS: 3 CM
SODIUM SERPL-SCNC: 142 MMOL/L (ref 136–145)
TRIGL SERPL-MCNC: 114 MG/DL (ref 0–150)
VLDLC SERPL-MCNC: 21 MG/DL (ref 5–40)
WBC NRBC COR # BLD AUTO: 7.96 10*3/MM3 (ref 3.4–10.8)

## 2024-06-13 PROCEDURE — 63710000001 INSULIN LISPRO (HUMAN) PER 5 UNITS: Performed by: INTERNAL MEDICINE

## 2024-06-13 PROCEDURE — 82948 REAGENT STRIP/BLOOD GLUCOSE: CPT

## 2024-06-13 PROCEDURE — 93306 TTE W/DOPPLER COMPLETE: CPT | Performed by: INTERNAL MEDICINE

## 2024-06-13 PROCEDURE — 82948 REAGENT STRIP/BLOOD GLUCOSE: CPT | Performed by: INTERNAL MEDICINE

## 2024-06-13 PROCEDURE — 87147 CULTURE TYPE IMMUNOLOGIC: CPT | Performed by: NURSE PRACTITIONER

## 2024-06-13 PROCEDURE — 63710000001 INSULIN GLARGINE PER 5 UNITS: Performed by: NURSE PRACTITIONER

## 2024-06-13 PROCEDURE — 63710000001 INSULIN LISPRO (HUMAN) PER 5 UNITS: Performed by: NURSE PRACTITIONER

## 2024-06-13 PROCEDURE — 85025 COMPLETE CBC W/AUTO DIFF WBC: CPT | Performed by: INTERNAL MEDICINE

## 2024-06-13 PROCEDURE — 87205 SMEAR GRAM STAIN: CPT | Performed by: NURSE PRACTITIONER

## 2024-06-13 PROCEDURE — 87186 SC STD MICRODIL/AGAR DIL: CPT | Performed by: NURSE PRACTITIONER

## 2024-06-13 PROCEDURE — 80061 LIPID PANEL: CPT | Performed by: INTERNAL MEDICINE

## 2024-06-13 PROCEDURE — 25010000002 HEPARIN (PORCINE) PER 1000 UNITS: Performed by: INTERNAL MEDICINE

## 2024-06-13 PROCEDURE — 93356 MYOCRD STRAIN IMG SPCKL TRCK: CPT | Performed by: INTERNAL MEDICINE

## 2024-06-13 PROCEDURE — 93306 TTE W/DOPPLER COMPLETE: CPT

## 2024-06-13 PROCEDURE — 93356 MYOCRD STRAIN IMG SPCKL TRCK: CPT

## 2024-06-13 PROCEDURE — 87070 CULTURE OTHR SPECIMN AEROBIC: CPT | Performed by: NURSE PRACTITIONER

## 2024-06-13 PROCEDURE — 99232 SBSQ HOSP IP/OBS MODERATE 35: CPT | Performed by: INTERNAL MEDICINE

## 2024-06-13 PROCEDURE — 93005 ELECTROCARDIOGRAM TRACING: CPT | Performed by: INTERNAL MEDICINE

## 2024-06-13 PROCEDURE — 83036 HEMOGLOBIN GLYCOSYLATED A1C: CPT | Performed by: INTERNAL MEDICINE

## 2024-06-13 PROCEDURE — 25810000003 SODIUM CHLORIDE 0.9 % SOLUTION: Performed by: INTERNAL MEDICINE

## 2024-06-13 PROCEDURE — 80048 BASIC METABOLIC PNL TOTAL CA: CPT | Performed by: INTERNAL MEDICINE

## 2024-06-13 RX ORDER — DEXTROSE MONOHYDRATE 25 G/50ML
25 INJECTION, SOLUTION INTRAVENOUS
Status: DISCONTINUED | OUTPATIENT
Start: 2024-06-13 | End: 2024-06-14 | Stop reason: HOSPADM

## 2024-06-13 RX ORDER — INSULIN LISPRO 100 [IU]/ML
2-9 INJECTION, SOLUTION INTRAVENOUS; SUBCUTANEOUS
Status: DISCONTINUED | OUTPATIENT
Start: 2024-06-13 | End: 2024-06-14 | Stop reason: HOSPADM

## 2024-06-13 RX ORDER — INSULIN LISPRO 100 [IU]/ML
2-9 INJECTION, SOLUTION INTRAVENOUS; SUBCUTANEOUS EVERY 6 HOURS SCHEDULED
Status: DISCONTINUED | OUTPATIENT
Start: 2024-06-13 | End: 2024-06-13

## 2024-06-13 RX ORDER — IBUPROFEN 600 MG/1
1 TABLET ORAL
Status: DISCONTINUED | OUTPATIENT
Start: 2024-06-13 | End: 2024-06-14 | Stop reason: HOSPADM

## 2024-06-13 RX ORDER — ACETAMINOPHEN 650 MG
TABLET, EXTENDED RELEASE ORAL DAILY
Status: DISCONTINUED | OUTPATIENT
Start: 2024-06-13 | End: 2024-06-14 | Stop reason: HOSPADM

## 2024-06-13 RX ORDER — INSULIN DEGLUDEC 100 U/ML
INJECTION, SOLUTION SUBCUTANEOUS
Qty: 18 ML | Refills: 0 | Status: SHIPPED | OUTPATIENT
Start: 2024-06-13

## 2024-06-13 RX ORDER — ATORVASTATIN CALCIUM 40 MG/1
40 TABLET, FILM COATED ORAL DAILY
Status: DISCONTINUED | OUTPATIENT
Start: 2024-06-14 | End: 2024-06-14 | Stop reason: HOSPADM

## 2024-06-13 RX ORDER — NICOTINE POLACRILEX 4 MG
15 LOZENGE BUCCAL
Status: DISCONTINUED | OUTPATIENT
Start: 2024-06-13 | End: 2024-06-14 | Stop reason: HOSPADM

## 2024-06-13 RX ORDER — HEPARIN SODIUM 5000 [USP'U]/ML
5000 INJECTION, SOLUTION INTRAVENOUS; SUBCUTANEOUS EVERY 8 HOURS SCHEDULED
Status: DISCONTINUED | OUTPATIENT
Start: 2024-06-13 | End: 2024-06-14 | Stop reason: HOSPADM

## 2024-06-13 RX ADMIN — INSULIN LISPRO 4 UNITS: 100 INJECTION, SOLUTION INTRAVENOUS; SUBCUTANEOUS at 20:27

## 2024-06-13 RX ADMIN — HEPARIN SODIUM 5000 UNITS: 5000 INJECTION INTRAVENOUS; SUBCUTANEOUS at 13:52

## 2024-06-13 RX ADMIN — GABAPENTIN 300 MG: 300 CAPSULE ORAL at 20:27

## 2024-06-13 RX ADMIN — GABAPENTIN 300 MG: 300 CAPSULE ORAL at 10:31

## 2024-06-13 RX ADMIN — SODIUM CHLORIDE 100 ML/HR: 900 INJECTION, SOLUTION INTRAVENOUS at 02:36

## 2024-06-13 RX ADMIN — HEPARIN SODIUM 5000 UNITS: 5000 INJECTION INTRAVENOUS; SUBCUTANEOUS at 22:00

## 2024-06-13 RX ADMIN — POVIDONE-IODINE: 10 SOLUTION TOPICAL at 13:53

## 2024-06-13 RX ADMIN — BUPROPION HYDROCHLORIDE 150 MG: 150 TABLET, EXTENDED RELEASE ORAL at 10:32

## 2024-06-13 RX ADMIN — AMLODIPINE BESYLATE 10 MG: 5 TABLET ORAL at 10:32

## 2024-06-13 RX ADMIN — INSULIN GLARGINE 23 UNITS: 100 INJECTION, SOLUTION SUBCUTANEOUS at 00:58

## 2024-06-13 RX ADMIN — ATORVASTATIN CALCIUM 20 MG: 20 TABLET, FILM COATED ORAL at 10:31

## 2024-06-13 RX ADMIN — INSULIN LISPRO 2 UNITS: 100 INJECTION, SOLUTION INTRAVENOUS; SUBCUTANEOUS at 18:44

## 2024-06-13 RX ADMIN — ESCITALOPRAM OXALATE 20 MG: 10 TABLET ORAL at 10:32

## 2024-06-13 NOTE — NURSING NOTE
WOCN note:    73 yr old female admitted 6/12/24 with symptomatic bradycardia. Patient has a hx of HTN, hyperlipidemia, DM, CKD and gout. She underwent TVP placement yesterday. WOCN consult received for RUE and right foot wounds.     Patient presents with three dry crusted wounds to her right upper arm with surrounding erythema. There is no fluctuance or induration noted. Patient states etiology is unknown but family at the bedside feels that patient picks at that arm and has had these types of wounds in the past. Patient denies pain. The area was soaked with Vashe soaked gauze pads for 10 min and left open to air.     There is also a thick hard callus to the right plantar second toe. Patient reports the callus splits occasionally. She sees Dr. Lundberg with podiatry although not routinely. She was instructed to make an appointment for callus paring to prevent a wound from forming d/t pressure with ambulation. The callus was painted with Betadine and left open to air.   Recommend to continue wound care as above daily and we will follow as needed.

## 2024-06-13 NOTE — PROGRESS NOTES
"NEPHROLOGY PROGRESS NOTE------[facility]    [facility]  [phone]  Rob Wick MD      Patient Care Team:  Bhargavi Rodriguez APRN as PCP - Rob Ann MD as Consulting Physician (Nephrology)      Provider:  Rob Wick MD  Patient Name: Dora Solorio  :  1950    SUBJECTIVE:  Follow-up CKD/hyperkalemia  No chest pain shortness of breath  Off temporary pacemaker.    Medication:  amLODIPine, 10 mg, Oral, Daily  [START ON 2024] atorvastatin, 40 mg, Oral, Daily  [Held by provider] bisoprolol, 10 mg, Oral, Daily  buPROPion XL, 150 mg, Oral, Daily  escitalopram, 20 mg, Oral, Daily  gabapentin, 300 mg, Oral, Q12H  heparin (porcine), 5,000 Units, Subcutaneous, Q8H  insulin lispro, 2-9 Units, Subcutaneous, Q6H  povidone-iodine, , Topical, Daily  [Transfer Hold] senna-docusate sodium, 2 tablet, Oral, BID  [Transfer Hold] sodium chloride, 10 mL, Intravenous, Q12H           OBJECTIVE    Vital Sign Min/Max for last 24 hours  Temp  Min: 97.7 °F (36.5 °C)  Max: 99.1 °F (37.3 °C)   BP  Min: 85/59  Max: 155/68   Pulse  Min: 30  Max: 94   Resp  Min: 12  Max: 18   SpO2  Min: 94 %  Max: 100 %   No data recorded   Weight  Min: 89.4 kg (197 lb)  Max: 91.1 kg (200 lb 13.4 oz)     Flowsheet Rows      Flowsheet Row First Filed Value   Admission Height 162.6 cm (64\") Documented at 2024 1425   Admission Weight 89.4 kg (197 lb) Documented at 2024 1425            I/O this shift:  In: -   Out: 950 [Urine:950]  I/O last 3 completed shifts:  In: 1213 [I.V.:1213]  Out:  [Urine:2000]    Physical Exam:  General Appearance: alert, appears stated age and cooperative  Head: normocephalic, without obvious abnormality and atraumatic  Eyes: conjunctivae and sclerae normal and no icterus  Neck: supple and no JVD  Lungs: clear to auscultation and respirations regular  Heart: regular rhythm & normal rate and normal S1, S2  Chest: Wall no " "abnormalities observed  Abdomen: normal bowel sounds and soft, nontender  Extremities: moves extremities well, no edema, no cyanosis and no redness  Skin: no bleeding, bruising or rash, turgor normal, color normal and no lesions noted  Neurologic: Alert, and oriented. No focal deficits    Labs:    WBC WBC   Date Value Ref Range Status   06/13/2024 7.96 3.40 - 10.80 10*3/mm3 Final   06/12/2024 8.85 3.40 - 10.80 10*3/mm3 Final      HGB Hemoglobin   Date Value Ref Range Status   06/13/2024 8.9 (L) 12.0 - 15.9 g/dL Final   06/12/2024 10.7 (L) 12.0 - 15.9 g/dL Final      HCT Hematocrit   Date Value Ref Range Status   06/13/2024 29.4 (L) 34.0 - 46.6 % Final   06/12/2024 35.1 34.0 - 46.6 % Final      Platelets No results found for: \"LABPLAT\"   MCV MCV   Date Value Ref Range Status   06/13/2024 84.2 79.0 - 97.0 fL Final   06/12/2024 85.0 79.0 - 97.0 fL Final          Sodium Sodium   Date Value Ref Range Status   06/13/2024 142 136 - 145 mmol/L Final   06/12/2024 139 136 - 145 mmol/L Final   06/12/2024 140 136 - 145 mmol/L Final      Potassium Potassium   Date Value Ref Range Status   06/13/2024 3.6 3.5 - 5.2 mmol/L Final   06/12/2024 5.3 (H) 3.5 - 5.2 mmol/L Final     Comment:     Specimen hemolyzed.  Result may be falsely elevated.   06/12/2024 6.3 (C) 3.5 - 5.2 mmol/L Final      Chloride Chloride   Date Value Ref Range Status   06/13/2024 113 (H) 98 - 107 mmol/L Final   06/12/2024 105 98 - 107 mmol/L Final   06/12/2024 108 (H) 98 - 107 mmol/L Final      CO2 CO2   Date Value Ref Range Status   06/13/2024 20.5 (L) 22.0 - 29.0 mmol/L Final   06/12/2024 22.6 22.0 - 29.0 mmol/L Final   06/12/2024 23.4 22.0 - 29.0 mmol/L Final      BUN BUN   Date Value Ref Range Status   06/13/2024 17 8 - 23 mg/dL Final   06/12/2024 22 8 - 23 mg/dL Final   06/12/2024 21 8 - 23 mg/dL Final      Creatinine Creatinine   Date Value Ref Range Status   06/13/2024 0.89 0.57 - 1.00 mg/dL Final   06/12/2024 1.26 (H) 0.57 - 1.00 mg/dL Final   06/12/2024 " "1.44 (H) 0.57 - 1.00 mg/dL Final      Calcium Calcium   Date Value Ref Range Status   06/13/2024 6.8 (L) 8.6 - 10.5 mg/dL Final   06/12/2024 8.8 8.6 - 10.5 mg/dL Final   06/12/2024 8.5 (L) 8.6 - 10.5 mg/dL Final      PO4 No components found for: \"PO4\"   Albumin No results found for: \"ALBUMIN\"   Magnesium Magnesium   Date Value Ref Range Status   06/12/2024 1.9 1.6 - 2.4 mg/dL Final      Uric Acid No components found for: \"URIC ACID\"     Imaging Results (Last 72 Hours)       Procedure Component Value Units Date/Time    XR Chest 1 View [[id number]] Collected: 06/12/24 1443     Updated: 06/12/24 1447    Narrative:      XR CHEST 1 VW    Date of Exam: 6/12/2024 2:30 PM EDT    Indication: Chest pain    Comparison: 1/4/2024.    Findings:  The heart is borderline enlarged. The right hemidiaphragm is slightly elevated. The lungs and pleural spaces are clear. The pulmonary vascular markings are normal. There are chronic age-related changes involving the bony thorax and thoracic aorta.      Impression:      Impression:    1. Borderline cardiomegaly.  2. No active disease.      Electronically Signed: Ángel Pinto MD    6/12/2024 2:45 PM EDT    Workstation ID: [id number]            Results for orders placed during the hospital encounter of 06/12/24    XR Chest 1 View    Narrative  XR CHEST 1 VW    Date of Exam: 6/12/2024 2:30 PM EDT    Indication: Chest pain    Comparison: 1/4/2024.    Findings:  The heart is borderline enlarged. The right hemidiaphragm is slightly elevated. The lungs and pleural spaces are clear. The pulmonary vascular markings are normal. There are chronic age-related changes involving the bony thorax and thoracic aorta.    Impression  Impression:    1. Borderline cardiomegaly.  2. No active disease.      Electronically Signed: Ángel Pinto MD  6/12/2024 2:45 PM EDT  Workstation ID: [id number]      Results for orders placed in visit on 05/09/24    XR Ankle 3+ View Left    Narrative  left Ankle X-Ray  Indication: " Postop ORIF ankle  Views: AP, Mortise Lateral  Findings: Healed ankle fracture hardware in position  no fracture  no bony lesion  Soft tissues normal  normal joint spaces  Hardware appropriately positioned yes    yes prior studies available for comparison.    X-RAY was ordered and reviewed by Eriberto Muñoz MD      Results for orders placed in visit on 03/28/24    XR Ankle 3+ View Left    Narrative  left Ankle X-Ray  Indication: Postop ORIF ankle  Views: AP, Mortise Lateral  Findings: Maintenance of fracture reduction hardware position intact mortise with blurring of the lateral malleolus fracture lines  yes fracture  no bony lesion  Soft tissues normal  normal joint spaces  Hardware appropriately positioned yes    yes prior studies available for comparison.    X-RAY was ordered and reviewed by Eriberto Muñoz MD            ASSESSMENT / PLAN      Symptomatic bradycardia    Bradycardia    Acute hypotension    CKD stage III-CKD due to hypertensive nephrosclerosis and/or diabetic glomerulosclerosis  Hyperkalemia-due to ACE inhibitor use and high dietary potassium intake.  Received Lokelma and treated with insulin and D50.  Bradycardia-off Bystolic.  Required temporary pacemaker.  Cardiology following  Diabetes type 2  Hypertension-blood pressure low, hold antihypertensives    Creatinine stable  Potassium improved  Stop Lokelma and IV fluids  Heart rate in the 60s  Blood pressure up, could be resumed on lisinopril with hydrochlorothiazide, has tolerated previously.  Have counseled on low potassium diet.  Labs in ..      Rob Wick MD  [facility]  [phone]  06/13/24  12:23 EDT

## 2024-06-13 NOTE — CONSULTS
Critical Care Consult Note   Dora Solorio : 1950 MRN:[id number] LOS:0 ROOM: [facility]/1     Reason for admission: Symptomatic bradycardia     Assessment / Plan     Symptomatic bradycardia, junctional rhythm -resolved  Dizziness -resolved  Status post TVP placement; cardiology following for possible permanent pacemaker  Blood pressure stable    Hyperkalemia  Medical treatment with lasix, lokelma, Insulin 8 units + D50, 10mg of albuterol  Will repeat BMP in 4 hours  EKG with peaked T-waves; repeat pending  Nephrology following    Right arm wounds  Patient does not meet SIRS criteria  Patient with wounds x 3 to right arm, red with no drainage  Will do wound culture to check for MRSA; monitor off abx for now  Wound consult    Chronic:  Hypertension: Continue home amlodipine; home lisinopril on hold  Hyperlipidemia: Continue home atorvastatin  Type 2 diabetes mellitus: Continue home Lantus; SSI for coverage  Anxiety disorder: Continue home Wellbutrin  Diabetic neuropathy: Continue home gabapentin    Level Of Support Discussed With: Patient  Code Status (Patient has no pulse and is not breathing): CPR (Attempt to Resuscitate)  Medical Interventions (Patient has pulse or is breathing): Full Support       Nutrition: NPO Diet NPO Type: Strict NPO  Diet: Cardiac, Diabetic; Healthy Heart (2-3 Na+); Consistent Carbohydrate; Fluid Consistency: Thin (IDDSI 0) Patient isn't on Tube Feeding     VTE Prophylaxis:  Mechanical VTE prophylaxis orders are present.         History of Present illness     A 73 y.o. old female patient with PMH of hypertension, hyperlipidemia, anxiety disorder, type 2 diabetes melitis, diabetic neuropathy, chronic kidney disease stage III, vitamin D deficiency, and gout who presented to the hospital with complaints of shortness of breath and weakness.  Workup in the emergency room found patient with heart rate in the 30s, and stable blood pressure.  She was evaluated by cardiology and taken to Cath Lab  for transvenous pacemaker placement.  Nephrology was also consulted for hyperkalemia of 6.3.  Patient was initially admitted to hospitalist group, however after TVP was placed she was transferred to ICU for further workup and monitoring overnight.    ACP: Patient does not have advanced directive on file this facility.  She is alert and orient x 4 and declares herself full code.    Patient was seen and examined on 06/12/24 at 20:58 EDT .    Subjective / Review of systems     Review of Systems   Constitutional:  Negative for chills and fever.   HENT:  Negative for congestion and sore throat.    Respiratory:  Negative for cough and shortness of breath.    Cardiovascular:  Negative for chest pain and palpitations.   Gastrointestinal:  Positive for abdominal pain. Negative for nausea.   Endocrine: Negative for heat intolerance and polyuria.   Genitourinary:  Negative for dysuria and urgency.   Musculoskeletal:  Positive for back pain. Negative for arthralgias and myalgias.   Skin:  Negative for rash and wound.   Neurological:  Negative for weakness and numbness.   Psychiatric/Behavioral:  Negative for suicidal ideas. The patient is not nervous/anxious.         Past Medical/Surgical/Social/Family History & Allergies     Past Medical History:   Diagnosis Date    Adjustment disorder with depressed mood 09/12/2018    Allergic Years    Sulfa drugs    Anxiety     Arthritis Years    [place] joints. Mostly back, knees, hips, fingers    Cataract 7/2023    Depression 09/12/2018    Diabetes mellitus, type II 07/03/2013    Essential hypertension 07/03/2013    Gout 08/12/2014    Heart attack     Hyperlipidemia 06/02/2020    Hypertension     Inflammatory bowel disease     Irritable bowel syndrome without diarrhea 04/17/2017    Kidney disease     Kidney stone ?August 2016?    In hospital overnight    Osteopenia 04/17/2017    Peripheral neuropathy 04/17/2017    Renal insufficiency 9/2023    Stage 3      Past Surgical History:    Procedure Laterality Date    ANKLE OPEN REDUCTION INTERNAL FIXATION Left 2024    Procedure: ANKLE OPEN REDUCTION INTERNAL FIXATION;  Surgeon: Eriberto Muñoz MD;  Location: [facility] MAIN OR;  Service: Orthopedics;  Laterality: Left;     SECTION      x2    COLON SURGERY      ENDOMETRIAL ABLATION      FRACTURE SURGERY  24    KNEE ARTHROSCOPY Bilateral     TUBAL ABDOMINAL LIGATION  1973      Social History     Socioeconomic History    Marital status:      Spouse name: Rishabh    Number of children: 2    Years of education: 12   Tobacco Use    Smoking status: Never     Passive exposure: Never    Smokeless tobacco: Never   Vaping Use    Vaping status: Never Used   Substance and Sexual Activity    Alcohol use: Never    Drug use: Never    Sexual activity: Not Currently     Partners: Male     Birth control/protection: Other, Tubal ligation      Family History   Adopted: Yes   Problem Relation Age of Onset    Epilepsy Mother     Colon cancer Mother     Diabetes Mother     Hypertension Mother     No Known Problems Sister     No Known Problems Maternal Grandmother     No Known Problems Maternal Grandfather     No Known Problems Paternal Grandmother     No Known Problems Paternal Grandfather     Other Maternal Uncle         alzhimers    Diabetes Daughter         Mothets initials      Allergies   Allergen Reactions    Oxycodone Mental Status Change and Hallucinations    Sulfa Antibiotics Rash    Allopurinol Nausea And Vomiting    Morphine Hallucinations        Home Medications     Prior to Admission medications    Medication Sig Start Date End Date Taking? Authorizing Provider   bisoprolol (ZEBeta) 10 MG tablet Take 1 tablet by mouth once daily 3/26/24  Yes Bhargavi Rodriguez APRN   Cyanocobalamin (Vitamin B-12) 1000 MCG sublingual tablet Place 1 tablet under the tongue Daily. 8/3/23  Yes Bhargavi Rodriguez APRN   escitalopram (LEXAPRO) 20 MG tablet Take 1 tablet by mouth once daily 3/26/24   Yes Bhargavi Rodriguez APRN   gabapentin (NEURONTIN) 600 MG tablet Take 1 tablet by mouth 2 (Two) Times a Day.   Yes Jaspreet Beck MD   hydroCHLOROthiazide 50 MG tablet Take 1 tablet by mouth once daily 3/26/24  Yes Bhargavi Rodriguez APRN   insulin degludec (Tresiba FlexTouch) 100 UNIT/ML solution pen-injector injection Inject 20 Units under the skin into the appropriate area as directed Every Evening.   Yes Jaspreet Beck MD   metFORMIN (GLUCOPHAGE) 1000 MG tablet TAKE 1 TABLET BY MOUTH TWICE DAILY WITH MEALS 3/18/24  Yes Bhargavi Rodriguez APRN   sodium bicarbonate 650 MG tablet Take 1 tablet by mouth 2 (Two) Times a Day.   Yes Jaspreet Beck MD   gabapentin (NEURONTIN) 600 MG tablet TAKE 1 TABLET BY MOUTH THREE TIMES DAILY 5/20/24 6/12/24 Yes Bhargavi Rodriguez APRN   Insulin Degludec FlexTouch 100 UNIT/ML solution pen-injector INJECT 20 UNITS SUBCUTANEOUSLY IN THE EVENING 5/21/24 6/12/24 Yes Isauro Regalado MD   amLODIPine (NORVASC) 10 MG tablet Take 1 tablet by mouth once daily 11/19/23   Bhargavi Rodriguez APRN   atorvastatin (LIPITOR) 20 MG tablet Take 1 tablet by mouth once daily 6/25/23   Bhargavi Rodriguez APRN   buPROPion XL (WELLBUTRIN XL) 150 MG 24 hr tablet Take 1 tablet by mouth once daily 2/21/24   Bhargavi Rodriguez APRN   vitamin D (ERGOCALCIFEROL) 1.25 MG (73975 UT) capsule capsule  3/26/24   Jaspreet Beck MD   allopurinol (ZYLOPRIM) 100 MG tablet  1/16/24 6/12/24  Jaspreet Beck MD   sodium bicarbonate 325 MG tablet Take 1 tablet by mouth 4 (Four) Times a Day.  6/12/24  Jaspreet Beck MD        Objective / Physical Exam     Vital signs:  Temp: 97.7 °F (36.5 °C)  BP: 92/59  Heart Rate: 82  Resp: 16  SpO2: 95 %  Weight: 89.4 kg (197 lb)    Admission Weight: Weight: 89.4 kg (197 lb)    Physical Exam  Constitutional:       Appearance: Normal appearance. She is normal weight.   HENT:      Head: Normocephalic.      Nose: Nose normal.      Mouth/Throat:       Mouth: Mucous membranes are moist.   Eyes:      Extraocular Movements: Extraocular movements intact.      Pupils: Pupils are equal, round, and reactive to light.   Cardiovascular:      Rate and Rhythm: Normal rate and regular rhythm.      Pulses: Normal pulses.      Heart sounds: Normal heart sounds.      Comments: Paced  Pulmonary:      Effort: Pulmonary effort is normal.      Breath sounds: Normal breath sounds.   Abdominal:      General: Bowel sounds are normal.      Palpations: Abdomen is soft.   Musculoskeletal:         General: Normal range of motion.   Skin:     General: Skin is warm and dry.      Comments: TVP site noted-soft, no hematoma    Wounds to right proximal arm; left foot   Neurological:      General: No focal deficit present.      Mental Status: She is alert.   Psychiatric:         Mood and Affect: Mood normal.         Behavior: Behavior normal.          Labs     Results from last 7 days   Lab Units 06/12/24  1422   WBC 10*3/mm3 8.85   HEMATOCRIT % 35.1   PLATELETS 10*3/mm3 216      Results from last 7 days   Lab Units 06/12/24  1459   SODIUM mmol/L 140   POTASSIUM mmol/L 6.3*   CHLORIDE mmol/L 108*   CO2 mmol/L 23.4   BUN mg/dL 21   CREATININE mg/dL 1.44*        Imaging     XR Chest 1 View    Result Date: 6/12/2024  Impression: 1. Borderline cardiomegaly. 2. No active disease. Electronically Signed: Ángel Pinto MD  6/12/2024 2:45 PM EDT  Workstation ID: [id number]        Current Medications     Scheduled Meds:  [Held by provider] amLODIPine, 10 mg, Oral, Daily  [START ON 6/13/2024] atorvastatin, 20 mg, Oral, Daily  [Held by provider] bisoprolol, 10 mg, Oral, Daily  [START ON 6/13/2024] buPROPion XL, 150 mg, Oral, Daily  [START ON 6/13/2024] escitalopram, 20 mg, Oral, Daily  gabapentin, 300 mg, Oral, Q12H  [Transfer Hold] insulin glargine, 1-200 Units, Subcutaneous, Nightly - Glucommander  [Transfer Hold] insulin lispro, 1-200 Units, Subcutaneous, 4x Daily With Meals & Nightly  [Transfer Hold]  senna-docusate sodium, 2 tablet, Oral, BID  [Transfer Hold] sodium chloride, 10 mL, Intravenous, Q12H  sodium zirconium cyclosilicate, 10 g, Oral, TID         Continuous Infusions:  sodium chloride, 100 mL/hr, Last Rate: 100 mL/hr (06/12/24 3975)       Patient continues to be critically ill, remains at risk of clinical deterioration or death and needed high complexity decision making. I have spent a total of 40 minutes providing critical care services to this patient including but not limited to: review of labs/ microbiology/imaging/medications, serial monitoring of vital signs, review of other consultant's notes, review of events in the last 24 hrs, monitoring input/output, review of treatment plan with bedside nurse, RT and other treatment team, management of life support and nutrition needs. No family at bedside.  Time spent in performing separately billable procedures and updating family is not included in the critical care time.       BRANDON Sierra   Critical Care  06/12/24   20:58 EDT

## 2024-06-13 NOTE — CASE MANAGEMENT/SOCIAL WORK
Discharge Planning Assessment   Vick     Patient Name: Dora Solorio  MRN: [id number]  Today's Date: 6/13/2024    Admit Date: 6/12/2024  Plan: DC plan: Return home w/ daughter, pending PT/OT eval.   Discharge Needs Assessment       Row Name 06/13/24 1031       Living Environment    People in Home child(rupinder), adult    Name(s) of People in Home Daughter Emily, son-in-law, and granddaughter.    Current Living Arrangements home    Potentially Unsafe Housing Conditions none    In the past 12 months has the electric, gas, oil, or water company threatened to shut off services in your home? No    Primary Care Provided by self    Provides Primary Care For no one    Family Caregiver if Needed child(rupinder), adult    Family Caregiver Names Emily    Quality of Family Relationships helpful;involved;supportive    Able to Return to Prior Arrangements yes       Resource/Environmental Concerns    Resource/Environmental Concerns none    Transportation Concerns none       Transportation Needs    In the past 12 months, has lack of transportation kept you from medical appointments or from getting medications? no    In the past 12 months, has lack of transportation kept you from meetings, work, or from getting things needed for daily living? No       Food Insecurity    Within the past 12 months, you worried that your food would run out before you got the money to buy more. Never true    Within the past 12 months, the food you bought just didn't last and you didn't have money to get more. Never true       Transition Planning    Patient/Family Anticipates Transition to home with family    Patient/Family Anticipated Services at Transition none    Transportation Anticipated family or friend will provide       Discharge Needs Assessment    Readmission Within the Last 30 Days no previous admission in last 30 days    Equipment Currently Used at Home walker, standard;wheelchair;commode;cane, straight    Concerns to be Addressed denies  needs/concerns at this time    Anticipated Changes Related to Illness none    Equipment Needed After Discharge none    Provided Post Acute Provider List? N/A    Provided Post Acute Provider Quality & Resource List? N/A                   Discharge Plan       Row Name 06/13/24 1032       Plan    Plan DC plan: Return home w/ daughter, pending PT/OT eval.    Patient/Family in Agreement with Plan yes    Provided Post Acute Provider List? N/A    Provided Post Acute Provider Quality & Resource List? N/A    Plan Comments CM met with patient and daughter at bedside. Patient states she lives at home with her daughter, son-in-law and granddaughter. Patient is independent with ADLs, no longer drives, current DME includes bp standard walker, wheelchair, straight cane, and commode. Patient confirms PCP Michael and utilizes [email or web address] for preferred pharmacy. Patient agreeable to enrollment in [email or web address] program. Patient denies current HHC or therapy. Patient denies difficulty affording medications, food, or utilities. Patient states at time of discharge her granddaughter will provide transportation.                  Continued Care and Services - Admitted Since 6/12/2024    No active coordination exists for this encounter.       Expected Discharge Date and Time       Expected Discharge Date Expected Discharge Time    Jun 14, 2024            Demographic Summary       Row Name 06/13/24 1031       General Information    Admission Type inpatient    Arrived From emergency department    Required Notices Provided Important Message from Medicare    Referral Source admission list    Reason for Consult discharge planning    Preferred Language English       Contact Information    Permission Granted to Share Info With                    Functional Status       Row Name 06/13/24 1031       Functional Status    Usual Activity Tolerance good    Current Activity Tolerance good       Functional Status, IADL    Medications  independent    Meal Preparation independent    Housekeeping independent    Laundry independent    Shopping independent       Mental Status    General Appearance WDL WDL       Mental Status Summary    Recent Changes in Mental Status/Cognitive Functioning no changes                Patient Forms       Row Name 06/13/24 1035       Patient Forms    Important Message from Medicare ([facility]) Delivered    Delivered to Support person  Daughter Emily    Method of delivery In person               Prema Blanchard, RN      Office Phone [phone]

## 2024-06-13 NOTE — CONSULTS
"Diabetes Education  Assessment/Teaching    Patient Name:  Dora Solorio  YOB: 1950  MRN: [id number]  Admit Date:  6/12/2024      Assessment Date:  6/13/2024  Flowsheet Row Most Recent Value   General Information     Referral From: MD order  [Consult received due to A1c >7%. A1c this adm 8.36%. Adm bs 162.]   Height 162.6 cm (64\")   Height Method Stated   Weight 90.7 kg (200 lb)   Weight Method Bed scale   Pregnancy Assessment    Diabetes History    What type of diabetes do you have? Type 2   Length of Diabetes Diagnosis --  [dx in 1998.]   Have you had diabetes education/teaching in the past? yes   When and where was your diabetes education? [facility]   Do you test your blood sugar at home? yes   Frequency of checks daily (pt rotating the times when she checks)   Meter type Accuchek Guide, purchases in 9/2022   Who performs the test? self   Have you had low blood sugar? (<70mg/dl) yes   How often do you have low blood sugar? rare   Education Preferences    Nutrition Information    Assessment Topics    DM Goals             Flowsheet Row Most Recent Value   DM Education Needs    Meter Has own   Meter Type Accuchek   Frequency of Testing Daily  [Encouraged pt to record bs and take readings to PCP visits. Gave log book.]   Blood Glucose Target Range Discussed A1c result of 8.36%. Discussed healthy bs range and healthy A1c target. Discussed importance of bs control.   Medication Oral, Insulin, Pen  [Pt taking Tresiba 20 units hs and Metformin 1000 mg bid. 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 Pt states has not been able to be as active since she injured ankle and leg in 1/2024.]   Motivation Engaged   Teaching Method " Explanation, Discussion, Handouts   Patient Response Verbalized understanding              Other Comments:  Met with pt at bedside. Pt states has PCP and has follow up appt next month. Discussed importance of routine follow up with PCP for diabetes management. Discussed goal would be to get A1c <7%. Encouraged pt to follow meal plan and be active within her limits with leg/ankle injury. Pt asking about how much fruit she can have. Discussed food groups containing CHO. Reviewed example serving sizes from the fruit list. Discussed trying to limit CHO foods to 3 servings at eat meal. Gave Planning Healthy Meals packet. Pt verbalized understanding of info. Pt states she does not have additional questions. Pt has insulin and bs monitoring supplies at home. No further follow up needed.       Electronically signed by:  Ramona Bright RN  06/13/24 15:09 EDT

## 2024-06-13 NOTE — PROGRESS NOTES
Critical Care Progress Note   Dora Solorio : 1950 MRN:[id number] LOS:1     Principal Problem: Symptomatic bradycardia     Reason for follow up: All the medical problems listed below    Summary     73 y.o. old female patient with PMH of hypertension, hyperlipidemia, anxiety disorder, type 2 diabetes melitis, diabetic neuropathy, chronic kidney disease stage III, vitamin D deficiency, and gout who presented to the hospital with complaints of shortness of breath and weakness.  Workup in the emergency room found patient with heart rate in the 30s, and stable blood pressure.  She was evaluated by cardiology and taken to Cath Lab for transvenous pacemaker placement.  Nephrology was also consulted for hyperkalemia of 6.3.  Patient was initially admitted to hospitalist group, however after TVP was placed she was transferred to ICU for further workup and monitoring overnight.     ACP: Patient does not have advanced directive on file this facility.  She is alert and orient x 4 and declares herself full code.    Significant events     24 : Patient afebrile, all vital signs are within normal limits and stable.  The rate on the transvenous pacer was decreased to 40 this morning and he is currently with a heart rate of 65 and in NSR.  Chemistry panel unremarkable.  Hemoglobin A1c was elevated at 8.4%--will consult diabetes educator.  Lipid profile noted for a very low HDL at 32--have increased Lipitor to 40 mg daily.  CBC only remarkable for the hemoglobin which has dropped from 10.7 down to 9.    Assessment / Plan     Symptomatic bradycardia, junctional rhythm -resolved  Dizziness -resolved  Status post TVP placement; cardiology following for possible permanent pacemaker  Blood pressure stable       Hyperkalemia  Medical treatment with lasix, lokelma, Insulin 8 units + D50, 10mg of albuterol  Will repeat BMP in 4 hours  EKG with peaked T-waves; repeat pending  Nephrology following       Right arm wounds  Patient  does not meet SIRS criteria  Patient with wounds x 3 to right arm, red with no drainage  Will do wound culture to check for MRSA; monitor off abx for now  Wound consult       DM-II, not well-controlled  A1c is 8.36%  Consulted diabetes educator      Chronic:  Hypertension: Continue home amlodipine; home lisinopril on hold  Hyperlipidemia: Continue home atorvastatin--increase to 40 mg  Anxiety disorder: Continue home Wellbutrin  Diabetic neuropathy: Continue home gabapentin       Code status:   Level Of Support Discussed With: Patient  Code Status (Patient has no pulse and is not breathing): CPR (Attempt to Resuscitate)  Medical Interventions (Patient has pulse or is breathing): Full Support       Nutrition: NPO Diet NPO Type: Strict NPO   Patient isn't on Tube Feeding    VTE Prophylaxis:  Pharmacologic & mechanical VTE prophylaxis orders are present.       Subjective / Review of systems     Review of Systems   Patient denies any chest pain or shortness of breath.  Denies any fevers, chills, sweats, nausea, vomiting.  Denies any dizziness or palpitations.    Objective / Physical Exam   Vital signs:  Temp: 98 °F (36.7 °C)  BP: 154/68  Heart Rate: 65  Resp: 18  SpO2: 96 %  Weight: 90.7 kg (200 lb)    Admission Weight: Weight: 89.4 kg (197 lb)  Current Weight: Weight: 90.7 kg (200 lb)    Input/Output in last 24 hours:    Intake/Output Summary (Last 24 hours) at 6/13/2024 1048  Last data filed at 6/13/2024 1000  Gross per 24 hour   Intake 1213 ml   Output 2950 ml   Net -1737 ml      Physical Exam     GEN:  Pleasant, obese, elderly woman who appears appropriate for stated age.  WD/WN/WH.  Lying flat in bed on RA.  NAD.  NEURO:  Brainstem reflexes intact.  No obvious focal deficit.  Moves all 4 ext.  HEENT:  N/AT.  PERRL.  MMM.  Oropharynx non-erythematous.  No drainage from the eyes/ears/nose.  No conjunctival petechiae.  No oral thrush.  Auditory and visual acuity grossly wnl.  Good dentition.  Voice normal.  NECK:   Supple, NT, trachea midline.  No meningismus.  No ROM limitation.  No torticollis.  No JVD.  No thyromegaly.    CHEST/LUNGS:  Breath sounds are clear and equal bilaterally.  No w/r/r.  Chest excursion equal bilaterally.    CARDIOVASCULAR:  RRR w/o murmur noted.    GI:  Abdomen soft, NT, ND, +BS.   :  Normal external genitalia.  No rob cath in place.  TVP in place.  EXTREMITIES:  No deformity or amputation.  No cyanosis, edema, or asymmetry.  Pulses 2+ and equal in BLE's.    SKIN:  Warm, dry, and pink.  No rash, breakdown, or track marks noted.  LYMPHATICS/HEME:  No overt LAD or abnormal bruising.  No lymphedema.  MSK:  Normal ROM.  No joint abnormalities noted.  Strength is 5/5 and equal in BUE and BLE's.  PSYCH:  Pleasant.  A&Ox 3.  Normal mood and affect.  Responds appropriately to commands and appears to comprehend instructions.          Radiology and Labs     Results from last 7 days   Lab Units 06/13/24  0246 06/12/24  1422   WBC 10*3/mm3 7.96 8.85   HEMATOCRIT % 29.4* 35.1   PLATELETS 10*3/mm3 145 216      Results from last 7 days   Lab Units 06/13/24  0246 06/12/24  2200 06/12/24  1459   SODIUM mmol/L 142 139 140   POTASSIUM mmol/L 3.6 5.3* 6.3*   CHLORIDE mmol/L 113* 105 108*   CO2 mmol/L 20.5* 22.6 23.4   BUN mg/dL 17 22 21   CREATININE mg/dL 0.89 1.26* 1.44*      Current medications   Scheduled Meds: amLODIPine, 10 mg, Oral, Daily  atorvastatin, 20 mg, Oral, Daily  [Held by provider] bisoprolol, 10 mg, Oral, Daily  buPROPion XL, 150 mg, Oral, Daily  escitalopram, 20 mg, Oral, Daily  gabapentin, 300 mg, Oral, Q12H  heparin (porcine), 5,000 Units, Subcutaneous, Q8H  insulin lispro, 2-9 Units, Subcutaneous, Q6H  [Transfer Hold] senna-docusate sodium, 2 tablet, Oral, BID  [Transfer Hold] sodium chloride, 10 mL, Intravenous, Q12H      Continuous Infusions: sodium chloride, 100 mL/hr, Last Rate: 100 mL/hr (06/13/24 1787)        Plan discussed with RN. Reviewed all other data in the last 24 hours, including  but not limited to vitals, labs, microbiology, imaging and pertinent notes from other providers.     Drew Grayson,    Critical Care  06/13/24   10:48 EDT

## 2024-06-13 NOTE — PROGRESS NOTES
Cardiology Progress Note    Patient Identification:  Name: Dora Solorio  Age: 73 y.o.  Sex: female  :  1950  MRN: [id number]                 Follow Up / Chief Complaint: Bradycardia  Chief Complaint   Patient presents with    Slow Heart Rate       Interval History: Patient presented with bradycardia near syncope underwent temporary pacemaker placement.  Beta-blockers were discontinued being observed in [facility].    NP note: Patient seen with Dr. Shirley.  She denies any further symptoms.  Patient currently in sinus rhythm in the 60s pacemaker is currently off.  Temporary pacemaker was removed by Dr. Shirley and sheath removed by RN.    Will continue to hold beta-blockers      Electronically signed by Leah Murray, BRANDON, 24, 12:30 PM EDT.  Cardiology attending addendum :    I have personally performed a face-to-face diagnostic evaluation, physical exam and reviewed data on this patient.  I have reviewed documentation done by me and nurse practitioner  and corrected as needed.  And agree with the different components of documentation.Greater than 50% of the time spent in the care of this patient was provided by attending consultant/me.         Subjective: Patient seen and examined.  Chart reviewed.  Labs reviewed.  Discussed with RN taking care of patient.  Patient came in with symptomatic bradycardia and hypotension.  Was admitted to ICU had a temporary pacemaker.  Today heart rate is improved.      Objective:   2024 reveal normal CBC.  TSH is normal at 2.01  2024: Potassium 3.6 calcium 6.8 hemoglobin A1c 8.36 LDL 32 hemoglobin 8.9       History of Present Illness:       Ms. Keke Solorio has PMH of     Recurrent dizziness, history of ankle fracture, scalp hematoma  Hypertension  Dyslipidemia with low HDL  Diabetes  CKD  Carotid disease 60% at the origin of right internal carotid by CTA 2022  Tubal ligation, , endometrial ablation, ankle fracture surgery  Allergy/intolerance to  sulfa     Presented with complaint of feeling dizzy lightheaded and near syncopal.  Was found to be bradycardic in 30s.  Patient has history of episodic dizziness previously.  But was never found to be bradycardic.     Data:  Labs 6/12/2024 reveal normal CBC.  Chest x-ray 6/12/2024 reveals borderline cardiomegaly  EKG done 6/12/2024 reviewed/interpreted by me reveals junctional bradycardia at the rate of 32 bpm with probable LVH and anterior Q waves.        Review of records:  Echocardiogram 9/21/2022 reveals normal LV systolic function EF of 65% with concentric LVH and grade 1 diastolic dysfunction        Assessment:  :     Junctional bradycardia  Acute hypotension  Dizziness, near syncope  Hypertension  Dyslipidemia  Diabetes  Obesity with BMI of 30        Recommendations / Plan:         Patient presented 6/12/2024 with junctional bradycardia and symptomatic bradycardia.  Patient was taken to the Cath Lab and had a temporary pacemaker placed because she was hypotensive.  Patient was on home medication on bisoprolol which has been on hold for 24 hours.  Patient TSH is normal.  Troponin x 2 are 16 and 14.  Patient's heart rate has improved after holding bisoprolol.  Discontinue temporary pacemaker.  Will follow-up closely as outpatient and then we can do ischemic workup.  Increase activity.  Echo reveals normal LV systolic function.  Hopefully discharge home and follow-up with me in 2 weeks as outpatient for ischemic workup.         Echocardiogram 6/13/2024 revealed      Left ventricular ejection fraction appears to be 61 - 65%.    Left ventricular diastolic function was indeterminate.    The right ventricular cavity is borderline dilated.    The left atrial cavity is dilated.    Left atrial volume is moderately increased.    Estimated right ventricular systolic pressure from tricuspid regurgitation is normal (<35 mmHg).               Copied text in this portion of the note has been reviewed and is accurate as of  2024    Past Medical History:  Past Medical History:   Diagnosis Date    Adjustment disorder with depressed mood 2018    Allergic Years    Sulfa drugs    Anxiety     Arthritis Years    Lotsvof joints. Mostly back, knees, hips, fingers    Cataract 2023    Depression 2018    Diabetes mellitus, type II 2013    Essential hypertension 2013    Gout 2014    Heart attack     Hyperlipidemia 2020    Hypertension     Inflammatory bowel disease     Irritable bowel syndrome without diarrhea 2017    Kidney disease     Kidney stone ?2016?    In hospital overnight    Osteopenia 2017    Peripheral neuropathy 2017    Renal insufficiency 2023    Stage 3     Past Surgical History:  Past Surgical History:   Procedure Laterality Date    ANKLE OPEN REDUCTION INTERNAL FIXATION Left 2024    Procedure: ANKLE OPEN REDUCTION INTERNAL FIXATION;  Surgeon: Eriberto Muñoz MD;  Location: [facility] MAIN OR;  Service: Orthopedics;  Laterality: Left;     SECTION      x2    COLON SURGERY      ENDOMETRIAL ABLATION      FRACTURE SURGERY  24    KNEE ARTHROSCOPY Bilateral     TUBAL ABDOMINAL LIGATION  1973        Social History:   Social History     Tobacco Use    Smoking status: Never     Passive exposure: Never    Smokeless tobacco: Never   Substance Use Topics    Alcohol use: Never      Family History:  Family History   Adopted: Yes   Problem Relation Age of Onset    Epilepsy Mother     Colon cancer Mother     Diabetes Mother     Hypertension Mother     No Known Problems Sister     No Known Problems Maternal Grandmother     No Known Problems Maternal Grandfather     No Known Problems Paternal Grandmother     No Known Problems Paternal Grandfather     Other Maternal Uncle         alzhimers    Diabetes Daughter         Mothets initials          Allergies:  Allergies   Allergen Reactions    Oxycodone Mental Status Change and Hallucinations    Sulfa Antibiotics  "Rash    Allopurinol Nausea And Vomiting    Morphine Hallucinations     Scheduled Meds:  amLODIPine, 10 mg, Daily  atorvastatin, 20 mg, Daily  [Held by provider] bisoprolol, 10 mg, Daily  buPROPion XL, 150 mg, Daily  escitalopram, 20 mg, Daily  gabapentin, 300 mg, Q12H  insulin glargine, 1-200 Units, Nightly - Glucommander  insulin lispro, 1-200 Units, 4x Daily With Meals & Nightly  [Transfer Hold] senna-docusate sodium, 2 tablet, BID  [Transfer Hold] sodium chloride, 10 mL, Q12H          Review of Systems:   ROS  Review of Systems   Constitution: Negative for chills and fever.   Cardiovascular: Negative for chest pain and palpitations.   Respiratory: Negative for cough and hemoptysis.    Gastrointestinal: Negative for nausea.        Constitutional:  Temp:  [97.7 °F (36.5 °C)-98.1 °F (36.7 °C)] 98 °F (36.7 °C)  Heart Rate:  [30-94] 80  Resp:  [12-18] 18  BP: ()/(55-85) 143/73    Physical Exam   /73 (BP Location: Left arm, Patient Position: Lying)   Pulse 80   Temp 98 °F (36.7 °C) (Oral)   Resp 18   Ht 162.6 cm (64\")   Wt 90.7 kg (200 lb)   SpO2 96%   BMI 34.33 kg/m²   General:  Appears in no acute distress  Eyes: Sclera is anicteric,  conjunctiva is clear   HEENT:  No JVD. Thyroid not visibly enlarged. No mucosal pallor or cyanosis  Respiratory: Respirations regular and unlabored at rest.  Clear to auscultation  Cardiovascular: S1,S2 Regular rate and rhythm. No murmur, rub or gallop auscultated.  . No pretibial pitting edema  Gastrointestinal: Abdomen nondistended.  Musculoskeletal:  No abnormal movements  Extremities: No digital clubbing or cyanosis  Skin: Color pink.   Neuro: Alert and awake.    INTAKE AND OUTPUT:    Intake/Output Summary (Last 24 hours) at 6/13/2024 0842  Last data filed at 6/13/2024 0600  Gross per 24 hour   Intake 1213 ml   Output 2000 ml   Net -787 ml       Cardiographics  Telemetry: Sinus rhythm    ECG:   ECG 12 Lead Rhythm Change   Preliminary Result   HEART RATE= 64  bpm "   RR Interval= 932  ms   HI Interval= 252  ms   P Horizontal Axis= 3  deg   P Front Axis= 44  deg   QRSD Interval= 90  ms   QT Interval= 420  ms   QTcB= 435  ms   QRS Axis= -2  deg   T Wave Axis= 3  deg   - ABNORMAL ECG -   Sinus rhythm   Prolonged HI interval   When compared with ECG of 13-Jun-2024 0:44:54,   No significant change   Electronically Signed By:    Date and Time of Study: 2024-06-13 05:59:04      ECG 12 Lead Electrolyte Imbalance   Preliminary Result   HEART RATE= 65  bpm   RR Interval= 928  ms   HI Interval= 224  ms   P Horizontal Axis= -32  deg   P Front Axis= 32  deg   QRSD Interval= 88  ms   QT Interval= 422  ms   QTcB= 438  ms   QRS Axis= -2  deg   T Wave Axis= 16  deg   - ABNORMAL ECG -   Sinus rhythm   Prolonged HI interval   When compared with ECG of 12-Jun-2024 14:09:42,   Significant change in rhythm: previously junctional   New conduction abnormality   Significant axis, voltage or hypertrophy change   Electronically Signed By:    Date and Time of Study: 2024-06-13 00:44:54      ECG 12 Lead Bradycardia   Final Result   HEART RATE= 32  bpm   RR Interval= 1892  ms   HI Interval=   ms   P Horizontal Axis=   deg   P Front Axis=   deg   QRSD Interval= 81  ms   QT Interval= 549  ms   QTcB= 399  ms   QRS Axis= -9  deg   T Wave Axis= 8  deg   - ABNORMAL ECG -   Junctional rhythm   Probable left ventricular hypertrophy   Anterior Q waves, possibly due to LVH   When compared with ECG of 04-Jan-2024 12:02:23,   Significant change in rhythm: previously sinus   Electronically Signed By: Archie De (LILIA) 13-Jun-2024 05:29:11   Date and Time of Study: 2024-06-12 14:09:42      ECG 12 Lead Electrolyte Imbalance    (Results Pending)     I have personally reviewed EKG    Echocardiogram: Results for orders placed during the hospital encounter of 09/09/22    Adult Transthoracic Echo Complete W/ Cont if Necessary Per Protocol (With Agitated Saline)    Interpretation Summary  · Left ventricular wall thickness is  "consistent with mild concentric hypertrophy.  · Estimated left ventricular EF = 65% Estimated left ventricular EF was in agreement with the calculated left ventricular EF. Left ventricular systolic function is normal.  · Left ventricular diastolic function is consistent with (grade I) impaired relaxation.  · Saline test results are negative for right to left atrial level shunt.  · Estimated right ventricular systolic pressure from tricuspid regurgitation is normal (<35 mmHg).      Lab Review   I have reviewed the labs  Results from last 7 days   Lab Units 06/12/24  2200 06/12/24  1459   HSTROP T ng/L 14* 16*     Results from last 7 days   Lab Units 06/12/24  1459   MAGNESIUM mg/dL 1.9     Results from last 7 days   Lab Units 06/13/24  0246   SODIUM mmol/L 142   POTASSIUM mmol/L 3.6   BUN mg/dL 17   CREATININE mg/dL 0.89   CALCIUM mg/dL 6.8*         Results from last 7 days   Lab Units 06/13/24  0246 06/12/24  1422   WBC 10*3/mm3 7.96 8.85   HEMOGLOBIN g/dL 8.9* 10.7*   HEMATOCRIT % 29.4* 35.1   PLATELETS 10*3/mm3 145 216           RADIOLOGY:  Imaging Results (Last 24 Hours)       Procedure Component Value Units Date/Time    XR Chest 1 View [[id number]] Collected: 06/12/24 1443     Updated: 06/12/24 1447    Narrative:      XR CHEST 1 VW    Date of Exam: 6/12/2024 2:30 PM EDT    Indication: Chest pain    Comparison: 1/4/2024.    Findings:  The heart is borderline enlarged. The right hemidiaphragm is slightly elevated. The lungs and pleural spaces are clear. The pulmonary vascular markings are normal. There are chronic age-related changes involving the bony thorax and thoracic aorta.      Impression:      Impression:    1. Borderline cardiomegaly.  2. No active disease.      Electronically Signed: Ángel Pinto MD    6/12/2024 2:45 PM EDT    Workstation ID: [id number]                  )6/13/2024  Jose Shirley MD      [facility] [email or web address]:   \"Dictated utilizing Dragon dictation\".   "

## 2024-06-14 ENCOUNTER — READMISSION MANAGEMENT (OUTPATIENT)
Dept: CALL CENTER | Facility: HOSPITAL | Age: 74
End: 2024-06-14
Payer: MEDICARE

## 2024-06-14 VITALS
DIASTOLIC BLOOD PRESSURE: 61 MMHG | HEART RATE: 59 BPM | RESPIRATION RATE: 12 BRPM | BODY MASS INDEX: 33.99 KG/M2 | HEIGHT: 64 IN | WEIGHT: 199.08 LBS | SYSTOLIC BLOOD PRESSURE: 121 MMHG | TEMPERATURE: 98.3 F | OXYGEN SATURATION: 95 %

## 2024-06-14 LAB
ALBUMIN SERPL-MCNC: 3.6 G/DL (ref 3.5–5.2)
ANION GAP SERPL CALCULATED.3IONS-SCNC: 9.9 MMOL/L (ref 5–15)
BASOPHILS # BLD AUTO: 0.02 10*3/MM3 (ref 0–0.2)
BASOPHILS NFR BLD AUTO: 0.2 % (ref 0–1.5)
BUN SERPL-MCNC: 20 MG/DL (ref 8–23)
BUN/CREAT SERPL: 17.1 (ref 7–25)
CALCIUM SPEC-SCNC: 8.4 MG/DL (ref 8.6–10.5)
CHLORIDE SERPL-SCNC: 105 MMOL/L (ref 98–107)
CO2 SERPL-SCNC: 24.1 MMOL/L (ref 22–29)
CREAT SERPL-MCNC: 1.17 MG/DL (ref 0.57–1)
DEPRECATED RDW RBC AUTO: 42.3 FL (ref 37–54)
EGFRCR SERPLBLD CKD-EPI 2021: 48.8 ML/MIN/1.73
EOSINOPHIL # BLD AUTO: 0.17 10*3/MM3 (ref 0–0.4)
EOSINOPHIL NFR BLD AUTO: 1.8 % (ref 0.3–6.2)
ERYTHROCYTE [DISTWIDTH] IN BLOOD BY AUTOMATED COUNT: 13.9 % (ref 12.3–15.4)
GLUCOSE BLDC GLUCOMTR-MCNC: 132 MG/DL (ref 70–105)
GLUCOSE BLDC GLUCOMTR-MCNC: 182 MG/DL (ref 70–105)
GLUCOSE SERPL-MCNC: 130 MG/DL (ref 65–99)
HCT VFR BLD AUTO: 30.6 % (ref 34–46.6)
HGB BLD-MCNC: 9.4 G/DL (ref 12–15.9)
IMM GRANULOCYTES # BLD AUTO: 0.02 10*3/MM3 (ref 0–0.05)
IMM GRANULOCYTES NFR BLD AUTO: 0.2 % (ref 0–0.5)
LYMPHOCYTES # BLD AUTO: 2.24 10*3/MM3 (ref 0.7–3.1)
LYMPHOCYTES NFR BLD AUTO: 23.4 % (ref 19.6–45.3)
MAGNESIUM SERPL-MCNC: 1.9 MG/DL (ref 1.6–2.4)
MCH RBC QN AUTO: 25.6 PG (ref 26.6–33)
MCHC RBC AUTO-ENTMCNC: 30.7 G/DL (ref 31.5–35.7)
MCV RBC AUTO: 83.4 FL (ref 79–97)
MONOCYTES # BLD AUTO: 0.93 10*3/MM3 (ref 0.1–0.9)
MONOCYTES NFR BLD AUTO: 9.7 % (ref 5–12)
NEUTROPHILS NFR BLD AUTO: 6.18 10*3/MM3 (ref 1.7–7)
NEUTROPHILS NFR BLD AUTO: 64.7 % (ref 42.7–76)
NRBC BLD AUTO-RTO: 0 /100 WBC (ref 0–0.2)
PHOSPHATE SERPL-MCNC: 3.4 MG/DL (ref 2.5–4.5)
PLATELET # BLD AUTO: 165 10*3/MM3 (ref 140–450)
PMV BLD AUTO: 9.8 FL (ref 6–12)
POTASSIUM SERPL-SCNC: 4.2 MMOL/L (ref 3.5–5.2)
RBC # BLD AUTO: 3.67 10*6/MM3 (ref 3.77–5.28)
SODIUM SERPL-SCNC: 139 MMOL/L (ref 136–145)
WBC NRBC COR # BLD AUTO: 9.56 10*3/MM3 (ref 3.4–10.8)

## 2024-06-14 PROCEDURE — 99232 SBSQ HOSP IP/OBS MODERATE 35: CPT | Performed by: INTERNAL MEDICINE

## 2024-06-14 PROCEDURE — 80069 RENAL FUNCTION PANEL: CPT | Performed by: INTERNAL MEDICINE

## 2024-06-14 PROCEDURE — 25010000002 HEPARIN (PORCINE) PER 1000 UNITS: Performed by: INTERNAL MEDICINE

## 2024-06-14 PROCEDURE — 83735 ASSAY OF MAGNESIUM: CPT | Performed by: INTERNAL MEDICINE

## 2024-06-14 PROCEDURE — 63710000001 INSULIN LISPRO (HUMAN) PER 5 UNITS: Performed by: INTERNAL MEDICINE

## 2024-06-14 PROCEDURE — 85025 COMPLETE CBC W/AUTO DIFF WBC: CPT | Performed by: INTERNAL MEDICINE

## 2024-06-14 PROCEDURE — 82948 REAGENT STRIP/BLOOD GLUCOSE: CPT | Performed by: INTERNAL MEDICINE

## 2024-06-14 PROCEDURE — 82948 REAGENT STRIP/BLOOD GLUCOSE: CPT

## 2024-06-14 RX ORDER — HYDROCHLOROTHIAZIDE 25 MG/1
25 TABLET ORAL DAILY
Qty: 30 TABLET | Refills: 3 | Status: SHIPPED | OUTPATIENT
Start: 2024-06-14

## 2024-06-14 RX ORDER — ACETAMINOPHEN 650 MG
TABLET, EXTENDED RELEASE ORAL DAILY
Qty: 237 ML | Refills: 0 | Status: SHIPPED | OUTPATIENT
Start: 2024-06-15

## 2024-06-14 RX ADMIN — GABAPENTIN 300 MG: 300 CAPSULE ORAL at 08:20

## 2024-06-14 RX ADMIN — AMLODIPINE BESYLATE 10 MG: 5 TABLET ORAL at 08:20

## 2024-06-14 RX ADMIN — POVIDONE-IODINE: 10 SOLUTION TOPICAL at 08:21

## 2024-06-14 RX ADMIN — INSULIN LISPRO 2 UNITS: 100 INJECTION, SOLUTION INTRAVENOUS; SUBCUTANEOUS at 12:36

## 2024-06-14 RX ADMIN — HEPARIN SODIUM 5000 UNITS: 5000 INJECTION INTRAVENOUS; SUBCUTANEOUS at 06:09

## 2024-06-14 RX ADMIN — ESCITALOPRAM OXALATE 20 MG: 10 TABLET ORAL at 08:20

## 2024-06-14 RX ADMIN — ATORVASTATIN CALCIUM 40 MG: 40 TABLET, FILM COATED ORAL at 08:20

## 2024-06-14 RX ADMIN — BUPROPION HYDROCHLORIDE 150 MG: 150 TABLET, EXTENDED RELEASE ORAL at 08:20

## 2024-06-14 NOTE — PROGRESS NOTES
"NEPHROLOGY PROGRESS NOTE------[facility]    [facility]  [phone]  Rob Wick MD      Patient Care Team:  Bhargavi Rodriguez APRN as PCP - Rob Ann MD as Consulting Physician (Nephrology)      Provider:  Rob Wick MD  Patient Name: Dora Solorio  :  1950    SUBJECTIVE:  Follow-up CKD/hyperkalemia  No chest pain shortness of breath    Medication:  amLODIPine, 10 mg, Oral, Daily  atorvastatin, 40 mg, Oral, Daily  buPROPion XL, 150 mg, Oral, Daily  escitalopram, 20 mg, Oral, Daily  gabapentin, 300 mg, Oral, Q12H  heparin (porcine), 5,000 Units, Subcutaneous, Q8H  insulin lispro, 2-9 Units, Subcutaneous, 4x Daily With Meals & Nightly  povidone-iodine, , Topical, Daily  [Transfer Hold] senna-docusate sodium, 2 tablet, Oral, BID  [Transfer Hold] sodium chloride, 10 mL, Intravenous, Q12H           OBJECTIVE    Vital Sign Min/Max for last 24 hours  Temp  Min: 98.3 °F (36.8 °C)  Max: 99.5 °F (37.5 °C)   BP  Min: 110/58  Max: 152/63   Pulse  Min: 54  Max: 66   Resp  Min: 13  Max: 17   SpO2  Min: 92 %  Max: 99 %   No data recorded   Weight  Min: 90.3 kg (199 lb 1.2 oz)  Max: 90.3 kg (199 lb 1.2 oz)     Flowsheet Rows      Flowsheet Row First Filed Value   Admission Height 162.6 cm (64\") Documented at 2024 1425   Admission Weight 89.4 kg (197 lb) Documented at 2024 1425            I/O this shift:  In: 240 [P.O.:240]  Out: -   I/O last 3 completed shifts:  In: 2639 [P.O.:600; I.V.:]  Out: 3350 [Urine:3350]    Physical Exam:  General Appearance: alert, appears stated age and cooperative  Head: normocephalic, without obvious abnormality and atraumatic  Eyes: conjunctivae and sclerae normal and no icterus  Neck: supple and no JVD  Lungs: clear to auscultation and respirations regular  Heart: regular rhythm & normal rate and normal S1, S2  Chest: Wall no abnormalities observed  Abdomen: normal bowel sounds and soft, " "nontender  Extremities: moves extremities well, no edema, no cyanosis and no redness  Skin: Scabbed wound right lower extremity with surrounding erythema  Neurologic: Alert, and oriented. No focal deficits    Labs:    WBC WBC   Date Value Ref Range Status   06/14/2024 9.56 3.40 - 10.80 10*3/mm3 Final   06/13/2024 7.96 3.40 - 10.80 10*3/mm3 Final   06/12/2024 8.85 3.40 - 10.80 10*3/mm3 Final      HGB Hemoglobin   Date Value Ref Range Status   06/14/2024 9.4 (L) 12.0 - 15.9 g/dL Final   06/13/2024 8.9 (L) 12.0 - 15.9 g/dL Final   06/12/2024 10.7 (L) 12.0 - 15.9 g/dL Final      HCT Hematocrit   Date Value Ref Range Status   06/14/2024 30.6 (L) 34.0 - 46.6 % Final   06/13/2024 29.4 (L) 34.0 - 46.6 % Final   06/12/2024 35.1 34.0 - 46.6 % Final      Platelets No results found for: \"LABPLAT\"   MCV MCV   Date Value Ref Range Status   06/14/2024 83.4 79.0 - 97.0 fL Final   06/13/2024 84.2 79.0 - 97.0 fL Final   06/12/2024 85.0 79.0 - 97.0 fL Final          Sodium Sodium   Date Value Ref Range Status   06/14/2024 139 136 - 145 mmol/L Final   06/13/2024 142 136 - 145 mmol/L Final   06/12/2024 139 136 - 145 mmol/L Final   06/12/2024 140 136 - 145 mmol/L Final      Potassium Potassium   Date Value Ref Range Status   06/14/2024 4.2 3.5 - 5.2 mmol/L Final   06/13/2024 3.6 3.5 - 5.2 mmol/L Final   06/12/2024 5.3 (H) 3.5 - 5.2 mmol/L Final     Comment:     Specimen hemolyzed.  Result may be falsely elevated.   06/12/2024 6.3 (C) 3.5 - 5.2 mmol/L Final      Chloride Chloride   Date Value Ref Range Status   06/14/2024 105 98 - 107 mmol/L Final   06/13/2024 113 (H) 98 - 107 mmol/L Final   06/12/2024 105 98 - 107 mmol/L Final   06/12/2024 108 (H) 98 - 107 mmol/L Final      CO2 CO2   Date Value Ref Range Status   06/14/2024 24.1 22.0 - 29.0 mmol/L Final   06/13/2024 20.5 (L) 22.0 - 29.0 mmol/L Final   06/12/2024 22.6 22.0 - 29.0 mmol/L Final   06/12/2024 23.4 22.0 - 29.0 mmol/L Final      BUN BUN   Date Value Ref Range Status " "  06/14/2024 20 8 - 23 mg/dL Final   06/13/2024 17 8 - 23 mg/dL Final   06/12/2024 22 8 - 23 mg/dL Final   06/12/2024 21 8 - 23 mg/dL Final      Creatinine Creatinine   Date Value Ref Range Status   06/14/2024 1.17 (H) 0.57 - 1.00 mg/dL Final   06/13/2024 0.89 0.57 - 1.00 mg/dL Final   06/12/2024 1.26 (H) 0.57 - 1.00 mg/dL Final   06/12/2024 1.44 (H) 0.57 - 1.00 mg/dL Final      Calcium Calcium   Date Value Ref Range Status   06/14/2024 8.4 (L) 8.6 - 10.5 mg/dL Final   06/13/2024 6.8 (L) 8.6 - 10.5 mg/dL Final   06/12/2024 8.8 8.6 - 10.5 mg/dL Final   06/12/2024 8.5 (L) 8.6 - 10.5 mg/dL Final      PO4 No components found for: \"PO4\"   Albumin Albumin   Date Value Ref Range Status   06/14/2024 3.6 3.5 - 5.2 g/dL Final      Magnesium Magnesium   Date Value Ref Range Status   06/14/2024 1.9 1.6 - 2.4 mg/dL Final   06/12/2024 1.9 1.6 - 2.4 mg/dL Final      Uric Acid No components found for: \"URIC ACID\"     Imaging Results (Last 72 Hours)       Procedure Component Value Units Date/Time    XR Chest 1 View [[id number]] Collected: 06/12/24 1443     Updated: 06/12/24 1447    Narrative:      XR CHEST 1 VW    Date of Exam: 6/12/2024 2:30 PM EDT    Indication: Chest pain    Comparison: 1/4/2024.    Findings:  The heart is borderline enlarged. The right hemidiaphragm is slightly elevated. The lungs and pleural spaces are clear. The pulmonary vascular markings are normal. There are chronic age-related changes involving the bony thorax and thoracic aorta.      Impression:      Impression:    1. Borderline cardiomegaly.  2. No active disease.      Electronically Signed: Ángel Pinto MD    6/12/2024 2:45 PM EDT    Workstation ID: [id number]            Results for orders placed during the hospital encounter of 06/12/24    XR Chest 1 View    Narrative  XR CHEST 1 VW    Date of Exam: 6/12/2024 2:30 PM EDT    Indication: Chest pain    Comparison: 1/4/2024.    Findings:  The heart is borderline enlarged. The right hemidiaphragm is slightly " elevated. The lungs and pleural spaces are clear. The pulmonary vascular markings are normal. There are chronic age-related changes involving the bony thorax and thoracic aorta.    Impression  Impression:    1. Borderline cardiomegaly.  2. No active disease.      Electronically Signed: Ángel Pinto MD  6/12/2024 2:45 PM EDT  Workstation ID: [id number]      Results for orders placed in visit on 05/09/24    XR Ankle 3+ View Left    Narrative  left Ankle X-Ray  Indication: Postop ORIF ankle  Views: AP, Mortise Lateral  Findings: Healed ankle fracture hardware in position  no fracture  no bony lesion  Soft tissues normal  normal joint spaces  Hardware appropriately positioned yes    yes prior studies available for comparison.    X-RAY was ordered and reviewed by Eriberto Muñoz MD      Results for orders placed in visit on 03/28/24    XR Ankle 3+ View Left    Narrative  left Ankle X-Ray  Indication: Postop ORIF ankle  Views: AP, Mortise Lateral  Findings: Maintenance of fracture reduction hardware position intact mortise with blurring of the lateral malleolus fracture lines  yes fracture  no bony lesion  Soft tissues normal  normal joint spaces  Hardware appropriately positioned yes    yes prior studies available for comparison.    X-RAY was ordered and reviewed by Eriberto Muñoz MD            ASSESSMENT / PLAN      Symptomatic bradycardia    Bradycardia    Acute hypotension    CKD stage III-CKD due to hypertensive nephrosclerosis and/or diabetic glomerulosclerosis  Hyperkalemia-due to ACE inhibitor use and high dietary potassium intake.  Received Lokelma and treated with insulin and D50.  Bradycardia-off Bystolic.  Required temporary pacemaker.  Cardiology following  Diabetes type 2  Hypertension-blood pressure low, hold antihypertensives  Cellulitis right upper extremity    Creatinine stable, potassium okay  Blood pressure controlled, off lisinopril  No objection discharge from renal standpoint  Follow-up in  office 2 to 4 weeks.      Rob Wick MD  [facility]  [phone]  06/14/24  12:10 EDT

## 2024-06-14 NOTE — CASE MANAGEMENT/SOCIAL WORK
Case Management Discharge Note      Final Note: Routine home.    Provided Post Acute Provider List?: N/A  Provided Post Acute Provider Quality & Resource List?: N/A    Selected Continued Care - Admitted Since 6/12/2024          Transportation Services  Private: Car    Final Discharge Disposition Code: 01 - home or self-care

## 2024-06-14 NOTE — DISCHARGE SUMMARY
CRITICAL CARE DISCHARGE SUMMARY           PATIENT NAME:  Dora Solorio             :  1950            MRN:  [id number]    DATE OF ADMISSION: 2024     DATE OF DISCHARGE: 2024    DISCHARGE DIAGNOSES:   Symptomatic bradycardia, junctional rhythm -resolved  Dizziness -resolved  Hyperkalemia  Right arm wounds  DM-II, not well-controlled  Hypertension  Hyperlipidemia  Anxiety disorder  Diabetic neuropathy      HOSPITAL COURSE  73 y.o. old female patient with PMH of hypertension, hyperlipidemia, anxiety disorder, type 2 diabetes melitis, diabetic neuropathy, chronic kidney disease stage III, vitamin D deficiency, and gout who presented to the hospital with complaints of shortness of breath and weakness. Workup in the emergency room found patient with heart rate in the 30s, and stable blood pressure. She was evaluated by cardiology and taken to Cath Lab for transvenous pacemaker placement. Nephrology was also consulted for hyperkalemia of 6.3. Patient was initially admitted to hospitalist group, however after TVP was placed she was transferred to ICU for further workup and monitoring overnight.  Hyperkalemia was aggressively treated and resolved, and pacemaker was weaned off, with improvement in patient's intrinsic heart rhythm.  It was determined that patient's bradycardia was likely secondary to her beta-blocker use, as her bisoprolol was held.  It has been determined that patient should no longer have bisoprolol, and this was discontinued.  Temporary pacemaker was removed on 2024.  Patient's hyperkalemia was also felt to be a secondary factor in patient's bradycardia, and it was felt that patient's ACE inhibitor was likely contributing in addition to a high dietary potassium intake, per nephrology's note.  In review of previous documentation, patient does not appear to have been on an ACE inhibitor.  Additionally of note, patient did arrive with wounds noted to her right upper arm, which were  cultured and discovered to be positive for MRSA.  Patient was without any evidence of systemic infection and had no SIRS criteria, therefore she will be discharged with recommendation to clean wound daily and was prescribed mupirocin to be applied 3 times daily.  She has been recommended to follow-up with her PCP, and especially to follow-up earlier than her next scheduled appointment if this wound becomes more problematic.  She also was prescribed Betadine for a thick hard callus to her right plantar second toe, which was recommended by wound care.  Nephrology did review patient's medications, and has recommended for patient's hydrochlorothiazide to be decreased from 50 to 25 mg daily.  Patient has been recommended to follow-up with cardiology, nephrology, and her primary care provider.  Patient has been deemed medically stable for discharge at this time.  This nurse practitioner has personally reviewed her home discharge instructions, and patient has expressed understanding of the plan, with her daughter that is present.    PROCEDURES PERFORMED  Procedure(s):  Temporary Pacemaker  -------------------       LABS/RADIOLOGICAL STUDIES  Lab Results (last 72 hours)       Procedure Component Value Units Date/Time    POC Glucose 4x Daily Before Meals & at Bedtime [[id number]]  (Abnormal) Collected: 06/14/24 1141    Specimen: Blood Updated: 06/14/24 1146     Glucose 182 mg/dL      Comment: Serial Number: [id number]:  [id number]       Wound Culture - Wound, Arm, Right [[id number]]  (Abnormal) Collected: 06/13/24 0246    Specimen: Wound from Arm, Right Updated: 06/14/24 0911     Wound Culture Moderate growth (3+) Staphylococcus aureus, MRSA     Comment:   Methicillin resistant Staphylococcus aureus, Patient may be an isolation risk.        Gram Stain Few (2+) WBCs per low power field      Rare (1+) Gram positive cocci    POC Glucose Once [[id number]]  (Abnormal) Collected: 06/14/24 0744    Specimen: Blood Updated:  06/14/24 0746     Glucose 132 mg/dL      Comment: Serial Number: [id number]:  [id number]       Renal Function Panel [[id number]]  (Abnormal) Collected: 06/14/24 0342    Specimen: Blood from Arm, Right Updated: 06/14/24 0410     Glucose 130 mg/dL      BUN 20 mg/dL      Creatinine 1.17 mg/dL      Sodium 139 mmol/L      Potassium 4.2 mmol/L      Chloride 105 mmol/L      CO2 24.1 mmol/L      Calcium 8.4 mg/dL      Albumin 3.6 g/dL      Phosphorus 3.4 mg/dL      Anion Gap 9.9 mmol/L      BUN/Creatinine Ratio 17.1     eGFR 48.8 mL/min/1.73     Narrative:      GFR Normal >60  Chronic Kidney Disease <60  Kidney Failure <15    The GFR formula is only valid for adults with stable renal function between ages 18 and 70.    Magnesium [[id number]]  (Normal) Collected: 06/14/24 0342    Specimen: Blood from Arm, Right Updated: 06/14/24 0410     Magnesium 1.9 mg/dL     CBC & Differential [[id number]]  (Abnormal) Collected: 06/14/24 0342    Specimen: Blood from Arm, Right Updated: 06/14/24 0347    Narrative:      The following orders were created for panel order CBC & Differential.  Procedure                               Abnormality         Status                     ---------                               -----------         ------                     CBC Auto Differential[[id number]]        Abnormal            Final result                 Please view results for these tests on the individual orders.    CBC Auto Differential [[id number]]  (Abnormal) Collected: 06/14/24 0342    Specimen: Blood from Arm, Right Updated: 06/14/24 0347     WBC 9.56 10*3/mm3      RBC 3.67 10*6/mm3      Hemoglobin 9.4 g/dL      Hematocrit 30.6 %      MCV 83.4 fL      MCH 25.6 pg      MCHC 30.7 g/dL      RDW 13.9 %      RDW-SD 42.3 fl      MPV 9.8 fL      Platelets 165 10*3/mm3      Neutrophil % 64.7 %      Lymphocyte % 23.4 %      Monocyte % 9.7 %      Eosinophil % 1.8 %      Basophil % 0.2 %      Immature Grans % 0.2 %      Neutrophils, Absolute 6.18  10*3/mm3      Lymphocytes, Absolute 2.24 10*3/mm3      Monocytes, Absolute 0.93 10*3/mm3      Eosinophils, Absolute 0.17 10*3/mm3      Basophils, Absolute 0.02 10*3/mm3      Immature Grans, Absolute 0.02 10*3/mm3      nRBC 0.0 /100 WBC     POC Glucose Once [[id number]]  (Abnormal) Collected: 06/13/24 2001    Specimen: Blood Updated: 06/13/24 2002     Glucose 220 mg/dL      Comment: Serial Number: [id number]:  [id number]       POC Glucose Once [[id number]]  (Abnormal) Collected: 06/13/24 1707    Specimen: Blood Updated: 06/13/24 1709     Glucose 186 mg/dL      Comment: Serial Number: [id number]:  [id number]       POC Glucose Once [[id number]]  (Normal) Collected: 06/13/24 1110    Specimen: Blood Updated: 06/13/24 1112     Glucose 103 mg/dL      Comment: Serial Number: [id number]:  [id number]       CANDIDA AURIS PCR - Swab, Axilla Right, Axilla Left and Groin [[id number]]  (Normal) Collected: 06/12/24 2200    Specimen: Swab from Axilla Right, Axilla Left and Groin Updated: 06/13/24 1025     CANDIDA AURIS PCR Not Detected    POC Glucose 4x Daily Before Meals & at Bedtime [[id number]]  (Normal) Collected: 06/13/24 0720    Specimen: Blood Updated: 06/13/24 0721     Glucose 83 mg/dL      Comment: Serial Number: [id number]:  [id number]       Basic Metabolic Panel [[id number]]  (Abnormal) Collected: 06/13/24 0246    Specimen: Blood Updated: 06/13/24 0316     Glucose 98 mg/dL      BUN 17 mg/dL      Creatinine 0.89 mg/dL      Sodium 142 mmol/L      Potassium 3.6 mmol/L      Chloride 113 mmol/L      CO2 20.5 mmol/L      Calcium 6.8 mg/dL      BUN/Creatinine Ratio 19.1     Anion Gap 8.5 mmol/L      eGFR 67.7 mL/min/1.73     Narrative:      GFR Normal >60  Chronic Kidney Disease <60  Kidney Failure <15    The GFR formula is only valid for adults with stable renal function between ages 18 and 70.    Lipid Panel [[id number]]  (Abnormal) Collected: 06/13/24 0246    Specimen: Blood Updated: 06/13/24 0314     Total  Cholesterol 99 mg/dL      Triglycerides 114 mg/dL      HDL Cholesterol 32 mg/dL      LDL Cholesterol  46 mg/dL      VLDL Cholesterol 21 mg/dL      LDL/HDL Ratio 1.38    Narrative:      Cholesterol Reference Ranges  (U.S. Department of Health and Human Services ATP III Classifications)    Desirable          <200 mg/dL  Borderline High    200-239 mg/dL  High Risk          >240 mg/dL      Triglyceride Reference Ranges  (U.S. Department of Health and Human Services ATP III Classifications)    Normal           <150 mg/dL  Borderline High  150-199 mg/dL  High             200-499 mg/dL  Very High        >500 mg/dL    HDL Reference Ranges  (U.S. Department of Health and Human Services ATP III Classifications)    Low     <40 mg/dl (major risk factor for CHD)  High    >60 mg/dl ('negative' risk factor for CHD)        LDL Reference Ranges  (U.S. Department of Health and Human Services ATP III Classifications)    Optimal          <100 mg/dL  Near Optimal     100-129 mg/dL  Borderline High  130-159 mg/dL  High             160-189 mg/dL  Very High        >189 mg/dL    Hemoglobin A1c [[id number]]  (Abnormal) Collected: 06/13/24 0246    Specimen: Blood Updated: 06/13/24 0312     Hemoglobin A1C 8.36 %     CBC Auto Differential [[id number]]  (Abnormal) Collected: 06/13/24 0246    Specimen: Blood Updated: 06/13/24 0253     WBC 7.96 10*3/mm3      RBC 3.49 10*6/mm3      Hemoglobin 8.9 g/dL      Hematocrit 29.4 %      MCV 84.2 fL      MCH 25.5 pg      MCHC 30.3 g/dL      RDW 13.9 %      RDW-SD 42.7 fl      MPV 10.3 fL      Platelets 145 10*3/mm3      Neutrophil % 72.6 %      Lymphocyte % 19.1 %      Monocyte % 6.7 %      Eosinophil % 1.0 %      Basophil % 0.3 %      Immature Grans % 0.3 %      Neutrophils, Absolute 5.79 10*3/mm3      Lymphocytes, Absolute 1.52 10*3/mm3      Monocytes, Absolute 0.53 10*3/mm3      Eosinophils, Absolute 0.08 10*3/mm3      Basophils, Absolute 0.02 10*3/mm3      Immature Grans, Absolute 0.02 10*3/mm3      nRBC  0.0 /100 WBC     POC Glucose Once [[id number]]  (Abnormal) Collected: 06/13/24 0028    Specimen: Blood Updated: 06/13/24 0030     Glucose 140 mg/dL      Comment: Serial Number: [id number]:  [id number]       Basic Metabolic Panel [[id number]]  (Abnormal) Collected: 06/12/24 2200    Specimen: Blood Updated: 06/12/24 2250     Glucose 135 mg/dL      BUN 22 mg/dL      Creatinine 1.26 mg/dL      Sodium 139 mmol/L      Potassium 5.3 mmol/L      Comment: Specimen hemolyzed.  Result may be falsely elevated.        Chloride 105 mmol/L      CO2 22.6 mmol/L      Calcium 8.8 mg/dL      BUN/Creatinine Ratio 17.5     Anion Gap 11.4 mmol/L      eGFR 44.6 mL/min/1.73     Narrative:      GFR Normal >60  Chronic Kidney Disease <60  Kidney Failure <15    The GFR formula is only valid for adults with stable renal function between ages 18 and 70.    High Sensitivity Troponin T 2Hr [[id number]]  (Abnormal) Collected: 06/12/24 2200    Specimen: Blood Updated: 06/12/24 2248     HS Troponin T 14 ng/L      Troponin T Delta -2 ng/L     Narrative:      High Sensitive Troponin T Reference Range:  <14.0 ng/L- Negative Female for AMI  <22.0 ng/L- Negative Male for AMI  >=14 - Abnormal Female indicating possible myocardial injury.  >=22 - Abnormal Male indicating possible myocardial injury.   Clinicians would have to utilize clinical acumen, EKG, Troponin, and serial changes to determine if it is an Acute Myocardial Infarction or myocardial injury due to an underlying chronic condition.         POC Glucose 4x Daily Before Meals & at Bedtime [[id number]]  (Abnormal) Collected: 06/12/24 2221    Specimen: Blood Updated: 06/12/24 2223     Glucose 146 mg/dL      Comment: Serial Number: [id number]:  [id number]       Basic Metabolic Panel [[id number]]  (Abnormal) Collected: 06/12/24 1459    Specimen: Blood Updated: 06/12/24 1555     Glucose 162 mg/dL      BUN 21 mg/dL      Creatinine 1.44 mg/dL      Sodium 140 mmol/L      Potassium 6.3 mmol/L       Chloride 108 mmol/L      CO2 23.4 mmol/L      Calcium 8.5 mg/dL      BUN/Creatinine Ratio 14.6     Anion Gap 8.6 mmol/L      eGFR 38.0 mL/min/1.73     Narrative:      GFR Normal >60  Chronic Kidney Disease <60  Kidney Failure <15    The GFR formula is only valid for adults with stable renal function between ages 18 and 70.    Magnesium [[id number]]  (Normal) Collected: 06/12/24 1459    Specimen: Blood Updated: 06/12/24 1555     Magnesium 1.9 mg/dL     High Sensitivity Troponin T [[id number]]  (Abnormal) Collected: 06/12/24 1459    Specimen: Blood Updated: 06/12/24 1538     HS Troponin T 16 ng/L     Narrative:      High Sensitive Troponin T Reference Range:  <14.0 ng/L- Negative Female for AMI  <22.0 ng/L- Negative Male for AMI  >=14 - Abnormal Female indicating possible myocardial injury.  >=22 - Abnormal Male indicating possible myocardial injury.   Clinicians would have to utilize clinical acumen, EKG, Troponin, and serial changes to determine if it is an Acute Myocardial Infarction or myocardial injury due to an underlying chronic condition.         TSH [[id number]]  (Normal) Collected: 06/12/24 1459    Specimen: Blood Updated: 06/12/24 1538     TSH 2.010 uIU/mL     CBC & Differential [[id number]]  (Abnormal) Collected: 06/12/24 1422    Specimen: Blood Updated: 06/12/24 1430    Narrative:      The following orders were created for panel order CBC & Differential.  Procedure                               Abnormality         Status                     ---------                               -----------         ------                     CBC Auto Differential[[id number]]        Abnormal            Final result                 Please view results for these tests on the individual orders.    CBC Auto Differential [[id number]]  (Abnormal) Collected: 06/12/24 1422    Specimen: Blood Updated: 06/12/24 1430     WBC 8.85 10*3/mm3      RBC 4.13 10*6/mm3      Hemoglobin 10.7 g/dL      Hematocrit 35.1 %      MCV 85.0 fL      MCH  "25.9 pg      MCHC 30.5 g/dL      RDW 14.1 %      RDW-SD 43.8 fl      MPV 11.0 fL      Platelets 216 10*3/mm3      Neutrophil % 67.2 %      Lymphocyte % 21.0 %      Monocyte % 6.7 %      Eosinophil % 4.4 %      Basophil % 0.5 %      Immature Grans % 0.2 %      Neutrophils, Absolute 5.95 10*3/mm3      Lymphocytes, Absolute 1.86 10*3/mm3      Monocytes, Absolute 0.59 10*3/mm3      Eosinophils, Absolute 0.39 10*3/mm3      Basophils, Absolute 0.04 10*3/mm3      Immature Grans, Absolute 0.02 10*3/mm3      nRBC 0.0 /100 WBC             XR Chest 1 View    Result Date: 6/12/2024  Impression: 1. Borderline cardiomegaly. 2. No active disease. Electronically Signed: Ángel Pinto MD  6/12/2024 2:45 PM EDT  Workstation ID: [id number]     CONSULTS   Consults       Date and Time Order Name Status Description    6/12/2024  4:12 PM Inpatient Intensivist Consult Completed     6/12/2024  4:12 PM Inpatient Nephrology Consult Completed     6/12/2024  3:03 PM Inpatient Cardiology Consult      6/12/2024  2:49 PM Hospitalist (on-call MD unless specified)              CONDITION ON DISCHARGE    Stable    VITAL SIGNS  /69   Pulse 61   Temp 98.3 °F (36.8 °C) (Oral)   Resp 17   Ht 162.6 cm (64\")   Wt 90.3 kg (199 lb 1.2 oz)   SpO2 97%   BMI 34.17 kg/m²     PHYSICAL EXAM  Constitutional:  Well developed, well nourished, no acute distress, non-toxic appearance, obese.  Eyes:  PERRL, conjunctiva normal, EOMI   HENT:  Atraumatic, external ears normal, nose normal, oropharynx moist, no pharyngeal exudates. Neck-normal range of motion, no tenderness, supple, trachea midline  Respiratory:  clear breath sounds, non-labored respirations without accessory muscle use  Cardiovascular:  Normal rate, normal rhythm, no murmurs, no gallops, no rubs   GI:  Soft, nondistended, normal bowel sounds, nontender, no organomegaly, no mass, no rebound, no guarding   :  No costovertebral angle tenderness   Musculoskeletal:  No edema, no tenderness, no " deformities  Integument:  Well hydrated, no rash.  Wound noted to patient's upper right extremity with some mild erythema, without drainage, odor, induration, or odor.  Thick callus noted to patient's right second toe.  Lymphatic:  No lymphadenopathy noted   Neurologic:  Alert & oriented x 3, CN 2-12 normal, normal motor function, normal sensory function, no focal deficits noted   Psychiatric:  Speech and behavior appropriate       DISCHARGE DISPOSITION  Home or Self Care    DISCHARGE MEDICATIONS     Discharge Medications        New Medications        Instructions Start Date   mupirocin 2 % ointment  Commonly known as: BACTROBAN   1 Application, Topical, 3 Times Daily, Apply to wounds on arm.      povidone-iodine 10 % external solution 10%  Commonly known as: BETADINE   Topical, Daily   Start Date: Jacqueline 15, 2024            Changes to Medications        Instructions Start Date   Tresiba FlexTouch 100 UNIT/ML solution pen-injector injection  Generic drug: insulin degludec  What changed: See the new instructions.   INJECT 20 UNITS SUBCUTANEOUSLY IN THE EVENING             Continue These Medications        Instructions Start Date   amLODIPine 10 MG tablet  Commonly known as: NORVASC   Take 1 tablet by mouth once daily      atorvastatin 20 MG tablet  Commonly known as: LIPITOR   Take 1 tablet by mouth once daily      buPROPion  MG 24 hr tablet  Commonly known as: WELLBUTRIN XL   150 mg, Oral, Daily      escitalopram 20 MG tablet  Commonly known as: LEXAPRO   20 mg, Oral, Daily      gabapentin 600 MG tablet  Commonly known as: NEURONTIN   600 mg, Oral, 2 Times Daily      hydroCHLOROthiazide 50 MG tablet   50 mg, Oral, Daily      metFORMIN 1000 MG tablet  Commonly known as: GLUCOPHAGE   1,000 mg, Oral, 2 Times Daily With Meals      sodium bicarbonate 650 MG tablet   650 mg, Oral, 2 Times Daily      Vitamin B-12 1000 MCG sublingual tablet   1,000 mcg, Sublingual, Daily      vitamin D 1.25 MG (18165 UT) capsule  capsule  Commonly known as: ERGOCALCIFEROL              Stop These Medications      bisoprolol 10 MG tablet  Commonly known as: ZEBeta                DISCHARGE DIET   Diet Instructions       Diet: Cardiac Diets; Healthy Heart (2-3 Na+); Regular (IDDSI 7); Thin (IDDSI 0)      Discharge Diet: Cardiac Diets    Cardiac Diet: Healthy Heart (2-3 Na+)    Texture: Regular (IDDSI 7)    Fluid Consistency: Thin (IDDSI 0)            ACTIVITY AT DISCHARGE   Activity Instructions       Gradually Increase Activity Until at Pre-Hospitalization Level              FOLLOW-UP APPOINTMENTS  Future Appointments   Date Time Provider Department Center   7/18/2024  1:30 PM Bhargavi Rodriguez APRN [facility]     Additional Instructions for the Follow-ups that You Need to Schedule       Discharge Follow-up with PCP   As directed       Currently Documented PCP:    Bhargavi Rodriguez APRN    PCP Phone Number:    [phone]     Follow Up Details: Follow up in 1-2 weeks post discharge.        Discharge Follow-up with Specified Provider: Delano   As directed      To: Delano   Follow Up Details: 2-4 weeks.        Discharge Follow-up with Specified Provider: [place]; 1 Week   As directed      To: [place]   Follow Up: 1 Week                  TEST RESULTS PENDING AT DISCHARGE  Pending Labs       Order Current Status    Wound Culture - Wound, Arm, Right Preliminary result          This note has been completed by this nurse practitioner on behalf of the critical care team, and discussed with Dr. Grayson, attending critical care physician, who agrees with discharge at this time.  All consultants have been contacted and their recommendations included in discharge instructions.    Time: Discharge 38 min    BRANDON Garcia  6/14/2024

## 2024-06-14 NOTE — PLAN OF CARE
Goal Outcome Evaluation:  Pt up in the chair this morning. Heart rate remained above 60 beats per minutes last night. Pt is unsteady on her feet and requires support with ambulation. She also had intermittent bouts of confusion last night.

## 2024-06-14 NOTE — OUTREACH NOTE
Prep Survey      Flowsheet Row Responses   [facility] patient discharged from? [facility]   Is LACE score < 7 ? No   Eligibility [facility]   Date of Admission 06/12/24   Date of Discharge 06/14/24   Discharge Disposition Home or Self Care   Discharge diagnosis Shortness of breath, dizziness and leg weakness   Does the patient have one of the following disease processes/diagnoses(primary or secondary)? Other   Does the patient have Home health ordered? No   Is there a DME ordered? No   Prep survey completed? Yes            DRAGAN JEREZ - Registered Nurse

## 2024-06-14 NOTE — PROGRESS NOTES
Cardiology Progress Note    Patient Identification:  Name: Dora Solorio  Age: 73 y.o.  Sex: female  :  1950  MRN: [id number]                 Follow Up / Chief Complaint: Bradycardia  Chief Complaint   Patient presents with    Slow Heart Rate       Interval History: Patient presented with bradycardia near syncope 2024, underwent temporary pacemaker placement.  Patient's beta-blockers were held.  Patient's heart rate improved.  Temporary pacemaker was removed 2024.           Subjective: Patient seen and examined.  Chart reviewed.  Labs reviewed.  Discussed with RN taking care of patient.  Continues to be in sinus rhythm and hemodynamically stable.      Objective:   2024 reveal normal CBC.  TSH is normal at 2.01  2024: Potassium 3.6 calcium 6.8 hemoglobin A1c 8.36 LDL 32 hemoglobin 8.9  2024: Creatinine is 1.17, hemoglobin 9.4       History of Present Illness:       Ms. Keke Solorio has PMH of     Recurrent dizziness, history of ankle fracture, scalp hematoma  Hypertension  Dyslipidemia with low HDL  Diabetes  CKD  Carotid disease 60% at the origin of right internal carotid by CTA 2022  Tubal ligation, , endometrial ablation, ankle fracture surgery  Allergy/intolerance to sulfa     Presented with complaint of feeling dizzy lightheaded and near syncopal.  Was found to be bradycardic in 30s.  Patient has history of episodic dizziness previously.  But was never found to be bradycardic.     Data:  Labs 2024 reveal normal CBC.  Chest x-ray 2024 reveals borderline cardiomegaly  EKG done 2024 reviewed/interpreted by me reveals junctional bradycardia at the rate of 32 bpm with probable LVH and anterior Q waves.        Review of records:  Echocardiogram 2022 reveals normal LV systolic function EF of 65% with concentric LVH and grade 1 diastolic dysfunction        Assessment:  :     Junctional bradycardia  Acute hypotension  Dizziness, near  syncope  Hypertension  Dyslipidemia  Diabetes  Obesity with BMI of 30        Recommendations / Plan:         Patient presented 6/12/2024 with junctional bradycardia and symptomatic bradycardia.  Patient was taken to the Cath Lab and had a temporary pacemaker placed because she was hypotensive.  Patient was on home medication on bisoprolol, bisoprolol was held and heart rate improved.  Temporary pacemaker was removed 6/13/2024.  Patient TSH is normal.  Troponin x 2 are 16 and 14.  Patient's heart rate has improved after holding bisoprolol.  Will follow-up closely as outpatient and then we can do ischemic workup.  Increase activity.  Echo reveals normal LV systolic function.  Discussed with RN taking care of patient.  Will follow-up as outpatient in 2 weeks     Echocardiogram 6/13/2024 revealed      Left ventricular ejection fraction appears to be 61 - 65%.    Left ventricular diastolic function was indeterminate.    The right ventricular cavity is borderline dilated.    The left atrial cavity is dilated.    Left atrial volume is moderately increased.    Estimated right ventricular systolic pressure from tricuspid regurgitation is normal (<35 mmHg).               Copied text in this portion of the note has been reviewed and is accurate as of 6/14/2024    Past Medical History:  Past Medical History:   Diagnosis Date    Adjustment disorder with depressed mood 09/12/2018    Allergic Years    Sulfa drugs    Anxiety     Arthritis Years    [place] joints. Mostly back, knees, hips, fingers    Cataract 7/2023    Depression 09/12/2018    Diabetes mellitus, type II 07/03/2013    Essential hypertension 07/03/2013    Gout 08/12/2014    Heart attack     Hyperlipidemia 06/02/2020    Hypertension     Inflammatory bowel disease     Irritable bowel syndrome without diarrhea 04/17/2017    Kidney disease     Kidney stone ?August 2016?    In hospital overnight    Osteopenia 04/17/2017    Peripheral neuropathy 04/17/2017    Renal  insufficiency 2023    Stage 3     Past Surgical History:  Past Surgical History:   Procedure Laterality Date    ANKLE OPEN REDUCTION INTERNAL FIXATION Left 2024    Procedure: ANKLE OPEN REDUCTION INTERNAL FIXATION;  Surgeon: Eriberto Muñoz MD;  Location: [facility] MAIN OR;  Service: Orthopedics;  Laterality: Left;    CARDIAC ELECTROPHYSIOLOGY PROCEDURE N/A 2024    Procedure: Temporary Pacemaker;  Surgeon: Jose Shirley MD;  Location: [facility] CATH INVASIVE LOCATION;  Service: Cardiovascular;  Laterality: N/A;     SECTION      x2    COLON SURGERY      ENDOMETRIAL ABLATION      FRACTURE SURGERY  24    KNEE ARTHROSCOPY Bilateral     TUBAL ABDOMINAL LIGATION  1973        Social History:   Social History     Tobacco Use    Smoking status: Never     Passive exposure: Never    Smokeless tobacco: Never   Substance Use Topics    Alcohol use: Never      Family History:  Family History   Adopted: Yes   Problem Relation Age of Onset    Epilepsy Mother     Colon cancer Mother     Diabetes Mother     Hypertension Mother     No Known Problems Sister     No Known Problems Maternal Grandmother     No Known Problems Maternal Grandfather     No Known Problems Paternal Grandmother     No Known Problems Paternal Grandfather     Other Maternal Uncle         alzhimers    Diabetes Daughter         Mothets initials          Allergies:  Allergies   Allergen Reactions    Oxycodone Mental Status Change and Hallucinations    Sulfa Antibiotics Rash    Allopurinol Nausea And Vomiting    Morphine Hallucinations     Scheduled Meds:  amLODIPine, 10 mg, Daily  atorvastatin, 40 mg, Daily  [Held by provider] bisoprolol, 10 mg, Daily  buPROPion XL, 150 mg, Daily  escitalopram, 20 mg, Daily  gabapentin, 300 mg, Q12H  heparin (porcine), 5,000 Units, Q8H  insulin lispro, 2-9 Units, 4x Daily With Meals & Nightly  povidone-iodine, , Daily  [Transfer Hold] senna-docusate sodium, 2 tablet, BID  [Transfer Hold]  "sodium chloride, 10 mL, Q12H          Review of Systems:   ROS  Review of Systems   Constitution: Negative for chills and fever.   Cardiovascular: Negative for chest pain and palpitations.   Respiratory: Negative for cough and hemoptysis.    Gastrointestinal: Negative for nausea.        Constitutional:  Temp:  [98.3 °F (36.8 °C)-99.5 °F (37.5 °C)] 99 °F (37.2 °C)  Heart Rate:  [54-67] 59  Resp:  [13-17] 17  BP: (110-155)/() 142/64    Physical Exam   /64 (BP Location: Left arm, Patient Position: Sitting)   Pulse 59   Temp 99 °F (37.2 °C) (Oral)   Resp 17   Ht 162.6 cm (64\")   Wt 90.3 kg (199 lb 1.2 oz)   SpO2 93%   BMI 34.17 kg/m²   General:  Appears in no acute distress  Eyes: Sclera is anicteric,  conjunctiva is clear   HEENT:  No JVD. Thyroid not visibly enlarged. No mucosal pallor or cyanosis  Respiratory: Respirations regular and unlabored at rest.  Clear to auscultation  Cardiovascular: S1,S2 Regular rate and rhythm. No murmur, rub or gallop auscultated.  . No pretibial pitting edema  Gastrointestinal: Abdomen nondistended.  Musculoskeletal:  No abnormal movements  Extremities: No digital clubbing or cyanosis  Skin: Color pink.   Neuro: Alert and awake.    INTAKE AND OUTPUT:    Intake/Output Summary (Last 24 hours) at 6/14/2024 0848  Last data filed at 6/14/2024 0600  Gross per 24 hour   Intake 1426 ml   Output 1350 ml   Net 76 ml       Cardiographics  Telemetry: Sinus rhythm    ECG:   ECG 12 Lead Rhythm Change   Preliminary Result   HEART RATE= 64  bpm   RR Interval= 932  ms   KY Interval= 252  ms   P Horizontal Axis= 3  deg   P Front Axis= 44  deg   QRSD Interval= 90  ms   QT Interval= 420  ms   QTcB= 435  ms   QRS Axis= -2  deg   T Wave Axis= 3  deg   - ABNORMAL ECG -   Sinus rhythm   Prolonged KY interval   When compared with ECG of 13-Jun-2024 0:44:54,   No significant change   Electronically Signed By:    Date and Time of Study: 2024-06-13 05:59:04      ECG 12 Lead Electrolyte Imbalance "   Preliminary Result   HEART RATE= 65  bpm   RR Interval= 928  ms   UT Interval= 224  ms   P Horizontal Axis= -32  deg   P Front Axis= 32  deg   QRSD Interval= 88  ms   QT Interval= 422  ms   QTcB= 438  ms   QRS Axis= -2  deg   T Wave Axis= 16  deg   - ABNORMAL ECG -   Sinus rhythm   Prolonged UT interval   When compared with ECG of 12-Jun-2024 14:09:42,   Significant change in rhythm: previously junctional   New conduction abnormality   Significant axis, voltage or hypertrophy change   Electronically Signed By:    Date and Time of Study: 2024-06-13 00:44:54      ECG 12 Lead Bradycardia   Final Result   HEART RATE= 32  bpm   RR Interval= 1892  ms   UT Interval=   ms   P Horizontal Axis=   deg   P Front Axis=   deg   QRSD Interval= 81  ms   QT Interval= 549  ms   QTcB= 399  ms   QRS Axis= -9  deg   T Wave Axis= 8  deg   - ABNORMAL ECG -   Junctional rhythm   Probable left ventricular hypertrophy   Anterior Q waves, possibly due to LVH   When compared with ECG of 04-Jan-2024 12:02:23,   Significant change in rhythm: previously sinus   Electronically Signed By: Archie De ([facility]) 13-Jun-2024 05:29:11   Date and Time of Study: 2024-06-12 14:09:42      ECG 12 Lead Electrolyte Imbalance    (Results Pending)     I have personally reviewed EKG    Echocardiogram: Results for orders placed during the hospital encounter of 06/12/24    Adult Transthoracic Echo Complete W/ Cont if Necessary Per Protocol    Interpretation Summary    Left ventricular ejection fraction appears to be 61 - 65%.    Left ventricular diastolic function was indeterminate.    The right ventricular cavity is borderline dilated.    The left atrial cavity is dilated.    Left atrial volume is moderately increased.    Estimated right ventricular systolic pressure from tricuspid regurgitation is normal (<35 mmHg).    Conclusion      Normal LV size and contractility EF of 60 to 65%  Borderline RV enlargement seen.  Mild to moderate increased left atrial  "volumes.  Pulmonic valve is not well visualized.  Aortic valve,   tricuspid valve appears structurally normal, trace tricuspid regurgitation seen.  Normal calculated RV systolic pressure of 28 mmHg  Mitral valve appears to have mitral annular calcification.  Is not well-visualized.  Leaflets are opening well.  No pericardial effusion seen.  Proximal aorta appears normal in size.      Lab Review   I have reviewed the labs  Results from last 7 days   Lab Units 06/12/24  2200 06/12/24  1459   HSTROP T ng/L 14* 16*     Results from last 7 days   Lab Units 06/14/24  0342   MAGNESIUM mg/dL 1.9     Results from last 7 days   Lab Units 06/14/24  0342   SODIUM mmol/L 139   POTASSIUM mmol/L 4.2   BUN mg/dL 20   CREATININE mg/dL 1.17*   CALCIUM mg/dL 8.4*         Results from last 7 days   Lab Units 06/14/24  0342 06/13/24  0246 06/12/24  1422   WBC 10*3/mm3 9.56 7.96 8.85   HEMOGLOBIN g/dL 9.4* 8.9* 10.7*   HEMATOCRIT % 30.6* 29.4* 35.1   PLATELETS 10*3/mm3 165 145 216           RADIOLOGY:  Imaging Results (Last 24 Hours)       ** No results found for the last 24 hours. **                  )6/14/2024  MD ZAKIA Salmeron/Transcription:   \"Dictated utilizing Dragon dictation\".   "

## 2024-06-15 LAB
BACTERIA SPEC AEROBE CULT: ABNORMAL
GRAM STN SPEC: ABNORMAL
GRAM STN SPEC: ABNORMAL

## 2024-06-17 ENCOUNTER — TRANSITIONAL CARE MANAGEMENT TELEPHONE ENCOUNTER (OUTPATIENT)
Dept: CALL CENTER | Facility: HOSPITAL | Age: 74
End: 2024-06-17
Payer: MEDICARE

## 2024-06-17 NOTE — OUTREACH NOTE
Pt discusses lisinopril discontinued--this Rn unable to verify on AVS or on discharge notes.  Informed patient that bisoprolol was discontinued--she v/u.  Please f/u to review and verify.    Thank you

## 2024-06-17 NOTE — OUTREACH NOTE
Call Center TCM Note      Flowsheet Row Responses   [facility] patient discharged from? [facility]   Does the patient have one of the following disease processes/diagnoses(primary or secondary)? Other   TCM attempt successful? Yes  [no names listed on verbal release]   Call start time 1515   Call end time 1519   Discharge diagnosis Shortness of breath, dizziness and leg weakness   Meds reviewed with patient/caregiver? Yes   Is the patient having any side effects they believe may be caused by any medication additions or changes? No   Does the patient have all medications ordered at discharge? Yes   Is the patient taking all medications as directed (includes completed medication regime)? Yes   Comments Hospital f/u 6/20/24@200pm   Does the patient have an appointment with their PCP within 7-14 days of discharge? Yes   Has home health visited the patient within 72 hours of discharge? N/A   Psychosocial issues? No   Did the patient receive a copy of their discharge instructions? Yes   Nursing interventions Reviewed instructions with patient   What is the patient's perception of their health status since discharge? Improving  [Groin temporary pacer site healing without issues, aware of site precautions.   REports dtr is a nurse who assists with care]   Is the patient/caregiver able to teach back signs and symptoms related to disease process for when to call PCP? Yes   Is the patient/caregiver able to teach back signs and symptoms related to disease process for when to call 911? Yes   TCM call completed? Yes   Call end time 1519            Sabra Huggins RN    6/17/2024, 15:23 EDT

## 2024-06-20 ENCOUNTER — OFFICE VISIT (OUTPATIENT)
Dept: FAMILY MEDICINE CLINIC | Facility: CLINIC | Age: 74
End: 2024-06-20
Payer: MEDICARE

## 2024-06-20 ENCOUNTER — LAB (OUTPATIENT)
Dept: FAMILY MEDICINE CLINIC | Facility: CLINIC | Age: 74
End: 2024-06-20
Payer: MEDICARE

## 2024-06-20 VITALS
BODY MASS INDEX: 33.39 KG/M2 | HEIGHT: 64 IN | HEART RATE: 58 BPM | OXYGEN SATURATION: 96 % | SYSTOLIC BLOOD PRESSURE: 124 MMHG | WEIGHT: 195.6 LBS | DIASTOLIC BLOOD PRESSURE: 74 MMHG | RESPIRATION RATE: 18 BRPM

## 2024-06-20 DIAGNOSIS — E87.5 HYPERKALEMIA: ICD-10-CM

## 2024-06-20 DIAGNOSIS — R00.1 BRADYCARDIA: ICD-10-CM

## 2024-06-20 DIAGNOSIS — N18.31 CHRONIC KIDNEY DISEASE, STAGE 3A: ICD-10-CM

## 2024-06-20 DIAGNOSIS — I10 ESSENTIAL HYPERTENSION: ICD-10-CM

## 2024-06-20 DIAGNOSIS — Z22.322 MRSA (METHICILLIN RESISTANT STAPH AUREUS) CULTURE POSITIVE: ICD-10-CM

## 2024-06-20 DIAGNOSIS — E11.42 DIABETIC PERIPHERAL NEUROPATHY: ICD-10-CM

## 2024-06-20 DIAGNOSIS — Z09 HOSPITAL DISCHARGE FOLLOW-UP: Primary | ICD-10-CM

## 2024-06-20 DIAGNOSIS — E11.65 TYPE 2 DIABETES MELLITUS WITH HYPERGLYCEMIA, WITH LONG-TERM CURRENT USE OF INSULIN: ICD-10-CM

## 2024-06-20 DIAGNOSIS — Z79.4 TYPE 2 DIABETES MELLITUS WITH HYPERGLYCEMIA, WITH LONG-TERM CURRENT USE OF INSULIN: ICD-10-CM

## 2024-06-20 DIAGNOSIS — F32.9 REACTIVE DEPRESSION: ICD-10-CM

## 2024-06-20 PROCEDURE — 3074F SYST BP LT 130 MM HG: CPT | Performed by: NURSE PRACTITIONER

## 2024-06-20 PROCEDURE — 99495 TRANSJ CARE MGMT MOD F2F 14D: CPT | Performed by: NURSE PRACTITIONER

## 2024-06-20 PROCEDURE — 1159F MED LIST DOCD IN RCRD: CPT | Performed by: NURSE PRACTITIONER

## 2024-06-20 PROCEDURE — 36415 COLL VENOUS BLD VENIPUNCTURE: CPT

## 2024-06-20 PROCEDURE — 3078F DIAST BP <80 MM HG: CPT | Performed by: NURSE PRACTITIONER

## 2024-06-20 PROCEDURE — 1160F RVW MEDS BY RX/DR IN RCRD: CPT | Performed by: NURSE PRACTITIONER

## 2024-06-20 PROCEDURE — 1111F DSCHRG MED/CURRENT MED MERGE: CPT | Performed by: NURSE PRACTITIONER

## 2024-06-20 PROCEDURE — 3052F HG A1C>EQUAL 8.0%<EQUAL 9.0%: CPT | Performed by: NURSE PRACTITIONER

## 2024-06-20 PROCEDURE — 80048 BASIC METABOLIC PNL TOTAL CA: CPT | Performed by: NURSE PRACTITIONER

## 2024-06-20 RX ORDER — ESCITALOPRAM OXALATE 20 MG/1
20 TABLET ORAL DAILY
Qty: 90 TABLET | Refills: 0 | OUTPATIENT
Start: 2024-06-20

## 2024-06-20 RX ORDER — HYDROCHLOROTHIAZIDE 50 MG/1
50 TABLET ORAL DAILY
Qty: 90 TABLET | Refills: 0 | OUTPATIENT
Start: 2024-06-20

## 2024-06-20 RX ORDER — BUPROPION HYDROCHLORIDE 150 MG/1
150 TABLET ORAL DAILY
Qty: 90 TABLET | Refills: 0 | OUTPATIENT
Start: 2024-06-20

## 2024-06-20 RX ORDER — BUPROPION HYDROCHLORIDE 150 MG/1
150 TABLET ORAL DAILY
Qty: 90 TABLET | Refills: 0 | Status: SHIPPED | OUTPATIENT
Start: 2024-06-20

## 2024-06-20 NOTE — PROGRESS NOTES
Transitional Care Follow Up Visit  Subjective     Dora Solorio is a 73 y.o. female who presents for a transitional care management visit.    Within 48 business hours after discharge our office contacted her via telephone to coordinate her care and needs.      I reviewed and discussed the details of that call along with the discharge summary, hospital problems, inpatient lab results, inpatient diagnostic studies, and consultation reports with Dora.     Current outpatient and discharge medications have been reconciled for the patient.  Reviewed by: BRANDON Levy          6/14/2024     5:41 PM   Date of TCM Phone Call   [facility]   Date of Admission 6/12/2024   Date of Discharge 6/14/2024   Discharge Disposition Home or Self Care     Risk for Readmission (LACE) Score: 9 (6/14/2024  6:00 AM)      History of Present Illness   Course During Hospital Stay:  pt comes in today for follow up from recent hospital stay. Went to ER on 6/12 with c/o dizziness, weakness, SOA, and not feeling well. HR was found to be in the 30s at time of arrival. Pt was taken to cath lab for transvenous pacemaker placement. This was thought to be a result of her bisoprolol. Pt was also found to have hyperkalemia with K+6.3.   She was admitted for monitoring. Beta blockers were stopped. Hyperkalemia was treated and resolved. Pacemaker was weaned off and bradycardia improved with discontinuation of beta blocker. Temporary pacemaker was removed on 6/13.   It was also noted that pt had wounds to right upper arm. Culture was positive for MRSA. She was started on bactoban and this is resolving as well. She has been cleaning wounds and denies any redness, drainage, fever or chills.   Nephrology also decreased her HCTZ to 25mg daily.   She is needing refills.   She is feeling better, but still with some weakness and dizziness.  Not drinking much fluids.   She had ankle surgery recently and that has caused her to be more  "sedentary lately as well.   Denies any CP, but mild SOA still.   Pt does have upcoming appt with Dr. Shirley next week and also has appt with nephrology pending.   She has been monitoring BP at home since making med changes and BP has been stable 110-120 systolic.   HR 50-60s       The following portions of the patient's history were reviewed and updated as appropriate: allergies, current medications, past family history, past medical history, past social history, past surgical history, and problem list.    Review of Systems    Objective   /74   Pulse 58   Resp 18   Ht 162.6 cm (64.02\")   Wt 88.7 kg (195 lb 9.6 oz)   SpO2 96%   BMI 33.56 kg/m²   Physical Exam  Constitutional:       Appearance: She is well-developed.   Eyes:      Pupils: Pupils are equal, round, and reactive to light.   Cardiovascular:      Rate and Rhythm: Normal rate and regular rhythm.   Pulmonary:      Effort: Pulmonary effort is normal.      Breath sounds: Normal breath sounds.   Musculoskeletal:      Comments: Healing wounds to right upper arm.    Neurological:      Mental Status: She is alert and oriented to person, place, and time.         Assessment & Plan   Diagnoses and all orders for this visit:    1. Hospital discharge follow-up (Primary)    2. Reactive depression  -     buPROPion XL (WELLBUTRIN XL) 150 MG 24 hr tablet; Take 1 tablet by mouth Daily.  Dispense: 90 tablet; Refill: 0    3. Hyperkalemia  Comments:  will check labs again today for stability. needs to follow up with nephrology soon.  Orders:  -     Basic Metabolic Panel; Future    4. Bradycardia  Assessment & Plan:  Resolved. Off beta blocker. Pt does have a follow upw with Dr. Shirley next week.       5. Essential hypertension  Assessment & Plan:  Hypertension is stable and controlled  Continue current treatment regimen.  Dietary sodium restriction.  Weight loss.  Regular aerobic exercise.  Blood pressure will be reassessed in 1 month.  Stable since stopping " beta blocker and decreasing HCTZ. Will cont to monitor closely.       6. MRSA (methicillin resistant staph aureus) culture positive  Comments:  improving with treatment.    7. Type 2 diabetes mellitus with hyperglycemia, with long-term current use of insulin  Assessment & Plan:  Diabetes is stable.   Continue current treatment regimen.  Recommended an ADA diet.  Regular aerobic exercise.  Discussed foot care.  Diabetes will be reassessed in 1 month      8. Diabetic peripheral neuropathy  Assessment & Plan:  Diabetes is stable.   Continue current treatment regimen.  Discussed foot care.  Diabetes will be reassessed in 1 month  Unchanged. Her gabapentin could also be contributing to dizziness.       9. Chronic kidney disease, stage 3a  Assessment & Plan:  Renal condition is stable.  Continue current treatment regimen.  Renal condition will be reassessed in 1 month.  Appt with nephrology pending.       Reviewed records  Check labs today  Nephrology and cardiology appt pending  Follow up in 1 month for MWV  Discussed importance of regular exercise and recommended starting or continuing a regular exercise program for good health. The patient was also encouraged to lose weight for better health.   During this office visit, we discussed the pertinent aspects of the visit and treatment recommendations. Pt verbalizes understanding. Follow up was discussed. Patient was given the opportunity to ask questions and discuss other concerns.

## 2024-06-20 NOTE — ASSESSMENT & PLAN NOTE
Diabetes is stable.   Continue current treatment regimen.  Recommended an ADA diet.  Regular aerobic exercise.  Discussed foot care.  Diabetes will be reassessed in 1 month

## 2024-06-20 NOTE — ASSESSMENT & PLAN NOTE
Hypertension is stable and controlled  Continue current treatment regimen.  Dietary sodium restriction.  Weight loss.  Regular aerobic exercise.  Blood pressure will be reassessed in 1 month.  Stable since stopping beta blocker and decreasing HCTZ. Will cont to monitor closely.

## 2024-06-20 NOTE — ASSESSMENT & PLAN NOTE
Diabetes is stable.   Continue current treatment regimen.  Discussed foot care.  Diabetes will be reassessed in 1 month  Unchanged. Her gabapentin could also be contributing to dizziness.

## 2024-06-20 NOTE — ASSESSMENT & PLAN NOTE
Renal condition is stable.  Continue current treatment regimen.  Renal condition will be reassessed in 1 month.  Appt with nephrology pending.

## 2024-06-21 LAB
ANION GAP SERPL CALCULATED.3IONS-SCNC: 9 MMOL/L (ref 5–15)
BUN SERPL-MCNC: 11 MG/DL (ref 8–23)
BUN/CREAT SERPL: 12.5 (ref 7–25)
CALCIUM SPEC-SCNC: 9 MG/DL (ref 8.6–10.5)
CHLORIDE SERPL-SCNC: 104 MMOL/L (ref 98–107)
CO2 SERPL-SCNC: 25 MMOL/L (ref 22–29)
CREAT SERPL-MCNC: 0.88 MG/DL (ref 0.57–1)
EGFRCR SERPLBLD CKD-EPI 2021: 68.6 ML/MIN/1.73
GLUCOSE SERPL-MCNC: 157 MG/DL (ref 65–99)
POTASSIUM SERPL-SCNC: 4.7 MMOL/L (ref 3.5–5.2)
SODIUM SERPL-SCNC: 138 MMOL/L (ref 136–145)

## 2024-06-24 NOTE — PROGRESS NOTES
Subjective:     Encounter Date:2024      Patient ID: Dora Solorio is a 73 y.o. female.    Chief Complaint: hospital follow up -- bradycardia     History of Present Illness    Ms. Keke Solorio has PMH of     Recurrent dizziness, history of ankle fracture, scalp hematoma  Hypertension  Dyslipidemia with low HDL  Diabetes  CKD  Carotid disease 60% at the origin of right internal carotid by CTA 2022  Tubal ligation, , endometrial ablation, ankle fracture surgery  Allergy/intolerance to sulfa     Presented with complaint of feeling dizzy lightheaded and near syncopal.  Was found to be bradycardic in 30s.  Patient has history of episodic dizziness previously.  But was never found to be bradycardic.     She underwent temporary pacemaker placement urgently on admission and her beta-blockers were held.  Patient's heart rate improved.  Temporary pacemaker was removed 2024.     Today (2024) she is here for hospital follow up. Patient continues to have episodes of significant shortness of breath on exertion, as well as dizziness.   Dizziness is worse when first standing and she had another fall since hospitalization.  She reports was getting up use the restroom and became dizzy and hit her head on the closet door.  She did not injury herself therefore did not go to the ED.   Patient does complain of some non pitting edema in both lower extremities, worse in left, likely from previously ankle surgery.   She denies any chest pain.      Today sitting blood pressure's were 141/78 in left arm 138/78 in right arm  HR 76  oxygen 96% on room air  weight 196 lbs     Lying blood pressure 127/73 standing blood pressure 104/62            Review of records:  Echocardiogram 2022 reveals normal LV systolic function EF of 65% with concentric LVH and grade 1 diastolic dysfunction    Chest x-ray 2024 reveals borderline cardiomegaly  EKG done 2024 reviewed/interpreted by me reveals junctional  bradycardia at the rate of 32 bpm with probable LVH and anterior Q waves.    Echocardiogram 6/13/2024 revealed       Left ventricular ejection fraction appears to be 61 - 65%.    Left ventricular diastolic function was indeterminate.    The right ventricular cavity is borderline dilated.    The left atrial cavity is dilated.    Left atrial volume is moderately increased.    Estimated right ventricular systolic pressure from tricuspid regurgitation is normal (<35 mmHg).      Lab Review:   6/12/2024 reveal normal CBC.  TSH is normal at 2.01  6/13/2024: Potassium 3.6 calcium 6.8 hemoglobin A1c 8.36 LDL 32 hemoglobin 8.9  6/14/2024: Creatinine is 1.17, hemoglobin 9.4      The following portions of the patient's history were reviewed and updated as appropriate: allergies, current medications, past family history, past medical history, past social history, past surgical history, and problem list.    Review of Systems   Constitutional: Negative for malaise/fatigue.   Cardiovascular:  Positive for dyspnea on exertion and leg swelling. Negative for chest pain and palpitations.   Respiratory:  Negative for cough and shortness of breath.    Gastrointestinal:  Negative for abdominal pain, nausea and vomiting.   Neurological:  Positive for dizziness and light-headedness. Negative for focal weakness, headaches and numbness.   All other systems reviewed and are negative.      Past Medical History:   Diagnosis Date    Adjustment disorder with depressed mood 09/12/2018    Allergic Years    Sulfa drugs    Anxiety     Arthritis Years    Lotsvof joints. Mostly back, knees, hips, fingers    Cataract 7/2023    Depression 09/12/2018    Diabetes mellitus, type II 07/03/2013    Essential hypertension 07/03/2013    Gout 08/12/2014    Heart attack     Hyperlipidemia 06/02/2020    Hypertension     Inflammatory bowel disease     Irritable bowel syndrome without diarrhea 04/17/2017    Kidney disease     Kidney stone ?August 2016?    In hospital  "overnight    Osteopenia 2017    Peripheral neuropathy 2017    Renal insufficiency 2023    Stage 3     Past Surgical History:   Procedure Laterality Date    ANKLE OPEN REDUCTION INTERNAL FIXATION Left 2024    Procedure: ANKLE OPEN REDUCTION INTERNAL FIXATION;  Surgeon: Eriberto Muñoz MD;  Location: [facility] MAIN OR;  Service: Orthopedics;  Laterality: Left;    CARDIAC ELECTROPHYSIOLOGY PROCEDURE N/A 2024    Procedure: Temporary Pacemaker;  Surgeon: Jose Shirley MD;  Location: [facility] CATH INVASIVE LOCATION;  Service: Cardiovascular;  Laterality: N/A;     SECTION      x2    COLON SURGERY      ENDOMETRIAL ABLATION      FRACTURE SURGERY  24    KNEE ARTHROSCOPY Bilateral     TUBAL ABDOMINAL LIGATION  1973     /62 (BP Location: Left arm, Patient Position: Standing, Cuff Size: Adult)   Pulse 76   Ht 162.6 cm (64\")   Wt 88.9 kg (196 lb)   SpO2 96%   BMI 33.64 kg/m²   Family History   Adopted: Yes   Problem Relation Age of Onset    Epilepsy Mother     Colon cancer Mother     Diabetes Mother     Hypertension Mother     No Known Problems Sister     No Known Problems Maternal Grandmother     No Known Problems Maternal Grandfather     No Known Problems Paternal Grandmother     No Known Problems Paternal Grandfather     Other Maternal Uncle         alzhimers    Diabetes Daughter         Mothets initials       Current Outpatient Medications:     amLODIPine (NORVASC) 10 MG tablet, Take 1 tablet by mouth once daily, Disp: 90 tablet, Rfl: 0    atorvastatin (LIPITOR) 20 MG tablet, Take 1 tablet by mouth once daily, Disp: 90 tablet, Rfl: 2    buPROPion XL (WELLBUTRIN XL) 150 MG 24 hr tablet, Take 1 tablet by mouth Daily., Disp: 90 tablet, Rfl: 0    Cyanocobalamin (Vitamin B-12) 1000 MCG sublingual tablet, Place 1 tablet under the tongue Daily., Disp: 100 each, Rfl: 4    escitalopram (LEXAPRO) 20 MG tablet, Take 1 tablet by mouth once daily, Disp: 90 tablet, Rfl: 0   "  gabapentin (NEURONTIN) 600 MG tablet, Take 1 tablet by mouth 2 (Two) Times a Day., Disp: , Rfl:     metFORMIN (GLUCOPHAGE) 1000 MG tablet, TAKE 1 TABLET BY MOUTH TWICE DAILY WITH MEALS, Disp: 180 tablet, Rfl: 0    mupirocin (BACTROBAN) 2 % ointment, Apply 1 Application topically to the appropriate area as directed 3 (Three) Times a Day for 14 days. Apply to wounds on arm., Disp: 22 g, Rfl: 1    povidone-iodine (BETADINE) 10 % external solution 10%, Apply  topically to the appropriate area as directed Daily., Disp: 237 mL, Rfl: 0    sodium bicarbonate 650 MG tablet, Take 1 tablet by mouth 2 (Two) Times a Day., Disp: , Rfl:     Tresiba FlexTouch 100 UNIT/ML solution pen-injector injection, INJECT 20 UNITS SUBCUTANEOUSLY IN THE EVENING, Disp: 18 mL, Rfl: 0    vitamin D (ERGOCALCIFEROL) 1.25 MG (88124 UT) capsule capsule, , Disp: , Rfl:   Allergies   Allergen Reactions    Oxycodone Mental Status Change and Hallucinations    Sulfa Antibiotics Rash    Allopurinol Nausea And Vomiting    Morphine Hallucinations     Social History     Socioeconomic History    Marital status:      Spouse name: Rishabh    Number of children: 2    Years of education: 12   Tobacco Use    Smoking status: Never     Passive exposure: Never    Smokeless tobacco: Never   Vaping Use    Vaping status: Never Used   Substance and Sexual Activity    Alcohol use: Never    Drug use: Never    Sexual activity: Not Currently     Partners: Male     Birth control/protection: Other, Tubal ligation, Birth control pill                Objective:     Constitutional:       Appearance: Not in distress. Obese and frail.   Neck:      Vascular: No JVR. JVD normal.   Pulmonary:      Effort: Pulmonary effort is normal.      Breath sounds: Normal breath sounds. No wheezing. No rhonchi. No rales.   Chest:      Chest wall: Not tender to palpatation.   Cardiovascular:      PMI at left midclavicular line. Normal rate. Regular rhythm. Normal S1. Normal S2.       Murmurs:  There is no murmur.      No gallop.  No click. No rub.   Pulses:     Intact distal pulses.   Edema:     Peripheral edema present.     Ankle: 1+ edema of the left ankle and trace edema of the right ankle.     Feet: 1+ edema of the left foot and trace edema of the right foot.  Abdominal:      General: Bowel sounds are normal.      Palpations: Abdomen is soft.      Tenderness: There is no abdominal tenderness.   Musculoskeletal: Normal range of motion.         General: No tenderness. Skin:     General: Skin is warm and dry.   Neurological:      General: No focal deficit present.      Mental Status: Alert and oriented to person, place and time.       Procedures                  Assessment:     [facility]       Diagnosis Plan   1. Hospital discharge follow-up        2. Essential hypertension  Stress Test With Myocardial Perfusion One Day      3. Bradycardia  Stress Test With Myocardial Perfusion One Day      4. Dyspnea on exertion  Stress Test With Myocardial Perfusion One Day      5. Dizziness  Stress Test With Myocardial Perfusion One Day      6. Anginal equivalent  Stress Test With Myocardial Perfusion One Day      7. Chronic kidney disease, stage 3a                       Plan:   Chart reviewed   Labs reviewed   Reviewed echocardiogram with patient and daughter   Continue to hold beta blockers   Continue to hold lisinopril   Patient is on amlodipine and hctz   I called and discussed patient with Dr. Shirley -- she is likely dry     Advised to hold hctz, increase fluid intake  but monitor edema closely as well     Wear compression stockings if possible (she does not want to)     Discussed falls risk with patient-- advised to use cane/walker around her house instead of the walls, furniture.       Call if no improvement in blood pressure.     Patient has significant dyspnea on exertion, unexplained by echocardiogram results.  Will check stress myoview to rule out ischemia.  Patient likely cannot walk on treadmill due to  decreased mobility and weakness/dizziness/should be walking with a cane or walker.       Patient to follow up with Dr. Shirley after stress test.     Electronically signed by BRANDON Bell, 06/28/24, 6:52 PM EDT.        This document is intended for medical expert use only.  Reading of this document by patients and/or patient's family without participating medical staff guidance may result in misinterpretation and unintended morbidity. Any interpretation of such data is the responsibility of the patient and/or family member responsible for the patient in concert with their primary or specialist providers, not to be left for sources of online search as such as [employer], [employer] or similar queries.  Relying on these approaches to knowledge may result in misinterpretation, misguided goals of care and even death should patient or family members try recommendations outside of the realm of professional medical care in a supervised inpatient environment.

## 2024-06-25 ENCOUNTER — OFFICE VISIT (OUTPATIENT)
Dept: CARDIOLOGY | Facility: CLINIC | Age: 74
End: 2024-06-25
Payer: MEDICARE

## 2024-06-25 VITALS
HEART RATE: 76 BPM | OXYGEN SATURATION: 96 % | SYSTOLIC BLOOD PRESSURE: 104 MMHG | DIASTOLIC BLOOD PRESSURE: 62 MMHG | BODY MASS INDEX: 33.46 KG/M2 | HEIGHT: 64 IN | WEIGHT: 196 LBS

## 2024-06-25 DIAGNOSIS — R00.1 BRADYCARDIA: ICD-10-CM

## 2024-06-25 DIAGNOSIS — R06.09 DYSPNEA ON EXERTION: ICD-10-CM

## 2024-06-25 DIAGNOSIS — R42 DIZZINESS: ICD-10-CM

## 2024-06-25 DIAGNOSIS — Z09 HOSPITAL DISCHARGE FOLLOW-UP: Primary | ICD-10-CM

## 2024-06-25 DIAGNOSIS — I20.89 ANGINAL EQUIVALENT: ICD-10-CM

## 2024-06-25 DIAGNOSIS — I10 ESSENTIAL HYPERTENSION: ICD-10-CM

## 2024-06-25 DIAGNOSIS — N18.31 CHRONIC KIDNEY DISEASE, STAGE 3A: ICD-10-CM

## 2024-07-08 NOTE — PROGRESS NOTES
Subjective:     Encounter Date:2024      Patient ID: Dora Solorio is a 73 y.o. female.    Chief Complaint and history of present illness:    Follow-up for dizziness, bradycardia, temporary pacemaker, hypertension, dyslipidemia, diabetes, CAD artery disease          History of Present Illness:       Ms. Keke Solorio has PMH of     Recurrent dizziness, history of ankle fracture, scalp hematoma  Hypertension  Dyslipidemia with low HDL  Diabetes  CKD  Carotid disease 60% at the origin of right internal carotid by CTA 2022  Tubal ligation, , endometrial ablation, ankle fracture surgery  Allergy/intolerance to sulfa      Here for hospital follow-up.  Patient presented 2024 with bradycardia near syncope, with a heart rate in the 30s, had a temporary pacemaker placed in beta-blocker stopped with improvement of heart rate and temporary was removed on 2024.  He is here for follow-up.  Patient was complaining of shortness of breath and stress test was ordered.  Patient had previous episodes of dizziness but was never found to be bradycardic.    Patient's arterial blood pressure is 136/76, heart rate 80 bpm, BMI over 30.        Data:  Labs 2024 reveal normal CBC.  Chest x-ray 2024 reveals borderline cardiomegaly  EKG done 2024 reviewed/interpreted by me reveals junctional bradycardia at the rate of 32 bpm with probable LVH and anterior Q waves.        Review of records:  Echocardiogram 2022 reveals normal LV systolic function EF of 65% with concentric LVH and grade 1 diastolic dysfunction     Labs 2024 reveal normal CBC.  TSH is normal at 2.01.  2024: Potassium 3.6 calcium 6.8 hemoglobin A1c 8.36 LDL 32 hemoglobin 8.9  2024: Creatinine is 1.17, hemoglobin 9.4      Assessment:  :     Junctional bradycardia  Dizziness, near syncope  Hypertension  Dyslipidemia  Diabetes  Obesity with BMI of 30        Recommendations / Plan:         Patient presented 2024 with  junctional bradycardia and symptomatic bradycardia.  Patient was taken to the Cath Lab and had a temporary pacemaker placed because she was hypotensive.  Patient was on home medication on bisoprolol, bisoprolol was held and heart rate improved.  Temporary pacemaker was removed 6/13/2024.  Patient TSH is normal.  Troponin x 2 are 16 and 14.  Patient's heart rate has improved after holding bisoprolol.  Echo 6/13/2024 reveals normal LV systolic function.  Patient underwent Lexiscan Cardiolite 7/11/2024.     Will continue medical management with amlodipine, atorvastatin as tolerated and monitor blood pressure heart rate and symptoms in cardiology clinic.  Follow-up with PMD for labs and anemia.      Data:    Echocardiogram 6/13/2024 revealed: Left ventricular ejection fraction appears to be 61 - 65%.  Left ventricular diastolic function was indeterminate.  The right ventricular cavity is borderline dilated.  The left atrial cavity is dilated. Left atrial volume is moderately increased.  Estimated right ventricular systolic pressure from tricuspid regurgitation is normal (<35 mmHg).       Procedures    Copied text in this portion of the note has been reviewed and is accurate as of 7/11/2024  The following portions of the patient's history were reviewed and updated as appropriate: allergies, current medications, past family history, past medical history, past social history, past surgical history and problem list.    Assessment:         [facility]       Diagnosis Plan   1. Dyspnea on exertion        2. Bradycardia        3. Dizziness        4. Essential hypertension        5. Chronic kidney disease, stage 3a               Plan:               Past Medical History:  Past Medical History:   Diagnosis Date    Adjustment disorder with depressed mood 09/12/2018    Allergic Years    Sulfa drugs    Anxiety     Arthritis Years    Lotsvof joints. Mostly back, knees, hips, fingers    Cataract 7/2023    Depression 09/12/2018    Diabetes  mellitus, type II 2013    Essential hypertension 2013    Gout 2014    Heart attack     Hyperlipidemia 2020    Hypertension     Inflammatory bowel disease     Irritable bowel syndrome without diarrhea 2017    Kidney disease     Kidney stone ?2016?    In hospital overnight    Osteopenia 2017    Peripheral neuropathy 2017    Renal insufficiency 2023    Stage 3     Past Surgical History:  Past Surgical History:   Procedure Laterality Date    ABLATION OF DYSRHYTHMIC FOCUS      ANKLE OPEN REDUCTION INTERNAL FIXATION Left 2024    Procedure: ANKLE OPEN REDUCTION INTERNAL FIXATION;  Surgeon: Eriberto Muñoz MD;  Location: [facility] MAIN OR;  Service: Orthopedics;  Laterality: Left;    CARDIAC CATHETERIZATION      CARDIAC ELECTROPHYSIOLOGY PROCEDURE N/A 2024    Procedure: Temporary Pacemaker;  Surgeon: Jose Shirley MD;  Location: [facility] CATH INVASIVE LOCATION;  Service: Cardiovascular;  Laterality: N/A;     SECTION      x2    COLON SURGERY      ENDOMETRIAL ABLATION      FRACTURE SURGERY  24    KNEE ARTHROSCOPY Bilateral     TUBAL ABDOMINAL LIGATION  1973      Allergies:  Allergies   Allergen Reactions    Oxycodone Mental Status Change and Hallucinations    Sulfa Antibiotics Rash    Allopurinol Nausea And Vomiting    Morphine Hallucinations     Home Meds:  Current Meds:     Current Outpatient Medications:     amLODIPine (NORVASC) 10 MG tablet, Take 1 tablet by mouth once daily, Disp: 90 tablet, Rfl: 0    atorvastatin (LIPITOR) 20 MG tablet, Take 1 tablet by mouth once daily, Disp: 90 tablet, Rfl: 2    buPROPion XL (WELLBUTRIN XL) 150 MG 24 hr tablet, Take 1 tablet by mouth Daily., Disp: 90 tablet, Rfl: 0    Cyanocobalamin (Vitamin B-12) 1000 MCG sublingual tablet, Place 1 tablet under the tongue Daily., Disp: 100 each, Rfl: 4    escitalopram (LEXAPRO) 20 MG tablet, Take 1 tablet by mouth once daily, Disp: 90 tablet, Rfl: 0     "gabapentin (NEURONTIN) 600 MG tablet, Take 1 tablet by mouth 2 (Two) Times a Day., Disp: , Rfl:     metFORMIN (GLUCOPHAGE) 1000 MG tablet, TAKE 1 TABLET BY MOUTH TWICE DAILY WITH MEALS, Disp: 180 tablet, Rfl: 0    povidone-iodine (BETADINE) 10 % external solution 10%, Apply  topically to the appropriate area as directed Daily., Disp: 237 mL, Rfl: 0    sodium bicarbonate 650 MG tablet, Take 1 tablet by mouth 2 (Two) Times a Day., Disp: , Rfl:     Tresiba FlexTouch 100 UNIT/ML solution pen-injector injection, INJECT 20 UNITS SUBCUTANEOUSLY IN THE EVENING, Disp: 18 mL, Rfl: 0    vitamin D (ERGOCALCIFEROL) 1.25 MG (67904 UT) capsule capsule, , Disp: , Rfl:   No current facility-administered medications for this visit.  Social History:   Social History     Tobacco Use    Smoking status: Never     Passive exposure: Never    Smokeless tobacco: Never   Substance Use Topics    Alcohol use: Never      Family History:  Family History   Adopted: Yes   Problem Relation Age of Onset    Epilepsy Mother     Colon cancer Mother     Diabetes Mother     Hypertension Mother     Heart attack Mother     No Known Problems Sister     No Known Problems Maternal Grandmother     No Known Problems Maternal Grandfather     No Known Problems Paternal Grandmother     No Known Problems Paternal Grandfather     Other Maternal Uncle         alzhimers    Diabetes Daughter         Mothets initials              ROS  All other systems are negative         Objective:     Physical Exam  /76   Pulse 80   Ht 162.6 cm (64\")   Wt 88.9 kg (196 lb)   BMI 33.64 kg/m²   General:  Appears in no acute distress  Eyes: Sclera is anicteric,  conjunctiva is clear   HEENT:  No JVD.  No carotid bruits  Respiratory: Respirations regular and unlabored at rest.  Clear to auscultation  Cardiovascular: S1,S2 Regular rate and rhythm. .   Extremities: No digital clubbing or cyanosis, no edema  Skin: Color pink. Skin warm and dry to touch. No rashes  No " "xanthoma  Neuro: Alert and awake.    Lab Reviewed:         Jose Shirley MD  7/11/2024 15:13 EDT      EMR Dragon/Transcription:   \"Dictated utilizing Dragon dictation\".        "

## 2024-07-11 ENCOUNTER — HOSPITAL ENCOUNTER (OUTPATIENT)
Dept: CARDIOLOGY | Facility: HOSPITAL | Age: 74
Discharge: HOME | End: 2024-07-11
Payer: MEDICARE

## 2024-07-11 ENCOUNTER — OFFICE VISIT (OUTPATIENT)
Dept: CARDIOLOGY | Facility: CLINIC | Age: 74
End: 2024-07-11
Payer: MEDICARE

## 2024-07-11 VITALS
DIASTOLIC BLOOD PRESSURE: 76 MMHG | HEIGHT: 64 IN | SYSTOLIC BLOOD PRESSURE: 136 MMHG | BODY MASS INDEX: 33.46 KG/M2 | HEART RATE: 80 BPM | WEIGHT: 196 LBS

## 2024-07-11 DIAGNOSIS — R42 DIZZINESS: ICD-10-CM

## 2024-07-11 DIAGNOSIS — I10 ESSENTIAL HYPERTENSION: ICD-10-CM

## 2024-07-11 DIAGNOSIS — R06.09 DYSPNEA ON EXERTION: Primary | ICD-10-CM

## 2024-07-11 DIAGNOSIS — N18.31 CHRONIC KIDNEY DISEASE, STAGE 3A: ICD-10-CM

## 2024-07-11 DIAGNOSIS — R00.1 BRADYCARDIA: ICD-10-CM

## 2024-07-11 DIAGNOSIS — I20.89 ANGINAL EQUIVALENT: ICD-10-CM

## 2024-07-11 DIAGNOSIS — R06.09 DYSPNEA ON EXERTION: ICD-10-CM

## 2024-07-11 LAB
BH CV REST NUCLEAR ISOTOPE DOSE: 10.4 MCI
BH CV STRESS BP STAGE 1: NORMAL
BH CV STRESS COMMENTS STAGE 1: NORMAL
BH CV STRESS DOSE REGADENOSON STAGE 1: 0.4
BH CV STRESS DURATION MIN STAGE 1: 0
BH CV STRESS DURATION SEC STAGE 1: 10
BH CV STRESS HR STAGE 1: 80
BH CV STRESS NUCLEAR ISOTOPE DOSE: 30.3 MCI
BH CV STRESS PROTOCOL 1: NORMAL
BH CV STRESS RECOVERY BP: NORMAL MMHG
BH CV STRESS RECOVERY HR: 85 BPM
BH CV STRESS STAGE 1: 1
LV EF NUC BP: 67 %
MAXIMAL PREDICTED HEART RATE: 145 BPM
STRESS BASELINE BP: NORMAL MMHG
STRESS BASELINE HR: 80 BPM
STRESS TARGET HR: 123 BPM

## 2024-07-11 PROCEDURE — 0 TECHNETIUM SESTAMIBI: Performed by: NURSE PRACTITIONER

## 2024-07-11 PROCEDURE — 78452 HT MUSCLE IMAGE SPECT MULT: CPT

## 2024-07-11 PROCEDURE — A9500 TC99M SESTAMIBI: HCPCS | Performed by: NURSE PRACTITIONER

## 2024-07-11 PROCEDURE — 93017 CV STRESS TEST TRACING ONLY: CPT

## 2024-07-11 PROCEDURE — 25010000002 REGADENOSON 0.4 MG/5ML SOLUTION: Performed by: NURSE PRACTITIONER

## 2024-07-11 RX ORDER — REGADENOSON 0.08 MG/ML
0.4 INJECTION, SOLUTION INTRAVENOUS
Status: COMPLETED | OUTPATIENT
Start: 2024-07-11 | End: 2024-07-11

## 2024-07-11 RX ADMIN — TECHNETIUM TC 99M SESTAMIBI 1 DOSE: 1 INJECTION INTRAVENOUS at 14:34

## 2024-07-11 RX ADMIN — TECHNETIUM TC 99M SESTAMIBI 1 DOSE: 1 INJECTION INTRAVENOUS at 13:11

## 2024-07-11 RX ADMIN — REGADENOSON 0.4 MG: 0.08 INJECTION, SOLUTION INTRAVENOUS at 14:33

## 2024-07-12 ENCOUNTER — TELEPHONE (OUTPATIENT)
Dept: CARDIOLOGY | Facility: CLINIC | Age: 74
End: 2024-07-12
Payer: MEDICARE

## 2024-07-12 ENCOUNTER — PATIENT MESSAGE (OUTPATIENT)
Dept: CARDIOLOGY | Facility: CLINIC | Age: 74
End: 2024-07-12
Payer: MEDICARE

## 2024-07-12 RX ORDER — AMLODIPINE BESYLATE 10 MG/1
10 TABLET ORAL DAILY
Qty: 90 TABLET | Refills: 3 | Status: SHIPPED | OUTPATIENT
Start: 2024-07-12

## 2024-07-12 RX ORDER — ATORVASTATIN CALCIUM 20 MG/1
20 TABLET, FILM COATED ORAL DAILY
Qty: 90 TABLET | Refills: 3 | Status: SHIPPED | OUTPATIENT
Start: 2024-07-12

## 2024-07-12 RX ORDER — SODIUM BICARBONATE 650 MG/1
650 TABLET ORAL 2 TIMES DAILY
Qty: 180 TABLET | Refills: 3 | Status: SHIPPED | OUTPATIENT
Start: 2024-07-12

## 2024-07-25 ENCOUNTER — TRANSCRIBE ORDERS (OUTPATIENT)
Dept: ADMINISTRATIVE | Facility: HOSPITAL | Age: 74
End: 2024-07-25
Payer: MEDICARE

## 2024-07-25 ENCOUNTER — LAB (OUTPATIENT)
Dept: LAB | Facility: HOSPITAL | Age: 74
End: 2024-07-25
Payer: MEDICARE

## 2024-07-25 DIAGNOSIS — N18.31 CHRONIC KIDNEY DISEASE (CKD) STAGE G3A/A1, MODERATELY DECREASED GLOMERULAR FILTRATION RATE (GFR) BETWEEN 45-59 ML/MIN/1.73 SQUARE METER AND ALBUMINURIA CREATININE RATIO LESS THAN 30 MG/G (CMS/H*: ICD-10-CM

## 2024-07-25 DIAGNOSIS — N18.31 CHRONIC KIDNEY DISEASE (CKD) STAGE G3A/A1, MODERATELY DECREASED GLOMERULAR FILTRATION RATE (GFR) BETWEEN 45-59 ML/MIN/1.73 SQUARE METER AND ALBUMINURIA CREATININE RATIO LESS THAN 30 MG/G (CMS/H*: Primary | ICD-10-CM

## 2024-07-25 LAB
ANION GAP SERPL CALCULATED.3IONS-SCNC: 9 MMOL/L (ref 5–15)
BASOPHILS # BLD AUTO: 0.02 10*3/MM3 (ref 0–0.2)
BASOPHILS NFR BLD AUTO: 0.3 % (ref 0–1.5)
BUN SERPL-MCNC: 16 MG/DL (ref 8–23)
BUN/CREAT SERPL: 16 (ref 7–25)
CALCIUM SPEC-SCNC: 9.5 MG/DL (ref 8.6–10.5)
CHLORIDE SERPL-SCNC: 102 MMOL/L (ref 98–107)
CO2 SERPL-SCNC: 26 MMOL/L (ref 22–29)
CREAT SERPL-MCNC: 1 MG/DL (ref 0.57–1)
DEPRECATED RDW RBC AUTO: 42.7 FL (ref 37–54)
EGFRCR SERPLBLD CKD-EPI 2021: 58.5 ML/MIN/1.73
EOSINOPHIL # BLD AUTO: 0.27 10*3/MM3 (ref 0–0.4)
EOSINOPHIL NFR BLD AUTO: 4.4 % (ref 0.3–6.2)
ERYTHROCYTE [DISTWIDTH] IN BLOOD BY AUTOMATED COUNT: 13.9 % (ref 12.3–15.4)
GLUCOSE SERPL-MCNC: 208 MG/DL (ref 65–99)
HCT VFR BLD AUTO: 31.5 % (ref 34–46.6)
HGB BLD-MCNC: 9.2 G/DL (ref 12–15.9)
IMM GRANULOCYTES # BLD AUTO: 0.02 10*3/MM3 (ref 0–0.05)
IMM GRANULOCYTES NFR BLD AUTO: 0.3 % (ref 0–0.5)
LYMPHOCYTES # BLD AUTO: 1.29 10*3/MM3 (ref 0.7–3.1)
LYMPHOCYTES NFR BLD AUTO: 21.1 % (ref 19.6–45.3)
MCH RBC QN AUTO: 24.7 PG (ref 26.6–33)
MCHC RBC AUTO-ENTMCNC: 29.2 G/DL (ref 31.5–35.7)
MCV RBC AUTO: 84.5 FL (ref 79–97)
MONOCYTES # BLD AUTO: 0.4 10*3/MM3 (ref 0.1–0.9)
MONOCYTES NFR BLD AUTO: 6.5 % (ref 5–12)
NEUTROPHILS NFR BLD AUTO: 4.12 10*3/MM3 (ref 1.7–7)
NEUTROPHILS NFR BLD AUTO: 67.4 % (ref 42.7–76)
NRBC BLD AUTO-RTO: 0 /100 WBC (ref 0–0.2)
PLATELET # BLD AUTO: 194 10*3/MM3 (ref 140–450)
PMV BLD AUTO: 10.1 FL (ref 6–12)
POTASSIUM SERPL-SCNC: 5 MMOL/L (ref 3.5–5.2)
RBC # BLD AUTO: 3.73 10*6/MM3 (ref 3.77–5.28)
SODIUM SERPL-SCNC: 137 MMOL/L (ref 136–145)
WBC NRBC COR # BLD AUTO: 6.12 10*3/MM3 (ref 3.4–10.8)

## 2024-07-25 PROCEDURE — 36415 COLL VENOUS BLD VENIPUNCTURE: CPT

## 2024-07-25 PROCEDURE — 85025 COMPLETE CBC W/AUTO DIFF WBC: CPT

## 2024-07-25 PROCEDURE — 80048 BASIC METABOLIC PNL TOTAL CA: CPT

## 2024-08-07 ENCOUNTER — LAB (OUTPATIENT)
Dept: FAMILY MEDICINE CLINIC | Facility: CLINIC | Age: 74
End: 2024-08-07
Payer: MEDICARE

## 2024-08-07 ENCOUNTER — OFFICE VISIT (OUTPATIENT)
Dept: FAMILY MEDICINE CLINIC | Facility: CLINIC | Age: 74
End: 2024-08-07
Payer: MEDICARE

## 2024-08-07 VITALS
WEIGHT: 198.8 LBS | BODY MASS INDEX: 33.94 KG/M2 | RESPIRATION RATE: 18 BRPM | DIASTOLIC BLOOD PRESSURE: 82 MMHG | SYSTOLIC BLOOD PRESSURE: 126 MMHG | HEART RATE: 79 BPM | HEIGHT: 64 IN | OXYGEN SATURATION: 97 %

## 2024-08-07 DIAGNOSIS — Z00.00 MEDICARE ANNUAL WELLNESS VISIT, SUBSEQUENT: Primary | ICD-10-CM

## 2024-08-07 DIAGNOSIS — R42 VERTIGO: ICD-10-CM

## 2024-08-07 DIAGNOSIS — Z00.00 MEDICARE ANNUAL WELLNESS VISIT, SUBSEQUENT: ICD-10-CM

## 2024-08-07 LAB
BASOPHILS # BLD AUTO: 0.03 10*3/MM3 (ref 0–0.2)
BASOPHILS NFR BLD AUTO: 0.4 % (ref 0–1.5)
DEPRECATED RDW RBC AUTO: 39.6 FL (ref 37–54)
EOSINOPHIL # BLD AUTO: 0.22 10*3/MM3 (ref 0–0.4)
EOSINOPHIL NFR BLD AUTO: 3.2 % (ref 0.3–6.2)
ERYTHROCYTE [DISTWIDTH] IN BLOOD BY AUTOMATED COUNT: 13.5 % (ref 12.3–15.4)
HCT VFR BLD AUTO: 33.4 % (ref 34–46.6)
HGB BLD-MCNC: 10.2 G/DL (ref 12–15.9)
IMM GRANULOCYTES # BLD AUTO: 0.02 10*3/MM3 (ref 0–0.05)
IMM GRANULOCYTES NFR BLD AUTO: 0.3 % (ref 0–0.5)
LYMPHOCYTES # BLD AUTO: 1.54 10*3/MM3 (ref 0.7–3.1)
LYMPHOCYTES NFR BLD AUTO: 22.3 % (ref 19.6–45.3)
MCH RBC QN AUTO: 24.8 PG (ref 26.6–33)
MCHC RBC AUTO-ENTMCNC: 30.5 G/DL (ref 31.5–35.7)
MCV RBC AUTO: 81.3 FL (ref 79–97)
MONOCYTES # BLD AUTO: 0.45 10*3/MM3 (ref 0.1–0.9)
MONOCYTES NFR BLD AUTO: 6.5 % (ref 5–12)
NEUTROPHILS NFR BLD AUTO: 4.65 10*3/MM3 (ref 1.7–7)
NEUTROPHILS NFR BLD AUTO: 67.3 % (ref 42.7–76)
NRBC BLD AUTO-RTO: 0 /100 WBC (ref 0–0.2)
PLATELET # BLD AUTO: 209 10*3/MM3 (ref 140–450)
PMV BLD AUTO: 10.5 FL (ref 6–12)
RBC # BLD AUTO: 4.11 10*6/MM3 (ref 3.77–5.28)
WBC NRBC COR # BLD AUTO: 6.91 10*3/MM3 (ref 3.4–10.8)

## 2024-08-07 PROCEDURE — 80053 COMPREHEN METABOLIC PANEL: CPT | Performed by: NURSE PRACTITIONER

## 2024-08-07 PROCEDURE — 1170F FXNL STATUS ASSESSED: CPT | Performed by: NURSE PRACTITIONER

## 2024-08-07 PROCEDURE — 83036 HEMOGLOBIN GLYCOSYLATED A1C: CPT | Performed by: NURSE PRACTITIONER

## 2024-08-07 PROCEDURE — 80061 LIPID PANEL: CPT | Performed by: NURSE PRACTITIONER

## 2024-08-07 PROCEDURE — 1160F RVW MEDS BY RX/DR IN RCRD: CPT | Performed by: NURSE PRACTITIONER

## 2024-08-07 PROCEDURE — 1159F MED LIST DOCD IN RCRD: CPT | Performed by: NURSE PRACTITIONER

## 2024-08-07 PROCEDURE — 85025 COMPLETE CBC W/AUTO DIFF WBC: CPT | Performed by: NURSE PRACTITIONER

## 2024-08-07 PROCEDURE — 84443 ASSAY THYROID STIM HORMONE: CPT | Performed by: NURSE PRACTITIONER

## 2024-08-07 PROCEDURE — 87086 URINE CULTURE/COLONY COUNT: CPT | Performed by: NURSE PRACTITIONER

## 2024-08-07 PROCEDURE — 3079F DIAST BP 80-89 MM HG: CPT | Performed by: NURSE PRACTITIONER

## 2024-08-07 PROCEDURE — 36415 COLL VENOUS BLD VENIPUNCTURE: CPT

## 2024-08-07 PROCEDURE — 81001 URINALYSIS AUTO W/SCOPE: CPT | Performed by: NURSE PRACTITIONER

## 2024-08-07 PROCEDURE — G0439 PPPS, SUBSEQ VISIT: HCPCS | Performed by: NURSE PRACTITIONER

## 2024-08-07 PROCEDURE — 3074F SYST BP LT 130 MM HG: CPT | Performed by: NURSE PRACTITIONER

## 2024-08-07 PROCEDURE — 99213 OFFICE O/P EST LOW 20 MIN: CPT | Performed by: NURSE PRACTITIONER

## 2024-08-07 NOTE — PROGRESS NOTES
Subjective   The ABCs of the Annual Wellness Visit  Medicare Wellness Visit      Dora Solorio is a 74 y.o. patient who presents for a Medicare Wellness Visit.    The following portions of the patient's history were reviewed and   updated as appropriate: allergies, current medications, past family history, past medical history, past social history, past surgical history, and problem list.    Compared to one year ago, the patient's physical   health is worse.  Compared to one year ago, the patient's mental   health is the same.    Recent Hospitalizations:  This patient has had a [facility] admission record on file within the last 365 days.  Current Medical Providers:  Patient Care Team:  Bhargavi Rodriguez APRN as PCP - General  Rob Wick MD as Consulting Physician (Nephrology)    Outpatient Medications Prior to Visit   Medication Sig Dispense Refill   • atorvastatin (LIPITOR) 20 MG tablet Take 1 tablet by mouth Daily. 90 tablet 3   • buPROPion XL (WELLBUTRIN XL) 150 MG 24 hr tablet Take 1 tablet by mouth Daily. 90 tablet 0   • Cyanocobalamin (Vitamin B-12) 1000 MCG sublingual tablet Place 1 tablet under the tongue Daily. 100 each 4   • doxazosin XL (Cardura XL) 4 MG 24 hr tablet Take 1 tablet by mouth Daily With Breakfast. 30 tablet 11   • escitalopram (LEXAPRO) 20 MG tablet Take 1 tablet by mouth once daily 90 tablet 0   • gabapentin (NEURONTIN) 600 MG tablet Take 1 tablet by mouth 2 (Two) Times a Day.     • metFORMIN (GLUCOPHAGE) 1000 MG tablet TAKE 1 TABLET BY MOUTH TWICE DAILY WITH MEALS 180 tablet 0   • povidone-iodine (BETADINE) 10 % external solution 10% Apply  topically to the appropriate area as directed Daily. 237 mL 0   • sodium bicarbonate 650 MG tablet Take 1 tablet by mouth 2 (Two) Times a Day. 180 tablet 3   • Tresiba FlexTouch 100 UNIT/ML solution pen-injector injection INJECT 20 UNITS SUBCUTANEOUSLY IN THE EVENING 18 mL 0   • vitamin D (ERGOCALCIFEROL) 1.25 MG (65757 UT) capsule  "capsule      • amLODIPine (NORVASC) 10 MG tablet Take 1 tablet by mouth Daily. 90 tablet 3     No facility-administered medications prior to visit.     No opioid medication identified on active medication list. I have reviewed chart for other potential  high risk medication/s and harmful drug interactions in the elderly.      Aspirin is not on active medication list.  Aspirin use is not indicated based on review of current medical condition/s. Risk of harm outweighs potential benefits.  .    Patient Active Problem List   Diagnosis   • Adjustment disorder with depressed mood   • Depression   • Type 2 diabetes mellitus with hyperglycemia, with long-term current use of insulin   • Diabetic peripheral neuropathy   • Essential hypertension   • Gout   • Mixed hyperlipidemia   • Irritable bowel syndrome without diarrhea   • Obesity   • Osteopenia   • Peripheral neuropathy   • Vitamin D deficiency   • Dizzy   • Acute UTI   • Closed left ankle fracture   • Bradycardia   • Acute hypotension   • Symptomatic bradycardia   • Chronic kidney disease, stage 3a     Advance Care Planning Advance Directive is not on file.  ACP discussion was held with the patient during this visit. Patient does not have an advance directive, information provided.            Objective   Vitals:    08/07/24 1430   BP: 126/82   Pulse: 79   Resp: 18   SpO2: 97%   Weight: 90.2 kg (198 lb 12.8 oz)   Height: 162.6 cm (64.02\")       Estimated body mass index is 34.11 kg/m² as calculated from the following:    Height as of this encounter: 162.6 cm (64.02\").    Weight as of this encounter: 90.2 kg (198 lb 12.8 oz).    BMI is >= 30 and <35. (Class 1 Obesity). The following options were offered after discussion;: exercise counseling/recommendations and nutrition counseling/recommendations       Does the patient have evidence of cognitive impairment? No  Lab Results   Component Value Date    TRIG 114 06/13/2024    HDL 32 (L) 06/13/2024    LDL 46 06/13/2024    VLDL " 21 2024    HGBA1C 8.36 (H) 2024                                                                                                Health  Risk Assessment    Smoking Status:  Social History     Tobacco Use   Smoking Status Never   • Passive exposure: Never   Smokeless Tobacco Never     Alcohol Consumption:  Social History     Substance and Sexual Activity   Alcohol Use Never       Fall Risk Screen  STEADI Fall Risk Assessment was completed, and patient is at HIGH risk for falls. Assessment completed on:2024    Depression Screenin/7/2024     2:29 PM   PHQ-2/PHQ-9 Depression Screening   Little Interest or Pleasure in Doing Things 0-->not at all   Feeling Down, Depressed or Hopeless 0-->not at all   PHQ-9: Brief Depression Severity Measure Score 0     Health Habits and Functional and Cognitive Screenin/7/2024     2:28 PM   Functional & Cognitive Status   Do you have difficulty preparing food and eating? No   Do you have difficulty bathing yourself, getting dressed or grooming yourself? No   Do you have difficulty using the toilet? No   Do you have difficulty moving around from place to place? Yes   Do you have trouble with steps or getting out of a bed or a chair? No   Current Diet Limited Junk Food   Dental Exam Not up to date   Eye Exam Not up to date   Exercise (times per week) 0 times per week   Current Exercises Include No Regular Exercise   Do you need help using the phone?  No   Are you deaf or do you have serious difficulty hearing?  No   Do you need help to go to places out of walking distance? No   Do you need help shopping? No   Do you need help preparing meals?  No   Do you need help with housework?  No   Do you need help with laundry? No   Do you need help taking your medications? No   Do you need help managing money? No   Do you ever drive or ride in a car without wearing a seat belt? No   Have you felt unusual stress, anger or loneliness in the last month? No   Who do you  live with? Child   If you need help, do you have trouble finding someone available to you? No   Have you been bothered in the last four weeks by sexual problems? No   Do you have difficulty concentrating, remembering or making decisions? No           Age-appropriate Screening Schedule:  Refer to the list below for future screening recommendations based on patient's age, sex and/or medical conditions. Orders for these recommended tests are listed in the plan section. The patient has been provided with a written plan.    Health Maintenance List  Health Maintenance   Topic Date Due   • ZOSTER VACCINE (1 of 2) 08/07/2024 (Originally 7/23/1998)   • COVID-19 Vaccine (5 - 2023-24 season) 09/09/2024 (Originally 9/1/2023)   • DIABETIC EYE EXAM  09/25/2024 (Originally 1/1/2022)   • INFLUENZA VACCINE  03/31/2025 (Originally 8/1/2024)   • RSV Vaccine - Adults (1 - 1-dose 60+ series) 08/07/2025 (Originally 7/23/2008)   • HEMOGLOBIN A1C  12/13/2024   • URINE MICROALBUMIN  03/21/2025   • LIPID PANEL  06/13/2025   • DXA SCAN  07/19/2025   • ANNUAL WELLNESS VISIT  08/07/2025   • BMI FOLLOWUP  08/07/2025   • TDAP/TD VACCINES (2 - Td or Tdap) 07/11/2033   • Pneumococcal Vaccine 65+  Completed   • HEPATITIS C SCREENING  Addressed   • COLORECTAL CANCER SCREENING  Discontinued                                                                                                                                                CMS Preventative Services Quick Reference  Risk Factors Identified During Encounter  None Identified    The above risks/problems have been discussed with the patient.  Pertinent information has been shared with the patient in the After Visit Summary.  An After Visit Summary and PPPS were made available to the patient.    Follow Up:   Next Medicare Wellness visit to be scheduled in 1 year.      Additional E&M Note during same encounter follows:  Patient has additional, significant, and separately identifiable  "condition(s)/problem(s) that require work above and beyond the Medicare Wellness Visit     Chief Complaint  Medicare Wellness-subsequent    Subjective   Pt comes in today for routine MWV and she is also with c/o ongoing dizziness. Worse when lying down or moving/changing positions. Has waxed and waned for about 6-7 months. No n/v.   No headaches. Described as spinning. Doesn't always last long.                   Objective   Vital Signs:  /82   Pulse 79   Resp 18   Ht 162.6 cm (64.02\")   Wt 90.2 kg (198 lb 12.8 oz)   SpO2 97%   BMI 34.11 kg/m²   Physical Exam  Constitutional:       Appearance: She is well-developed.   HENT:      Head: Normocephalic.   Eyes:      Conjunctiva/sclera: Conjunctivae normal.      Pupils: Pupils are equal, round, and reactive to light.   Neck:      Thyroid: No thyromegaly.   Cardiovascular:      Rate and Rhythm: Normal rate and regular rhythm.      Heart sounds: No murmur heard.  Pulmonary:      Effort: Pulmonary effort is normal.      Breath sounds: Normal breath sounds.   Abdominal:      General: Bowel sounds are normal.      Palpations: Abdomen is soft. There is no mass.      Tenderness: There is no abdominal tenderness.   Musculoskeletal:      Cervical back: Neck supple.   Skin:     General: Skin is warm and dry.      Findings: No lesion.   Neurological:      Mental Status: She is alert and oriented to person, place, and time.   Psychiatric:         Behavior: Behavior normal.               Assessment and Plan               Medicare annual wellness visit, subsequent    Vertigo      Orders Placed This Encounter   Procedures   • Comprehensive Metabolic Panel     Standing Status:   Future     Number of Occurrences:   1     Standing Expiration Date:   8/7/2025     Order Specific Question:   Release to patient     Answer:   Routine Release [[id number]]   • Hemoglobin A1c     Standing Status:   Future     Number of Occurrences:   1     Standing Expiration Date:   8/7/2025     Order " Specific Question:   Release to patient     Answer:   Routine Release [[id number]]   • Lipid Panel     Standing Status:   Future     Number of Occurrences:   1     Standing Expiration Date:   8/7/2025     Order Specific Question:   Release to patient     Answer:   Routine Release [[id number]]   • Urinalysis With Culture If Indicated - Urine, Clean Catch     Standing Status:   Future     Number of Occurrences:   1     Standing Expiration Date:   8/7/2025     Order Specific Question:   Release to patient     Answer:   Routine Release [[id number]]   • TSH     Standing Status:   Future     Number of Occurrences:   1     Standing Expiration Date:   8/7/2025     Order Specific Question:   Release to patient     Answer:   Routine Release [[id number]]   • Ambulatory Referral to Physical Therapy for Evaluation & Treatment     Referral Priority:   Routine     Referral Type:   Physical Therapy     Referral Reason:   Specialty Services Required     Requested Specialty:   Physical Therapy     Number of Visits Requested:   1   • CBC & Differential     Standing Status:   Future     Number of Occurrences:   1     Standing Expiration Date:   8/7/2025     Order Specific Question:   Manual Differential     Answer:   No     Order Specific Question:   Release to patient     Answer:   Routine Release [[id number]]   Check labs  Referral to vestibular therapy  Discussed importance of regular exercise and recommended starting or continuing a regular exercise program for good health. The patient was also encouraged to lose weight for better health.   During this visit for their annual exam, we reviewed their personal history, social history and family history. We went over their medications and all the recommended health maintenance items for their age group. They were given the opportunity to ask questions and discuss other concerns.   The importance of monitoring blood sugar regularly was reviewed.  The importance of monitoring the HgBA1C  level regularly was reviewed.  The importance of monitoring urine microalbumin regularly to check for kidney damage was reviewed.   The importance of annual eye exams to prevent blindness was reviewed.  The importance of proper foot care and regularly checking feet to prevent sores and possibly loss of limbs was reviewed.             Follow Up   Return in about 6 months (around 2/7/2025) for HTN follow up, Diabetes follow up.  Patient was given instructions and counseling regarding her condition or for health maintenance advice. Please see specific information pulled into the AVS if appropriate.

## 2024-08-08 LAB
ALBUMIN SERPL-MCNC: 3.9 G/DL (ref 3.5–5.2)
ALBUMIN/GLOB SERPL: 1.1 G/DL
ALP SERPL-CCNC: 93 U/L (ref 39–117)
ALT SERPL W P-5'-P-CCNC: 7 U/L (ref 1–33)
ANION GAP SERPL CALCULATED.3IONS-SCNC: 11 MMOL/L (ref 5–15)
AST SERPL-CCNC: 11 U/L (ref 1–32)
BACTERIA UR QL AUTO: ABNORMAL /HPF
BILIRUB SERPL-MCNC: 0.2 MG/DL (ref 0–1.2)
BILIRUB UR QL STRIP: NEGATIVE
BUN SERPL-MCNC: 14 MG/DL (ref 8–23)
BUN/CREAT SERPL: 12.2 (ref 7–25)
CALCIUM SPEC-SCNC: 9.4 MG/DL (ref 8.6–10.5)
CHLORIDE SERPL-SCNC: 104 MMOL/L (ref 98–107)
CHOLEST SERPL-MCNC: 132 MG/DL (ref 0–200)
CLARITY UR: ABNORMAL
CO2 SERPL-SCNC: 25 MMOL/L (ref 22–29)
COLOR UR: YELLOW
CREAT SERPL-MCNC: 1.15 MG/DL (ref 0.57–1)
EGFRCR SERPLBLD CKD-EPI 2021: 49.5 ML/MIN/1.73
GLOBULIN UR ELPH-MCNC: 3.6 GM/DL
GLUCOSE SERPL-MCNC: 117 MG/DL (ref 65–99)
GLUCOSE UR STRIP-MCNC: NEGATIVE MG/DL
HBA1C MFR BLD: 7.7 % (ref 4.8–5.6)
HDLC SERPL-MCNC: 38 MG/DL (ref 40–60)
HGB UR QL STRIP.AUTO: ABNORMAL
HOLD SPECIMEN: NORMAL
HYALINE CASTS UR QL AUTO: ABNORMAL /LPF
KETONES UR QL STRIP: ABNORMAL
LDLC SERPL CALC-MCNC: 58 MG/DL (ref 0–100)
LDLC/HDLC SERPL: 1.28 {RATIO}
LEUKOCYTE ESTERASE UR QL STRIP.AUTO: ABNORMAL
NITRITE UR QL STRIP: NEGATIVE
PH UR STRIP.AUTO: 5.5 [PH] (ref 5–8)
POTASSIUM SERPL-SCNC: 5 MMOL/L (ref 3.5–5.2)
PROT SERPL-MCNC: 7.5 G/DL (ref 6–8.5)
PROT UR QL STRIP: ABNORMAL
RBC # UR STRIP: ABNORMAL /HPF
REF LAB TEST METHOD: ABNORMAL
SODIUM SERPL-SCNC: 140 MMOL/L (ref 136–145)
SP GR UR STRIP: 1.02 (ref 1–1.03)
SQUAMOUS #/AREA URNS HPF: ABNORMAL /HPF
TRIGL SERPL-MCNC: 226 MG/DL (ref 0–150)
TSH SERPL DL<=0.05 MIU/L-ACNC: 1.47 UIU/ML (ref 0.27–4.2)
UROBILINOGEN UR QL STRIP: ABNORMAL
VLDLC SERPL-MCNC: 36 MG/DL (ref 5–40)
WBC # UR STRIP: ABNORMAL /HPF

## 2024-08-09 LAB — BACTERIA SPEC AEROBE CULT: NORMAL

## 2024-08-12 NOTE — PROGRESS NOTES
Not much change in labs. Cont with same regimen and cont to work on diet avoiding sweets and foots high in saturated fats.

## 2024-08-19 ENCOUNTER — PATIENT OUTREACH (OUTPATIENT)
Dept: CASE MANAGEMENT | Facility: CLINIC | Age: 74
End: 2024-08-19
Payer: MEDICARE

## 2024-08-19 DIAGNOSIS — E11.65 TYPE 2 DIABETES MELLITUS WITH HYPERGLYCEMIA, WITH LONG-TERM CURRENT USE OF INSULIN: Primary | ICD-10-CM

## 2024-08-19 DIAGNOSIS — Z79.4 TYPE 2 DIABETES MELLITUS WITH HYPERGLYCEMIA, WITH LONG-TERM CURRENT USE OF INSULIN: Primary | ICD-10-CM

## 2024-08-19 DIAGNOSIS — E78.2 MIXED HYPERLIPIDEMIA: ICD-10-CM

## 2024-08-19 RX ORDER — BLOOD SUGAR DIAGNOSTIC
STRIP MISCELLANEOUS
Qty: 50 EACH | Refills: 0 | Status: SHIPPED | OUTPATIENT
Start: 2024-08-19 | End: 2024-08-23

## 2024-08-19 NOTE — OUTREACH NOTE
AMBULATORY CASE MANAGEMENT NOTE    Names and Relationships of Patient/Support Persons: Contact: Dora Solorio; Relationship: Self -     CCM Interim Update    Spoke with patient at this time for ACO outreach, identified self and role.  AC notes A1C of 7.70 and Triglycerides of 226.  Offered assistance with chronic disease management, specifically DM and lipid management, patient declines.  Patient denies needing any assistance with her diabetes management at this time.  Patient did ask about status of refill on her glucose test strips, [facility] notes refill was sent to [facility] on file today.  Patient denies any other needs, will close program.          Catalina VALENCIA  Ambulatory Case Management    8/19/2024, 11:57 EDT

## 2024-08-20 DIAGNOSIS — I10 ESSENTIAL HYPERTENSION: ICD-10-CM

## 2024-08-20 RX ORDER — BISOPROLOL FUMARATE 10 MG/1
TABLET, FILM COATED ORAL
Qty: 90 TABLET | Refills: 0 | Status: SHIPPED | OUTPATIENT
Start: 2024-08-20

## 2024-08-23 RX ORDER — BLOOD SUGAR DIAGNOSTIC
STRIP MISCELLANEOUS
Qty: 50 EACH | Refills: 0 | Status: SHIPPED | OUTPATIENT
Start: 2024-08-23

## 2024-09-04 ENCOUNTER — TREATMENT (OUTPATIENT)
Dept: PHYSICAL THERAPY | Facility: CLINIC | Age: 74
End: 2024-09-04
Payer: MEDICARE

## 2024-09-04 DIAGNOSIS — Z74.09 IMPAIRED MOBILITY AND ACTIVITIES OF DAILY LIVING: ICD-10-CM

## 2024-09-04 DIAGNOSIS — Z78.9 IMPAIRED MOBILITY AND ACTIVITIES OF DAILY LIVING: ICD-10-CM

## 2024-09-04 DIAGNOSIS — R42 VERTIGO: Primary | ICD-10-CM

## 2024-09-04 NOTE — PROGRESS NOTES
Physical Therapy Initial Evaluation and Plan of Care     [address], [address] [place]  [address]    Patient: Dora Solorio   : 1950  Diagnosis/ICD-10 Code:  Vertigo [R42]  Referring practitioner: BRANDON Levy  Date of Initial Visit: 2024  Today's Date: 2024  Patient seen for 1 sessions           Subjective Questionnaire: DHI: 60      Subjective Evaluation    History of Present Illness  Mechanism of injury: Pt reports she has been having dizziness for at least 6 months.  She does not recall any particular incident.  It might be from a fall back in January when she broke her ankle. She feels swimmy, off balance, light headedness, spinning and it came on suddenly. Every time she changes positions she it and it is spinning sometimes.  She feels it with bending, looking up, sit to lying, lying to sitting, rolling over in bed.    She has a history of back/neck/hip/knee and ankle problems; vision problems; n/t feet.    She has difficulty with grocery shopping;  rolling over in bed during the night; bending over to clean the house      Patient Occupation: retired from homemaker; lives with daughter who is home with her all the time Quality of life: fair    Pain  Location: pain in multiple joints but unrelated to current reason for PT    Social Support  Lives in: one-story house  Lives with: adult children    Patient Goals  Patient goals for therapy: improved balance, increased motion, increased strength and independence with ADLs/IADLs       PRECAUTIONS: anxiety; arthritis; depression; DM; MI; osteopenia; cataracts; HTN;   back/neck/hip/knee and ankle problems; vision problems; n/t feet.    Objective     Additional subjective information:   Vestibular testing completed:  none   Vision problems/correction:  bifocals; wears all the time   Hearing problems:  none   History of falls: x 2024 not related to dizziness      Symptoms last a matter of:  less than a minute   Symptoms feel  like:  spinning; woozy; off balance   Concurrent symptoms:  HA; n/v; blurry vision; aural fullness; tinnitus;dec memory    Objective:     Oculomotor and VOR:      Spontaneous nystagmus:  neg    Gaze-evoked nystagmus:  pos up    Skew deviation:  neg    Head-shake nystagmus:  deferred    Smooth pursuit:  saccadic up    Saccades:  neg bilat    VORc:  difficulty to the R with reports of vertigo    Head thrust:  R/L        SVA:    DEFERRED    DVA:  Horiz:              Vert:     LE strength:  Right: 4 overall             Left:  4- overall   LE AROM:  Right:  WFL                      Left:  WFL  Note:  L UE weaker than R UE throughout     Cervical AROM:  flex WNL; rot and ext minimal limitation pulling; no pain     Vertebral artery screen:  neg bilat;  mild brief reports of dizziness x 10 sec     Cerebellar function:  UE:  finger to nose: impaired L                                                       FRIDA:  impaired L                                     LE:  FRIDA:  WFL                                                       Heel to shin:  deferred     BPPV Assessment:    Roll Test:  Right:  pos           Left:  neg    Margarita Hallpike:  Right:  pos                          Left:  neg     MCTSIB:  cond 1:  DEFERRED  `                           cond 2:                              cond 3:                              cond 4:     Gait:  amb (I) with rollator walker without LOB or veering       Rx:  mod epley for right post BPPV; educ pt re: 24 hr restrictions and she voices understanding     Assessment & Plan       Assessment  Impairments: abnormal coordination, abnormal gait, activity intolerance, impaired balance, lacks appropriate home exercise program and safety issue   Functional limitations: lifting, sleeping, walking, moving in bed, standing and unable to perform repetitive tasks   Assessment details: Pt is a 73 y/o female with a dx of vertigo onset more than 6 months ago.    She has difficulty with with grocery shopping;   rolling over in bed during the night; bending over to clean the house    Pt exhibits the above impairments and functional limitations and would benefit from skilled PT to address those areas and maximize function.   Prognosis: good    Goals  Plan Goals: STGs:  6 weeks  1.  Pt to tolerate initial HEP/post-treatment regimen.  2.  Pt to tolerate advancement of HEP as indicated.  3.  Pt to report at least 25% improvement in vertigo symptoms.  4.  Resolve BPPV as indicated by negative bilateral Roll Tests and [place] Hallpike.       LTGs:  By DC  1.  Pt to be (I) with HEP to manage own condition.  2.  Pt to report at least 90% improvement in vertigo symptoms.  3.  Pt to improve function as indicated by improved score on DHI as compared to eval.  4.  Pt to improve balance as indicated by improved performance on MCTSIB/DGI.     Plan  Therapy options: will be seen for skilled therapy services  Planned therapy interventions: abdominal trunk stabilization, ADL retraining, balance/weight-bearing training, body mechanics training, flexibility, functional ROM exercises, gait training, home exercise program, manual therapy, motor coordination training, neuromuscular re-education, postural training, soft tissue mobilization, strengthening, therapeutic activities, stretching and transfer training  Frequency: 2x week  Treatment plan discussed with: patient  Plan details: 12 weeks       See flowsheet for treatment details.    History # of Personal Factors and/or Comorbidities: MODERATE (1-2)  Examination of Body System(s): # of elements: MODERATE (3)  Clinical Presentation: EVOLVING  Clinical Decision Making: MODERATE    Timed:         Manual Therapy:         mins  11969;     Therapeutic Exercise:         mins  85694;     Neuromuscular Ramos:  15      mins  03009;    Therapeutic Activity:          mins  36967;     Gait Training:           mins  18467;     Ultrasound:          mins  55377;    Ionto:                                   mins    47084  Self Care:                            mins   20056    Un-Timed:  Electrical Stimulation:         mins  69841 ( );  Traction          mins 66303  Canalith Repos.          _12__  mins  61936  Low Eval          Mins  41041  Mod Eval   30       Mins  27026  High Eval                            Mins  76155  Re-Eval                               mins  08762    Timed Treatment: 15     mins   Total Treatment:   57     mins    PT SIGNATURE: Ruba Sebastian, PT   [place] PT Lic. # [id number]    DATE TREATMENT INITIATED: 9/4/2024    Initial Certification  Certification Period: 12/2/2024  I certify that the therapy services are furnished while this patient is under my care.  The services outlined above are required by this patient, and will be reviewed every 90 days.     PHYSICIAN: Bhargavi Rodriguez APRN  NPI: [id number]                                       DATE:     Please sign and return via fax to [phone].. Thank you, [facility].

## 2024-09-11 ENCOUNTER — TREATMENT (OUTPATIENT)
Dept: PHYSICAL THERAPY | Facility: CLINIC | Age: 74
End: 2024-09-11
Payer: MEDICARE

## 2024-09-11 DIAGNOSIS — Z78.9 IMPAIRED MOBILITY AND ACTIVITIES OF DAILY LIVING: ICD-10-CM

## 2024-09-11 DIAGNOSIS — R42 VERTIGO: Primary | ICD-10-CM

## 2024-09-11 DIAGNOSIS — Z74.09 IMPAIRED MOBILITY AND ACTIVITIES OF DAILY LIVING: ICD-10-CM

## 2024-09-11 NOTE — PROGRESS NOTES
Physical Therapy Daily Treatment Note    Patient: Dora Solorio   : 1950  Referring practitioner: BRANDON Levy  Diagnoses: Vertigo [R42]  Today's Date: 2024    VISIT#: 2    Subjective Evaluation    History of Present Illness  Mechanism of injury: Pt reports she feels better since the last session and she does not have as many episodes now.  When she feels it when she turns her head while lying in bed or when she looks up.      Pain  Current pain ratin         Objective     See Exercise, Manual, and Modality Logs for complete treatment.     Re-assessed:  pos R post canal   Rx with mod epley and reminded pt of post rx restrictions  She voices understanding    Assessment & Plan       Assessment  Assessment details: Paco well today and with some improvement overall       Progress per Plan of Care        Timed:         Manual Therapy:        mins  58407;     Therapeutic Exercise:         mins  91776;     Neuromuscular Ramos: 12       mins  98515;    Therapeutic Activity:          mins  38821;     Gait Training:           mins  27784;     Ultrasound:          mins  20985;    Ionto                                   mins   21407  Self Care                            mins   23049    Un-Timed:  Electrical Stimulation:         mins  02787 ( );  Traction          mins 01050  Canalith Repos            11       mins  06445  Low Eval          Mins  12357  Mod Eval          Mins  13941  High Eval                            Mins  04001  Re-Eval                               mins  39660    Timed Treatment: 12     mins   Total Treatment:    23    mins    Ruba Sebastian PT  Physical Therapist  [place] PT Lic. # [id number]

## 2024-09-12 ENCOUNTER — TREATMENT (OUTPATIENT)
Dept: PHYSICAL THERAPY | Facility: CLINIC | Age: 74
End: 2024-09-12
Payer: MEDICARE

## 2024-09-12 DIAGNOSIS — R42 VERTIGO: Primary | ICD-10-CM

## 2024-09-12 DIAGNOSIS — Z78.9 IMPAIRED MOBILITY AND ACTIVITIES OF DAILY LIVING: ICD-10-CM

## 2024-09-12 DIAGNOSIS — Z74.09 IMPAIRED MOBILITY AND ACTIVITIES OF DAILY LIVING: ICD-10-CM

## 2024-09-12 NOTE — PROGRESS NOTES
Physical Therapy Daily Treatment Note    Patient: Dora Solorio   : 1950  Referring practitioner: BRANDON Levy  Diagnoses: Vertigo [R42]  Today's Date: 2024    VISIT#: 3    Subjective Evaluation    History of Present Illness  Mechanism of injury: Pt feels like all is well and she does not feel any symptoms with any position or movements. She tried all of what used to bother her and it is all fine.      Pain  Current pain ratin             Objective     See Exercise, Manual, and Modality Logs for complete treatment.     Re-assessed:  pos R post with brief nystagmus and reports of spinning    Rx with mod epley with reminders of post-rx restrictions      Assessment & Plan       Assessment  Assessment details: Paco well today; still pos R post canal but less robust response than prior       Progress per Plan of Care        Timed:         Manual Therapy:         mins  97835;     Therapeutic Exercise:         mins  55202;     Neuromuscular Ramos:  12      mins  89484;    Therapeutic Activity:          mins  26037;     Gait Training:           mins  37517;     Ultrasound:          mins  42385;    Ionto                                   mins   75952  Self Care                            mins   24143    Un-Timed:  Electrical Stimulation:         mins  30239 ( );  Traction          mins 94611  Canalith Repos            11       mins  04107  Low Eval          Mins  66244  Mod Eval          Mins  04919  High Eval                            Mins  39707  Re-Eval                               mins  96920    Timed Treatment: 12     mins   Total Treatment:     23   mins    Ruba Sebastian PT  Physical Therapist  [place] PT Lic. # [id number]

## 2024-09-16 ENCOUNTER — TREATMENT (OUTPATIENT)
Dept: PHYSICAL THERAPY | Facility: CLINIC | Age: 74
End: 2024-09-16
Payer: MEDICARE

## 2024-09-16 DIAGNOSIS — Z74.09 IMPAIRED MOBILITY AND ACTIVITIES OF DAILY LIVING: ICD-10-CM

## 2024-09-16 DIAGNOSIS — Z78.9 IMPAIRED MOBILITY AND ACTIVITIES OF DAILY LIVING: ICD-10-CM

## 2024-09-16 DIAGNOSIS — R42 VERTIGO: Primary | ICD-10-CM

## 2024-09-16 PROCEDURE — 97112 NEUROMUSCULAR REEDUCATION: CPT | Performed by: PHYSICAL THERAPIST

## 2024-09-16 PROCEDURE — 95992 CANALITH REPOSITIONING PROC: CPT | Performed by: PHYSICAL THERAPIST

## 2024-09-18 ENCOUNTER — TREATMENT (OUTPATIENT)
Dept: PHYSICAL THERAPY | Facility: CLINIC | Age: 74
End: 2024-09-18
Payer: MEDICARE

## 2024-09-18 DIAGNOSIS — F32.9 REACTIVE DEPRESSION: ICD-10-CM

## 2024-09-18 DIAGNOSIS — R42 VERTIGO: Primary | ICD-10-CM

## 2024-09-18 DIAGNOSIS — Z74.09 IMPAIRED MOBILITY AND ACTIVITIES OF DAILY LIVING: ICD-10-CM

## 2024-09-18 DIAGNOSIS — Z78.9 IMPAIRED MOBILITY AND ACTIVITIES OF DAILY LIVING: ICD-10-CM

## 2024-09-18 PROCEDURE — 97535 SELF CARE MNGMENT TRAINING: CPT | Performed by: PHYSICAL THERAPIST

## 2024-09-18 PROCEDURE — 97112 NEUROMUSCULAR REEDUCATION: CPT | Performed by: PHYSICAL THERAPIST

## 2024-09-18 RX ORDER — BUPROPION HYDROCHLORIDE 150 MG/1
150 TABLET ORAL DAILY
Qty: 90 TABLET | Refills: 0 | Status: SHIPPED | OUTPATIENT
Start: 2024-09-18

## 2024-09-23 RX ORDER — ESCITALOPRAM OXALATE 20 MG/1
20 TABLET ORAL DAILY
Qty: 90 TABLET | Refills: 0 | Status: SHIPPED | OUTPATIENT
Start: 2024-09-23

## 2024-09-23 RX ORDER — GABAPENTIN 600 MG/1
600 TABLET ORAL 3 TIMES DAILY
Qty: 90 TABLET | Refills: 0 | Status: SHIPPED | OUTPATIENT
Start: 2024-09-23

## 2024-09-25 ENCOUNTER — TREATMENT (OUTPATIENT)
Dept: PHYSICAL THERAPY | Facility: CLINIC | Age: 74
End: 2024-09-25
Payer: MEDICARE

## 2024-09-25 DIAGNOSIS — R42 VERTIGO: Primary | ICD-10-CM

## 2024-09-25 DIAGNOSIS — Z74.09 IMPAIRED MOBILITY AND ACTIVITIES OF DAILY LIVING: ICD-10-CM

## 2024-09-25 DIAGNOSIS — Z78.9 IMPAIRED MOBILITY AND ACTIVITIES OF DAILY LIVING: ICD-10-CM

## 2024-09-25 PROCEDURE — 97530 THERAPEUTIC ACTIVITIES: CPT | Performed by: PHYSICAL THERAPIST

## 2024-09-25 PROCEDURE — 97110 THERAPEUTIC EXERCISES: CPT | Performed by: PHYSICAL THERAPIST

## 2024-09-25 PROCEDURE — 97112 NEUROMUSCULAR REEDUCATION: CPT | Performed by: PHYSICAL THERAPIST

## 2024-09-30 ENCOUNTER — TREATMENT (OUTPATIENT)
Dept: PHYSICAL THERAPY | Facility: CLINIC | Age: 74
End: 2024-09-30
Payer: MEDICARE

## 2024-09-30 DIAGNOSIS — Z74.09 IMPAIRED MOBILITY AND ACTIVITIES OF DAILY LIVING: ICD-10-CM

## 2024-09-30 DIAGNOSIS — R42 VERTIGO: Primary | ICD-10-CM

## 2024-09-30 DIAGNOSIS — Z78.9 IMPAIRED MOBILITY AND ACTIVITIES OF DAILY LIVING: ICD-10-CM

## 2024-09-30 PROCEDURE — 97110 THERAPEUTIC EXERCISES: CPT | Performed by: PHYSICAL THERAPIST

## 2024-09-30 PROCEDURE — 97530 THERAPEUTIC ACTIVITIES: CPT | Performed by: PHYSICAL THERAPIST

## 2024-09-30 PROCEDURE — 97112 NEUROMUSCULAR REEDUCATION: CPT | Performed by: PHYSICAL THERAPIST

## 2024-09-30 NOTE — PROGRESS NOTES
Physical Therapy Daily Treatment Note    Patient: Dora Solorio   : 1950  Referring practitioner: BRANDON Levy  Diagnoses: Vertigo [R42]  Today's Date: 2024    VISIT#: 7    Subjective Evaluation    History of Present Illness  Mechanism of injury: Exercises are going pretty good at home.  No more spinning    Pain  Current pain ratin         Objective     See Exercise, Manual, and Modality Logs for complete treatment.     Cont with ex, NMR and added to program today  Pt with LE fatigue and back pain during standing ex     Patient Education:  add new ones to HEP    Assessment & Plan       Assessment  Assessment details: Paco well today with all ex but with fatigue and some back pain reported but eased up after sitting break          Progress per Plan of Care        Timed:         Manual Therapy:         mins  41763;     Therapeutic Exercise:    20     mins  46622;     Neuromuscular Ramos:   10     mins  57453;    Therapeutic Activity:     8     mins  96768;     Gait Training:           mins  64726;     Ultrasound:          mins  72426;    Ionto                                   mins   09567  Self Care                            mins   15556    Un-Timed:  Electrical Stimulation:         mins  43246 ( );  Traction          mins 98842  Canalith Repos                   mins  98475  Low Eval          Mins  59953  Mod Eval          Mins  77406  High Eval                            Mins  74803  Re-Eval                               mins  40014    Timed Treatment:  38    mins   Total Treatment:    38    mins    Ruba Sebastian PT  Physical Therapist  [place] Lic. # [id number]

## 2024-10-07 RX ORDER — PEN NEEDLE, DIABETIC 31 GX5/16"
NEEDLE, DISPOSABLE MISCELLANEOUS
Qty: 100 EACH | Refills: 0 | Status: SHIPPED | OUTPATIENT
Start: 2024-10-07

## 2024-10-11 RX ORDER — BLOOD SUGAR DIAGNOSTIC
STRIP MISCELLANEOUS
Qty: 50 EACH | Refills: 0 | Status: SHIPPED | OUTPATIENT
Start: 2024-10-11

## 2024-10-17 ENCOUNTER — TREATMENT (OUTPATIENT)
Dept: PHYSICAL THERAPY | Facility: CLINIC | Age: 74
End: 2024-10-17
Payer: MEDICARE

## 2024-10-17 DIAGNOSIS — Z78.9 IMPAIRED MOBILITY AND ACTIVITIES OF DAILY LIVING: ICD-10-CM

## 2024-10-17 DIAGNOSIS — R42 VERTIGO: Primary | ICD-10-CM

## 2024-10-17 DIAGNOSIS — Z74.09 IMPAIRED MOBILITY AND ACTIVITIES OF DAILY LIVING: ICD-10-CM

## 2024-10-17 PROCEDURE — 97112 NEUROMUSCULAR REEDUCATION: CPT | Performed by: PHYSICAL THERAPIST

## 2024-10-17 PROCEDURE — 97110 THERAPEUTIC EXERCISES: CPT | Performed by: PHYSICAL THERAPIST

## 2024-10-17 PROCEDURE — 97530 THERAPEUTIC ACTIVITIES: CPT | Performed by: PHYSICAL THERAPIST

## 2024-10-17 NOTE — PROGRESS NOTES
Physical Therapy Daily Treatment Note    Patient: Dora Solorio   : 1950  Referring practitioner: BRANDON Levy  Diagnoses: Vertigo [R42]  Today's Date: 10/17/2024    VISIT#: 8    Subjective Evaluation    History of Present Illness  Mechanism of injury: Pt reports she has been doing ok.  Still has not had any more spinning.  She is doing better with walking without the walker in the house and is not touching or holding things as much.    Pain  Current pain ratin             Objective     See Exercise, Manual, and Modality Logs for complete treatment.     Cont with ex, NMR and act as noted  Added to program and progressed some ex     Patient Education:  add new ones to HEP    Assessment & Plan       Assessment  Assessment details: Paco well today and pt is improving      Progress per Plan of Care        Timed:         Manual Therapy:         mins  56994;     Therapeutic Exercise:   10      mins  75642;     Neuromuscular Ramos:   20     mins  09410;    Therapeutic Activity:    8      mins  11527;     Gait Training:           mins  13117;     Ultrasound:          mins  40980;    Ionto                                   mins   37238  Self Care                            mins   29179    Un-Timed:  Electrical Stimulation:         mins  03195 ( );  Traction          mins 53868  Canalith Repos                   mins  22458  Low Eval          Mins  02498  Mod Eval          Mins  17604  High Eval                            Mins  78695  Re-Eval                               mins  68845    Timed Treatment:  38    mins   Total Treatment:    38   mins    Ruba Sebastian PT  Physical Therapist  [place] Lic. # [id number]

## 2024-10-18 RX ORDER — BLOOD SUGAR DIAGNOSTIC
STRIP MISCELLANEOUS
Qty: 50 EACH | Refills: 0 | Status: SHIPPED | OUTPATIENT
Start: 2024-10-18

## 2024-10-21 RX ORDER — GABAPENTIN 600 MG/1
600 TABLET ORAL 3 TIMES DAILY
Qty: 90 TABLET | Refills: 3 | Status: SHIPPED | OUTPATIENT
Start: 2024-10-21

## 2024-10-25 RX ORDER — BLOOD SUGAR DIAGNOSTIC
STRIP MISCELLANEOUS
Qty: 50 EACH | Refills: 0 | Status: SHIPPED | OUTPATIENT
Start: 2024-10-25

## 2024-10-29 ENCOUNTER — OFFICE VISIT (OUTPATIENT)
Dept: CARDIOLOGY | Facility: CLINIC | Age: 74
End: 2024-10-29
Payer: MEDICARE

## 2024-10-29 VITALS
OXYGEN SATURATION: 95 % | WEIGHT: 195 LBS | HEART RATE: 82 BPM | SYSTOLIC BLOOD PRESSURE: 86 MMHG | DIASTOLIC BLOOD PRESSURE: 49 MMHG | HEIGHT: 64 IN | BODY MASS INDEX: 33.29 KG/M2

## 2024-10-29 DIAGNOSIS — Z79.4 TYPE 2 DIABETES MELLITUS WITH HYPERGLYCEMIA, WITH LONG-TERM CURRENT USE OF INSULIN: ICD-10-CM

## 2024-10-29 DIAGNOSIS — I95.2 HYPOTENSION DUE TO DRUGS: Primary | ICD-10-CM

## 2024-10-29 DIAGNOSIS — E11.65 TYPE 2 DIABETES MELLITUS WITH HYPERGLYCEMIA, WITH LONG-TERM CURRENT USE OF INSULIN: ICD-10-CM

## 2024-10-29 DIAGNOSIS — E78.2 MIXED HYPERLIPIDEMIA: ICD-10-CM

## 2024-10-29 PROCEDURE — 3074F SYST BP LT 130 MM HG: CPT | Performed by: NURSE PRACTITIONER

## 2024-10-29 PROCEDURE — 1159F MED LIST DOCD IN RCRD: CPT | Performed by: NURSE PRACTITIONER

## 2024-10-29 PROCEDURE — 1160F RVW MEDS BY RX/DR IN RCRD: CPT | Performed by: NURSE PRACTITIONER

## 2024-10-29 PROCEDURE — 3078F DIAST BP <80 MM HG: CPT | Performed by: NURSE PRACTITIONER

## 2024-10-29 PROCEDURE — 99214 OFFICE O/P EST MOD 30 MIN: CPT | Performed by: NURSE PRACTITIONER

## 2024-10-29 RX ORDER — MIDODRINE HYDROCHLORIDE 5 MG/1
5 TABLET ORAL
Qty: 90 TABLET | Refills: 3 | Status: SHIPPED | OUTPATIENT
Start: 2024-10-29

## 2024-10-29 RX ORDER — DOXAZOSIN 2 MG/1
2 TABLET ORAL NIGHTLY
COMMUNITY
Start: 2024-10-20 | End: 2024-10-29

## 2024-10-29 NOTE — PATIENT INSTRUCTIONS
Stop doxazosin for now     Start midodrine 5mg twice daily     If blood pressure over 120 do not take the midodrine

## 2024-10-29 NOTE — PROGRESS NOTES
Subjective:     Encounter Date:10/29/2024      Patient ID: Dora Solorio is a 74 y.o. female.    Chief Complaint: 3 month follow up     Ms. Keke Solorio has PMH of     Recurrent dizziness  history of ankle fracture, scalp hematoma  Hypertension  Dyslipidemia with low HDL  Diabetes  CKD  Carotid disease 60% at the origin of right internal carotid by CTA 2022  Tubal ligation, , endometrial ablation, ankle fracture surgery  Allergy/intolerance to sulfa     Here for 3 month follow up.  Patient was in the hospital in  with bradycardia requiring a temporary pacemaker, heart rate improved with discontinuation of beta blocker.  At last visit in July, she was started on Terazosin 4mg.  She reports nephrology decreased in to 2mg daily.     Today her blood pressure is 86/49  HR 82 oxygen 95% on room air  weight 195 lbs   Manual blood pressure was 94/56     Patient does have complaints of fatigue/weakness and intermittent dizziness.               Review of records:  Echocardiogram 2022 reveals normal LV systolic function EF of 65% with concentric LVH and grade 1 diastolic dysfunction    Echocardiogram 2024  Normal LV size and contractility EF of 60 to 65%  Borderline RV enlargement seen.  Mild to moderate increased left atrial volumes.  Pulmonic valve is not well visualized.  Aortic valve,   tricuspid valve appears structurally normal, trace tricuspid regurgitation seen.  Normal calculated RV systolic pressure of 28 mmHg  Mitral valve appears to have mitral annular calcification.  Is not well-visualized.  Leaflets are opening well.  No pericardial effusion seen.  Proximal aorta appears normal in size.      Stress test 2024 negative for ischemia      Lab Review:  2024 reveal normal CBC.  TSH is normal at 2.01.   2024: Potassium 3.6 calcium 6.8 hemoglobin A1c 8.36 LDL 32 hemoglobin 8.9  2024: Creatinine is 1.17, hemoglobin 9.4       2024: K 5.0, bun 14, creatinine 1.15 hgb 10.2          The following portions of the patient's history were reviewed and updated as appropriate: allergies, current medications, past family history, past medical history, past social history, past surgical history, and problem list.    Review of Systems   Constitutional: Positive for malaise/fatigue.   Cardiovascular:  Negative for chest pain, dyspnea on exertion, leg swelling and palpitations.   Respiratory:  Negative for cough and shortness of breath.    Gastrointestinal:  Negative for abdominal pain, nausea and vomiting.   Neurological:  Positive for dizziness, light-headedness and numbness. Negative for focal weakness and headaches.   All other systems reviewed and are negative.        Past Medical History:   Diagnosis Date    Adjustment disorder with depressed mood 09/12/2018    Allergic Years    Sulfa drugs    Anxiety     Arthritis Years    [place] joints. Mostly back, knees, hips, fingers    Cataract 7/2023    Depression 09/12/2018    Diabetes mellitus, type II 07/03/2013    Essential hypertension 07/03/2013    Gout 08/12/2014    Heart attack     Hyperlipidemia 06/02/2020    Hypertension     Inflammatory bowel disease     Irritable bowel syndrome without diarrhea 04/17/2017    Kidney disease     Kidney stone ?August 2016?    In hospital overnight    Osteopenia 04/17/2017    Peripheral neuropathy 04/17/2017    Renal insufficiency 9/2023    Stage 3    Vertigo     Resolved     Past Surgical History:   Procedure Laterality Date    ABLATION OF DYSRHYTHMIC FOCUS      ANKLE OPEN REDUCTION INTERNAL FIXATION Left 01/06/2024    Procedure: ANKLE OPEN REDUCTION INTERNAL FIXATION;  Surgeon: Eriberto Muñoz MD;  Location: [facility] MAIN OR;  Service: Orthopedics;  Laterality: Left;    CARDIAC CATHETERIZATION      CARDIAC ELECTROPHYSIOLOGY PROCEDURE N/A 06/12/2024    Procedure: Temporary Pacemaker;  Surgeon: Jose Shirley MD;  Location: [facility] CATH INVASIVE LOCATION;  Service: Cardiovascular;  Laterality:  "N/A;     SECTION      x2    COLON SURGERY      ENDOMETRIAL ABLATION      FRACTURE SURGERY  24    INSERT / REPLACE / REMOVE PACEMAKER      Was temporary.removed 2024    KNEE ARTHROSCOPY Bilateral     TUBAL ABDOMINAL LIGATION  1973     BP (!) 86/49 (BP Location: Right arm, Patient Position: Sitting, Cuff Size: Large Adult)   Pulse 82   Ht 162.6 cm (64\")   Wt 88.5 kg (195 lb)   SpO2 95%   BMI 33.47 kg/m²   Family History   Adopted: Yes   Problem Relation Age of Onset    Epilepsy Mother     Colon cancer Mother     Diabetes Mother     Hypertension Mother     Heart attack Mother     No Known Problems Sister     No Known Problems Maternal Grandmother     No Known Problems Maternal Grandfather     No Known Problems Paternal Grandmother     No Known Problems Paternal Grandfather     Other Maternal Uncle         alzhimers    Diabetes Daughter         Mothets initials       Current Outpatient Medications:     atorvastatin (LIPITOR) 20 MG tablet, Take 1 tablet by mouth Daily., Disp: 90 tablet, Rfl: 3    buPROPion XL (WELLBUTRIN XL) 150 MG 24 hr tablet, Take 1 tablet by mouth once daily, Disp: 90 tablet, Rfl: 0    Cyanocobalamin (Vitamin B-12) 1000 MCG sublingual tablet, Place 1 tablet under the tongue Daily., Disp: 100 each, Rfl: 4    escitalopram (LEXAPRO) 20 MG tablet, Take 1 tablet by mouth once daily, Disp: 90 tablet, Rfl: 0    gabapentin (NEURONTIN) 600 MG tablet, TAKE 1 TABLET BY MOUTH THREE TIMES DAILY, Disp: 90 tablet, Rfl: 3    Insulin Pen Needle (B-D ULTRAFINE III SHORT PEN) 31G X 8 MM misc, USE 1 EACH ONCE DAILY WITH TRESIBA, Disp: 100 each, Rfl: 0    metFORMIN (GLUCOPHAGE) 1000 MG tablet, TAKE 1 TABLET BY MOUTH TWICE DAILY WITH MEALS, Disp: 180 tablet, Rfl: 0    povidone-iodine (BETADINE) 10 % external solution 10%, Apply  topically to the appropriate area as directed Daily., Disp: 237 mL, Rfl: 0    sodium bicarbonate 650 MG tablet, Take 1 tablet by mouth 2 (Two) Times a " Day., Disp: 180 tablet, Rfl: 3    Tresiba FlexTouch 100 UNIT/ML solution pen-injector injection, INJECT 20 UNITS SUBCUTANEOUSLY IN THE EVENING, Disp: 18 mL, Rfl: 0    vitamin D (ERGOCALCIFEROL) 1.25 MG (36157 UT) capsule capsule, , Disp: , Rfl:     glucose blood (Accu-Chek Guide) test strip, USE AS DIRECTED ONCE DAILY  DX: E11.9, Disp: 50 each, Rfl: 12    midodrine (PROAMATINE) 5 MG tablet, Take 1 tablet by mouth 3 (Three) Times a Day Before Meals., Disp: 90 tablet, Rfl: 3  Allergies   Allergen Reactions    Oxycodone Mental Status Change and Hallucinations    Morphine Hallucinations    Allopurinol Nausea And Vomiting    Sulfa Antibiotics Rash     Social History     Socioeconomic History    Marital status:      Spouse name: Rishabh    Number of children: 2    Years of education: 12   Tobacco Use    Smoking status: Never     Passive exposure: Never    Smokeless tobacco: Never   Vaping Use    Vaping status: Never Used   Substance and Sexual Activity    Alcohol use: Never    Drug use: Never    Sexual activity: Not Currently     Partners: Male     Birth control/protection: Tubal ligation, Birth control pill                Objective:     Vitals reviewed.   Constitutional:       Appearance: Not in distress. Frail.   Neck:      Vascular: No JVR. JVD normal.   Pulmonary:      Effort: Pulmonary effort is normal.      Breath sounds: Decreased breath sounds present. No wheezing. No rhonchi. No rales.   Chest:      Chest wall: Not tender to palpatation.   Cardiovascular:      PMI at left midclavicular line. Normal rate. Regular rhythm. Normal S1. Normal S2.       Murmurs: There is no murmur.      No gallop.  No click. No rub.   Pulses:     Intact distal pulses.   Edema:     Peripheral edema absent.   Abdominal:      General: Bowel sounds are normal.      Palpations: Abdomen is soft.      Tenderness: There is no abdominal tenderness.   Musculoskeletal: Normal range of motion.         General: No tenderness. Skin:      General: Skin is warm and dry.   Neurological:      General: No focal deficit present.      Mental Status: Alert and oriented to person, place and time.       Procedures                  Assessment:     [facility]       Diagnosis Plan   1. Hypotension due to drugs        2. Mixed hyperlipidemia        3. Type 2 diabetes mellitus with hyperglycemia, with long-term current use of insulin                       Plan:   Chart reviewed   Labs reviewed   Stress test from July negative   Echocardiogram reviewed with normal EF     Patient is hypotensive  discussed with patient to discontinue terazosin completely and add midodrine.  She denies any other symptoms such as cough, fever, shortness of breath, no urinary or infectious systems present.      Patient has appointment with nephrology coming up.     Advised patient to start midodrine 5mg BID   Monitor blood pressure twice daily   Call if no improvement by Friday     Electronically signed by BRANDON Bell, 11/03/24, 6:22 PM EST.        .          This document is intended for medical expert use only.  Reading of this document by patients and/or patient's family without participating medical staff guidance may result in misinterpretation and unintended morbidity. Any interpretation of such data is the responsibility of the patient and/or family member responsible for the patient in concert with their primary or specialist providers, not to be left for sources of online search as such as [employer], [employer] or similar queries.  Relying on these approaches to knowledge may result in misinterpretation, misguided goals of care and even death should patient or family members try recommendations outside of the realm of professional medical care in a supervised inpatient environment.

## 2024-11-01 RX ORDER — BLOOD SUGAR DIAGNOSTIC
STRIP MISCELLANEOUS
Qty: 50 EACH | Refills: 12 | Status: SHIPPED | OUTPATIENT
Start: 2024-11-01

## 2024-11-03 PROBLEM — I95.2 HYPOTENSION DUE TO DRUGS: Status: ACTIVE | Noted: 2024-11-03

## 2024-11-04 ENCOUNTER — LAB (OUTPATIENT)
Dept: LAB | Facility: HOSPITAL | Age: 74
End: 2024-11-04
Payer: MEDICARE

## 2024-11-04 ENCOUNTER — TRANSCRIBE ORDERS (OUTPATIENT)
Dept: ADMINISTRATIVE | Facility: HOSPITAL | Age: 74
End: 2024-11-04
Payer: MEDICARE

## 2024-11-04 DIAGNOSIS — E11.8 DIABETIC COMPLICATION: ICD-10-CM

## 2024-11-04 DIAGNOSIS — N18.31 CHRONIC KIDNEY DISEASE (CKD) STAGE G3A/A1, MODERATELY DECREASED GLOMERULAR FILTRATION RATE (GFR) BETWEEN 45-59 ML/MIN/1.73 SQUARE METER AND ALBUMINURIA CREATININE RATIO LESS THAN 30 MG/G (CMS/H*: ICD-10-CM

## 2024-11-04 DIAGNOSIS — N18.31 CHRONIC KIDNEY DISEASE (CKD) STAGE G3A/A1, MODERATELY DECREASED GLOMERULAR FILTRATION RATE (GFR) BETWEEN 45-59 ML/MIN/1.73 SQUARE METER AND ALBUMINURIA CREATININE RATIO LESS THAN 30 MG/G (CMS/H*: Primary | ICD-10-CM

## 2024-11-04 LAB
25(OH)D3 SERPL-MCNC: 17.4 NG/ML (ref 30–100)
ALBUMIN SERPL-MCNC: 3.7 G/DL (ref 3.5–5.2)
ALBUMIN/GLOB SERPL: 1 G/DL
ALP SERPL-CCNC: 78 U/L (ref 39–117)
ALT SERPL W P-5'-P-CCNC: 7 U/L (ref 1–33)
ANION GAP SERPL CALCULATED.3IONS-SCNC: 9.5 MMOL/L (ref 5–15)
AST SERPL-CCNC: 12 U/L (ref 1–32)
BACTERIA UR QL AUTO: ABNORMAL /HPF
BASOPHILS # BLD AUTO: 0.03 10*3/MM3 (ref 0–0.2)
BASOPHILS NFR BLD AUTO: 0.4 % (ref 0–1.5)
BILIRUB SERPL-MCNC: <0.2 MG/DL (ref 0–1.2)
BILIRUB UR QL STRIP: NEGATIVE
BUN SERPL-MCNC: 12 MG/DL (ref 8–23)
BUN/CREAT SERPL: 9.8 (ref 7–25)
CALCIUM SPEC-SCNC: 8.9 MG/DL (ref 8.6–10.5)
CHLORIDE SERPL-SCNC: 103 MMOL/L (ref 98–107)
CK SERPL-CCNC: 39 U/L (ref 20–180)
CLARITY UR: ABNORMAL
CO2 SERPL-SCNC: 26.5 MMOL/L (ref 22–29)
COLOR UR: YELLOW
CREAT SERPL-MCNC: 1.23 MG/DL (ref 0.57–1)
CREAT UR-MCNC: 242.3 MG/DL
DEPRECATED RDW RBC AUTO: 44.1 FL (ref 37–54)
EGFRCR SERPLBLD CKD-EPI 2021: 45.6 ML/MIN/1.73
EOSINOPHIL # BLD AUTO: 0.19 10*3/MM3 (ref 0–0.4)
EOSINOPHIL NFR BLD AUTO: 2.8 % (ref 0.3–6.2)
ERYTHROCYTE [DISTWIDTH] IN BLOOD BY AUTOMATED COUNT: 14.8 % (ref 12.3–15.4)
GLOBULIN UR ELPH-MCNC: 3.6 GM/DL
GLUCOSE SERPL-MCNC: 141 MG/DL (ref 65–99)
GLUCOSE UR STRIP-MCNC: NEGATIVE MG/DL
HBA1C MFR BLD: 6.2 % (ref 4.8–5.6)
HCT VFR BLD AUTO: 32.6 % (ref 34–46.6)
HGB BLD-MCNC: 10.4 G/DL (ref 12–15.9)
HGB UR QL STRIP.AUTO: ABNORMAL
HYALINE CASTS UR QL AUTO: ABNORMAL /LPF
IMM GRANULOCYTES # BLD AUTO: 0.01 10*3/MM3 (ref 0–0.05)
IMM GRANULOCYTES NFR BLD AUTO: 0.1 % (ref 0–0.5)
KETONES UR QL STRIP: ABNORMAL
LEUKOCYTE ESTERASE UR QL STRIP.AUTO: ABNORMAL
LYMPHOCYTES # BLD AUTO: 1.42 10*3/MM3 (ref 0.7–3.1)
LYMPHOCYTES NFR BLD AUTO: 20.8 % (ref 19.6–45.3)
MAGNESIUM SERPL-MCNC: 1.6 MG/DL (ref 1.6–2.4)
MCH RBC QN AUTO: 26 PG (ref 26.6–33)
MCHC RBC AUTO-ENTMCNC: 31.9 G/DL (ref 31.5–35.7)
MCV RBC AUTO: 81.5 FL (ref 79–97)
MONOCYTES # BLD AUTO: 0.48 10*3/MM3 (ref 0.1–0.9)
MONOCYTES NFR BLD AUTO: 7 % (ref 5–12)
NEUTROPHILS NFR BLD AUTO: 4.69 10*3/MM3 (ref 1.7–7)
NEUTROPHILS NFR BLD AUTO: 68.9 % (ref 42.7–76)
NITRITE UR QL STRIP: NEGATIVE
NRBC BLD AUTO-RTO: 0 /100 WBC (ref 0–0.2)
PH UR STRIP.AUTO: 5.5 [PH] (ref 5–8)
PHOSPHATE SERPL-MCNC: 3.3 MG/DL (ref 2.5–4.5)
PLATELET # BLD AUTO: 183 10*3/MM3 (ref 140–450)
PMV BLD AUTO: 11.4 FL (ref 6–12)
POTASSIUM SERPL-SCNC: 4.8 MMOL/L (ref 3.5–5.2)
PROT ?TM UR-MCNC: 42.5 MG/DL
PROT SERPL-MCNC: 7.3 G/DL (ref 6–8.5)
PROT UR QL STRIP: ABNORMAL
PROT/CREAT UR: 175.4 MG/G CREA (ref 0–200)
PTH-INTACT SERPL-MCNC: 61.3 PG/ML (ref 15–65)
RBC # BLD AUTO: 4 10*6/MM3 (ref 3.77–5.28)
RBC # UR STRIP: ABNORMAL /HPF
REF LAB TEST METHOD: ABNORMAL
SODIUM SERPL-SCNC: 139 MMOL/L (ref 136–145)
SP GR UR STRIP: 1.02 (ref 1–1.03)
SQUAMOUS #/AREA URNS HPF: ABNORMAL /HPF
URATE SERPL-MCNC: 8 MG/DL (ref 2.4–5.7)
UROBILINOGEN UR QL STRIP: ABNORMAL
WBC # UR STRIP: ABNORMAL /HPF
WBC NRBC COR # BLD AUTO: 6.82 10*3/MM3 (ref 3.4–10.8)

## 2024-11-04 PROCEDURE — 84100 ASSAY OF PHOSPHORUS: CPT

## 2024-11-04 PROCEDURE — 80053 COMPREHEN METABOLIC PANEL: CPT

## 2024-11-04 PROCEDURE — 83735 ASSAY OF MAGNESIUM: CPT

## 2024-11-04 PROCEDURE — 82306 VITAMIN D 25 HYDROXY: CPT

## 2024-11-04 PROCEDURE — 82550 ASSAY OF CK (CPK): CPT

## 2024-11-04 PROCEDURE — 85025 COMPLETE CBC W/AUTO DIFF WBC: CPT

## 2024-11-04 PROCEDURE — 81001 URINALYSIS AUTO W/SCOPE: CPT

## 2024-11-04 PROCEDURE — 36415 COLL VENOUS BLD VENIPUNCTURE: CPT

## 2024-11-04 PROCEDURE — 84156 ASSAY OF PROTEIN URINE: CPT

## 2024-11-04 PROCEDURE — 83970 ASSAY OF PARATHORMONE: CPT

## 2024-11-04 PROCEDURE — 82570 ASSAY OF URINE CREATININE: CPT

## 2024-11-04 PROCEDURE — 84550 ASSAY OF BLOOD/URIC ACID: CPT

## 2024-12-14 DIAGNOSIS — F32.9 REACTIVE DEPRESSION: ICD-10-CM

## 2024-12-16 RX ORDER — BUPROPION HYDROCHLORIDE 150 MG/1
150 TABLET ORAL DAILY
Qty: 90 TABLET | Refills: 0 | Status: SHIPPED | OUTPATIENT
Start: 2024-12-16

## 2024-12-17 RX ORDER — ESCITALOPRAM OXALATE 20 MG/1
20 TABLET ORAL DAILY
Qty: 90 TABLET | Refills: 1 | Status: SHIPPED | OUTPATIENT
Start: 2024-12-17

## 2025-01-03 RX ORDER — INSULIN DEGLUDEC 100 U/ML
INJECTION, SOLUTION SUBCUTANEOUS
Qty: 18 ML | Refills: 0 | Status: SHIPPED | OUTPATIENT
Start: 2025-01-03

## 2025-02-03 ENCOUNTER — OFFICE VISIT (OUTPATIENT)
Dept: CARDIOLOGY | Facility: CLINIC | Age: 75
End: 2025-02-03
Payer: MEDICARE

## 2025-02-03 VITALS
BODY MASS INDEX: 33.63 KG/M2 | HEIGHT: 64 IN | SYSTOLIC BLOOD PRESSURE: 140 MMHG | WEIGHT: 197 LBS | DIASTOLIC BLOOD PRESSURE: 79 MMHG | OXYGEN SATURATION: 95 % | HEART RATE: 69 BPM

## 2025-02-03 DIAGNOSIS — I10 ESSENTIAL HYPERTENSION: ICD-10-CM

## 2025-02-03 DIAGNOSIS — Z79.4 TYPE 2 DIABETES MELLITUS WITH HYPERGLYCEMIA, WITH LONG-TERM CURRENT USE OF INSULIN: ICD-10-CM

## 2025-02-03 DIAGNOSIS — I49.5 SSS (SICK SINUS SYNDROME): ICD-10-CM

## 2025-02-03 DIAGNOSIS — E66.9 OBESITY (BMI 30-39.9): ICD-10-CM

## 2025-02-03 DIAGNOSIS — E78.5 DYSLIPIDEMIA: ICD-10-CM

## 2025-02-03 DIAGNOSIS — R42 DIZZINESS: Primary | ICD-10-CM

## 2025-02-03 DIAGNOSIS — E11.65 TYPE 2 DIABETES MELLITUS WITH HYPERGLYCEMIA, WITH LONG-TERM CURRENT USE OF INSULIN: ICD-10-CM

## 2025-02-03 PROCEDURE — 1159F MED LIST DOCD IN RCRD: CPT | Performed by: INTERNAL MEDICINE

## 2025-02-03 PROCEDURE — 3077F SYST BP >= 140 MM HG: CPT | Performed by: INTERNAL MEDICINE

## 2025-02-03 PROCEDURE — 1160F RVW MEDS BY RX/DR IN RCRD: CPT | Performed by: INTERNAL MEDICINE

## 2025-02-03 PROCEDURE — 99214 OFFICE O/P EST MOD 30 MIN: CPT | Performed by: INTERNAL MEDICINE

## 2025-02-03 PROCEDURE — 93000 ELECTROCARDIOGRAM COMPLETE: CPT | Performed by: INTERNAL MEDICINE

## 2025-02-03 PROCEDURE — 3078F DIAST BP <80 MM HG: CPT | Performed by: INTERNAL MEDICINE

## 2025-02-03 RX ORDER — SODIUM CHLORIDE 0.9 % (FLUSH) 0.9 %
10 SYRINGE (ML) INJECTION EVERY 12 HOURS SCHEDULED
OUTPATIENT
Start: 2025-02-03

## 2025-02-03 RX ORDER — SODIUM CHLORIDE 9 MG/ML
50 INJECTION, SOLUTION INTRAVENOUS CONTINUOUS
OUTPATIENT
Start: 2025-02-03 | End: 2025-02-03

## 2025-02-03 RX ORDER — DOXAZOSIN 2 MG/1
2 TABLET ORAL NIGHTLY
COMMUNITY
Start: 2025-01-25

## 2025-02-03 RX ORDER — SODIUM CHLORIDE 0.9 % (FLUSH) 0.9 %
10 SYRINGE (ML) INJECTION AS NEEDED
OUTPATIENT
Start: 2025-02-03

## 2025-02-03 RX ORDER — SODIUM CHLORIDE 9 MG/ML
40 INJECTION, SOLUTION INTRAVENOUS AS NEEDED
OUTPATIENT
Start: 2025-02-03

## 2025-02-03 NOTE — H&P (VIEW-ONLY)
Subjective:     Encounter Date:2025      Patient ID: Dora Solorio is a 74 y.o. female.    Chief Complaint and history of present illness:     Follow-up for dizziness, bradycardia, temporary pacemaker, hypertension, dyslipidemia, diabetes, CAD artery disease        History of Present Illness:       Ms. Keke Solorio has PMH of     Recurrent dizziness, history of ankle fracture, scalp hematoma  Hypertension  Dyslipidemia with low HDL  Diabetes  CKD  Carotid disease 60% at the origin of right internal carotid by CTA 2022  Tubal ligation, , endometrial ablation, ankle fracture surgery  Allergy/intolerance to sulfa    Here for follow-up.  Patient was in hospital 2024 with bradycardia and syncope was found to have a heart rate of 30 bpm.  Had a temporary pacemaker placed.  Beta-blockers were stopped with improvement of heart rate.  Patient is being monitored as outpatient.  Patient is off beta-blockers currently.  He is complaining of recurrent dizziness feels like she is going to pass out.  Patient's EKG is abnormal with sinus node dysfunction and ectopic atrial rhythm.     Patient's arterial blood pressure is 140/79, heart rate 69, O2 sat of 95% on room air.  BMI is over 30.       Review of records::    Chest x-ray 2024 reveals borderline cardiomegaly  EKG done 2024 reviewed/interpreted by me reveals junctional bradycardia at the rate of 32 bpm with probable LVH and anterior Q waves.     Echocardiogram 2022 reveals normal LV systolic function EF of 65% with concentric LVH and grade 1 diastolic dysfunction   Echo 2024 reveals normal LV systolic function.  Borderline RVE, LAE   Lexiscan Cardiolite 2024 revealed small fixed lateral defect probably due to soft tissue attenuation versus MI.  EF of 67%  .  Labs 2024 reveal normal CBC.  TSH is normal at 2.01.  2024: Potassium 3.6 calcium 6.8 hemoglobin A1c 8.36 LDL 32 hemoglobin 8.9. 2024: Creatinine is 1.17,  hemoglobin 9.4 .  Labs 11/4/2024 reveal CMP with a glucose of 141, creatinine 1.23, EGFR 45.  A1c improved from 7.7 on 8/7/2024 to 6.20.  CBC with a hemoglobin of 10.4.        Assessment:  :     Junctional bradycardia  Dizziness, near syncope  Sinus node dysfunction/sick sinus syndrome  Hypertension  Dyslipidemia  Diabetes  Obesity with BMI of 30        Recommendations / Plan:         Reviewed EKG results with patient  Patient presented 6/12/2024 with junctional bradycardia and symptomatic bradycardia.  Patient was taken to the Cath Lab and had a temporary pacemaker placed because she was hypotensive.  Patient was on home medication on bisoprolol, bisoprolol was held and heart rate improved.  Temporary pacemaker was removed 6/13/2024.  Is here for follow-up and is complaining of recurrent dizziness and has EKG showing sinus node dysfunction and ectopic atrial rhythm.  Patient would benefit from permanent pacemaker.  Shared decision making was done with patient and family.  Will schedule for permanent pacemaker.  Risk-benefit alternatives explained.  Will monitor blood pressure and continue treatment with midodrine for chronic hypotension.  Follow-up with PMD for diabetes.  Patient's diabetes is improved significantly.  He is on metformin and insulin.  Will continue atorvastatin for dyslipidemia.  Monitor labs as outpatient.  Reviewed BMI over 30, counseled on weight loss diet and exercise.       Will continue medical management with amlodipine, atorvastatin as tolerated and monitor blood pressure heart rate and symptoms in cardiology clinic.  Follow-up with PMD for labs and anemia.        Data:     Echocardiogram 6/13/2024 revealed: Left ventricular ejection fraction appears to be 61 - 65%.  Left ventricular diastolic function was indeterminate.  The right ventricular cavity is borderline dilated.  The left atrial cavity is dilated. Left atrial volume is moderately increased.  Estimated right ventricular systolic  pressure from tricuspid regurgitation is normal (<35 mmHg).           ECG 12 Lead    Date/Time: 2/3/2025 12:41 PM  Performed by: Jose Shirley MD    Authorized by: Jose Shirley MD  Comparison: compared with previous ECG from 6/13/2024  Comparison to previous ECG: EKG done today reviewed/interpreted by me reveals ectopic atrial rhythm with a rate of 66 bpm, compared to EKG from 6/13/2024 rhythm is changed          Copied text in this portion of the note has been reviewed and is accurate as of 2/3/2025  The following portions of the patient's history were reviewed and updated as appropriate: allergies, current medications, past family history, past medical history, past social history, past surgical history and problem list.    Assessment:         [facility]       Diagnosis Plan   1. Dizziness  Case Request Cath Lab: Pacemaker DC new    CBC and Differential    Comprehensive Metabolic Panel    sodium chloride 0.9 % flush 10 mL    sodium chloride 0.9 % flush 10 mL    sodium chloride 0.9 % infusion 40 mL    sodium chloride 0.9 % infusion    ceFAZolin (ANCEF) 2,000 mg in sodium chloride 0.9 % 100 mL IVPB    Case Request Cath Lab: Pacemaker DC new      2. SSS (sick sinus syndrome)  Case Request Cath Lab: Pacemaker DC new    CBC and Differential    Comprehensive Metabolic Panel    sodium chloride 0.9 % flush 10 mL    sodium chloride 0.9 % flush 10 mL    sodium chloride 0.9 % infusion 40 mL    sodium chloride 0.9 % infusion    ceFAZolin (ANCEF) 2,000 mg in sodium chloride 0.9 % 100 mL IVPB    Case Request Cath Lab: Pacemaker DC new      3. Essential hypertension  Case Request Cath Lab: Pacemaker DC new    CBC and Differential    Comprehensive Metabolic Panel    sodium chloride 0.9 % flush 10 mL    sodium chloride 0.9 % flush 10 mL    sodium chloride 0.9 % infusion 40 mL    sodium chloride 0.9 % infusion    ceFAZolin (ANCEF) 2,000 mg in sodium chloride 0.9 % 100 mL IVPB    Case Request Cath Lab: Pacemaker DC  new      4. Type 2 diabetes mellitus with hyperglycemia, with long-term current use of insulin  Case Request Cath Lab: Pacemaker DC new    CBC and Differential    Comprehensive Metabolic Panel    sodium chloride 0.9 % flush 10 mL    sodium chloride 0.9 % flush 10 mL    sodium chloride 0.9 % infusion 40 mL    sodium chloride 0.9 % infusion    ceFAZolin (ANCEF) 2,000 mg in sodium chloride 0.9 % 100 mL IVPB    Case Request Cath Lab: Pacemaker DC new      5. Dyslipidemia  Case Request Cath Lab: Pacemaker DC new    CBC and Differential    Comprehensive Metabolic Panel    sodium chloride 0.9 % flush 10 mL    sodium chloride 0.9 % flush 10 mL    sodium chloride 0.9 % infusion 40 mL    sodium chloride 0.9 % infusion    ceFAZolin (ANCEF) 2,000 mg in sodium chloride 0.9 % 100 mL IVPB    Case Request Cath Lab: Pacemaker DC new      6. Obesity (BMI 30-39.9)  Case Request Cath Lab: Pacemaker DC new    CBC and Differential    Comprehensive Metabolic Panel    sodium chloride 0.9 % flush 10 mL    sodium chloride 0.9 % flush 10 mL    sodium chloride 0.9 % infusion 40 mL    sodium chloride 0.9 % infusion    ceFAZolin (ANCEF) 2,000 mg in sodium chloride 0.9 % 100 mL IVPB    Case Request Cath Lab: Pacemaker DC new             Plan:               Past Medical History:  Past Medical History:   Diagnosis Date    Adjustment disorder with depressed mood 09/12/2018    Allergic Years    Sulfa drugs    Anxiety     Arthritis Years    [place] joints. Mostly back, knees, hips, fingers    Cataract 7/2023    Depression 09/12/2018    Diabetes mellitus, type II 07/03/2013    Essential hypertension 07/03/2013    Gout 08/12/2014    Heart attack     Hyperlipidemia 06/02/2020    Hypertension     Inflammatory bowel disease     Irritable bowel syndrome without diarrhea 04/17/2017    Kidney disease     Kidney stone ?August 2016?    In hospital overnight    Osteopenia 04/17/2017    Peripheral neuropathy 04/17/2017    Renal insufficiency 9/2023    Stage 3     Vertigo     Resolved     Past Surgical History:  Past Surgical History:   Procedure Laterality Date    ABLATION OF DYSRHYTHMIC FOCUS      ANKLE OPEN REDUCTION INTERNAL FIXATION Left 2024    Procedure: ANKLE OPEN REDUCTION INTERNAL FIXATION;  Surgeon: Eriberto Muñoz MD;  Location: [facility] MAIN OR;  Service: Orthopedics;  Laterality: Left;    CARDIAC CATHETERIZATION      CARDIAC ELECTROPHYSIOLOGY PROCEDURE N/A 2024    Procedure: Temporary Pacemaker;  Surgeon: Jose Shirley MD;  Location: [facility] CATH INVASIVE LOCATION;  Service: Cardiovascular;  Laterality: N/A;     SECTION      x2    COLON SURGERY      ENDOMETRIAL ABLATION      FRACTURE SURGERY  24    INSERT / REPLACE / REMOVE PACEMAKER      Was temporary.removed 2024    KNEE ARTHROSCOPY Bilateral     TUBAL ABDOMINAL LIGATION  1973      Allergies:  Allergies   Allergen Reactions    Oxycodone Mental Status Change and Hallucinations    Morphine Hallucinations    Allopurinol Nausea And Vomiting    Sulfa Antibiotics Rash     Home Meds:  Current Meds:     Current Outpatient Medications:     atorvastatin (LIPITOR) 20 MG tablet, Take 1 tablet by mouth Daily., Disp: 90 tablet, Rfl: 3    buPROPion XL (WELLBUTRIN XL) 150 MG 24 hr tablet, Take 1 tablet by mouth once daily, Disp: 90 tablet, Rfl: 0    Cyanocobalamin (Vitamin B-12) 1000 MCG sublingual tablet, Place 1 tablet under the tongue Daily., Disp: 100 each, Rfl: 4    doxazosin (CARDURA) 2 MG tablet, 1 tablet., Disp: , Rfl:     escitalopram (LEXAPRO) 20 MG tablet, Take 1 tablet by mouth once daily, Disp: 90 tablet, Rfl: 1    gabapentin (NEURONTIN) 600 MG tablet, TAKE 1 TABLET BY MOUTH THREE TIMES DAILY, Disp: 90 tablet, Rfl: 3    glucose blood (Accu-Chek Guide) test strip, USE AS DIRECTED ONCE DAILY  DX: E11.9, Disp: 50 each, Rfl: 12    Insulin Degludec FlexTouch 100 UNIT/ML solution pen-injector, INJECT 20 UNITS SUBCUTANEOUSLY IN THE EVENING, Disp: 18 mL,  "Rfl: 0    Insulin Pen Needle (B-D ULTRAFINE III SHORT PEN) 31G X 8 MM misc, USE 1 EACH ONCE DAILY WITH TRESIBA, Disp: 100 each, Rfl: 0    metFORMIN (GLUCOPHAGE) 1000 MG tablet, TAKE 1 TABLET BY MOUTH TWICE DAILY WITH MEALS, Disp: 180 tablet, Rfl: 0    midodrine (PROAMATINE) 5 MG tablet, Take 1 tablet by mouth 3 (Three) Times a Day Before Meals., Disp: 90 tablet, Rfl: 3    povidone-iodine (BETADINE) 10 % external solution 10%, Apply  topically to the appropriate area as directed Daily., Disp: 237 mL, Rfl: 0    sodium bicarbonate 650 MG tablet, Take 1 tablet by mouth 2 (Two) Times a Day., Disp: 180 tablet, Rfl: 3    vitamin D (ERGOCALCIFEROL) 1.25 MG (43090 UT) capsule capsule, , Disp: , Rfl:   Social History:   Social History     Tobacco Use    Smoking status: Never     Passive exposure: Never    Smokeless tobacco: Never   Substance Use Topics    Alcohol use: Never      Family History:  Family History   Adopted: Yes   Problem Relation Age of Onset    Epilepsy Mother     Colon cancer Mother     Diabetes Mother     Hypertension Mother     Heart attack Mother     No Known Problems Sister     No Known Problems Maternal Grandmother     No Known Problems Maternal Grandfather     No Known Problems Paternal Grandmother     No Known Problems Paternal Grandfather     Other Maternal Uncle         alzhimers    Diabetes Daughter         Mothets initials              Review of Systems   Constitutional: Positive for malaise/fatigue.   Cardiovascular:  Negative for chest pain, leg swelling and palpitations.   Respiratory:  Negative for shortness of breath.    Skin:  Negative for rash.   Neurological:  Positive for dizziness and light-headedness. Negative for numbness.     All other systems are negative         Objective:     Physical Exam  /79   Pulse 69   Ht 162.6 cm (64\")   Wt 89.4 kg (197 lb)   SpO2 95%   BMI 33.81 kg/m²   General:  Appears in no acute distress  Eyes: Sclera is anicteric,  conjunctiva is clear   HEENT: " " No JVD.  No carotid bruits  Respiratory: Respirations regular and unlabored at rest.  Clear to auscultation  Cardiovascular: S1,S2 Regular rate and rhythm. .   Extremities: No digital clubbing or cyanosis, no edema  Skin: Color pink. Skin warm and dry to touch. No rashes  No xanthoma  Neuro: Alert and awake.    Lab Reviewed:         Jose Shirley MD  2/3/2025 12:47 EST      EMR Dragon/Transcription:   \"Dictated utilizing Dragon dictation\".        "

## 2025-02-03 NOTE — PROGRESS NOTES
Subjective:     Encounter Date:2025      Patient ID: Dora Solorio is a 74 y.o. female.    Chief Complaint and history of present illness:     Follow-up for dizziness, bradycardia, temporary pacemaker, hypertension, dyslipidemia, diabetes, CAD artery disease        History of Present Illness:       Ms. Keke Solorio has PMH of     Recurrent dizziness, history of ankle fracture, scalp hematoma  Hypertension  Dyslipidemia with low HDL  Diabetes  CKD  Carotid disease 60% at the origin of right internal carotid by CTA 2022  Tubal ligation, , endometrial ablation, ankle fracture surgery  Allergy/intolerance to sulfa    Here for follow-up.  Patient was in hospital 2024 with bradycardia and syncope was found to have a heart rate of 30 bpm.  Had a temporary pacemaker placed.  Beta-blockers were stopped with improvement of heart rate.  Patient is being monitored as outpatient.  Patient is off beta-blockers currently.  He is complaining of recurrent dizziness feels like she is going to pass out.  Patient's EKG is abnormal with sinus node dysfunction and ectopic atrial rhythm.     Patient's arterial blood pressure is 140/79, heart rate 69, O2 sat of 95% on room air.  BMI is over 30.       Review of records::    Chest x-ray 2024 reveals borderline cardiomegaly  EKG done 2024 reviewed/interpreted by me reveals junctional bradycardia at the rate of 32 bpm with probable LVH and anterior Q waves.     Echocardiogram 2022 reveals normal LV systolic function EF of 65% with concentric LVH and grade 1 diastolic dysfunction   Echo 2024 reveals normal LV systolic function.  Borderline RVE, LAE   Lexiscan Cardiolite 2024 revealed small fixed lateral defect probably due to soft tissue attenuation versus MI.  EF of 67%  .  Labs 2024 reveal normal CBC.  TSH is normal at 2.01.  2024: Potassium 3.6 calcium 6.8 hemoglobin A1c 8.36 LDL 32 hemoglobin 8.9. 2024: Creatinine is 1.17,  hemoglobin 9.4 .  Labs 11/4/2024 reveal CMP with a glucose of 141, creatinine 1.23, EGFR 45.  A1c improved from 7.7 on 8/7/2024 to 6.20.  CBC with a hemoglobin of 10.4.        Assessment:  :     Junctional bradycardia  Dizziness, near syncope  Sinus node dysfunction/sick sinus syndrome  Hypertension  Dyslipidemia  Diabetes  Obesity with BMI of 30        Recommendations / Plan:         Reviewed EKG results with patient  Patient presented 6/12/2024 with junctional bradycardia and symptomatic bradycardia.  Patient was taken to the Cath Lab and had a temporary pacemaker placed because she was hypotensive.  Patient was on home medication on bisoprolol, bisoprolol was held and heart rate improved.  Temporary pacemaker was removed 6/13/2024.  Is here for follow-up and is complaining of recurrent dizziness and has EKG showing sinus node dysfunction and ectopic atrial rhythm.  Patient would benefit from permanent pacemaker.  Shared decision making was done with patient and family.  Will schedule for permanent pacemaker.  Risk-benefit alternatives explained.  Will monitor blood pressure and continue treatment with midodrine for chronic hypotension.  Follow-up with PMD for diabetes.  Patient's diabetes is improved significantly.  He is on metformin and insulin.  Will continue atorvastatin for dyslipidemia.  Monitor labs as outpatient.  Reviewed BMI over 30, counseled on weight loss diet and exercise.       Will continue medical management with amlodipine, atorvastatin as tolerated and monitor blood pressure heart rate and symptoms in cardiology clinic.  Follow-up with PMD for labs and anemia.        Data:     Echocardiogram 6/13/2024 revealed: Left ventricular ejection fraction appears to be 61 - 65%.  Left ventricular diastolic function was indeterminate.  The right ventricular cavity is borderline dilated.  The left atrial cavity is dilated. Left atrial volume is moderately increased.  Estimated right ventricular systolic  pressure from tricuspid regurgitation is normal (<35 mmHg).           ECG 12 Lead    Date/Time: 2/3/2025 12:41 PM  Performed by: Jose Shirley MD    Authorized by: Jose Shirley MD  Comparison: compared with previous ECG from 6/13/2024  Comparison to previous ECG: EKG done today reviewed/interpreted by me reveals ectopic atrial rhythm with a rate of 66 bpm, compared to EKG from 6/13/2024 rhythm is changed          Copied text in this portion of the note has been reviewed and is accurate as of 2/3/2025  The following portions of the patient's history were reviewed and updated as appropriate: allergies, current medications, past family history, past medical history, past social history, past surgical history and problem list.    Assessment:         [facility]       Diagnosis Plan   1. Dizziness  Case Request Cath Lab: Pacemaker DC new    CBC and Differential    Comprehensive Metabolic Panel    sodium chloride 0.9 % flush 10 mL    sodium chloride 0.9 % flush 10 mL    sodium chloride 0.9 % infusion 40 mL    sodium chloride 0.9 % infusion    ceFAZolin (ANCEF) 2,000 mg in sodium chloride 0.9 % 100 mL IVPB    Case Request Cath Lab: Pacemaker DC new      2. SSS (sick sinus syndrome)  Case Request Cath Lab: Pacemaker DC new    CBC and Differential    Comprehensive Metabolic Panel    sodium chloride 0.9 % flush 10 mL    sodium chloride 0.9 % flush 10 mL    sodium chloride 0.9 % infusion 40 mL    sodium chloride 0.9 % infusion    ceFAZolin (ANCEF) 2,000 mg in sodium chloride 0.9 % 100 mL IVPB    Case Request Cath Lab: Pacemaker DC new      3. Essential hypertension  Case Request Cath Lab: Pacemaker DC new    CBC and Differential    Comprehensive Metabolic Panel    sodium chloride 0.9 % flush 10 mL    sodium chloride 0.9 % flush 10 mL    sodium chloride 0.9 % infusion 40 mL    sodium chloride 0.9 % infusion    ceFAZolin (ANCEF) 2,000 mg in sodium chloride 0.9 % 100 mL IVPB    Case Request Cath Lab: Pacemaker DC  new      4. Type 2 diabetes mellitus with hyperglycemia, with long-term current use of insulin  Case Request Cath Lab: Pacemaker DC new    CBC and Differential    Comprehensive Metabolic Panel    sodium chloride 0.9 % flush 10 mL    sodium chloride 0.9 % flush 10 mL    sodium chloride 0.9 % infusion 40 mL    sodium chloride 0.9 % infusion    ceFAZolin (ANCEF) 2,000 mg in sodium chloride 0.9 % 100 mL IVPB    Case Request Cath Lab: Pacemaker DC new      5. Dyslipidemia  Case Request Cath Lab: Pacemaker DC new    CBC and Differential    Comprehensive Metabolic Panel    sodium chloride 0.9 % flush 10 mL    sodium chloride 0.9 % flush 10 mL    sodium chloride 0.9 % infusion 40 mL    sodium chloride 0.9 % infusion    ceFAZolin (ANCEF) 2,000 mg in sodium chloride 0.9 % 100 mL IVPB    Case Request Cath Lab: Pacemaker DC new      6. Obesity (BMI 30-39.9)  Case Request Cath Lab: Pacemaker DC new    CBC and Differential    Comprehensive Metabolic Panel    sodium chloride 0.9 % flush 10 mL    sodium chloride 0.9 % flush 10 mL    sodium chloride 0.9 % infusion 40 mL    sodium chloride 0.9 % infusion    ceFAZolin (ANCEF) 2,000 mg in sodium chloride 0.9 % 100 mL IVPB    Case Request Cath Lab: Pacemaker DC new             Plan:               Past Medical History:  Past Medical History:   Diagnosis Date    Adjustment disorder with depressed mood 09/12/2018    Allergic Years    Sulfa drugs    Anxiety     Arthritis Years    [place] joints. Mostly back, knees, hips, fingers    Cataract 7/2023    Depression 09/12/2018    Diabetes mellitus, type II 07/03/2013    Essential hypertension 07/03/2013    Gout 08/12/2014    Heart attack     Hyperlipidemia 06/02/2020    Hypertension     Inflammatory bowel disease     Irritable bowel syndrome without diarrhea 04/17/2017    Kidney disease     Kidney stone ?August 2016?    In hospital overnight    Osteopenia 04/17/2017    Peripheral neuropathy 04/17/2017    Renal insufficiency 9/2023    Stage 3     Vertigo     Resolved     Past Surgical History:  Past Surgical History:   Procedure Laterality Date    ABLATION OF DYSRHYTHMIC FOCUS      ANKLE OPEN REDUCTION INTERNAL FIXATION Left 2024    Procedure: ANKLE OPEN REDUCTION INTERNAL FIXATION;  Surgeon: Eriberto Muñoz MD;  Location: [facility] MAIN OR;  Service: Orthopedics;  Laterality: Left;    CARDIAC CATHETERIZATION      CARDIAC ELECTROPHYSIOLOGY PROCEDURE N/A 2024    Procedure: Temporary Pacemaker;  Surgeon: Jose Shirley MD;  Location: [facility] CATH INVASIVE LOCATION;  Service: Cardiovascular;  Laterality: N/A;     SECTION      x2    COLON SURGERY      ENDOMETRIAL ABLATION      FRACTURE SURGERY  24    INSERT / REPLACE / REMOVE PACEMAKER      Was temporary.removed 2024    KNEE ARTHROSCOPY Bilateral     TUBAL ABDOMINAL LIGATION  1973      Allergies:  Allergies   Allergen Reactions    Oxycodone Mental Status Change and Hallucinations    Morphine Hallucinations    Allopurinol Nausea And Vomiting    Sulfa Antibiotics Rash     Home Meds:  Current Meds:     Current Outpatient Medications:     atorvastatin (LIPITOR) 20 MG tablet, Take 1 tablet by mouth Daily., Disp: 90 tablet, Rfl: 3    buPROPion XL (WELLBUTRIN XL) 150 MG 24 hr tablet, Take 1 tablet by mouth once daily, Disp: 90 tablet, Rfl: 0    Cyanocobalamin (Vitamin B-12) 1000 MCG sublingual tablet, Place 1 tablet under the tongue Daily., Disp: 100 each, Rfl: 4    doxazosin (CARDURA) 2 MG tablet, 1 tablet., Disp: , Rfl:     escitalopram (LEXAPRO) 20 MG tablet, Take 1 tablet by mouth once daily, Disp: 90 tablet, Rfl: 1    gabapentin (NEURONTIN) 600 MG tablet, TAKE 1 TABLET BY MOUTH THREE TIMES DAILY, Disp: 90 tablet, Rfl: 3    glucose blood (Accu-Chek Guide) test strip, USE AS DIRECTED ONCE DAILY  DX: E11.9, Disp: 50 each, Rfl: 12    Insulin Degludec FlexTouch 100 UNIT/ML solution pen-injector, INJECT 20 UNITS SUBCUTANEOUSLY IN THE EVENING, Disp: 18 mL,  "Rfl: 0    Insulin Pen Needle (B-D ULTRAFINE III SHORT PEN) 31G X 8 MM misc, USE 1 EACH ONCE DAILY WITH TRESIBA, Disp: 100 each, Rfl: 0    metFORMIN (GLUCOPHAGE) 1000 MG tablet, TAKE 1 TABLET BY MOUTH TWICE DAILY WITH MEALS, Disp: 180 tablet, Rfl: 0    midodrine (PROAMATINE) 5 MG tablet, Take 1 tablet by mouth 3 (Three) Times a Day Before Meals., Disp: 90 tablet, Rfl: 3    povidone-iodine (BETADINE) 10 % external solution 10%, Apply  topically to the appropriate area as directed Daily., Disp: 237 mL, Rfl: 0    sodium bicarbonate 650 MG tablet, Take 1 tablet by mouth 2 (Two) Times a Day., Disp: 180 tablet, Rfl: 3    vitamin D (ERGOCALCIFEROL) 1.25 MG (47092 UT) capsule capsule, , Disp: , Rfl:   Social History:   Social History     Tobacco Use    Smoking status: Never     Passive exposure: Never    Smokeless tobacco: Never   Substance Use Topics    Alcohol use: Never      Family History:  Family History   Adopted: Yes   Problem Relation Age of Onset    Epilepsy Mother     Colon cancer Mother     Diabetes Mother     Hypertension Mother     Heart attack Mother     No Known Problems Sister     No Known Problems Maternal Grandmother     No Known Problems Maternal Grandfather     No Known Problems Paternal Grandmother     No Known Problems Paternal Grandfather     Other Maternal Uncle         alzhimers    Diabetes Daughter         Mothets initials              Review of Systems   Constitutional: Positive for malaise/fatigue.   Cardiovascular:  Negative for chest pain, leg swelling and palpitations.   Respiratory:  Negative for shortness of breath.    Skin:  Negative for rash.   Neurological:  Positive for dizziness and light-headedness. Negative for numbness.     All other systems are negative         Objective:     Physical Exam  /79   Pulse 69   Ht 162.6 cm (64\")   Wt 89.4 kg (197 lb)   SpO2 95%   BMI 33.81 kg/m²   General:  Appears in no acute distress  Eyes: Sclera is anicteric,  conjunctiva is clear   HEENT: " " No JVD.  No carotid bruits  Respiratory: Respirations regular and unlabored at rest.  Clear to auscultation  Cardiovascular: S1,S2 Regular rate and rhythm. .   Extremities: No digital clubbing or cyanosis, no edema  Skin: Color pink. Skin warm and dry to touch. No rashes  No xanthoma  Neuro: Alert and awake.    Lab Reviewed:         Jose Shirley MD  2/3/2025 12:47 EST      EMR Dragon/Transcription:   \"Dictated utilizing Dragon dictation\".        "

## 2025-02-06 ENCOUNTER — OFFICE VISIT (OUTPATIENT)
Dept: FAMILY MEDICINE CLINIC | Facility: CLINIC | Age: 75
End: 2025-02-06
Payer: MEDICARE

## 2025-02-06 VITALS
WEIGHT: 198.2 LBS | RESPIRATION RATE: 18 BRPM | OXYGEN SATURATION: 97 % | HEART RATE: 93 BPM | HEIGHT: 64 IN | BODY MASS INDEX: 33.84 KG/M2 | DIASTOLIC BLOOD PRESSURE: 64 MMHG | SYSTOLIC BLOOD PRESSURE: 128 MMHG

## 2025-02-06 DIAGNOSIS — E11.65 TYPE 2 DIABETES MELLITUS WITH HYPERGLYCEMIA, WITH LONG-TERM CURRENT USE OF INSULIN: Primary | ICD-10-CM

## 2025-02-06 DIAGNOSIS — Z79.4 TYPE 2 DIABETES MELLITUS WITH HYPERGLYCEMIA, WITH LONG-TERM CURRENT USE OF INSULIN: Primary | ICD-10-CM

## 2025-02-06 PROCEDURE — 3074F SYST BP LT 130 MM HG: CPT | Performed by: NURSE PRACTITIONER

## 2025-02-06 PROCEDURE — 99213 OFFICE O/P EST LOW 20 MIN: CPT | Performed by: NURSE PRACTITIONER

## 2025-02-06 PROCEDURE — 1159F MED LIST DOCD IN RCRD: CPT | Performed by: NURSE PRACTITIONER

## 2025-02-06 PROCEDURE — G2211 COMPLEX E/M VISIT ADD ON: HCPCS | Performed by: NURSE PRACTITIONER

## 2025-02-06 PROCEDURE — 3078F DIAST BP <80 MM HG: CPT | Performed by: NURSE PRACTITIONER

## 2025-02-06 PROCEDURE — 1160F RVW MEDS BY RX/DR IN RCRD: CPT | Performed by: NURSE PRACTITIONER

## 2025-02-06 NOTE — PROGRESS NOTES
"Chief Complaint  Hypertension (6 month follow up )  Subjective        Dora Solorio presents to [facility]  History of Present Illness  Pt comes in today for routine 6 month follow up on HTN and DM  HTN; having issues with orthostatic hypotension and bradycardia. Taking midodrine. Scheduled for pacemaker placement next week. Seeing cardiology  DM: managed with metformin and tresiba. Received a letter that insurance no longer covering tresiba and needing altenative. No specific concerns today.   Hypertension  Pertinent negatives include no chest pain, headaches or neck pain.     Follow up  Symptoms are: recurrent.   Onset was 6 to 12 months.   Symptoms occur: intermittently.  Symptoms include: joint pain, fatigue, joint swelling, numbness and weakness.   Pertinent negative symptoms include no abdominal pain, no anorexia, no change in stool, no chest pain, no chills, no congestion, no cough, no diaphoresis, no fever, no headaches, no myalgias, no nausea, no neck pain, no rash, no sore throat, no swollen glands, no dysuria, no vertigo, no visual change and no vomiting.     Review of Systems   Constitutional:  Positive for fatigue. Negative for chills, diaphoresis and fever.   HENT:  Negative for congestion, sore throat and swollen glands.    Respiratory:  Negative for cough.    Cardiovascular:  Negative for chest pain.   Gastrointestinal:  Negative for abdominal pain, anorexia, nausea and vomiting.   Genitourinary:  Negative for dysuria.   Musculoskeletal:  Positive for joint pain. Negative for myalgias and neck pain.   Skin:  Negative for rash.   Neurological:  Positive for weakness and numbness. Negative for vertigo.     Objective     Vital Signs:   /64   Pulse 93   Resp 18   Ht 162.6 cm (64.02\")   Wt 89.9 kg (198 lb 3.2 oz)   SpO2 97%   BMI 34.00 kg/m²       BP Readings from Last 3 Encounters:   02/06/25 128/64   02/03/25 140/79   10/29/24 (!) 86/49       Wt Readings " from Last 3 Encounters:   02/06/25 89.9 kg (198 lb 3.2 oz)   02/03/25 89.4 kg (197 lb)   10/29/24 88.5 kg (195 lb)     Physical Exam  Constitutional:       Appearance: She is well-developed. She is obese.   Eyes:      Pupils: Pupils are equal, round, and reactive to light.   Cardiovascular:      Rate and Rhythm: Normal rate and regular rhythm.   Pulmonary:      Effort: Pulmonary effort is normal.      Breath sounds: Normal breath sounds.   Neurological:      Mental Status: She is alert and oriented to person, place, and time.        Result Review :                 Assessment and Plan    Diagnoses and all orders for this visit:    1. Type 2 diabetes mellitus with hyperglycemia, with long-term current use of insulin (Primary)  -     Insulin Glargine (LANTUS SOLOSTAR) 100 UNIT/ML injection pen; Inject 20 Units under the skin into the appropriate area as directed Every Night.  Dispense: 3 mL; Refill: 3  -     MicroAlbumin, Urine, Random - Urine, Clean Catch; Future  -     Hemoglobin A1c; Future    Check labs  Switch insulin to lantus.   During this office visit, we discussed the pertinent aspects of the visit and treatment recommendations. Pt verbalizes understanding. Follow up was discussed. Patient was given the opportunity to ask questions and discuss other concerns.         Follow Up   No follow-ups on file.  Patient was given instructions and counseling regarding her condition or for health maintenance advice. Please see specific information pulled into the AVS if appropriate.

## 2025-02-10 ENCOUNTER — LAB (OUTPATIENT)
Dept: LAB | Facility: HOSPITAL | Age: 75
End: 2025-02-10
Payer: MEDICARE

## 2025-02-10 DIAGNOSIS — R42 DIZZINESS: ICD-10-CM

## 2025-02-10 DIAGNOSIS — I49.5 SSS (SICK SINUS SYNDROME): ICD-10-CM

## 2025-02-10 DIAGNOSIS — E11.65 TYPE 2 DIABETES MELLITUS WITH HYPERGLYCEMIA, WITH LONG-TERM CURRENT USE OF INSULIN: ICD-10-CM

## 2025-02-10 DIAGNOSIS — E78.5 DYSLIPIDEMIA: ICD-10-CM

## 2025-02-10 DIAGNOSIS — E66.9 OBESITY (BMI 30-39.9): ICD-10-CM

## 2025-02-10 DIAGNOSIS — I10 ESSENTIAL HYPERTENSION: ICD-10-CM

## 2025-02-10 DIAGNOSIS — Z79.4 TYPE 2 DIABETES MELLITUS WITH HYPERGLYCEMIA, WITH LONG-TERM CURRENT USE OF INSULIN: ICD-10-CM

## 2025-02-10 LAB
ALBUMIN SERPL-MCNC: 3.7 G/DL (ref 3.5–5.2)
ALBUMIN UR-MCNC: 17.1 MG/DL
ALBUMIN/GLOB SERPL: 1 G/DL
ALP SERPL-CCNC: 95 U/L (ref 39–117)
ALT SERPL W P-5'-P-CCNC: 5 U/L (ref 1–33)
ANION GAP SERPL CALCULATED.3IONS-SCNC: 10.6 MMOL/L (ref 5–15)
AST SERPL-CCNC: 13 U/L (ref 1–32)
BASOPHILS # BLD AUTO: 0.03 10*3/MM3 (ref 0–0.2)
BASOPHILS NFR BLD AUTO: 0.4 % (ref 0–1.5)
BILIRUB SERPL-MCNC: 0.3 MG/DL (ref 0–1.2)
BUN SERPL-MCNC: 16 MG/DL (ref 8–23)
BUN/CREAT SERPL: 10.3 (ref 7–25)
CALCIUM SPEC-SCNC: 9.2 MG/DL (ref 8.6–10.5)
CHLORIDE SERPL-SCNC: 101 MMOL/L (ref 98–107)
CO2 SERPL-SCNC: 26.4 MMOL/L (ref 22–29)
CREAT SERPL-MCNC: 1.56 MG/DL (ref 0.57–1)
DEPRECATED RDW RBC AUTO: 43.3 FL (ref 37–54)
EGFRCR SERPLBLD CKD-EPI 2021: 34.3 ML/MIN/1.73
EOSINOPHIL # BLD AUTO: 0.25 10*3/MM3 (ref 0–0.4)
EOSINOPHIL NFR BLD AUTO: 3.3 % (ref 0.3–6.2)
ERYTHROCYTE [DISTWIDTH] IN BLOOD BY AUTOMATED COUNT: 13.6 % (ref 12.3–15.4)
GLOBULIN UR ELPH-MCNC: 3.8 GM/DL
GLUCOSE SERPL-MCNC: 87 MG/DL (ref 65–99)
HBA1C MFR BLD: 6.26 % (ref 4.8–5.6)
HCT VFR BLD AUTO: 35.1 % (ref 34–46.6)
HGB BLD-MCNC: 10.3 G/DL (ref 12–15.9)
IMM GRANULOCYTES # BLD AUTO: 0.02 10*3/MM3 (ref 0–0.05)
IMM GRANULOCYTES NFR BLD AUTO: 0.3 % (ref 0–0.5)
LYMPHOCYTES # BLD AUTO: 1.2 10*3/MM3 (ref 0.7–3.1)
LYMPHOCYTES NFR BLD AUTO: 15.9 % (ref 19.6–45.3)
MCH RBC QN AUTO: 25.7 PG (ref 26.6–33)
MCHC RBC AUTO-ENTMCNC: 29.3 G/DL (ref 31.5–35.7)
MCV RBC AUTO: 87.5 FL (ref 79–97)
MONOCYTES # BLD AUTO: 0.49 10*3/MM3 (ref 0.1–0.9)
MONOCYTES NFR BLD AUTO: 6.5 % (ref 5–12)
NEUTROPHILS NFR BLD AUTO: 5.57 10*3/MM3 (ref 1.7–7)
NEUTROPHILS NFR BLD AUTO: 73.6 % (ref 42.7–76)
NRBC BLD AUTO-RTO: 0 /100 WBC (ref 0–0.2)
PLATELET # BLD AUTO: 189 10*3/MM3 (ref 140–450)
PMV BLD AUTO: 10.8 FL (ref 6–12)
POTASSIUM SERPL-SCNC: 4.8 MMOL/L (ref 3.5–5.2)
PROT SERPL-MCNC: 7.5 G/DL (ref 6–8.5)
RBC # BLD AUTO: 4.01 10*6/MM3 (ref 3.77–5.28)
SODIUM SERPL-SCNC: 138 MMOL/L (ref 136–145)
WBC NRBC COR # BLD AUTO: 7.56 10*3/MM3 (ref 3.4–10.8)

## 2025-02-10 PROCEDURE — 36415 COLL VENOUS BLD VENIPUNCTURE: CPT

## 2025-02-10 PROCEDURE — 80053 COMPREHEN METABOLIC PANEL: CPT

## 2025-02-10 PROCEDURE — 83036 HEMOGLOBIN GLYCOSYLATED A1C: CPT

## 2025-02-10 PROCEDURE — 85025 COMPLETE CBC W/AUTO DIFF WBC: CPT

## 2025-02-10 PROCEDURE — 82043 UR ALBUMIN QUANTITATIVE: CPT

## 2025-02-11 ENCOUNTER — TELEPHONE (OUTPATIENT)
Dept: CARDIOLOGY | Facility: CLINIC | Age: 75
End: 2025-02-11
Payer: MEDICARE

## 2025-02-11 ENCOUNTER — HOSPITAL ENCOUNTER (OUTPATIENT)
Facility: HOSPITAL | Age: 75
Discharge: HOME | End: 2025-02-12
Attending: INTERNAL MEDICINE | Admitting: INTERNAL MEDICINE
Payer: MEDICARE

## 2025-02-11 ENCOUNTER — APPOINTMENT (OUTPATIENT)
Dept: GENERAL RADIOLOGY | Facility: HOSPITAL | Age: 75
End: 2025-02-11
Payer: MEDICARE

## 2025-02-11 DIAGNOSIS — E78.5 DYSLIPIDEMIA: ICD-10-CM

## 2025-02-11 DIAGNOSIS — I49.5 SSS (SICK SINUS SYNDROME): ICD-10-CM

## 2025-02-11 DIAGNOSIS — Z79.4 TYPE 2 DIABETES MELLITUS WITH HYPERGLYCEMIA, WITH LONG-TERM CURRENT USE OF INSULIN: ICD-10-CM

## 2025-02-11 DIAGNOSIS — E66.9 OBESITY (BMI 30-39.9): ICD-10-CM

## 2025-02-11 DIAGNOSIS — E11.65 TYPE 2 DIABETES MELLITUS WITH HYPERGLYCEMIA, WITH LONG-TERM CURRENT USE OF INSULIN: ICD-10-CM

## 2025-02-11 DIAGNOSIS — R42 DIZZINESS: ICD-10-CM

## 2025-02-11 DIAGNOSIS — I10 ESSENTIAL HYPERTENSION: ICD-10-CM

## 2025-02-11 LAB
GLUCOSE BLDC GLUCOMTR-MCNC: 105 MG/DL (ref 70–105)
GLUCOSE BLDC GLUCOMTR-MCNC: 81 MG/DL (ref 70–105)

## 2025-02-11 PROCEDURE — C1898 LEAD, PMKR, OTHER THAN TRANS: HCPCS | Performed by: INTERNAL MEDICINE

## 2025-02-11 PROCEDURE — 25010000002 MIDAZOLAM PER 1 MG: Performed by: INTERNAL MEDICINE

## 2025-02-11 PROCEDURE — 82948 REAGENT STRIP/BLOOD GLUCOSE: CPT

## 2025-02-11 PROCEDURE — 71045 X-RAY EXAM CHEST 1 VIEW: CPT

## 2025-02-11 PROCEDURE — 25510000001 IOPAMIDOL PER 1 ML: Performed by: INTERNAL MEDICINE

## 2025-02-11 PROCEDURE — C1785 PMKR, DUAL, RATE-RESP: HCPCS | Performed by: INTERNAL MEDICINE

## 2025-02-11 PROCEDURE — C1894 INTRO/SHEATH, NON-LASER: HCPCS | Performed by: INTERNAL MEDICINE

## 2025-02-11 PROCEDURE — 33208 INSRT HEART PM ATRIAL & VENT: CPT | Performed by: INTERNAL MEDICINE

## 2025-02-11 PROCEDURE — G0378 HOSPITAL OBSERVATION PER HR: HCPCS

## 2025-02-11 PROCEDURE — 25810000003 SODIUM CHLORIDE 0.9 % SOLUTION: Performed by: INTERNAL MEDICINE

## 2025-02-11 PROCEDURE — 25010000002 CEFAZOLIN PER 500 MG: Performed by: INTERNAL MEDICINE

## 2025-02-11 PROCEDURE — 25010000002 FENTANYL CITRATE (PF) 100 MCG/2ML SOLUTION: Performed by: INTERNAL MEDICINE

## 2025-02-11 PROCEDURE — 25010000002 HYDROMORPHONE PER 4 MG: Performed by: INTERNAL MEDICINE

## 2025-02-11 PROCEDURE — 25010000002 LIDOCAINE-EPINEPHRINE 2 %-1:100000 SOLUTION: Performed by: INTERNAL MEDICINE

## 2025-02-11 DEVICE — LD PM TENDRIL STS 6F52CM 2088TC52: Type: IMPLANTABLE DEVICE | Site: CORONARY | Status: FUNCTIONAL

## 2025-02-11 DEVICE — GEN PM ASSURITY MRI DR RF PM2272: Type: IMPLANTABLE DEVICE | Site: CORONARY | Status: FUNCTIONAL

## 2025-02-11 DEVICE — LD PM TENDRIL STS 6F58CM 2088TC58: Type: IMPLANTABLE DEVICE | Site: CORONARY | Status: FUNCTIONAL

## 2025-02-11 RX ORDER — BUPROPION HYDROCHLORIDE 150 MG/1
150 TABLET ORAL DAILY
Status: DISCONTINUED | OUTPATIENT
Start: 2025-02-12 | End: 2025-02-12 | Stop reason: HOSPADM

## 2025-02-11 RX ORDER — SODIUM CHLORIDE 0.9 % (FLUSH) 0.9 %
3-10 SYRINGE (ML) INJECTION AS NEEDED
Status: DISCONTINUED | OUTPATIENT
Start: 2025-02-11 | End: 2025-02-12 | Stop reason: HOSPADM

## 2025-02-11 RX ORDER — SODIUM CHLORIDE 0.9 % (FLUSH) 0.9 %
10 SYRINGE (ML) INJECTION AS NEEDED
Status: DISCONTINUED | OUTPATIENT
Start: 2025-02-11 | End: 2025-02-11 | Stop reason: HOSPADM

## 2025-02-11 RX ORDER — SODIUM CHLORIDE 9 MG/ML
40 INJECTION, SOLUTION INTRAVENOUS AS NEEDED
Status: DISCONTINUED | OUTPATIENT
Start: 2025-02-11 | End: 2025-02-11 | Stop reason: HOSPADM

## 2025-02-11 RX ORDER — SODIUM CHLORIDE 9 MG/ML
40 INJECTION, SOLUTION INTRAVENOUS AS NEEDED
Status: DISCONTINUED | OUTPATIENT
Start: 2025-02-11 | End: 2025-02-12 | Stop reason: HOSPADM

## 2025-02-11 RX ORDER — ESCITALOPRAM OXALATE 10 MG/1
20 TABLET ORAL NIGHTLY
Status: DISCONTINUED | OUTPATIENT
Start: 2025-02-11 | End: 2025-02-12 | Stop reason: HOSPADM

## 2025-02-11 RX ORDER — ACETAMINOPHEN 650 MG/1
650 SUPPOSITORY RECTAL EVERY 4 HOURS PRN
Status: DISCONTINUED | OUTPATIENT
Start: 2025-02-11 | End: 2025-02-12 | Stop reason: HOSPADM

## 2025-02-11 RX ORDER — NALOXONE HCL 0.4 MG/ML
0.4 VIAL (ML) INJECTION
Status: DISCONTINUED | OUTPATIENT
Start: 2025-02-11 | End: 2025-02-12 | Stop reason: HOSPADM

## 2025-02-11 RX ORDER — GABAPENTIN 300 MG/1
600 CAPSULE ORAL EVERY 8 HOURS SCHEDULED
Status: DISCONTINUED | OUTPATIENT
Start: 2025-02-11 | End: 2025-02-12 | Stop reason: HOSPADM

## 2025-02-11 RX ORDER — MIDODRINE HYDROCHLORIDE 5 MG/1
5 TABLET ORAL AS NEEDED
COMMUNITY

## 2025-02-11 RX ORDER — ACETAMINOPHEN 325 MG/1
650 TABLET ORAL EVERY 4 HOURS PRN
Status: DISCONTINUED | OUTPATIENT
Start: 2025-02-11 | End: 2025-02-12 | Stop reason: HOSPADM

## 2025-02-11 RX ORDER — SODIUM CHLORIDE 0.9 % (FLUSH) 0.9 %
10 SYRINGE (ML) INJECTION EVERY 12 HOURS SCHEDULED
Status: DISCONTINUED | OUTPATIENT
Start: 2025-02-11 | End: 2025-02-11

## 2025-02-11 RX ORDER — HYDROMORPHONE HYDROCHLORIDE 1 MG/ML
0.5 INJECTION, SOLUTION INTRAMUSCULAR; INTRAVENOUS; SUBCUTANEOUS
Status: DISCONTINUED | OUTPATIENT
Start: 2025-02-11 | End: 2025-02-12 | Stop reason: HOSPADM

## 2025-02-11 RX ORDER — LIDOCAINE HYDROCHLORIDE AND EPINEPHRINE BITARTRATE 20; .01 MG/ML; MG/ML
INJECTION, SOLUTION SUBCUTANEOUS
Status: DISCONTINUED | OUTPATIENT
Start: 2025-02-11 | End: 2025-02-11 | Stop reason: HOSPADM

## 2025-02-11 RX ORDER — SODIUM CHLORIDE 9 MG/ML
70 INJECTION, SOLUTION INTRAVENOUS CONTINUOUS
Status: DISCONTINUED | OUTPATIENT
Start: 2025-02-11 | End: 2025-02-12 | Stop reason: HOSPADM

## 2025-02-11 RX ORDER — FENTANYL CITRATE 50 UG/ML
INJECTION, SOLUTION INTRAMUSCULAR; INTRAVENOUS
Status: DISCONTINUED | OUTPATIENT
Start: 2025-02-11 | End: 2025-02-11 | Stop reason: HOSPADM

## 2025-02-11 RX ORDER — MIDAZOLAM HYDROCHLORIDE 1 MG/ML
INJECTION, SOLUTION INTRAMUSCULAR; INTRAVENOUS
Status: DISCONTINUED | OUTPATIENT
Start: 2025-02-11 | End: 2025-02-11 | Stop reason: HOSPADM

## 2025-02-11 RX ORDER — INSULIN GLARGINE 100 [IU]/ML
20 INJECTION, SOLUTION SUBCUTANEOUS NIGHTLY
COMMUNITY

## 2025-02-11 RX ORDER — SODIUM CHLORIDE 9 MG/ML
50 INJECTION, SOLUTION INTRAVENOUS CONTINUOUS
Status: DISCONTINUED | OUTPATIENT
Start: 2025-02-11 | End: 2025-02-11

## 2025-02-11 RX ORDER — ATORVASTATIN CALCIUM 20 MG/1
20 TABLET, FILM COATED ORAL NIGHTLY
Status: DISCONTINUED | OUTPATIENT
Start: 2025-02-11 | End: 2025-02-12 | Stop reason: HOSPADM

## 2025-02-11 RX ORDER — SODIUM CHLORIDE 0.9 % (FLUSH) 0.9 %
3 SYRINGE (ML) INJECTION EVERY 12 HOURS SCHEDULED
Status: DISCONTINUED | OUTPATIENT
Start: 2025-02-11 | End: 2025-02-12 | Stop reason: HOSPADM

## 2025-02-11 RX ORDER — ACETAMINOPHEN 160 MG/5ML
650 SOLUTION ORAL EVERY 4 HOURS PRN
Status: DISCONTINUED | OUTPATIENT
Start: 2025-02-11 | End: 2025-02-12 | Stop reason: HOSPADM

## 2025-02-11 RX ORDER — IOPAMIDOL 755 MG/ML
INJECTION, SOLUTION INTRAVASCULAR
Status: DISCONTINUED | OUTPATIENT
Start: 2025-02-11 | End: 2025-02-11 | Stop reason: HOSPADM

## 2025-02-11 RX ADMIN — Medication 3 ML: at 22:00

## 2025-02-11 RX ADMIN — ATORVASTATIN CALCIUM 20 MG: 20 TABLET, FILM COATED ORAL at 21:03

## 2025-02-11 RX ADMIN — SODIUM CHLORIDE 70 ML/HR: 9 INJECTION, SOLUTION INTRAVENOUS at 20:00

## 2025-02-11 RX ADMIN — CEFAZOLIN 2000 MG: 2 INJECTION, POWDER, FOR SOLUTION INTRAMUSCULAR; INTRAVENOUS at 13:41

## 2025-02-11 RX ADMIN — GABAPENTIN 600 MG: 300 CAPSULE ORAL at 17:15

## 2025-02-11 RX ADMIN — SODIUM CHLORIDE 70 ML/HR: 9 INJECTION, SOLUTION INTRAVENOUS at 13:41

## 2025-02-11 RX ADMIN — CEFAZOLIN 2000 MG: 2 INJECTION, POWDER, FOR SOLUTION INTRAMUSCULAR; INTRAVENOUS at 21:02

## 2025-02-11 RX ADMIN — HYDROMORPHONE HYDROCHLORIDE 0.5 MG: 1 INJECTION, SOLUTION INTRAMUSCULAR; INTRAVENOUS; SUBCUTANEOUS at 21:03

## 2025-02-11 RX ADMIN — GABAPENTIN 600 MG: 300 CAPSULE ORAL at 21:02

## 2025-02-11 RX ADMIN — ACETAMINOPHEN 650 MG: 325 TABLET, FILM COATED ORAL at 21:02

## 2025-02-11 RX ADMIN — ESCITALOPRAM 20 MG: 10 TABLET, FILM COATED ORAL at 21:02

## 2025-02-11 NOTE — PLAN OF CARE
Problem: Adult Inpatient Plan of Care  Goal: Plan of Care Review  Outcome: Progressing  Flowsheets (Taken 2/11/2025 1702)  Progress: improving  Outcome Evaluation: pacemaker implanted. vitals and site stable.  Plan of Care Reviewed With: patient  Goal: Patient-Specific Goal (Individualized)  Outcome: Progressing  Goal: Absence of Hospital-Acquired Illness or Injury  Outcome: Progressing  Intervention: Identify and Manage Fall Risk  Recent Flowsheet Documentation  Taken 2/11/2025 1600 by Nasra Read RN  Safety Promotion/Fall Prevention: safety round/check completed  Taken 2/11/2025 1515 by Nasra Read RN  Safety Promotion/Fall Prevention: safety round/check completed  Intervention: Prevent Skin Injury  Recent Flowsheet Documentation  Taken 2/11/2025 1600 by Nasra Read RN  Body Position: supine  Taken 2/11/2025 1545 by Nasra Read RN  Body Position: supine  Taken 2/11/2025 1530 by Nasra Read RN  Body Position: supine  Taken 2/11/2025 1515 by Nasra Read RN  Body Position: supine  Goal: Optimal Comfort and Wellbeing  Outcome: Progressing  Goal: Readiness for Transition of Care  Outcome: Progressing  Intervention: Mutually Develop Transition Plan  Recent Flowsheet Documentation  Taken 2/11/2025 1655 by Nasra Read RN  Equipment Currently Used at Home:   bipap   glucometer   pulse ox   rollator  Transportation Anticipated: family or friend will provide  Transportation Concerns: none  Patient/Family Anticipated Services at Transition: none  Patient/Family Anticipates Transition to: home with family   Goal Outcome Evaluation:  Plan of Care Reviewed With: patient        Progress: improving  Outcome Evaluation: pacemaker implanted. vitals and site stable.

## 2025-02-12 VITALS
TEMPERATURE: 97.6 F | OXYGEN SATURATION: 100 % | HEIGHT: 61 IN | SYSTOLIC BLOOD PRESSURE: 155 MMHG | HEART RATE: 60 BPM | WEIGHT: 193.78 LBS | RESPIRATION RATE: 13 BRPM | DIASTOLIC BLOOD PRESSURE: 70 MMHG | BODY MASS INDEX: 36.59 KG/M2

## 2025-02-12 PROCEDURE — 99024 POSTOP FOLLOW-UP VISIT: CPT | Performed by: NURSE PRACTITIONER

## 2025-02-12 PROCEDURE — 25010000002 CEFAZOLIN PER 500 MG: Performed by: INTERNAL MEDICINE

## 2025-02-12 PROCEDURE — 25010000002 HYDROMORPHONE PER 4 MG: Performed by: INTERNAL MEDICINE

## 2025-02-12 RX ORDER — HYDRALAZINE HYDROCHLORIDE 20 MG/ML
10 INJECTION INTRAMUSCULAR; INTRAVENOUS EVERY 4 HOURS PRN
Status: DISCONTINUED | OUTPATIENT
Start: 2025-02-12 | End: 2025-02-12 | Stop reason: HOSPADM

## 2025-02-12 RX ORDER — HYDRALAZINE HYDROCHLORIDE 25 MG/1
25 TABLET, FILM COATED ORAL ONCE
Status: COMPLETED | OUTPATIENT
Start: 2025-02-12 | End: 2025-02-12

## 2025-02-12 RX ORDER — METOPROLOL SUCCINATE 25 MG/1
25 TABLET, EXTENDED RELEASE ORAL DAILY
Qty: 30 TABLET | Refills: 5 | Status: SHIPPED | OUTPATIENT
Start: 2025-02-12 | End: 2025-08-11

## 2025-02-12 RX ADMIN — HYDROMORPHONE HYDROCHLORIDE 0.5 MG: 1 INJECTION, SOLUTION INTRAMUSCULAR; INTRAVENOUS; SUBCUTANEOUS at 02:27

## 2025-02-12 RX ADMIN — CEFAZOLIN 2000 MG: 2 INJECTION, POWDER, FOR SOLUTION INTRAMUSCULAR; INTRAVENOUS at 05:31

## 2025-02-12 RX ADMIN — HYDRALAZINE HYDROCHLORIDE 25 MG: 25 TABLET ORAL at 04:08

## 2025-02-12 RX ADMIN — BUPROPION HYDROCHLORIDE 150 MG: 150 TABLET, EXTENDED RELEASE ORAL at 08:54

## 2025-02-12 RX ADMIN — Medication 3 ML: at 08:54

## 2025-02-12 RX ADMIN — ACETAMINOPHEN 650 MG: 325 TABLET, FILM COATED ORAL at 02:27

## 2025-02-12 RX ADMIN — GABAPENTIN 600 MG: 300 CAPSULE ORAL at 05:31

## 2025-02-12 NOTE — CASE MANAGEMENT/SOCIAL WORK
Discharge Planning Assessment   Vick     Patient Name: Dora Solorio  MRN: [id number]  Today's Date: 2/12/2025    Admit Date: 2/11/2025    Plan: Outpatient PM - no CM assessment required     Discharge Plan       Row Name 02/12/25 0840       Plan    Plan Outpatient PM - no CM assessment required    Plan Comments Outpatient PM -no CM assessment required               Expected Discharge Date and Time       Expected Discharge Date Expected Discharge Time    Feb 12, 2025         MIKE Nuno RN  ICU/CVU   O: [phone]  C: [phone]  [email or web address]

## 2025-02-12 NOTE — DISCHARGE SUMMARY
"  Date of Discharge:  2/12/2025    Discharge Diagnosis:   Active Hospital Problems    Diagnosis  POA    **SSS (sick sinus syndrome) [I49.5]  Unknown    Dizziness [R42]  Unknown    Dyslipidemia [E78.5]  Unknown    Obesity (BMI 30-39.9) [E66.9]  Unknown    Essential hypertension [I10]  Unknown    Type 2 diabetes mellitus with hyperglycemia, with long-term current use of insulin [E11.65, Z79.4]  Not Applicable      Resolved Hospital Problems   No resolved problems to display.       Presenting Problem/History of Present Illness  Dizziness [R42]  SSS (sick sinus syndrome) [I49.5]  Essential hypertension [I10]  Type 2 diabetes mellitus with hyperglycemia, with long-term current use of insulin [E11.65, Z79.4]  Dyslipidemia [E78.5]  Obesity (BMI 30-39.9) [E66.9]      Hospital Course  Patient is a 74 y.o. female presented for elective pacemaker placement.  Patient was observed overnight without any complications.  She denies any complaints.  Advised Tylenol and ice for discomfort.  Will re-start low dose beta blocker.  Hold midodrine unless blood pressure becomes low at home.   Patient reports she is orthostatic at times.        Discussed discharge instructions with patient and attached to AVS.     Follow up next week for staple removal and wound check as scheduled.         Procedures Performed  Procedure(s):  Pacemaker DC new - REPS EMAILED       Consults:   Consults       No orders found for last 30 day(s).            Pertinent Test Results: labs: no pertinent labs     Ejection Fraction  Lab Results   Component Value Date    EF 67 07/11/2024       Echo EF Estimated  Lab Results   Component Value Date    ECHOEFEST 65 09/10/2022       Nuclear Stress Ejection Fraction  No components found for: \"NUCEF\"    Cath Ejection Fraction Quantitative  No results found for: \"CATHEF\"    Condition on Discharge: Stable    Physical Exam at Discharge :    Vital Signs  Temp:  [97.5 °F (36.4 °C)-98.4 °F (36.9 °C)] 97.6 °F (36.4 °C)  Heart Rate: "  [60-83] 60  Resp:  [10-16] 13  BP: (126-177)/(56-80) 155/70  General:  Appears in no acute distress  Eyes: Sclera is anicteric,  conjunctiva is clear   HEENT:  No JVD. Thyroid not visibly enlarged. No mucosal pallor or cyanosis  Respiratory: Respirations regular and unlabored at rest.  CTA  Cardiovascular: S1,S2 Regular rate and rhythm. No murmur, rub or gallop auscultated. Pacemaker to left chest wall, staples covered with dressing   Gastrointestinal: Abdomen nondistended, soft  Musculoskeletal:  No abnormal movements  Extremities: No digital clubbing or cyanosis, traced edema in ankles   Skin: Color pink. Skin warm and dry to touch. No rashes  No xanthoma  Neuro: Alert and awake, no lateralizing deficits appreciated        Discharge Disposition Home  Home or Self Care    Discharge Medications     Discharge Medications        New Medications        Instructions Start Date   metoprolol succinate XL 25 MG 24 hr tablet  Commonly known as: TOPROL-XL   25 mg, Oral, Daily             Continue These Medications        Instructions Start Date   Accu-Chek Guide test strip  Generic drug: glucose blood   USE AS DIRECTED ONCE DAILY  DX: E11.9      atorvastatin 20 MG tablet  Commonly known as: LIPITOR   20 mg, Oral, Daily      B-D ULTRAFINE III SHORT PEN 31G X 8 MM misc  Generic drug: Insulin Pen Needle   USE 1 EACH ONCE DAILY WITH TRESIBA      buPROPion  MG 24 hr tablet  Commonly known as: WELLBUTRIN XL   150 mg, Oral, Daily      doxazosin 2 MG tablet  Commonly known as: CARDURA   2 mg, Nightly      escitalopram 20 MG tablet  Commonly known as: LEXAPRO   20 mg, Oral, Daily      gabapentin 600 MG tablet  Commonly known as: NEURONTIN   600 mg, Oral, 3 Times Daily      insulin degludec 100 UNIT/ML solution pen-injector injection  Commonly known as: TRESIBA FLEXTOUCH   20 Units, Nightly      Insulin Glargine 100 UNIT/ML injection pen  Commonly known as: LANTUS SOLOSTAR   20 Units, Subcutaneous, Nightly      Lantus SoloStar  100 UNIT/ML injection pen  Generic drug: Insulin Glargine   20 Units, Subcutaneous, Nightly, Pt has not started.  Will finish the tresiba she has and then move to [facility].       metFORMIN 1000 MG tablet  Commonly known as: GLUCOPHAGE   1,000 mg, Oral, 2 Times Daily With Meals      midodrine 5 MG tablet  Commonly known as: PROAMATINE   5 mg, As Needed      povidone-iodine 10 % external solution 10%  Commonly known as: BETADINE   Topical, Daily      sodium bicarbonate 650 MG tablet   650 mg, Oral, 2 Times Daily      Vitamin B-12 1000 MCG sublingual tablet   1,000 mcg, Sublingual, Daily      vitamin D 1.25 MG (06784 UT) capsule capsule  Commonly known as: ERGOCALCIFEROL   Take 1 capsule by mouth Every 7 (Seven) Days.               Discharge Diet: Cardiac diet    Activity at Discharge: Activity as tolerated    Follow-up Appointments: Follow-up with me in 1 month  Future Appointments   Date Time Provider Department Center   2/18/2025 10:00 AM [facility] DEVICE CHECK [facility]   3/11/2025  3:30 PM Leah Murray APRN [facility]   8/13/2025 10:30 AM Bhargavi Rodriguez APRN [facility]              BRANDON Bell  02/12/25  14:58 EST    Time: Discharge Time Spent:30    Electronically signed by BRANDON Bell, 02/12/25, 2:59 PM EST.

## 2025-02-13 NOTE — CASE MANAGEMENT/SOCIAL WORK
Case Management Discharge Note      Final Note: Home         Selected Continued Care - Discharged on 2/12/2025 Admission date: 2/11/2025 - Discharge disposition: Home or Self Care       Transportation Services  Private: Car    Final Discharge Disposition Code: 01 - home or self-care      MIKE Nuno RN  ICU/CVU   O: [phone]  C: [phone]  [email or web address]

## 2025-02-18 ENCOUNTER — CLINICAL SUPPORT NO REQUIREMENTS (OUTPATIENT)
Dept: CARDIOLOGY | Facility: CLINIC | Age: 75
End: 2025-02-18
Payer: MEDICARE

## 2025-02-18 DIAGNOSIS — R00.1 BRADYCARDIA: Primary | ICD-10-CM

## 2025-02-18 DIAGNOSIS — Z95.0 PACEMAKER: ICD-10-CM

## 2025-02-18 DIAGNOSIS — I49.5 SSS (SICK SINUS SYNDROME): Chronic | ICD-10-CM

## 2025-02-18 NOTE — PROGRESS NOTES
Patient was seen today s/p pacemaker placement. Interrogation of device is normal, leads stable. Removed staples with no issues. Incision is well approximated with no redness, swelling or drainage noted. Patient teaching for left arm precaution, incision care and follow up. Patient has Merlin connection and has an appt 3/11/25 for follow up.

## 2025-03-11 ENCOUNTER — OFFICE VISIT (OUTPATIENT)
Dept: CARDIOLOGY | Facility: CLINIC | Age: 75
End: 2025-03-11
Payer: MEDICARE

## 2025-03-11 VITALS
HEIGHT: 64 IN | SYSTOLIC BLOOD PRESSURE: 178 MMHG | WEIGHT: 195 LBS | DIASTOLIC BLOOD PRESSURE: 88 MMHG | OXYGEN SATURATION: 96 % | BODY MASS INDEX: 33.29 KG/M2 | HEART RATE: 64 BPM

## 2025-03-11 DIAGNOSIS — R00.1 BRADYCARDIA: ICD-10-CM

## 2025-03-11 DIAGNOSIS — N18.31 CHRONIC KIDNEY DISEASE, STAGE 3A: ICD-10-CM

## 2025-03-11 DIAGNOSIS — I10 ESSENTIAL HYPERTENSION: Primary | ICD-10-CM

## 2025-03-11 DIAGNOSIS — Z95.0 PACEMAKER: ICD-10-CM

## 2025-03-11 RX ORDER — METOPROLOL SUCCINATE 50 MG/1
50 TABLET, EXTENDED RELEASE ORAL DAILY
Qty: 90 TABLET | Refills: 3 | Status: SHIPPED | OUTPATIENT
Start: 2025-03-11 | End: 2026-03-06

## 2025-03-11 NOTE — PROGRESS NOTES
Subjective:     Encounter Date:2025      Patient ID: Dora Solorio is a 74 y.o. female.    Chief Complaint: 1 month follow up status post pacemaker  placement     Ms. Keke Solorio has PMH of     Recurrent dizziness orthostatic hypotension  Sinus node dysfunction status post permanent pacemaker placement (2025 [employer])  history of ankle fracture, scalp hematoma  Hypertension  Dyslipidemia with low HDL  Diabetes  CKD  Carotid disease 60% at the origin of right internal carotid by CTA 2022  Tubal ligation, , endometrial ablation, ankle fracture surgery  Allergy/intolerance to sulfa     Here for one month hospital follow up status post pacemaker placement.  Patient was in the hospital in  with bradycardia requiring a temporary pacemaker, heart rate improved with discontinuation of beta blocker.  Patient continued to have symptoms with dizziness and lightheadedness and had junctional bradycardia therefore underwent permanent pacemaker placement.  Patient was also having orthostatic hypotension requiring midodrine as well.  Patient denies any chest pain shortness of breath or lower extremity edema.  She reports that she does have chronic left ankle edema due to her prior fracture.  Pacemaker incision is healing well    Patient reports that since getting pacemaker placement her blood pressure has been high in the 150s to 170s systolically at home.  While in the hospital her beta-blocker was restarted on discharge.       Today's blood pressure is 181/103 left arm 152/93 right arm with repeat 178/88 heart rate 64 oxygen 96% on room air weight 195 pounds BMI 33.47            Review of records:  Echocardiogram 2022 reveals normal LV systolic function EF of 65% with concentric LVH and grade 1 diastolic dysfunction    Echocardiogram 2024  Normal LV size and contractility EF of 60 to 65%  Borderline RV enlargement seen.  Mild to moderate increased left atrial volumes.  Pulmonic valve is  not well visualized.  Aortic valve,   tricuspid valve appears structurally normal, trace tricuspid regurgitation seen.  Normal calculated RV systolic pressure of 28 mmHg  Mitral valve appears to have mitral annular calcification.  Is not well-visualized.  Leaflets are opening well.  No pericardial effusion seen.  Proximal aorta appears normal in size.      Stress test 7/11/2024 negative for ischemia      Lab Review:       labs from 2/10/2025 potassium 4.8 BUN 16 creatinine 1.56 EGFR 34.3 hemoglobin A1c 6.26 hemoglobin 10.3    The following portions of the patient's history were reviewed and updated as appropriate: allergies, current medications, past family history, past medical history, past social history, past surgical history, and problem list.    Review of Systems   Constitutional: Positive for malaise/fatigue.   Cardiovascular:  Positive for leg swelling. Negative for chest pain, dyspnea on exertion and palpitations.   Respiratory:  Negative for cough and shortness of breath.    Gastrointestinal:  Negative for abdominal pain, nausea and vomiting.   Neurological:  Negative for dizziness, focal weakness, headaches, light-headedness and numbness.   All other systems reviewed and are negative.        Past Medical History:   Diagnosis Date    Adjustment disorder with depressed mood 09/12/2018    Allergic Years    Sulfa drugs    Anxiety     Arthritis Years    [place] joints. Mostly back, knees, hips, fingers    Arthritis of back 1995    Cataract 7/2023    Depression 09/12/2018    Diabetes mellitus, type II 07/03/2013    Essential hypertension 07/03/2013    Fracture of ankle     Fracture of wrist 1998    Left    Fracture, foot 1/04/2024    Gout 08/12/2014    Heart attack     Hip arthrosis 2015    Hyperlipidemia 06/02/2020    Hypertension     Inflammatory bowel disease     Irritable bowel syndrome without diarrhea 04/17/2017    Kidney disease     Kidney stone ?August 2016?    In hospital overnight    Knee swelling  "1999    Osteopenia 2017    Peripheral neuropathy 2017    Renal insufficiency 2023    Stage 3    Urinary tract infection     Vertigo     Resolved    Vitamin D deficiency 2020 ?6     Past Surgical History:   Procedure Laterality Date    ABLATION OF DYSRHYTHMIC FOCUS      ANKLE OPEN REDUCTION INTERNAL FIXATION Left 2024    Procedure: ANKLE OPEN REDUCTION INTERNAL FIXATION;  Surgeon: Eriberto Muñoz MD;  Location: [facility] MAIN OR;  Service: Orthopedics;  Laterality: Left;    CARDIAC CATHETERIZATION      CARDIAC ELECTROPHYSIOLOGY PROCEDURE N/A 2024    Procedure: Temporary Pacemaker;  Surgeon: Jose Shirley MD;  Location: [facility] CATH INVASIVE LOCATION;  Service: Cardiovascular;  Laterality: N/A;    CARDIAC ELECTROPHYSIOLOGY PROCEDURE N/A 2025    Procedure: Pacemaker DC new - REPS EMAILED;  Surgeon: Jose Shirley MD;  Location: [facility] CATH INVASIVE LOCATION;  Service: Cardiovascular;  Laterality: N/A;     SECTION      x2    ENDOMETRIAL ABLATION      FOOT SURGERY      FRACTURE SURGERY  24    INSERT / REPLACE / REMOVE PACEMAKER      Was temporary.removed 2024    KNEE ARTHROSCOPY Bilateral     TUBAL ABDOMINAL LIGATION  1973     /88 (BP Location: Right arm, Patient Position: Sitting, Cuff Size: Adult)   Pulse 64   Ht 162.6 cm (64\")   Wt 88.5 kg (195 lb)   SpO2 96%   BMI 33.47 kg/m²   Family History   Adopted: Yes   Problem Relation Age of Onset    Epilepsy Mother     Colon cancer Mother     Diabetes Mother     Hypertension Mother     Heart attack Mother     Cancer Mother     Broken bones Sister     Severe sprains Sister     No Known Problems Maternal Grandmother     Vision loss Maternal Grandfather     No Known Problems Paternal Grandmother     No Known Problems Paternal Grandfather     Cancer Maternal Uncle     Diabetes Daughter         Mothets initials       Current Outpatient Medications:     atorvastatin " (LIPITOR) 20 MG tablet, Take 1 tablet by mouth Daily., Disp: 90 tablet, Rfl: 3    buPROPion XL (WELLBUTRIN XL) 150 MG 24 hr tablet, Take 1 tablet by mouth once daily, Disp: 90 tablet, Rfl: 0    Cyanocobalamin (Vitamin B-12) 1000 MCG sublingual tablet, Place 1 tablet under the tongue Daily., Disp: 100 each, Rfl: 4    doxazosin (CARDURA) 2 MG tablet, Take 1 tablet by mouth Every Night., Disp: , Rfl:     escitalopram (LEXAPRO) 20 MG tablet, Take 1 tablet by mouth once daily, Disp: 90 tablet, Rfl: 1    gabapentin (NEURONTIN) 600 MG tablet, TAKE 1 TABLET BY MOUTH THREE TIMES DAILY, Disp: 90 tablet, Rfl: 3    glucose blood (Accu-Chek Guide) test strip, USE AS DIRECTED ONCE DAILY  DX: E11.9, Disp: 50 each, Rfl: 12    insulin degludec (TRESIBA FLEXTOUCH) 100 UNIT/ML solution pen-injector injection, Inject 20 Units under the skin into the appropriate area as directed Every Night., Disp: , Rfl:     Insulin Glargine (LANTUS SOLOSTAR) 100 UNIT/ML injection pen, Inject 20 Units under the skin into the appropriate area as directed Every Night., Disp: 3 mL, Rfl: 3    Insulin Pen Needle (B-D ULTRAFINE III SHORT PEN) 31G X 8 MM misc, USE 1 EACH ONCE DAILY WITH TRESIBA, Disp: 100 each, Rfl: 0    metFORMIN (GLUCOPHAGE) 1000 MG tablet, TAKE 1 TABLET BY MOUTH TWICE DAILY WITH MEALS, Disp: 180 tablet, Rfl: 0    metoprolol succinate XL (TOPROL-XL) 50 MG 24 hr tablet, Take 1 tablet by mouth Daily for 360 days., Disp: 90 tablet, Rfl: 3    povidone-iodine (BETADINE) 10 % external solution 10%, Apply  topically to the appropriate area as directed Daily., Disp: 237 mL, Rfl: 0    sodium bicarbonate 650 MG tablet, Take 1 tablet by mouth 2 (Two) Times a Day., Disp: 180 tablet, Rfl: 3    vitamin D (ERGOCALCIFEROL) 1.25 MG (79371 UT) capsule capsule, Take 1 capsule by mouth Every 7 (Seven) Days., Disp: , Rfl:     Insulin Glargine (Lantus SoloStar) 100 UNIT/ML injection pen, Inject 20 Units under the skin into the appropriate area as directed Every  Night. Pt has not started.  Will finish the tresiba she has and then move to [facility]. (Patient not taking: Reported on 3/11/2025), Disp: , Rfl:     Allergies   Allergen Reactions    Oxycodone Mental Status Change and Hallucinations    Morphine Hallucinations    Allopurinol Nausea And Vomiting    Sulfa Antibiotics Rash     Social History     Socioeconomic History    Marital status:      Spouse name: Rishabh    Number of children: 2    Years of education: 12   Tobacco Use    Smoking status: Never     Passive exposure: Past    Smokeless tobacco: Never   Vaping Use    Vaping status: Never Used   Substance and Sexual Activity    Alcohol use: Never    Drug use: Never    Sexual activity: Not Currently     Partners: Male     Birth control/protection: Other, Tubal ligation                Objective:     Vitals reviewed.   Constitutional:       Appearance: Not in distress. Frail.   Neck:      Vascular: No JVR. JVD normal.   Pulmonary:      Effort: Pulmonary effort is normal.      Breath sounds: Decreased breath sounds present. No wheezing. No rhonchi. No rales.   Chest:      Chest wall: Not tender to palpatation.   Cardiovascular:      PMI at left midclavicular line. Normal rate. Regular rhythm. Normal S1. Normal S2.       Murmurs: There is no murmur.      No gallop.  No click. No rub.      Comments: Left chest pacemaker incision healing well  Pulses:     Intact distal pulses.   Edema:     Peripheral edema absent.      Comments: Trace left ankle edema due to prior fracture  Abdominal:      General: Bowel sounds are normal.      Palpations: Abdomen is soft.      Tenderness: There is no abdominal tenderness.   Musculoskeletal: Normal range of motion.         General: No tenderness. Skin:     General: Skin is warm and dry.   Neurological:      General: No focal deficit present.      Mental Status: Alert and oriented to person, place and time.       Procedures                  Assessment:     [facility]       Diagnosis Plan   1.  Essential hypertension  BNP    Comprehensive Metabolic Panel      2. Bradycardia        3. Pacemaker        4. Chronic kidney disease, stage 3a  BNP    Comprehensive Metabolic Panel                       Plan:   Chart reviewed   Labs reviewed     Patient is hypertensive today reports that she is no longer taking midodrine she reports that she is no longer having dizziness when standing.  Continue continue doxazosin 2 mg daily  Will increase metoprolol to 50 mg daily    Advised to monitor blood pressure for the next 2 weeks and call if no improvement          Advised that she needs to keep follow-up with nephrology which she has an appointment next week  Will repeat BNP and CMP    Electronically signed by BRANDON Blel, 03/11/25, 5:09 PM EDT.        .          This document is intended for medical expert use only.  Reading of this document by patients and/or patient's family without participating medical staff guidance may result in misinterpretation and unintended morbidity. Any interpretation of such data is the responsibility of the patient and/or family member responsible for the patient in concert with their primary or specialist providers, not to be left for sources of online search as such as [employer], [employer] or similar queries.  Relying on these approaches to knowledge may result in misinterpretation, misguided goals of care and even death should patient or family members try recommendations outside of the realm of professional medical care in a supervised inpatient environment.

## 2025-03-14 DIAGNOSIS — F32.9 REACTIVE DEPRESSION: ICD-10-CM

## 2025-03-14 RX ORDER — BUPROPION HYDROCHLORIDE 150 MG/1
150 TABLET ORAL DAILY
Qty: 90 TABLET | Refills: 0 | Status: SHIPPED | OUTPATIENT
Start: 2025-03-14

## 2025-03-20 ENCOUNTER — TRANSCRIBE ORDERS (OUTPATIENT)
Dept: ADMINISTRATIVE | Facility: HOSPITAL | Age: 75
End: 2025-03-20
Payer: COMMERCIAL

## 2025-03-20 ENCOUNTER — LAB (OUTPATIENT)
Dept: LAB | Facility: HOSPITAL | Age: 75
End: 2025-03-20
Payer: MEDICARE

## 2025-03-20 DIAGNOSIS — N18.31 CHRONIC KIDNEY DISEASE, STAGE 3A: ICD-10-CM

## 2025-03-20 DIAGNOSIS — E11.8 DIABETIC COMPLICATION: ICD-10-CM

## 2025-03-20 DIAGNOSIS — N18.32 CHRONIC KIDNEY DISEASE (CKD) STAGE G3B/A1, MODERATELY DECREASED GLOMERULAR FILTRATION RATE (GFR) BETWEEN 30-44 ML/MIN/1.73 SQUARE METER AND ALBUMINURIA CREATININE RATIO LESS THAN 30 MG/G (CMS/H*: ICD-10-CM

## 2025-03-20 DIAGNOSIS — I10 ESSENTIAL HYPERTENSION: ICD-10-CM

## 2025-03-20 DIAGNOSIS — N18.32 CHRONIC KIDNEY DISEASE (CKD) STAGE G3B/A1, MODERATELY DECREASED GLOMERULAR FILTRATION RATE (GFR) BETWEEN 30-44 ML/MIN/1.73 SQUARE METER AND ALBUMINURIA CREATININE RATIO LESS THAN 30 MG/G (CMS/H*: Primary | ICD-10-CM

## 2025-03-20 LAB
25(OH)D3 SERPL-MCNC: 22 NG/ML (ref 30–100)
ALBUMIN SERPL-MCNC: 3.8 G/DL (ref 3.5–5.2)
ALBUMIN/GLOB SERPL: 1.2 G/DL
ALP SERPL-CCNC: 82 U/L (ref 39–117)
ALT SERPL W P-5'-P-CCNC: 10 U/L (ref 1–33)
ANION GAP SERPL CALCULATED.3IONS-SCNC: 10 MMOL/L (ref 5–15)
AST SERPL-CCNC: 12 U/L (ref 1–32)
BACTERIA UR QL AUTO: ABNORMAL /HPF
BASOPHILS # BLD AUTO: 0.03 10*3/MM3 (ref 0–0.2)
BASOPHILS NFR BLD AUTO: 0.5 % (ref 0–1.5)
BILIRUB SERPL-MCNC: 0.3 MG/DL (ref 0–1.2)
BILIRUB UR QL STRIP: NEGATIVE
BUN SERPL-MCNC: 16 MG/DL (ref 8–23)
BUN/CREAT SERPL: 13.9 (ref 7–25)
CALCIUM SPEC-SCNC: 8.7 MG/DL (ref 8.6–10.5)
CHLORIDE SERPL-SCNC: 105 MMOL/L (ref 98–107)
CHOLEST SERPL-MCNC: 109 MG/DL (ref 0–200)
CK SERPL-CCNC: 46 U/L (ref 20–180)
CLARITY UR: CLEAR
CO2 SERPL-SCNC: 26 MMOL/L (ref 22–29)
COLOR UR: YELLOW
CREAT SERPL-MCNC: 1.15 MG/DL (ref 0.57–1)
CREAT UR-MCNC: 79.1 MG/DL
DEPRECATED RDW RBC AUTO: 43.9 FL (ref 37–54)
EGFRCR SERPLBLD CKD-EPI 2021: 49.5 ML/MIN/1.73
EOSINOPHIL # BLD AUTO: 0.26 10*3/MM3 (ref 0–0.4)
EOSINOPHIL NFR BLD AUTO: 4.1 % (ref 0.3–6.2)
ERYTHROCYTE [DISTWIDTH] IN BLOOD BY AUTOMATED COUNT: 13.7 % (ref 12.3–15.4)
GLOBULIN UR ELPH-MCNC: 3.2 GM/DL
GLUCOSE SERPL-MCNC: 67 MG/DL (ref 65–99)
GLUCOSE UR STRIP-MCNC: NEGATIVE MG/DL
HBA1C MFR BLD: 5.71 % (ref 4.8–5.6)
HCT VFR BLD AUTO: 34.2 % (ref 34–46.6)
HDLC SERPL-MCNC: 40 MG/DL (ref 40–60)
HGB BLD-MCNC: 10 G/DL (ref 12–15.9)
HGB UR QL STRIP.AUTO: NEGATIVE
HOLD SPECIMEN: NORMAL
HYALINE CASTS UR QL AUTO: ABNORMAL /LPF
IMM GRANULOCYTES # BLD AUTO: 0.02 10*3/MM3 (ref 0–0.05)
IMM GRANULOCYTES NFR BLD AUTO: 0.3 % (ref 0–0.5)
KETONES UR QL STRIP: NEGATIVE
LDLC SERPL CALC-MCNC: 51 MG/DL (ref 0–100)
LDLC/HDLC SERPL: 1.26 {RATIO}
LEUKOCYTE ESTERASE UR QL STRIP.AUTO: ABNORMAL
LYMPHOCYTES # BLD AUTO: 1.53 10*3/MM3 (ref 0.7–3.1)
LYMPHOCYTES NFR BLD AUTO: 24.1 % (ref 19.6–45.3)
MAGNESIUM SERPL-MCNC: 2 MG/DL (ref 1.6–2.4)
MCH RBC QN AUTO: 25.6 PG (ref 26.6–33)
MCHC RBC AUTO-ENTMCNC: 29.2 G/DL (ref 31.5–35.7)
MCV RBC AUTO: 87.5 FL (ref 79–97)
MONOCYTES # BLD AUTO: 0.43 10*3/MM3 (ref 0.1–0.9)
MONOCYTES NFR BLD AUTO: 6.8 % (ref 5–12)
NEUTROPHILS NFR BLD AUTO: 4.09 10*3/MM3 (ref 1.7–7)
NEUTROPHILS NFR BLD AUTO: 64.2 % (ref 42.7–76)
NITRITE UR QL STRIP: NEGATIVE
NRBC BLD AUTO-RTO: 0 /100 WBC (ref 0–0.2)
NT-PROBNP SERPL-MCNC: 1224 PG/ML (ref 0–1800)
PH UR STRIP.AUTO: 7 [PH] (ref 5–8)
PHOSPHATE SERPL-MCNC: 4.3 MG/DL (ref 2.5–4.5)
PLATELET # BLD AUTO: 155 10*3/MM3 (ref 140–450)
PMV BLD AUTO: 10.9 FL (ref 6–12)
POTASSIUM SERPL-SCNC: 4.9 MMOL/L (ref 3.5–5.2)
PROT ?TM UR-MCNC: 9.2 MG/DL
PROT SERPL-MCNC: 7 G/DL (ref 6–8.5)
PROT UR QL STRIP: NEGATIVE
PROT/CREAT UR: 116.3 MG/G CREA (ref 0–200)
PTH-INTACT SERPL-MCNC: 88.8 PG/ML (ref 15–65)
RBC # BLD AUTO: 3.91 10*6/MM3 (ref 3.77–5.28)
RBC # UR STRIP: ABNORMAL /HPF
REF LAB TEST METHOD: ABNORMAL
SODIUM SERPL-SCNC: 141 MMOL/L (ref 136–145)
SP GR UR STRIP: 1.02 (ref 1–1.03)
SQUAMOUS #/AREA URNS HPF: ABNORMAL /HPF
TRIGL SERPL-MCNC: 93 MG/DL (ref 0–150)
URATE SERPL-MCNC: 7.9 MG/DL (ref 2.4–5.7)
UROBILINOGEN UR QL STRIP: ABNORMAL
VLDLC SERPL-MCNC: 18 MG/DL (ref 5–40)
WBC # UR STRIP: ABNORMAL /HPF
WBC NRBC COR # BLD AUTO: 6.36 10*3/MM3 (ref 3.4–10.8)

## 2025-03-20 PROCEDURE — 84156 ASSAY OF PROTEIN URINE: CPT

## 2025-03-20 PROCEDURE — 85025 COMPLETE CBC W/AUTO DIFF WBC: CPT

## 2025-03-20 PROCEDURE — 82550 ASSAY OF CK (CPK): CPT

## 2025-03-20 PROCEDURE — 80053 COMPREHEN METABOLIC PANEL: CPT

## 2025-03-20 PROCEDURE — 83735 ASSAY OF MAGNESIUM: CPT

## 2025-03-20 PROCEDURE — 36415 COLL VENOUS BLD VENIPUNCTURE: CPT

## 2025-03-20 PROCEDURE — 82570 ASSAY OF URINE CREATININE: CPT

## 2025-03-20 PROCEDURE — 82306 VITAMIN D 25 HYDROXY: CPT

## 2025-03-20 PROCEDURE — 83036 HEMOGLOBIN GLYCOSYLATED A1C: CPT

## 2025-03-20 PROCEDURE — 83970 ASSAY OF PARATHORMONE: CPT

## 2025-03-20 PROCEDURE — 84550 ASSAY OF BLOOD/URIC ACID: CPT

## 2025-03-20 PROCEDURE — 83880 ASSAY OF NATRIURETIC PEPTIDE: CPT

## 2025-03-20 PROCEDURE — 80061 LIPID PANEL: CPT

## 2025-03-20 PROCEDURE — 84100 ASSAY OF PHOSPHORUS: CPT

## 2025-03-22 LAB — BACTERIA SPEC AEROBE CULT: NO GROWTH

## 2025-03-28 NOTE — TELEPHONE ENCOUNTER
Patient received a call from ortho and was wanting to know why she got a call from them. Does she need to see them as well as pediatry?   Patient was not seen today due to forgetting appointment, shopping at time of visit. Patient encouraged to reschedule appointment.

## 2025-04-29 RX ORDER — GABAPENTIN 600 MG/1
600 TABLET ORAL 3 TIMES DAILY
Qty: 90 TABLET | Refills: 0 | Status: SHIPPED | OUTPATIENT
Start: 2025-04-29

## 2025-06-08 DIAGNOSIS — F32.9 REACTIVE DEPRESSION: ICD-10-CM

## 2025-06-09 RX ORDER — ESCITALOPRAM OXALATE 20 MG/1
20 TABLET ORAL DAILY
Qty: 90 TABLET | Refills: 0 | Status: SHIPPED | OUTPATIENT
Start: 2025-06-09

## 2025-06-09 RX ORDER — BUPROPION HYDROCHLORIDE 150 MG/1
150 TABLET ORAL DAILY
Qty: 90 TABLET | Refills: 0 | Status: SHIPPED | OUTPATIENT
Start: 2025-06-09

## 2025-06-18 ENCOUNTER — TRANSCRIBE ORDERS (OUTPATIENT)
Dept: ADMINISTRATIVE | Facility: HOSPITAL | Age: 75
End: 2025-06-18
Payer: COMMERCIAL

## 2025-06-18 ENCOUNTER — LAB (OUTPATIENT)
Dept: LAB | Facility: HOSPITAL | Age: 75
End: 2025-06-18
Payer: MEDICARE

## 2025-06-18 DIAGNOSIS — E11.8 DIABETIC COMPLICATION: ICD-10-CM

## 2025-06-18 DIAGNOSIS — N18.32 CHRONIC KIDNEY DISEASE (CKD) STAGE G3B/A1, MODERATELY DECREASED GLOMERULAR FILTRATION RATE (GFR) BETWEEN 30-44 ML/MIN/1.73 SQUARE METER AND ALBUMINURIA CREATININE RATIO LESS THAN 30 MG/G (CMS/H*: ICD-10-CM

## 2025-06-18 DIAGNOSIS — N18.32 CHRONIC KIDNEY DISEASE (CKD) STAGE G3B/A1, MODERATELY DECREASED GLOMERULAR FILTRATION RATE (GFR) BETWEEN 30-44 ML/MIN/1.73 SQUARE METER AND ALBUMINURIA CREATININE RATIO LESS THAN 30 MG/G (CMS/H*: Primary | ICD-10-CM

## 2025-06-18 LAB
25(OH)D3 SERPL-MCNC: 18.7 NG/ML (ref 30–100)
ALBUMIN SERPL-MCNC: 3.8 G/DL (ref 3.5–5.2)
ALBUMIN/GLOB SERPL: 1.1 G/DL
ALP SERPL-CCNC: 101 U/L (ref 39–117)
ALT SERPL W P-5'-P-CCNC: 14 U/L (ref 1–33)
ANION GAP SERPL CALCULATED.3IONS-SCNC: 13 MMOL/L (ref 5–15)
AST SERPL-CCNC: 13 U/L (ref 1–32)
BACTERIA UR QL AUTO: ABNORMAL /HPF
BASOPHILS # BLD AUTO: 0.03 10*3/MM3 (ref 0–0.2)
BASOPHILS NFR BLD AUTO: 0.5 % (ref 0–1.5)
BILIRUB SERPL-MCNC: 0.2 MG/DL (ref 0–1.2)
BILIRUB UR QL STRIP: NEGATIVE
BUN SERPL-MCNC: 17 MG/DL (ref 8–23)
BUN/CREAT SERPL: 14.3 (ref 7–25)
CALCIUM SPEC-SCNC: 8.8 MG/DL (ref 8.6–10.5)
CHLORIDE SERPL-SCNC: 104 MMOL/L (ref 98–107)
CHOLEST SERPL-MCNC: 225 MG/DL (ref 0–200)
CK SERPL-CCNC: 33 U/L (ref 20–180)
CLARITY UR: CLEAR
CO2 SERPL-SCNC: 25 MMOL/L (ref 22–29)
COLOR UR: YELLOW
CREAT SERPL-MCNC: 1.19 MG/DL (ref 0.57–1)
CREAT UR-MCNC: 112.8 MG/DL
DEPRECATED RDW RBC AUTO: 42.7 FL (ref 37–54)
EGFRCR SERPLBLD CKD-EPI 2021: 47.5 ML/MIN/1.73
EOSINOPHIL # BLD AUTO: 0.48 10*3/MM3 (ref 0–0.4)
EOSINOPHIL NFR BLD AUTO: 8.7 % (ref 0.3–6.2)
ERYTHROCYTE [DISTWIDTH] IN BLOOD BY AUTOMATED COUNT: 13.8 % (ref 12.3–15.4)
GLOBULIN UR ELPH-MCNC: 3.4 GM/DL
GLUCOSE SERPL-MCNC: 104 MG/DL (ref 65–99)
GLUCOSE UR STRIP-MCNC: NEGATIVE MG/DL
HBA1C MFR BLD: 6.7 % (ref 4.8–5.6)
HCT VFR BLD AUTO: 37 % (ref 34–46.6)
HDLC SERPL-MCNC: 42 MG/DL (ref 40–60)
HGB BLD-MCNC: 11 G/DL (ref 12–15.9)
HGB UR QL STRIP.AUTO: NEGATIVE
HYALINE CASTS UR QL AUTO: ABNORMAL /LPF
IMM GRANULOCYTES # BLD AUTO: 0.01 10*3/MM3 (ref 0–0.05)
IMM GRANULOCYTES NFR BLD AUTO: 0.2 % (ref 0–0.5)
KETONES UR QL STRIP: NEGATIVE
LDLC SERPL CALC-MCNC: 132 MG/DL (ref 0–100)
LDLC/HDLC SERPL: 2.99 {RATIO}
LEUKOCYTE ESTERASE UR QL STRIP.AUTO: ABNORMAL
LYMPHOCYTES # BLD AUTO: 1.98 10*3/MM3 (ref 0.7–3.1)
LYMPHOCYTES NFR BLD AUTO: 35.9 % (ref 19.6–45.3)
MAGNESIUM SERPL-MCNC: 1.8 MG/DL (ref 1.6–2.4)
MCH RBC QN AUTO: 25.1 PG (ref 26.6–33)
MCHC RBC AUTO-ENTMCNC: 29.7 G/DL (ref 31.5–35.7)
MCV RBC AUTO: 84.5 FL (ref 79–97)
MONOCYTES # BLD AUTO: 0.4 10*3/MM3 (ref 0.1–0.9)
MONOCYTES NFR BLD AUTO: 7.2 % (ref 5–12)
NEUTROPHILS NFR BLD AUTO: 2.62 10*3/MM3 (ref 1.7–7)
NEUTROPHILS NFR BLD AUTO: 47.5 % (ref 42.7–76)
NITRITE UR QL STRIP: NEGATIVE
NRBC BLD AUTO-RTO: 0 /100 WBC (ref 0–0.2)
PH UR STRIP.AUTO: 7 [PH] (ref 5–8)
PHOSPHATE SERPL-MCNC: 3.9 MG/DL (ref 2.5–4.5)
PLATELET # BLD AUTO: 200 10*3/MM3 (ref 140–450)
PMV BLD AUTO: 10.5 FL (ref 6–12)
POTASSIUM SERPL-SCNC: 5.2 MMOL/L (ref 3.5–5.2)
PROT ?TM UR-MCNC: 17.4 MG/DL
PROT SERPL-MCNC: 7.2 G/DL (ref 6–8.5)
PROT UR QL STRIP: ABNORMAL
PROT/CREAT UR: 154.3 MG/G CREA (ref 0–200)
PTH-INTACT SERPL-MCNC: 105 PG/ML (ref 15–65)
RBC # BLD AUTO: 4.38 10*6/MM3 (ref 3.77–5.28)
RBC # UR STRIP: ABNORMAL /HPF
REF LAB TEST METHOD: ABNORMAL
SODIUM SERPL-SCNC: 142 MMOL/L (ref 136–145)
SP GR UR STRIP: 1.01 (ref 1–1.03)
SQUAMOUS #/AREA URNS HPF: ABNORMAL /HPF
TRIGL SERPL-MCNC: 287 MG/DL (ref 0–150)
URATE SERPL-MCNC: 7.7 MG/DL (ref 2.4–5.7)
UROBILINOGEN UR QL STRIP: ABNORMAL
VLDLC SERPL-MCNC: 51 MG/DL (ref 5–40)
WBC # UR STRIP: ABNORMAL /HPF
WBC NRBC COR # BLD AUTO: 5.52 10*3/MM3 (ref 3.4–10.8)

## 2025-06-18 PROCEDURE — 83036 HEMOGLOBIN GLYCOSYLATED A1C: CPT

## 2025-06-18 PROCEDURE — 81001 URINALYSIS AUTO W/SCOPE: CPT

## 2025-06-18 PROCEDURE — 80061 LIPID PANEL: CPT

## 2025-06-18 PROCEDURE — 80053 COMPREHEN METABOLIC PANEL: CPT

## 2025-06-18 PROCEDURE — 84156 ASSAY OF PROTEIN URINE: CPT

## 2025-06-18 PROCEDURE — 84550 ASSAY OF BLOOD/URIC ACID: CPT

## 2025-06-18 PROCEDURE — 82306 VITAMIN D 25 HYDROXY: CPT

## 2025-06-18 PROCEDURE — 83970 ASSAY OF PARATHORMONE: CPT

## 2025-06-18 PROCEDURE — 82550 ASSAY OF CK (CPK): CPT

## 2025-06-18 PROCEDURE — 82570 ASSAY OF URINE CREATININE: CPT

## 2025-06-18 PROCEDURE — 36415 COLL VENOUS BLD VENIPUNCTURE: CPT

## 2025-06-18 PROCEDURE — 83735 ASSAY OF MAGNESIUM: CPT

## 2025-06-18 PROCEDURE — 84100 ASSAY OF PHOSPHORUS: CPT

## 2025-06-18 PROCEDURE — 85025 COMPLETE CBC W/AUTO DIFF WBC: CPT

## 2025-06-30 RX ORDER — GABAPENTIN 600 MG/1
600 TABLET ORAL 3 TIMES DAILY
Qty: 90 TABLET | Refills: 0 | Status: SHIPPED | OUTPATIENT
Start: 2025-06-30

## 2025-06-30 RX ORDER — ATORVASTATIN CALCIUM 20 MG/1
20 TABLET, FILM COATED ORAL DAILY
Qty: 90 TABLET | Refills: 0 | Status: SHIPPED | OUTPATIENT
Start: 2025-06-30

## 2025-07-28 DIAGNOSIS — Z79.4 TYPE 2 DIABETES MELLITUS WITH HYPERGLYCEMIA, WITH LONG-TERM CURRENT USE OF INSULIN: ICD-10-CM

## 2025-07-28 DIAGNOSIS — E11.65 TYPE 2 DIABETES MELLITUS WITH HYPERGLYCEMIA, WITH LONG-TERM CURRENT USE OF INSULIN: ICD-10-CM

## 2025-07-29 RX ORDER — INSULIN GLARGINE-YFGN 100 [IU]/ML
INJECTION, SOLUTION SUBCUTANEOUS
Qty: 3 ML | Refills: 0 | Status: SHIPPED | OUTPATIENT
Start: 2025-07-29

## 2025-08-01 RX ORDER — GABAPENTIN 600 MG/1
600 TABLET ORAL 3 TIMES DAILY
Qty: 90 TABLET | Refills: 0 | Status: SHIPPED | OUTPATIENT
Start: 2025-08-01

## 2025-08-13 ENCOUNTER — LAB (OUTPATIENT)
Dept: FAMILY MEDICINE CLINIC | Facility: CLINIC | Age: 75
End: 2025-08-13
Payer: MEDICARE

## 2025-08-13 ENCOUNTER — OFFICE VISIT (OUTPATIENT)
Dept: FAMILY MEDICINE CLINIC | Facility: CLINIC | Age: 75
End: 2025-08-13
Payer: MEDICARE

## 2025-08-13 VITALS
DIASTOLIC BLOOD PRESSURE: 78 MMHG | RESPIRATION RATE: 18 BRPM | OXYGEN SATURATION: 98 % | BODY MASS INDEX: 35.65 KG/M2 | WEIGHT: 208.8 LBS | SYSTOLIC BLOOD PRESSURE: 138 MMHG | HEART RATE: 67 BPM | HEIGHT: 64 IN

## 2025-08-13 DIAGNOSIS — E11.65 TYPE 2 DIABETES MELLITUS WITH HYPERGLYCEMIA, WITH LONG-TERM CURRENT USE OF INSULIN: Primary | ICD-10-CM

## 2025-08-13 DIAGNOSIS — N18.32 CHRONIC KIDNEY DISEASE, STAGE 3B: ICD-10-CM

## 2025-08-13 DIAGNOSIS — M19.172 POST-TRAUMATIC OSTEOARTHRITIS OF LEFT ANKLE: ICD-10-CM

## 2025-08-13 DIAGNOSIS — Z00.00 MEDICARE ANNUAL WELLNESS VISIT, SUBSEQUENT: ICD-10-CM

## 2025-08-13 DIAGNOSIS — E11.42 DIABETIC PERIPHERAL NEUROPATHY: ICD-10-CM

## 2025-08-13 DIAGNOSIS — Z78.0 POSTMENOPAUSE: ICD-10-CM

## 2025-08-13 DIAGNOSIS — Z79.4 TYPE 2 DIABETES MELLITUS WITH HYPERGLYCEMIA, WITH LONG-TERM CURRENT USE OF INSULIN: Primary | ICD-10-CM

## 2025-08-13 LAB
ALBUMIN UR-MCNC: 3 MG/DL
CREAT UR-MCNC: 80 MG/DL
MICROALBUMIN/CREAT UR: 37.5 MG/G (ref 0–29)

## 2025-08-13 PROCEDURE — 82043 UR ALBUMIN QUANTITATIVE: CPT | Performed by: NURSE PRACTITIONER

## 2025-08-13 PROCEDURE — 82570 ASSAY OF URINE CREATININE: CPT | Performed by: NURSE PRACTITIONER

## 2025-08-13 RX ORDER — TRAMADOL HYDROCHLORIDE 50 MG/1
50 TABLET ORAL NIGHTLY PRN
Qty: 30 TABLET | Refills: 0 | Status: SHIPPED | OUTPATIENT
Start: 2025-08-13

## 2025-08-13 RX ORDER — INSULIN GLARGINE 100 [IU]/ML
15 INJECTION, SOLUTION SUBCUTANEOUS NIGHTLY
Qty: 3 ML | Refills: 3 | Status: SHIPPED | OUTPATIENT
Start: 2025-08-13 | End: 2025-08-14

## 2025-08-14 DIAGNOSIS — E11.65 TYPE 2 DIABETES MELLITUS WITH HYPERGLYCEMIA, WITH LONG-TERM CURRENT USE OF INSULIN: ICD-10-CM

## 2025-08-14 DIAGNOSIS — Z79.4 TYPE 2 DIABETES MELLITUS WITH HYPERGLYCEMIA, WITH LONG-TERM CURRENT USE OF INSULIN: ICD-10-CM

## 2025-08-14 RX ORDER — INSULIN GLARGINE 100 [IU]/ML
INJECTION, SOLUTION SUBCUTANEOUS
Qty: 3 ML | Refills: 3 | Status: SHIPPED | OUTPATIENT
Start: 2025-08-14

## 2025-08-18 ENCOUNTER — PATIENT ROUNDING (BHMG ONLY) (OUTPATIENT)
Dept: FAMILY MEDICINE CLINIC | Facility: CLINIC | Age: 75
End: 2025-08-18
Payer: COMMERCIAL

## 2025-08-25 LAB
MDC_IDC_MSMT_BATTERY_REMAINING_LONGEVITY: 86 MO
MDC_IDC_MSMT_BATTERY_REMAINING_PERCENTAGE: 95.5 %
MDC_IDC_MSMT_BATTERY_RRT_TRIGGER: 2.6
MDC_IDC_MSMT_BATTERY_STATUS: NORMAL
MDC_IDC_MSMT_BATTERY_VOLTAGE: 2.99
MDC_IDC_MSMT_LEADCHNL_RA_IMPEDANCE_VALUE: 330
MDC_IDC_MSMT_LEADCHNL_RA_PACING_THRESHOLD_POLARITY: NORMAL
MDC_IDC_MSMT_LEADCHNL_RA_SENSING_INTR_AMPL: 1.3
MDC_IDC_MSMT_LEADCHNL_RV_DTM: NORMAL
MDC_IDC_MSMT_LEADCHNL_RV_IMPEDANCE_VALUE: 430
MDC_IDC_MSMT_LEADCHNL_RV_PACING_THRESHOLD_AMPLITUDE: 0.88
MDC_IDC_MSMT_LEADCHNL_RV_PACING_THRESHOLD_POLARITY: NORMAL
MDC_IDC_MSMT_LEADCHNL_RV_PACING_THRESHOLD_PULSEWIDTH: 0.4
MDC_IDC_MSMT_LEADCHNL_RV_SENSING_INTR_AMPL: 3.8
MDC_IDC_PG_IMPLANT_DTM: NORMAL
MDC_IDC_PG_MFG: NORMAL
MDC_IDC_PG_MODEL: NORMAL
MDC_IDC_PG_SERIAL: NORMAL
MDC_IDC_PG_TYPE: NORMAL
MDC_IDC_SESS_DTM: NORMAL
MDC_IDC_SESS_TYPE: NORMAL
MDC_IDC_SET_BRADY_AT_MODE_SWITCH_RATE: 180
MDC_IDC_SET_BRADY_LOWRATE: 60
MDC_IDC_SET_BRADY_MAX_SENSOR_RATE: 130
MDC_IDC_SET_BRADY_MAX_TRACKING_RATE: 130
MDC_IDC_SET_BRADY_MODE: NORMAL
MDC_IDC_SET_BRADY_PAV_DELAY: 225
MDC_IDC_SET_BRADY_SAV_DELAY: 200
MDC_IDC_SET_LEADCHNL_RA_PACING_AMPLITUDE: 2.5
MDC_IDC_SET_LEADCHNL_RA_PACING_POLARITY: NORMAL
MDC_IDC_SET_LEADCHNL_RA_PACING_PULSEWIDTH: 1
MDC_IDC_SET_LEADCHNL_RA_SENSING_POLARITY: NORMAL
MDC_IDC_SET_LEADCHNL_RA_SENSING_SENSITIVITY: 0.5
MDC_IDC_SET_LEADCHNL_RV_PACING_AMPLITUDE: 1.12
MDC_IDC_SET_LEADCHNL_RV_PACING_POLARITY: NORMAL
MDC_IDC_SET_LEADCHNL_RV_PACING_PULSEWIDTH: 0.4
MDC_IDC_SET_LEADCHNL_RV_SENSING_POLARITY: NORMAL
MDC_IDC_SET_LEADCHNL_RV_SENSING_SENSITIVITY: 0.5
MDC_IDC_STAT_AT_BURDEN_PERCENT: 0
MDC_IDC_STAT_BRADY_RA_PERCENT_PACED: 78
MDC_IDC_STAT_BRADY_RV_PERCENT_PACED: 7

## (undated) DEVICE — UNDERGLV SURG BIOGEL INDICAT PI SZ8 BLU

## (undated) DEVICE — BNDG PLSTR SPECIALIST FAST 3IN 3YD LF

## (undated) DEVICE — DRSNG GZ CURAD XEROFORM NONADHR OVERWRAP 5X9IN

## (undated) DEVICE — 3M™ STERI-STRIP™ REINFORCED ADHESIVE SKIN CLOSURES, R1547, 1/2 IN X 4 IN (12 MM X 100 MM), 6 STRIPS/ENVELOPE: Brand: 3M™ STERI-STRIP™

## (undated) DEVICE — UNDRGLV SURG BIOGEL PIMICROINDICATOR SYNTH SZ8 LF STRL

## (undated) DEVICE — GOWN,REINFORCE,POLY,SIRUS,BREATH SLV,XLG: Brand: MEDLINE

## (undated) DEVICE — IMMOB SHLDR CUT/AWAY UNIV

## (undated) DEVICE — PAD,ABDOMINAL,5"X9",STERILE,LF,1/PK: Brand: MEDLINE INDUSTRIES, INC.

## (undated) DEVICE — APPL CHLORAPREP HI/LITE TINTED 10.5ML ORNG

## (undated) DEVICE — 3M™ IOBAN™ 2 ANTIMICROBIAL INCISE DRAPE 6650EZ: Brand: IOBAN™ 2

## (undated) DEVICE — GLV SURG SIGNATURE ESSENTIAL PF LTX SZ8.5

## (undated) DEVICE — PACEMAKER CDS: Brand: MEDLINE INDUSTRIES, INC.

## (undated) DEVICE — UNDYED BRAIDED (POLYGLACTIN 910), SYNTHETIC ABSORBABLE SUTURE: Brand: COATED VICRYL

## (undated) DEVICE — PADDING,UNDERCAST,COTTON, 4"X4YD STERILE: Brand: MEDLINE

## (undated) DEVICE — INTRO SHEATH PRELUDE SNAP .038 6F 13CM W/SDPRT

## (undated) DEVICE — FOAM BUMP, LARGE: Brand: MEDLINE INDUSTRIES, INC.

## (undated) DEVICE — REFLEX ONE, SKIN STAPLER, 35 WIDE: Brand: REFLEX

## (undated) DEVICE — PINNACLE INTRODUCER SHEATH: Brand: PINNACLE

## (undated) DEVICE — STAPLER, SKIN, 35W, A: Brand: MEDLINE INDUSTRIES, INC.

## (undated) DEVICE — 3M™ PATIENT PLATE, CORDED, SPLIT, LARGE, 40 PER CASE, 1179: Brand: 3M™

## (undated) DEVICE — ELECTRD DEFIB M/FUNC PROPADZ RADIOL 2PK

## (undated) DEVICE — DISPOSABLE TOURNIQUET CUFF 30"X4", 1-LINE, WHITE, STERILE, 1EA/PK, 10PK/CS: Brand: ASP MEDICAL

## (undated) DEVICE — C-ARM: Brand: UNBRANDED

## (undated) DEVICE — DRP SURG U/DRP INVISISHIELD PA 48X52IN

## (undated) DEVICE — ANTIBACTERIAL UNDYED BRAIDED (POLYGLACTIN 910), SYNTHETIC ABSORBABLE SUTURE: Brand: COATED VICRYL

## (undated) DEVICE — BIT DRL QC SS 2.5X110MM

## (undated) DEVICE — VIOLET BRAIDED (POLYGLACTIN 910), SYNTHETIC ABSORBABLE SUTURE: Brand: COATED VICRYL

## (undated) DEVICE — PENCL SMOKE/EVAC MEGADYNE TELESCP 10FT

## (undated) DEVICE — GAUZE,SPONGE,FLUFF,6"X6.75",STRL,5/TRAY: Brand: MEDLINE

## (undated) DEVICE — DISPOSABLE ADAPTER

## (undated) DEVICE — TBG PENCL TELESCP MEGADYNE SMOKE EVAC 15FT

## (undated) DEVICE — CVR HNDL LT SURG ACCSSRY BLU STRL

## (undated) DEVICE — UNDERGLV SURG BIOGEL/PI PF SYNTH SURG SZ8.5 BLU 50/BX

## (undated) DEVICE — DRAPE SHEET ULTRAGARD: Brand: MEDLINE

## (undated) DEVICE — SWAN-GANZ BIPOLAR PACING CATHETER: Brand: SWAN-GANZ

## (undated) DEVICE — KT SURG TURNOVER 050

## (undated) DEVICE — GAUZE,SPONGE,4"X4",32PLY,XRAY,STRL,LF: Brand: MEDLINE

## (undated) DEVICE — BANDAGE,ELASTIC,ESMARK,STERILE,6"X9',LF: Brand: MEDLINE

## (undated) DEVICE — SOL IRRIG NACL 1000ML

## (undated) DEVICE — CABL BIPOL W/ALLGTR CLIP/SM 12FT

## (undated) DEVICE — ST ACC MICROPUNCTURE STFF/CANN PLAT/TP 4F 21G 40CM

## (undated) DEVICE — SNAP KOVER: Brand: UNBRANDED

## (undated) DEVICE — PAD CAST SYN PROTOUCH RL 4IN NS

## (undated) DEVICE — GLV SURG SENSICARE PI ORTHO SZ8 LF STRL

## (undated) DEVICE — SUT MNCRYL 2/0 CT1 36IN

## (undated) DEVICE — SOLUTION,WATER,IRRIGATION,1000ML,STERILE: Brand: MEDLINE

## (undated) DEVICE — PK EXTREM 50